# Patient Record
Sex: FEMALE | Race: WHITE | Employment: UNEMPLOYED | URBAN - METROPOLITAN AREA
[De-identification: names, ages, dates, MRNs, and addresses within clinical notes are randomized per-mention and may not be internally consistent; named-entity substitution may affect disease eponyms.]

---

## 2019-09-02 ENCOUNTER — HOSPITAL ENCOUNTER (EMERGENCY)
Age: 54
Discharge: HOME | End: 2019-09-02
Attending: STUDENT IN AN ORGANIZED HEALTH CARE EDUCATION/TRAINING PROGRAM
Payer: COMMERCIAL

## 2019-09-02 ENCOUNTER — APPOINTMENT (OUTPATIENT)
Dept: GENERAL RADIOLOGY | Age: 54
End: 2019-09-02
Payer: COMMERCIAL

## 2019-09-02 VITALS
OXYGEN SATURATION: 100 % | WEIGHT: 209 LBS | HEIGHT: 63 IN | TEMPERATURE: 97.7 F | DIASTOLIC BLOOD PRESSURE: 83 MMHG | RESPIRATION RATE: 16 BRPM | BODY MASS INDEX: 37.03 KG/M2 | SYSTOLIC BLOOD PRESSURE: 162 MMHG | HEART RATE: 90 BPM

## 2019-09-02 DIAGNOSIS — Z86.79 HISTORY OF CHF (CONGESTIVE HEART FAILURE): ICD-10-CM

## 2019-09-02 DIAGNOSIS — I10 CHRONIC HYPERTENSION: ICD-10-CM

## 2019-09-02 DIAGNOSIS — N18.5 CHRONIC RENAL FAILURE, STAGE 5 (HCC): Primary | ICD-10-CM

## 2019-09-02 DIAGNOSIS — E11.65 TYPE 2 DIABETES MELLITUS WITH HYPERGLYCEMIA, WITH LONG-TERM CURRENT USE OF INSULIN (HCC): ICD-10-CM

## 2019-09-02 DIAGNOSIS — Z79.4 TYPE 2 DIABETES MELLITUS WITH HYPERGLYCEMIA, WITH LONG-TERM CURRENT USE OF INSULIN (HCC): ICD-10-CM

## 2019-09-02 DIAGNOSIS — Z91.199 MEDICALLY NONCOMPLIANT: ICD-10-CM

## 2019-09-02 LAB
ALBUMIN SERPL-MCNC: 3.9 G/DL (ref 3.5–4.6)
ALP BLD-CCNC: 59 U/L (ref 40–130)
ALT SERPL-CCNC: 20 U/L (ref 0–33)
ANION GAP SERPL CALCULATED.3IONS-SCNC: 20 MEQ/L (ref 9–15)
APTT: 28.9 SEC (ref 24.4–36.8)
AST SERPL-CCNC: 19 U/L (ref 0–35)
BASOPHILS ABSOLUTE: 0 K/UL (ref 0–0.2)
BASOPHILS RELATIVE PERCENT: 0.6 %
BILIRUB SERPL-MCNC: 0.4 MG/DL (ref 0.2–0.7)
BUN BLDV-MCNC: 79 MG/DL (ref 6–20)
C-REACTIVE PROTEIN, HIGH SENSITIVITY: 3.4 MG/L (ref 0–5)
CALCIUM SERPL-MCNC: 8.9 MG/DL (ref 8.5–9.9)
CHLORIDE BLD-SCNC: 95 MEQ/L (ref 95–107)
CO2: 21 MEQ/L (ref 20–31)
CREAT SERPL-MCNC: 10.74 MG/DL (ref 0.5–0.9)
EKG ATRIAL RATE: 84 BPM
EKG P AXIS: 48 DEGREES
EKG P-R INTERVAL: 166 MS
EKG Q-T INTERVAL: 434 MS
EKG QRS DURATION: 88 MS
EKG QTC CALCULATION (BAZETT): 512 MS
EKG R AXIS: -10 DEGREES
EKG T AXIS: 84 DEGREES
EKG VENTRICULAR RATE: 84 BPM
EOSINOPHILS ABSOLUTE: 0.1 K/UL (ref 0–0.7)
EOSINOPHILS RELATIVE PERCENT: 1.6 %
GFR AFRICAN AMERICAN: 4.6
GFR NON-AFRICAN AMERICAN: 3.8
GLOBULIN: 4.6 G/DL (ref 2.3–3.5)
GLUCOSE BLD-MCNC: 226 MG/DL (ref 70–99)
HCT VFR BLD CALC: 35.2 % (ref 37–47)
HEMOGLOBIN: 11.6 G/DL (ref 12–16)
INR BLD: 1
LACTIC ACID: 1 MMOL/L (ref 0.5–2.2)
LYMPHOCYTES ABSOLUTE: 1.3 K/UL (ref 1–4.8)
LYMPHOCYTES RELATIVE PERCENT: 22.9 %
MCH RBC QN AUTO: 31.4 PG (ref 27–31.3)
MCHC RBC AUTO-ENTMCNC: 33 % (ref 33–37)
MCV RBC AUTO: 95.2 FL (ref 82–100)
MONOCYTES ABSOLUTE: 0.5 K/UL (ref 0.2–0.8)
MONOCYTES RELATIVE PERCENT: 8 %
NEUTROPHILS ABSOLUTE: 3.9 K/UL (ref 1.4–6.5)
NEUTROPHILS RELATIVE PERCENT: 66.9 %
PDW BLD-RTO: 14.8 % (ref 11.5–14.5)
PLATELET # BLD: 238 K/UL (ref 130–400)
POTASSIUM SERPL-SCNC: 4.5 MEQ/L (ref 3.4–4.9)
PROTHROMBIN TIME: 13.9 SEC (ref 12.3–14.9)
RBC # BLD: 3.69 M/UL (ref 4.2–5.4)
SODIUM BLD-SCNC: 136 MEQ/L (ref 135–144)
TOTAL CK: 58 U/L (ref 0–170)
TOTAL PROTEIN: 8.5 G/DL (ref 6.3–8)
TROPONIN: 0.13 NG/ML (ref 0–0.01)
WBC # BLD: 5.8 K/UL (ref 4.8–10.8)

## 2019-09-02 PROCEDURE — 93005 ELECTROCARDIOGRAM TRACING: CPT | Performed by: STUDENT IN AN ORGANIZED HEALTH CARE EDUCATION/TRAINING PROGRAM

## 2019-09-02 PROCEDURE — 6360000002 HC RX W HCPCS: Performed by: STUDENT IN AN ORGANIZED HEALTH CARE EDUCATION/TRAINING PROGRAM

## 2019-09-02 PROCEDURE — 71045 X-RAY EXAM CHEST 1 VIEW: CPT

## 2019-09-02 PROCEDURE — 99285 EMERGENCY DEPT VISIT HI MDM: CPT

## 2019-09-02 PROCEDURE — 36415 COLL VENOUS BLD VENIPUNCTURE: CPT

## 2019-09-02 PROCEDURE — 85025 COMPLETE CBC W/AUTO DIFF WBC: CPT

## 2019-09-02 PROCEDURE — 85610 PROTHROMBIN TIME: CPT

## 2019-09-02 PROCEDURE — 84484 ASSAY OF TROPONIN QUANT: CPT

## 2019-09-02 PROCEDURE — 86141 C-REACTIVE PROTEIN HS: CPT

## 2019-09-02 PROCEDURE — 85730 THROMBOPLASTIN TIME PARTIAL: CPT

## 2019-09-02 PROCEDURE — 83605 ASSAY OF LACTIC ACID: CPT

## 2019-09-02 PROCEDURE — 6370000000 HC RX 637 (ALT 250 FOR IP): Performed by: STUDENT IN AN ORGANIZED HEALTH CARE EDUCATION/TRAINING PROGRAM

## 2019-09-02 PROCEDURE — 82550 ASSAY OF CK (CPK): CPT

## 2019-09-02 PROCEDURE — 96374 THER/PROPH/DIAG INJ IV PUSH: CPT

## 2019-09-02 PROCEDURE — 80053 COMPREHEN METABOLIC PANEL: CPT

## 2019-09-02 RX ORDER — ONDANSETRON 4 MG/1
4 TABLET, ORALLY DISINTEGRATING ORAL ONCE
Status: COMPLETED | OUTPATIENT
Start: 2019-09-02 | End: 2019-09-02

## 2019-09-02 RX ORDER — HYDRALAZINE HYDROCHLORIDE 20 MG/ML
20 INJECTION INTRAMUSCULAR; INTRAVENOUS ONCE
Status: COMPLETED | OUTPATIENT
Start: 2019-09-02 | End: 2019-09-02

## 2019-09-02 RX ADMIN — ONDANSETRON 4 MG: 4 TABLET, ORALLY DISINTEGRATING ORAL at 15:50

## 2019-09-02 RX ADMIN — HYDRALAZINE HYDROCHLORIDE 20 MG: 20 INJECTION INTRAMUSCULAR; INTRAVENOUS at 14:58

## 2019-09-02 SDOH — HEALTH STABILITY: MENTAL HEALTH: HOW OFTEN DO YOU HAVE A DRINK CONTAINING ALCOHOL?: NEVER

## 2019-09-02 ASSESSMENT — ENCOUNTER SYMPTOMS
DIARRHEA: 0
TROUBLE SWALLOWING: 0
ABDOMINAL PAIN: 0
SINUS PRESSURE: 0
COUGH: 1
SHORTNESS OF BREATH: 1
VOMITING: 0
CHEST TIGHTNESS: 0
BACK PAIN: 0

## 2019-09-02 NOTE — ED PROVIDER NOTES
words are mis-transcribed.)    Bela Suazo,  (electronically signed)  Attending Emergency Physician          Bela Suazo,   09/02/19 Francheska Vasques [place] [address], DO  09/09/19 1673

## 2019-09-03 PROCEDURE — 93010 ELECTROCARDIOGRAM REPORT: CPT | Performed by: INTERNAL MEDICINE

## 2019-09-09 ASSESSMENT — ENCOUNTER SYMPTOMS
SHORTNESS OF BREATH: 1
VOMITING: 0
BACK PAIN: 0
SINUS PRESSURE: 0
COUGH: 1
TROUBLE SWALLOWING: 0
CHEST TIGHTNESS: 0
ABDOMINAL PAIN: 0
DIARRHEA: 0

## 2019-10-13 ENCOUNTER — HOSPITAL ENCOUNTER (INPATIENT)
Age: 54
LOS: 2 days | Discharge: ANOTHER HOSPITAL | DRG: 191 | End: 2019-10-16
Attending: STUDENT IN AN ORGANIZED HEALTH CARE EDUCATION/TRAINING PROGRAM | Admitting: INTERNAL MEDICINE
Payer: COMMERCIAL

## 2019-10-13 ENCOUNTER — APPOINTMENT (OUTPATIENT)
Dept: CT IMAGING | Age: 54
DRG: 191 | End: 2019-10-13
Payer: COMMERCIAL

## 2019-10-13 ENCOUNTER — APPOINTMENT (OUTPATIENT)
Dept: GENERAL RADIOLOGY | Age: 54
DRG: 191 | End: 2019-10-13
Payer: COMMERCIAL

## 2019-10-13 DIAGNOSIS — I20.8 ANGINAL CHEST PAIN AT REST (HCC): Primary | ICD-10-CM

## 2019-10-13 DIAGNOSIS — E11.65 TYPE 2 DIABETES MELLITUS WITH HYPERGLYCEMIA, WITH LONG-TERM CURRENT USE OF INSULIN (HCC): ICD-10-CM

## 2019-10-13 DIAGNOSIS — N18.6 END STAGE RENAL FAILURE ON DIALYSIS (HCC): ICD-10-CM

## 2019-10-13 DIAGNOSIS — Z99.2 END STAGE RENAL FAILURE ON DIALYSIS (HCC): ICD-10-CM

## 2019-10-13 DIAGNOSIS — E66.01 MORBID OBESITY DUE TO EXCESS CALORIES (HCC): ICD-10-CM

## 2019-10-13 DIAGNOSIS — Z79.4 TYPE 2 DIABETES MELLITUS WITH HYPERGLYCEMIA, WITH LONG-TERM CURRENT USE OF INSULIN (HCC): ICD-10-CM

## 2019-10-13 PROBLEM — R07.9 CHEST PAIN: Status: ACTIVE | Noted: 2019-10-13

## 2019-10-13 LAB
ALBUMIN SERPL-MCNC: 3.5 G/DL (ref 3.5–4.6)
ALP BLD-CCNC: 59 U/L (ref 40–130)
ALT SERPL-CCNC: 31 U/L (ref 0–33)
ANION GAP SERPL CALCULATED.3IONS-SCNC: 18 MEQ/L (ref 9–15)
APTT: 32.1 SEC (ref 24.4–36.8)
AST SERPL-CCNC: 25 U/L (ref 0–35)
BASOPHILS ABSOLUTE: 0 K/UL (ref 0–0.2)
BASOPHILS RELATIVE PERCENT: 0.5 %
BILIRUB SERPL-MCNC: <0.2 MG/DL (ref 0.2–0.7)
BUN BLDV-MCNC: 53 MG/DL (ref 6–20)
C-REACTIVE PROTEIN, HIGH SENSITIVITY: 3.7 MG/L (ref 0–5)
CALCIUM SERPL-MCNC: 8.8 MG/DL (ref 8.5–9.9)
CHLORIDE BLD-SCNC: 95 MEQ/L (ref 95–107)
CHOLESTEROL, TOTAL: 119 MG/DL (ref 0–199)
CO2: 26 MEQ/L (ref 20–31)
CREAT SERPL-MCNC: 8.11 MG/DL (ref 0.5–0.9)
D DIMER: 0.93 MG/L FEU (ref 0–0.5)
EOSINOPHILS ABSOLUTE: 0.3 K/UL (ref 0–0.7)
EOSINOPHILS RELATIVE PERCENT: 4.8 %
GFR AFRICAN AMERICAN: 6.3
GFR NON-AFRICAN AMERICAN: 5.2
GLOBULIN: 4.4 G/DL (ref 2.3–3.5)
GLUCOSE BLD-MCNC: 100 MG/DL (ref 60–115)
GLUCOSE BLD-MCNC: 183 MG/DL (ref 70–99)
GONADOTROPIN, CHORIONIC (HCG) QUANT: 4.2 MIU/ML
HCT VFR BLD CALC: 27.8 % (ref 37–47)
HDLC SERPL-MCNC: 30 MG/DL (ref 40–59)
HEMOGLOBIN: 9.7 G/DL (ref 12–16)
INR BLD: 1
LDL CHOLESTEROL CALCULATED: 60 MG/DL (ref 0–129)
LYMPHOCYTES ABSOLUTE: 1.5 K/UL (ref 1–4.8)
LYMPHOCYTES RELATIVE PERCENT: 27.9 %
MAGNESIUM: 2 MG/DL (ref 1.7–2.4)
MCH RBC QN AUTO: 33.3 PG (ref 27–31.3)
MCHC RBC AUTO-ENTMCNC: 35 % (ref 33–37)
MCV RBC AUTO: 95.1 FL (ref 82–100)
MONOCYTES ABSOLUTE: 0.6 K/UL (ref 0.2–0.8)
MONOCYTES RELATIVE PERCENT: 11.4 %
NEUTROPHILS ABSOLUTE: 2.9 K/UL (ref 1.4–6.5)
NEUTROPHILS RELATIVE PERCENT: 55.4 %
PDW BLD-RTO: 13.8 % (ref 11.5–14.5)
PERFORMED ON: NORMAL
PLATELET # BLD: 224 K/UL (ref 130–400)
POTASSIUM SERPL-SCNC: 4 MEQ/L (ref 3.4–4.9)
PRO-BNP: NORMAL PG/ML
PROTHROMBIN TIME: 13.6 SEC (ref 12.3–14.9)
RBC # BLD: 2.93 M/UL (ref 4.2–5.4)
SODIUM BLD-SCNC: 139 MEQ/L (ref 135–144)
TOTAL CK: 47 U/L (ref 0–170)
TOTAL PROTEIN: 7.9 G/DL (ref 6.3–8)
TRIGL SERPL-MCNC: 146 MG/DL (ref 0–150)
TROPONIN: 0.12 NG/ML (ref 0–0.01)
TROPONIN: 0.12 NG/ML (ref 0–0.01)
TSH SERPL DL<=0.05 MIU/L-ACNC: 0.85 UIU/ML (ref 0.44–3.86)
WBC # BLD: 5.2 K/UL (ref 4.8–10.8)

## 2019-10-13 PROCEDURE — 6370000000 HC RX 637 (ALT 250 FOR IP): Performed by: NURSE PRACTITIONER

## 2019-10-13 PROCEDURE — 2580000003 HC RX 258: Performed by: NURSE PRACTITIONER

## 2019-10-13 PROCEDURE — 80061 LIPID PANEL: CPT

## 2019-10-13 PROCEDURE — 85379 FIBRIN DEGRADATION QUANT: CPT

## 2019-10-13 PROCEDURE — 80053 COMPREHEN METABOLIC PANEL: CPT

## 2019-10-13 PROCEDURE — 36415 COLL VENOUS BLD VENIPUNCTURE: CPT

## 2019-10-13 PROCEDURE — 86141 C-REACTIVE PROTEIN HS: CPT

## 2019-10-13 PROCEDURE — 83880 ASSAY OF NATRIURETIC PEPTIDE: CPT

## 2019-10-13 PROCEDURE — 83735 ASSAY OF MAGNESIUM: CPT

## 2019-10-13 PROCEDURE — 84443 ASSAY THYROID STIM HORMONE: CPT

## 2019-10-13 PROCEDURE — 85025 COMPLETE CBC W/AUTO DIFF WBC: CPT

## 2019-10-13 PROCEDURE — 6370000000 HC RX 637 (ALT 250 FOR IP): Performed by: STUDENT IN AN ORGANIZED HEALTH CARE EDUCATION/TRAINING PROGRAM

## 2019-10-13 PROCEDURE — 71046 X-RAY EXAM CHEST 2 VIEWS: CPT

## 2019-10-13 PROCEDURE — 85730 THROMBOPLASTIN TIME PARTIAL: CPT

## 2019-10-13 PROCEDURE — 99285 EMERGENCY DEPT VISIT HI MDM: CPT

## 2019-10-13 PROCEDURE — G0378 HOSPITAL OBSERVATION PER HR: HCPCS

## 2019-10-13 PROCEDURE — 85610 PROTHROMBIN TIME: CPT

## 2019-10-13 PROCEDURE — 84484 ASSAY OF TROPONIN QUANT: CPT

## 2019-10-13 PROCEDURE — 84702 CHORIONIC GONADOTROPIN TEST: CPT

## 2019-10-13 PROCEDURE — 71275 CT ANGIOGRAPHY CHEST: CPT

## 2019-10-13 PROCEDURE — 93005 ELECTROCARDIOGRAM TRACING: CPT | Performed by: STUDENT IN AN ORGANIZED HEALTH CARE EDUCATION/TRAINING PROGRAM

## 2019-10-13 PROCEDURE — 6360000004 HC RX CONTRAST MEDICATION: Performed by: STUDENT IN AN ORGANIZED HEALTH CARE EDUCATION/TRAINING PROGRAM

## 2019-10-13 PROCEDURE — 82550 ASSAY OF CK (CPK): CPT

## 2019-10-13 RX ORDER — SODIUM CHLORIDE 0.9 % (FLUSH) 0.9 %
10 SYRINGE (ML) INJECTION PRN
Status: DISCONTINUED | OUTPATIENT
Start: 2019-10-13 | End: 2019-10-17 | Stop reason: HOSPADM

## 2019-10-13 RX ORDER — SODIUM CHLORIDE 0.9 % (FLUSH) 0.9 %
10 SYRINGE (ML) INJECTION EVERY 12 HOURS SCHEDULED
Status: DISCONTINUED | OUTPATIENT
Start: 2019-10-13 | End: 2019-10-14

## 2019-10-13 RX ORDER — FLUOXETINE 10 MG/1
40 CAPSULE ORAL DAILY
Status: ON HOLD | COMMUNITY
End: 2020-01-01 | Stop reason: HOSPADM

## 2019-10-13 RX ORDER — ONDANSETRON 2 MG/ML
4 INJECTION INTRAMUSCULAR; INTRAVENOUS EVERY 6 HOURS PRN
Status: DISCONTINUED | OUTPATIENT
Start: 2019-10-13 | End: 2019-10-17 | Stop reason: HOSPADM

## 2019-10-13 RX ORDER — ACETAMINOPHEN 500 MG
1000 TABLET ORAL ONCE
Status: COMPLETED | OUTPATIENT
Start: 2019-10-13 | End: 2019-10-13

## 2019-10-13 RX ORDER — ASPIRIN 81 MG/1
81 TABLET, CHEWABLE ORAL DAILY
Status: DISCONTINUED | OUTPATIENT
Start: 2019-10-14 | End: 2019-10-17 | Stop reason: HOSPADM

## 2019-10-13 RX ORDER — DEXTROSE MONOHYDRATE 25 G/50ML
12.5 INJECTION, SOLUTION INTRAVENOUS PRN
Status: DISCONTINUED | OUTPATIENT
Start: 2019-10-13 | End: 2019-10-17 | Stop reason: HOSPADM

## 2019-10-13 RX ORDER — ATORVASTATIN CALCIUM 40 MG/1
40 TABLET, FILM COATED ORAL NIGHTLY
Status: DISCONTINUED | OUTPATIENT
Start: 2019-10-13 | End: 2019-10-17 | Stop reason: HOSPADM

## 2019-10-13 RX ORDER — NICOTINE POLACRILEX 4 MG
15 LOZENGE BUCCAL PRN
Status: DISCONTINUED | OUTPATIENT
Start: 2019-10-13 | End: 2019-10-17 | Stop reason: HOSPADM

## 2019-10-13 RX ORDER — CLONIDINE HYDROCHLORIDE 0.1 MG/1
0.1 TABLET ORAL NIGHTLY
Status: ON HOLD | COMMUNITY
End: 2020-01-01 | Stop reason: HOSPADM

## 2019-10-13 RX ORDER — NITROGLYCERIN 0.4 MG/1
0.4 TABLET SUBLINGUAL EVERY 5 MIN PRN
Status: DISCONTINUED | OUTPATIENT
Start: 2019-10-13 | End: 2019-10-17 | Stop reason: HOSPADM

## 2019-10-13 RX ORDER — NICOTINE 21 MG/24HR
1 PATCH, TRANSDERMAL 24 HOURS TRANSDERMAL DAILY
Status: DISCONTINUED | OUTPATIENT
Start: 2019-10-13 | End: 2019-10-14

## 2019-10-13 RX ORDER — SENNA PLUS 8.6 MG/1
1 TABLET ORAL DAILY PRN
Status: DISCONTINUED | OUTPATIENT
Start: 2019-10-13 | End: 2019-10-17 | Stop reason: HOSPADM

## 2019-10-13 RX ORDER — DEXTROSE MONOHYDRATE 50 MG/ML
100 INJECTION, SOLUTION INTRAVENOUS PRN
Status: DISCONTINUED | OUTPATIENT
Start: 2019-10-13 | End: 2019-10-17 | Stop reason: HOSPADM

## 2019-10-13 RX ADMIN — Medication 10 ML: at 21:58

## 2019-10-13 RX ADMIN — ATORVASTATIN CALCIUM 40 MG: 40 TABLET, FILM COATED ORAL at 21:57

## 2019-10-13 RX ADMIN — NITROGLYCERIN 0.4 MG: 0.4 TABLET, ORALLY DISINTEGRATING SUBLINGUAL at 16:17

## 2019-10-13 RX ADMIN — ACETAMINOPHEN 1000 MG: 500 TABLET ORAL at 15:20

## 2019-10-13 RX ADMIN — IOPAMIDOL 100 ML: 612 INJECTION, SOLUTION INTRAVENOUS at 16:28

## 2019-10-13 ASSESSMENT — ENCOUNTER SYMPTOMS
CHEST TIGHTNESS: 0
ABDOMINAL PAIN: 0
EYES NEGATIVE: 1
CHEST TIGHTNESS: 1
COUGH: 0
SHORTNESS OF BREATH: 1
TROUBLE SWALLOWING: 0
VOMITING: 0
GASTROINTESTINAL NEGATIVE: 1
BACK PAIN: 0
ALLERGIC/IMMUNOLOGIC NEGATIVE: 1
SINUS PRESSURE: 0
DIARRHEA: 0

## 2019-10-13 ASSESSMENT — HEART SCORE: ECG: 1

## 2019-10-13 ASSESSMENT — PAIN SCALES - GENERAL
PAINLEVEL_OUTOF10: 9
PAINLEVEL_OUTOF10: 8
PAINLEVEL_OUTOF10: 2

## 2019-10-13 ASSESSMENT — PAIN DESCRIPTION - PAIN TYPE: TYPE: ACUTE PAIN

## 2019-10-13 ASSESSMENT — PAIN DESCRIPTION - LOCATION: LOCATION: CHEST

## 2019-10-14 ENCOUNTER — APPOINTMENT (OUTPATIENT)
Dept: CARDIAC CATH/INVASIVE PROCEDURES | Age: 54
DRG: 191 | End: 2019-10-14
Payer: COMMERCIAL

## 2019-10-14 LAB
ALBUMIN SERPL-MCNC: 3.4 G/DL (ref 3.5–4.6)
ALP BLD-CCNC: 55 U/L (ref 40–130)
ALT SERPL-CCNC: 29 U/L (ref 0–33)
ANION GAP SERPL CALCULATED.3IONS-SCNC: 18 MEQ/L (ref 9–15)
AST SERPL-CCNC: 21 U/L (ref 0–35)
BILIRUB SERPL-MCNC: <0.2 MG/DL (ref 0.2–0.7)
BUN BLDV-MCNC: 58 MG/DL (ref 6–20)
CALCIUM SERPL-MCNC: 9 MG/DL (ref 8.5–9.9)
CHLORIDE BLD-SCNC: 97 MEQ/L (ref 95–107)
CO2: 24 MEQ/L (ref 20–31)
CREAT SERPL-MCNC: 8.84 MG/DL (ref 0.5–0.9)
EKG ATRIAL RATE: 73 BPM
EKG ATRIAL RATE: 86 BPM
EKG P AXIS: 39 DEGREES
EKG P AXIS: 46 DEGREES
EKG P-R INTERVAL: 162 MS
EKG P-R INTERVAL: 176 MS
EKG Q-T INTERVAL: 426 MS
EKG Q-T INTERVAL: 472 MS
EKG QRS DURATION: 82 MS
EKG QRS DURATION: 98 MS
EKG QTC CALCULATION (BAZETT): 509 MS
EKG QTC CALCULATION (BAZETT): 519 MS
EKG R AXIS: -1 DEGREES
EKG R AXIS: 0 DEGREES
EKG T AXIS: 12 DEGREES
EKG T AXIS: 3 DEGREES
EKG VENTRICULAR RATE: 73 BPM
EKG VENTRICULAR RATE: 86 BPM
GFR AFRICAN AMERICAN: 5.7
GFR NON-AFRICAN AMERICAN: 4.7
GLOBULIN: 4.3 G/DL (ref 2.3–3.5)
GLUCOSE BLD-MCNC: 118 MG/DL (ref 70–99)
GLUCOSE BLD-MCNC: 129 MG/DL (ref 60–115)
GLUCOSE BLD-MCNC: 154 MG/DL (ref 60–115)
GLUCOSE BLD-MCNC: 222 MG/DL (ref 60–115)
GLUCOSE BLD-MCNC: 226 MG/DL (ref 60–115)
HCT VFR BLD CALC: 29.6 % (ref 37–47)
HEMOGLOBIN: 10.1 G/DL (ref 12–16)
MCH RBC QN AUTO: 32.6 PG (ref 27–31.3)
MCHC RBC AUTO-ENTMCNC: 34.2 % (ref 33–37)
MCV RBC AUTO: 95.4 FL (ref 82–100)
PDW BLD-RTO: 13.9 % (ref 11.5–14.5)
PERFORMED ON: ABNORMAL
PLATELET # BLD: 228 K/UL (ref 130–400)
POTASSIUM REFLEX MAGNESIUM: 4.3 MEQ/L (ref 3.4–4.9)
PRO-BNP: NORMAL PG/ML
RBC # BLD: 3.1 M/UL (ref 4.2–5.4)
SODIUM BLD-SCNC: 139 MEQ/L (ref 135–144)
TOTAL PROTEIN: 7.7 G/DL (ref 6.3–8)
TROPONIN: 0.11 NG/ML (ref 0–0.01)
WBC # BLD: 6 K/UL (ref 4.8–10.8)

## 2019-10-14 PROCEDURE — 6360000004 HC RX CONTRAST MEDICATION: Performed by: INTERNAL MEDICINE

## 2019-10-14 PROCEDURE — 2580000003 HC RX 258

## 2019-10-14 PROCEDURE — 85027 COMPLETE CBC AUTOMATED: CPT

## 2019-10-14 PROCEDURE — 4A023N8 MEASUREMENT OF CARDIAC SAMPLING AND PRESSURE, BILATERAL, PERCUTANEOUS APPROACH: ICD-10-PCS | Performed by: INTERNAL MEDICINE

## 2019-10-14 PROCEDURE — 6370000000 HC RX 637 (ALT 250 FOR IP): Performed by: NURSE PRACTITIONER

## 2019-10-14 PROCEDURE — 75716 ARTERY X-RAYS ARMS/LEGS: CPT | Performed by: INTERNAL MEDICINE

## 2019-10-14 PROCEDURE — 2500000003 HC RX 250 WO HCPCS

## 2019-10-14 PROCEDURE — 93458 L HRT ARTERY/VENTRICLE ANGIO: CPT | Performed by: INTERNAL MEDICINE

## 2019-10-14 PROCEDURE — B2151ZZ FLUOROSCOPY OF LEFT HEART USING LOW OSMOLAR CONTRAST: ICD-10-PCS | Performed by: INTERNAL MEDICINE

## 2019-10-14 PROCEDURE — C1894 INTRO/SHEATH, NON-LASER: HCPCS

## 2019-10-14 PROCEDURE — 93005 ELECTROCARDIOGRAM TRACING: CPT | Performed by: NURSE PRACTITIONER

## 2019-10-14 PROCEDURE — 75630 X-RAY AORTA LEG ARTERIES: CPT | Performed by: INTERNAL MEDICINE

## 2019-10-14 PROCEDURE — 2709999900 HC NON-CHARGEABLE SUPPLY

## 2019-10-14 PROCEDURE — 2500000003 HC RX 250 WO HCPCS: Performed by: INTERNAL MEDICINE

## 2019-10-14 PROCEDURE — B4101ZZ FLUOROSCOPY OF ABDOMINAL AORTA USING LOW OSMOLAR CONTRAST: ICD-10-PCS | Performed by: INTERNAL MEDICINE

## 2019-10-14 PROCEDURE — C1769 GUIDE WIRE: HCPCS

## 2019-10-14 PROCEDURE — 83880 ASSAY OF NATRIURETIC PEPTIDE: CPT

## 2019-10-14 PROCEDURE — 2060000000 HC ICU INTERMEDIATE R&B

## 2019-10-14 PROCEDURE — 6360000002 HC RX W HCPCS

## 2019-10-14 PROCEDURE — 99254 IP/OBS CNSLTJ NEW/EST MOD 60: CPT | Performed by: INTERNAL MEDICINE

## 2019-10-14 PROCEDURE — 36415 COLL VENOUS BLD VENIPUNCTURE: CPT

## 2019-10-14 PROCEDURE — 80053 COMPREHEN METABOLIC PANEL: CPT

## 2019-10-14 PROCEDURE — 6370000000 HC RX 637 (ALT 250 FOR IP): Performed by: INTERNAL MEDICINE

## 2019-10-14 RX ORDER — CARVEDILOL 12.5 MG/1
12.5 TABLET ORAL 2 TIMES DAILY
Status: ON HOLD | COMMUNITY
End: 2020-01-01 | Stop reason: HOSPADM

## 2019-10-14 RX ORDER — HYDRALAZINE HYDROCHLORIDE 20 MG/ML
10 INJECTION INTRAMUSCULAR; INTRAVENOUS EVERY 6 HOURS PRN
Status: DISCONTINUED | OUTPATIENT
Start: 2019-10-14 | End: 2019-10-17 | Stop reason: HOSPADM

## 2019-10-14 RX ORDER — CARVEDILOL 12.5 MG/1
12.5 TABLET ORAL 2 TIMES DAILY WITH MEALS
Status: DISCONTINUED | OUTPATIENT
Start: 2019-10-14 | End: 2019-10-17 | Stop reason: HOSPADM

## 2019-10-14 RX ORDER — TOPIRAMATE 25 MG/1
25 TABLET ORAL 2 TIMES DAILY
Status: ON HOLD | COMMUNITY
End: 2020-01-01 | Stop reason: HOSPADM

## 2019-10-14 RX ORDER — M-VIT,TX,IRON,MINS/CALC/FOLIC 27MG-0.4MG
1 TABLET ORAL DAILY
Status: ON HOLD | COMMUNITY
End: 2020-01-01 | Stop reason: HOSPADM

## 2019-10-14 RX ORDER — NYSTATIN 10B UNIT
POWDER (EA) MISCELLANEOUS 2 TIMES DAILY
COMMUNITY

## 2019-10-14 RX ORDER — ASPIRIN 81 MG/1
81 TABLET ORAL ONCE
Status: DISCONTINUED | OUTPATIENT
Start: 2019-10-14 | End: 2019-10-17 | Stop reason: HOSPADM

## 2019-10-14 RX ORDER — ATORVASTATIN CALCIUM 40 MG/1
40 TABLET, FILM COATED ORAL NIGHTLY
COMMUNITY

## 2019-10-14 RX ORDER — SODIUM CHLORIDE 9 MG/ML
INJECTION, SOLUTION INTRAVENOUS CONTINUOUS
Status: DISCONTINUED | OUTPATIENT
Start: 2019-10-14 | End: 2019-10-17 | Stop reason: HOSPADM

## 2019-10-14 RX ORDER — SODIUM CHLORIDE 0.9 % (FLUSH) 0.9 %
10 SYRINGE (ML) INJECTION PRN
Status: DISCONTINUED | OUTPATIENT
Start: 2019-10-14 | End: 2019-10-17 | Stop reason: HOSPADM

## 2019-10-14 RX ORDER — DIPHENHYDRAMINE HCL 25 MG
50 TABLET ORAL ONCE
Status: DISCONTINUED | OUTPATIENT
Start: 2019-10-14 | End: 2019-10-17 | Stop reason: HOSPADM

## 2019-10-14 RX ORDER — DILTIAZEM HYDROCHLORIDE 240 MG/1
240 CAPSULE, COATED, EXTENDED RELEASE ORAL DAILY
Status: DISCONTINUED | OUTPATIENT
Start: 2019-10-14 | End: 2019-10-17 | Stop reason: HOSPADM

## 2019-10-14 RX ORDER — ONDANSETRON 2 MG/ML
4 INJECTION INTRAMUSCULAR; INTRAVENOUS EVERY 6 HOURS PRN
Status: DISCONTINUED | OUTPATIENT
Start: 2019-10-14 | End: 2019-10-17 | Stop reason: HOSPADM

## 2019-10-14 RX ORDER — OXYCODONE HYDROCHLORIDE AND ACETAMINOPHEN 5; 325 MG/1; MG/1
1 TABLET ORAL EVERY 4 HOURS PRN
Status: DISCONTINUED | OUTPATIENT
Start: 2019-10-14 | End: 2019-10-17 | Stop reason: HOSPADM

## 2019-10-14 RX ORDER — SODIUM CHLORIDE 0.9 % (FLUSH) 0.9 %
10 SYRINGE (ML) INJECTION EVERY 12 HOURS SCHEDULED
Status: DISCONTINUED | OUTPATIENT
Start: 2019-10-14 | End: 2019-10-14

## 2019-10-14 RX ORDER — FLUOXETINE HYDROCHLORIDE 20 MG/1
40 CAPSULE ORAL DAILY
Status: DISCONTINUED | OUTPATIENT
Start: 2019-10-14 | End: 2019-10-17 | Stop reason: HOSPADM

## 2019-10-14 RX ORDER — ALPRAZOLAM 0.5 MG/1
0.5 TABLET ORAL
Status: ACTIVE | OUTPATIENT
Start: 2019-10-14 | End: 2019-10-14

## 2019-10-14 RX ORDER — INSULIN GLARGINE 100 [IU]/ML
10 INJECTION, SOLUTION SUBCUTANEOUS NIGHTLY
Status: DISCONTINUED | OUTPATIENT
Start: 2019-10-14 | End: 2019-10-16

## 2019-10-14 RX ORDER — CYCLOBENZAPRINE HCL 5 MG
5 TABLET ORAL 3 TIMES DAILY PRN
Status: DISCONTINUED | OUTPATIENT
Start: 2019-10-14 | End: 2019-10-17 | Stop reason: HOSPADM

## 2019-10-14 RX ORDER — CHOLECALCIFEROL (VITAMIN D3) 10 MCG
1 TABLET ORAL DAILY
COMMUNITY

## 2019-10-14 RX ORDER — CLONIDINE HYDROCHLORIDE 0.1 MG/1
0.1 TABLET ORAL NIGHTLY
Status: DISCONTINUED | OUTPATIENT
Start: 2019-10-14 | End: 2019-10-17 | Stop reason: HOSPADM

## 2019-10-14 RX ORDER — DILTIAZEM HYDROCHLORIDE 240 MG/1
240 CAPSULE, EXTENDED RELEASE ORAL DAILY
Status: ON HOLD | COMMUNITY
End: 2020-01-01

## 2019-10-14 RX ORDER — PREDNISONE 50 MG/1
50 TABLET ORAL ONCE
Status: DISCONTINUED | OUTPATIENT
Start: 2019-10-14 | End: 2019-10-17 | Stop reason: HOSPADM

## 2019-10-14 RX ORDER — NITROGLYCERIN 0.4 MG/1
0.4 TABLET SUBLINGUAL EVERY 5 MIN PRN
Status: DISCONTINUED | OUTPATIENT
Start: 2019-10-14 | End: 2019-10-17 | Stop reason: HOSPADM

## 2019-10-14 RX ORDER — ACETAMINOPHEN 325 MG/1
650 TABLET ORAL EVERY 4 HOURS PRN
Status: DISCONTINUED | OUTPATIENT
Start: 2019-10-14 | End: 2019-10-17 | Stop reason: HOSPADM

## 2019-10-14 RX ADMIN — IOPAMIDOL 85 ML: 612 INJECTION, SOLUTION INTRAVENOUS at 16:24

## 2019-10-14 RX ADMIN — INSULIN GLARGINE 10 UNITS: 100 INJECTION, SOLUTION SUBCUTANEOUS at 20:50

## 2019-10-14 RX ADMIN — ATORVASTATIN CALCIUM 40 MG: 40 TABLET, FILM COATED ORAL at 20:50

## 2019-10-14 RX ADMIN — OXYCODONE HYDROCHLORIDE AND ACETAMINOPHEN 1 TABLET: 5; 325 TABLET ORAL at 10:35

## 2019-10-14 RX ADMIN — CARVEDILOL 12.5 MG: 12.5 TABLET, FILM COATED ORAL at 20:49

## 2019-10-14 RX ADMIN — CARVEDILOL 12.5 MG: 12.5 TABLET, FILM COATED ORAL at 10:35

## 2019-10-14 RX ADMIN — MICONAZOLE NITRATE: 20 POWDER TOPICAL at 20:49

## 2019-10-14 RX ADMIN — DILTIAZEM HYDROCHLORIDE 240 MG: 240 CAPSULE, COATED, EXTENDED RELEASE ORAL at 10:37

## 2019-10-14 RX ADMIN — CLONIDINE HYDROCHLORIDE 0.1 MG: 0.1 TABLET ORAL at 01:51

## 2019-10-14 RX ADMIN — OXYCODONE HYDROCHLORIDE AND ACETAMINOPHEN 1 TABLET: 5; 325 TABLET ORAL at 20:49

## 2019-10-14 RX ADMIN — ASPIRIN 81 MG 81 MG: 81 TABLET ORAL at 10:34

## 2019-10-14 RX ADMIN — FLUOXETINE 40 MG: 20 CAPSULE ORAL at 10:34

## 2019-10-14 RX ADMIN — CLONIDINE HYDROCHLORIDE 0.1 MG: 0.1 TABLET ORAL at 20:50

## 2019-10-14 ASSESSMENT — ENCOUNTER SYMPTOMS
GASTROINTESTINAL NEGATIVE: 1
WHEEZING: 0
VOMITING: 0
ABDOMINAL PAIN: 0
NAUSEA: 0
EYES NEGATIVE: 1
SHORTNESS OF BREATH: 1
ALLERGIC/IMMUNOLOGIC NEGATIVE: 1

## 2019-10-14 ASSESSMENT — PAIN SCALES - GENERAL
PAINLEVEL_OUTOF10: 0
PAINLEVEL_OUTOF10: 7
PAINLEVEL_OUTOF10: 8

## 2019-10-15 PROBLEM — I20.8 ANGINA AT REST (HCC): Status: ACTIVE | Noted: 2019-10-13

## 2019-10-15 PROBLEM — I20.9 ANGINA PECTORIS, UNSPECIFIED (HCC): Status: ACTIVE | Noted: 2019-10-13

## 2019-10-15 PROBLEM — I20.89 ANGINA AT REST: Status: ACTIVE | Noted: 2019-10-13

## 2019-10-15 PROBLEM — I25.118 CORONARY ARTERY DISEASE OF NATIVE ARTERY OF NATIVE HEART WITH STABLE ANGINA PECTORIS (HCC): Status: ACTIVE | Noted: 2019-10-15

## 2019-10-15 LAB
ANION GAP SERPL CALCULATED.3IONS-SCNC: 18 MEQ/L (ref 9–15)
BASOPHILS ABSOLUTE: 0 K/UL (ref 0–0.2)
BASOPHILS RELATIVE PERCENT: 0.6 %
BUN BLDV-MCNC: 63 MG/DL (ref 6–20)
CALCIUM SERPL-MCNC: 8.9 MG/DL (ref 8.5–9.9)
CHLORIDE BLD-SCNC: 95 MEQ/L (ref 95–107)
CO2: 23 MEQ/L (ref 20–31)
CREAT SERPL-MCNC: 9.65 MG/DL (ref 0.5–0.9)
EOSINOPHILS ABSOLUTE: 0.3 K/UL (ref 0–0.7)
EOSINOPHILS RELATIVE PERCENT: 4.6 %
GFR AFRICAN AMERICAN: 5.2
GFR NON-AFRICAN AMERICAN: 4.3
GLUCOSE BLD-MCNC: 131 MG/DL (ref 60–115)
GLUCOSE BLD-MCNC: 134 MG/DL (ref 70–99)
GLUCOSE BLD-MCNC: 144 MG/DL (ref 60–115)
GLUCOSE BLD-MCNC: 167 MG/DL (ref 60–115)
HCT VFR BLD CALC: 28.3 % (ref 37–47)
HEMOGLOBIN: 9.6 G/DL (ref 12–16)
LYMPHOCYTES ABSOLUTE: 1.5 K/UL (ref 1–4.8)
LYMPHOCYTES RELATIVE PERCENT: 25.5 %
MCH RBC QN AUTO: 32.9 PG (ref 27–31.3)
MCHC RBC AUTO-ENTMCNC: 34 % (ref 33–37)
MCV RBC AUTO: 96.7 FL (ref 82–100)
MONOCYTES ABSOLUTE: 0.7 K/UL (ref 0.2–0.8)
MONOCYTES RELATIVE PERCENT: 11.1 %
NEUTROPHILS ABSOLUTE: 3.5 K/UL (ref 1.4–6.5)
NEUTROPHILS RELATIVE PERCENT: 58.2 %
PDW BLD-RTO: 14.1 % (ref 11.5–14.5)
PERFORMED ON: ABNORMAL
PLATELET # BLD: 226 K/UL (ref 130–400)
POTASSIUM SERPL-SCNC: 4.7 MEQ/L (ref 3.4–4.9)
RBC # BLD: 2.92 M/UL (ref 4.2–5.4)
SODIUM BLD-SCNC: 136 MEQ/L (ref 135–144)
WBC # BLD: 6 K/UL (ref 4.8–10.8)

## 2019-10-15 PROCEDURE — 2580000003 HC RX 258: Performed by: INTERNAL MEDICINE

## 2019-10-15 PROCEDURE — 2060000000 HC ICU INTERMEDIATE R&B

## 2019-10-15 PROCEDURE — 99233 SBSQ HOSP IP/OBS HIGH 50: CPT | Performed by: INTERNAL MEDICINE

## 2019-10-15 PROCEDURE — 6370000000 HC RX 637 (ALT 250 FOR IP): Performed by: INTERNAL MEDICINE

## 2019-10-15 PROCEDURE — 5A1D70Z PERFORMANCE OF URINARY FILTRATION, INTERMITTENT, LESS THAN 6 HOURS PER DAY: ICD-10-PCS | Performed by: STUDENT IN AN ORGANIZED HEALTH CARE EDUCATION/TRAINING PROGRAM

## 2019-10-15 PROCEDURE — 85025 COMPLETE CBC W/AUTO DIFF WBC: CPT

## 2019-10-15 PROCEDURE — 36415 COLL VENOUS BLD VENIPUNCTURE: CPT

## 2019-10-15 PROCEDURE — 6360000002 HC RX W HCPCS: Performed by: INTERNAL MEDICINE

## 2019-10-15 PROCEDURE — 6370000000 HC RX 637 (ALT 250 FOR IP): Performed by: NURSE PRACTITIONER

## 2019-10-15 PROCEDURE — 80048 BASIC METABOLIC PNL TOTAL CA: CPT

## 2019-10-15 RX ORDER — ASPIRIN 81 MG/1
81 TABLET, CHEWABLE ORAL DAILY
Qty: 30 TABLET | Refills: 3 | DISCHARGE
Start: 2019-10-15

## 2019-10-15 RX ORDER — HEPARIN SODIUM 5000 [USP'U]/ML
5000 INJECTION, SOLUTION INTRAVENOUS; SUBCUTANEOUS EVERY 8 HOURS SCHEDULED
Status: DISCONTINUED | OUTPATIENT
Start: 2019-10-15 | End: 2019-10-17 | Stop reason: HOSPADM

## 2019-10-15 RX ORDER — NITROGLYCERIN 0.4 MG/1
TABLET SUBLINGUAL
Qty: 25 TABLET | Refills: 3 | Status: ON HOLD | DISCHARGE
Start: 2019-10-15 | End: 2020-01-01

## 2019-10-15 RX ORDER — RANOLAZINE 500 MG/1
500 TABLET, EXTENDED RELEASE ORAL 2 TIMES DAILY
Status: DISCONTINUED | OUTPATIENT
Start: 2019-10-15 | End: 2019-10-17 | Stop reason: HOSPADM

## 2019-10-15 RX ADMIN — ASPIRIN 81 MG 81 MG: 81 TABLET ORAL at 08:41

## 2019-10-15 RX ADMIN — Medication 10 ML: at 20:48

## 2019-10-15 RX ADMIN — HEPARIN SODIUM 5000 UNITS: 5000 INJECTION INTRAVENOUS; SUBCUTANEOUS at 20:48

## 2019-10-15 RX ADMIN — RANOLAZINE 500 MG: 500 TABLET, FILM COATED, EXTENDED RELEASE ORAL at 20:48

## 2019-10-15 RX ADMIN — ATORVASTATIN CALCIUM 40 MG: 40 TABLET, FILM COATED ORAL at 20:48

## 2019-10-15 RX ADMIN — CLONIDINE HYDROCHLORIDE 0.1 MG: 0.1 TABLET ORAL at 20:48

## 2019-10-15 RX ADMIN — FLUOXETINE 40 MG: 20 CAPSULE ORAL at 08:41

## 2019-10-15 RX ADMIN — MICONAZOLE NITRATE: 20 POWDER TOPICAL at 20:48

## 2019-10-15 RX ADMIN — OXYCODONE HYDROCHLORIDE AND ACETAMINOPHEN 1 TABLET: 5; 325 TABLET ORAL at 08:42

## 2019-10-15 ASSESSMENT — ENCOUNTER SYMPTOMS
ABDOMINAL DISTENTION: 0
CHEST TIGHTNESS: 1
EYE DISCHARGE: 0
SHORTNESS OF BREATH: 1

## 2019-10-15 ASSESSMENT — PAIN SCALES - GENERAL
PAINLEVEL_OUTOF10: 0
PAINLEVEL_OUTOF10: 0
PAINLEVEL_OUTOF10: 9

## 2019-10-16 VITALS
HEART RATE: 72 BPM | TEMPERATURE: 97.8 F | WEIGHT: 218.48 LBS | HEIGHT: 63 IN | SYSTOLIC BLOOD PRESSURE: 149 MMHG | DIASTOLIC BLOOD PRESSURE: 67 MMHG | OXYGEN SATURATION: 100 % | BODY MASS INDEX: 38.71 KG/M2 | RESPIRATION RATE: 18 BRPM

## 2019-10-16 LAB
ANION GAP SERPL CALCULATED.3IONS-SCNC: 13 MEQ/L (ref 9–15)
BASOPHILS ABSOLUTE: 0 K/UL (ref 0–0.2)
BASOPHILS RELATIVE PERCENT: 0.6 %
BUN BLDV-MCNC: 20 MG/DL (ref 6–20)
CALCIUM SERPL-MCNC: 8.9 MG/DL (ref 8.5–9.9)
CHLORIDE BLD-SCNC: 93 MEQ/L (ref 95–107)
CO2: 28 MEQ/L (ref 20–31)
CREAT SERPL-MCNC: 4.9 MG/DL (ref 0.5–0.9)
EOSINOPHILS ABSOLUTE: 0.2 K/UL (ref 0–0.7)
EOSINOPHILS RELATIVE PERCENT: 4.8 %
GFR AFRICAN AMERICAN: 11.3
GFR NON-AFRICAN AMERICAN: 9.3
GLUCOSE BLD-MCNC: 120 MG/DL (ref 60–115)
GLUCOSE BLD-MCNC: 149 MG/DL (ref 60–115)
GLUCOSE BLD-MCNC: 263 MG/DL (ref 60–115)
GLUCOSE BLD-MCNC: 72 MG/DL (ref 60–115)
GLUCOSE BLD-MCNC: 76 MG/DL (ref 70–99)
HCT VFR BLD CALC: 32.7 % (ref 37–47)
HEMOGLOBIN: 10.7 G/DL (ref 12–16)
LYMPHOCYTES ABSOLUTE: 1.6 K/UL (ref 1–4.8)
LYMPHOCYTES RELATIVE PERCENT: 30.9 %
MCH RBC QN AUTO: 32.2 PG (ref 27–31.3)
MCHC RBC AUTO-ENTMCNC: 32.7 % (ref 33–37)
MCV RBC AUTO: 98.2 FL (ref 82–100)
MONOCYTES ABSOLUTE: 0.8 K/UL (ref 0.2–0.8)
MONOCYTES RELATIVE PERCENT: 15.7 %
NEUTROPHILS ABSOLUTE: 2.5 K/UL (ref 1.4–6.5)
NEUTROPHILS RELATIVE PERCENT: 48 %
PDW BLD-RTO: 14.2 % (ref 11.5–14.5)
PERFORMED ON: ABNORMAL
PERFORMED ON: NORMAL
PLATELET # BLD: 225 K/UL (ref 130–400)
POTASSIUM SERPL-SCNC: 3.8 MEQ/L (ref 3.4–4.9)
RBC # BLD: 3.33 M/UL (ref 4.2–5.4)
SODIUM BLD-SCNC: 134 MEQ/L (ref 135–144)
WBC # BLD: 5.1 K/UL (ref 4.8–10.8)

## 2019-10-16 PROCEDURE — 6360000002 HC RX W HCPCS: Performed by: INTERNAL MEDICINE

## 2019-10-16 PROCEDURE — 6370000000 HC RX 637 (ALT 250 FOR IP): Performed by: INTERNAL MEDICINE

## 2019-10-16 PROCEDURE — 80048 BASIC METABOLIC PNL TOTAL CA: CPT

## 2019-10-16 PROCEDURE — 36415 COLL VENOUS BLD VENIPUNCTURE: CPT

## 2019-10-16 PROCEDURE — 6370000000 HC RX 637 (ALT 250 FOR IP): Performed by: NURSE PRACTITIONER

## 2019-10-16 PROCEDURE — 99232 SBSQ HOSP IP/OBS MODERATE 35: CPT | Performed by: INTERNAL MEDICINE

## 2019-10-16 PROCEDURE — 85025 COMPLETE CBC W/AUTO DIFF WBC: CPT

## 2019-10-16 PROCEDURE — 2060000000 HC ICU INTERMEDIATE R&B

## 2019-10-16 RX ORDER — INSULIN GLARGINE 100 [IU]/ML
5 INJECTION, SOLUTION SUBCUTANEOUS NIGHTLY
Status: DISCONTINUED | OUTPATIENT
Start: 2019-10-16 | End: 2019-10-17 | Stop reason: HOSPADM

## 2019-10-16 RX ADMIN — RANOLAZINE 500 MG: 500 TABLET, FILM COATED, EXTENDED RELEASE ORAL at 08:11

## 2019-10-16 RX ADMIN — OXYCODONE HYDROCHLORIDE AND ACETAMINOPHEN 1 TABLET: 5; 325 TABLET ORAL at 00:32

## 2019-10-16 RX ADMIN — INSULIN GLARGINE 10 UNITS: 100 INJECTION, SOLUTION SUBCUTANEOUS at 01:13

## 2019-10-16 RX ADMIN — MICONAZOLE NITRATE: 20 POWDER TOPICAL at 08:12

## 2019-10-16 RX ADMIN — CARVEDILOL 12.5 MG: 12.5 TABLET, FILM COATED ORAL at 17:29

## 2019-10-16 RX ADMIN — ASPIRIN 81 MG 81 MG: 81 TABLET ORAL at 08:11

## 2019-10-16 RX ADMIN — OXYCODONE HYDROCHLORIDE AND ACETAMINOPHEN 1 TABLET: 5; 325 TABLET ORAL at 08:26

## 2019-10-16 RX ADMIN — CLONIDINE HYDROCHLORIDE 0.1 MG: 0.1 TABLET ORAL at 22:16

## 2019-10-16 RX ADMIN — CARVEDILOL 12.5 MG: 12.5 TABLET, FILM COATED ORAL at 08:11

## 2019-10-16 RX ADMIN — ATORVASTATIN CALCIUM 40 MG: 40 TABLET, FILM COATED ORAL at 22:16

## 2019-10-16 RX ADMIN — HEPARIN SODIUM 5000 UNITS: 5000 INJECTION INTRAVENOUS; SUBCUTANEOUS at 14:09

## 2019-10-16 RX ADMIN — DILTIAZEM HYDROCHLORIDE 240 MG: 240 CAPSULE, COATED, EXTENDED RELEASE ORAL at 08:11

## 2019-10-16 RX ADMIN — RANOLAZINE 500 MG: 500 TABLET, FILM COATED, EXTENDED RELEASE ORAL at 22:30

## 2019-10-16 RX ADMIN — HEPARIN SODIUM 5000 UNITS: 5000 INJECTION INTRAVENOUS; SUBCUTANEOUS at 05:33

## 2019-10-16 RX ADMIN — HEPARIN SODIUM 5000 UNITS: 5000 INJECTION INTRAVENOUS; SUBCUTANEOUS at 22:17

## 2019-10-16 RX ADMIN — FLUOXETINE 40 MG: 20 CAPSULE ORAL at 08:11

## 2019-10-16 ASSESSMENT — PAIN DESCRIPTION - LOCATION
LOCATION: BACK
LOCATION: BACK

## 2019-10-16 ASSESSMENT — PAIN DESCRIPTION - ORIENTATION
ORIENTATION: LOWER
ORIENTATION: LOWER

## 2019-10-16 ASSESSMENT — PAIN DESCRIPTION - DESCRIPTORS
DESCRIPTORS: ACHING
DESCRIPTORS: ACHING

## 2019-10-16 ASSESSMENT — PAIN SCALES - GENERAL
PAINLEVEL_OUTOF10: 9
PAINLEVEL_OUTOF10: 8
PAINLEVEL_OUTOF10: 0
PAINLEVEL_OUTOF10: 2

## 2019-10-16 ASSESSMENT — PAIN DESCRIPTION - PAIN TYPE
TYPE: ACUTE PAIN
TYPE: CHRONIC PAIN

## 2019-11-16 ENCOUNTER — HOSPITAL ENCOUNTER (EMERGENCY)
Age: 54
Discharge: HOME | End: 2019-11-16
Payer: COMMERCIAL

## 2019-11-16 ENCOUNTER — APPOINTMENT (OUTPATIENT)
Dept: GENERAL RADIOLOGY | Age: 54
End: 2019-11-16
Payer: COMMERCIAL

## 2019-11-16 VITALS
BODY MASS INDEX: 36.32 KG/M2 | RESPIRATION RATE: 16 BRPM | DIASTOLIC BLOOD PRESSURE: 69 MMHG | HEIGHT: 65 IN | WEIGHT: 218 LBS | TEMPERATURE: 98 F | OXYGEN SATURATION: 99 % | SYSTOLIC BLOOD PRESSURE: 146 MMHG | HEART RATE: 72 BPM

## 2019-11-16 DIAGNOSIS — Z91.199 H/O NONCOMPLIANCE WITH MEDICAL TREATMENT, PRESENTING HAZARDS TO HEALTH: ICD-10-CM

## 2019-11-16 DIAGNOSIS — Z99.2 STAGE 5 CHRONIC KIDNEY DISEASE ON CHRONIC DIALYSIS (HCC): ICD-10-CM

## 2019-11-16 DIAGNOSIS — R05.9 COUGH: Primary | ICD-10-CM

## 2019-11-16 DIAGNOSIS — J90 PLEURAL EFFUSION: ICD-10-CM

## 2019-11-16 DIAGNOSIS — N18.6 STAGE 5 CHRONIC KIDNEY DISEASE ON CHRONIC DIALYSIS (HCC): ICD-10-CM

## 2019-11-16 DIAGNOSIS — E87.79 OTHER HYPERVOLEMIA: ICD-10-CM

## 2019-11-16 LAB
ALBUMIN SERPL-MCNC: 2.9 G/DL (ref 3.5–4.6)
ALP BLD-CCNC: 66 U/L (ref 40–130)
ALT SERPL-CCNC: 24 U/L (ref 0–33)
ANION GAP SERPL CALCULATED.3IONS-SCNC: 19 MEQ/L (ref 9–15)
APTT: 28.2 SEC (ref 24.4–36.8)
AST SERPL-CCNC: 64 U/L (ref 0–35)
BASOPHILS ABSOLUTE: 0 K/UL (ref 0–0.2)
BASOPHILS RELATIVE PERCENT: 0.2 %
BILIRUB SERPL-MCNC: 0.3 MG/DL (ref 0.2–0.7)
BUN BLDV-MCNC: 61 MG/DL (ref 6–20)
CALCIUM SERPL-MCNC: 8.4 MG/DL (ref 8.5–9.9)
CHLORIDE BLD-SCNC: 94 MEQ/L (ref 95–107)
CO2: 18 MEQ/L (ref 20–31)
CREAT SERPL-MCNC: 11.06 MG/DL (ref 0.5–0.9)
EKG ATRIAL RATE: 71 BPM
EKG P AXIS: 55 DEGREES
EKG P-R INTERVAL: 170 MS
EKG Q-T INTERVAL: 442 MS
EKG QRS DURATION: 84 MS
EKG QTC CALCULATION (BAZETT): 480 MS
EKG R AXIS: -4 DEGREES
EKG T AXIS: -5 DEGREES
EKG VENTRICULAR RATE: 71 BPM
EOSINOPHILS ABSOLUTE: 0.8 K/UL (ref 0–0.7)
EOSINOPHILS RELATIVE PERCENT: 6.2 %
GFR AFRICAN AMERICAN: 4.4
GFR NON-AFRICAN AMERICAN: 3.6
GLOBULIN: 5 G/DL (ref 2.3–3.5)
GLUCOSE BLD-MCNC: 117 MG/DL (ref 70–99)
HCT VFR BLD CALC: 26.5 % (ref 37–47)
HEMOGLOBIN: 8.6 G/DL (ref 12–16)
INR BLD: 1.5
LYMPHOCYTES ABSOLUTE: 2 K/UL (ref 1–4.8)
LYMPHOCYTES RELATIVE PERCENT: 16.4 %
MCH RBC QN AUTO: 31 PG (ref 27–31.3)
MCHC RBC AUTO-ENTMCNC: 32.4 % (ref 33–37)
MCV RBC AUTO: 95.9 FL (ref 82–100)
MONOCYTES ABSOLUTE: 1.3 K/UL (ref 0.2–0.8)
MONOCYTES RELATIVE PERCENT: 10.3 %
NEUTROPHILS ABSOLUTE: 8.2 K/UL (ref 1.4–6.5)
NEUTROPHILS RELATIVE PERCENT: 66.9 %
PDW BLD-RTO: 15.8 % (ref 11.5–14.5)
PLATELET # BLD: 301 K/UL (ref 130–400)
POTASSIUM SERPL-SCNC: 4.2 MEQ/L (ref 3.4–4.9)
POTASSIUM SERPL-SCNC: 6.2 MEQ/L (ref 3.4–4.9)
PRO-BNP: NORMAL PG/ML
PROTHROMBIN TIME: 18.4 SEC (ref 12.3–14.9)
RBC # BLD: 2.76 M/UL (ref 4.2–5.4)
SODIUM BLD-SCNC: 131 MEQ/L (ref 135–144)
STREP GRP A PCR: NEGATIVE
TOTAL CK: 111 U/L (ref 0–170)
TOTAL PROTEIN: 7.9 G/DL (ref 6.3–8)
TROPONIN: 0.2 NG/ML (ref 0–0.01)
WBC # BLD: 12.2 K/UL (ref 4.8–10.8)

## 2019-11-16 PROCEDURE — 83880 ASSAY OF NATRIURETIC PEPTIDE: CPT

## 2019-11-16 PROCEDURE — 71046 X-RAY EXAM CHEST 2 VIEWS: CPT

## 2019-11-16 PROCEDURE — 93005 ELECTROCARDIOGRAM TRACING: CPT | Performed by: PHYSICIAN ASSISTANT

## 2019-11-16 PROCEDURE — 96374 THER/PROPH/DIAG INJ IV PUSH: CPT

## 2019-11-16 PROCEDURE — 85610 PROTHROMBIN TIME: CPT

## 2019-11-16 PROCEDURE — 85025 COMPLETE CBC W/AUTO DIFF WBC: CPT

## 2019-11-16 PROCEDURE — 36415 COLL VENOUS BLD VENIPUNCTURE: CPT

## 2019-11-16 PROCEDURE — 6360000002 HC RX W HCPCS: Performed by: PHYSICIAN ASSISTANT

## 2019-11-16 PROCEDURE — 84484 ASSAY OF TROPONIN QUANT: CPT

## 2019-11-16 PROCEDURE — 84132 ASSAY OF SERUM POTASSIUM: CPT

## 2019-11-16 PROCEDURE — 87651 STREP A DNA AMP PROBE: CPT

## 2019-11-16 PROCEDURE — 82550 ASSAY OF CK (CPK): CPT

## 2019-11-16 PROCEDURE — 99284 EMERGENCY DEPT VISIT MOD MDM: CPT

## 2019-11-16 PROCEDURE — 85730 THROMBOPLASTIN TIME PARTIAL: CPT

## 2019-11-16 PROCEDURE — 80053 COMPREHEN METABOLIC PANEL: CPT

## 2019-11-16 RX ORDER — ONDANSETRON 2 MG/ML
4 INJECTION INTRAMUSCULAR; INTRAVENOUS ONCE
Status: COMPLETED | OUTPATIENT
Start: 2019-11-16 | End: 2019-11-16

## 2019-11-16 RX ADMIN — ONDANSETRON 4 MG: 2 INJECTION INTRAMUSCULAR; INTRAVENOUS at 10:21

## 2019-11-16 ASSESSMENT — ENCOUNTER SYMPTOMS
RHINORRHEA: 0
EYE DISCHARGE: 0
SORE THROAT: 1
ABDOMINAL DISTENTION: 0
NAUSEA: 0
VOMITING: 0
SHORTNESS OF BREATH: 1
CHEST TIGHTNESS: 0
DIARRHEA: 0
BACK PAIN: 0
CONSTIPATION: 0
COUGH: 1
COLOR CHANGE: 0
ABDOMINAL PAIN: 0

## 2019-11-16 ASSESSMENT — PAIN DESCRIPTION - LOCATION: LOCATION: BACK;ABDOMEN

## 2019-11-16 ASSESSMENT — PAIN SCALES - GENERAL: PAINLEVEL_OUTOF10: 5

## 2019-11-16 ASSESSMENT — PAIN DESCRIPTION - DESCRIPTORS: DESCRIPTORS: CONSTANT

## 2019-11-17 ENCOUNTER — HOSPITAL ENCOUNTER (EMERGENCY)
Age: 54
Discharge: HOME | End: 2019-11-18
Attending: EMERGENCY MEDICINE
Payer: COMMERCIAL

## 2019-11-17 ENCOUNTER — APPOINTMENT (OUTPATIENT)
Dept: GENERAL RADIOLOGY | Age: 54
End: 2019-11-17
Payer: COMMERCIAL

## 2019-11-17 DIAGNOSIS — J90 PLEURAL EFFUSION ON RIGHT: Primary | ICD-10-CM

## 2019-11-17 DIAGNOSIS — J90 PLEURAL EFFUSION: ICD-10-CM

## 2019-11-17 DIAGNOSIS — I31.39 PERICARDIAL EFFUSION: ICD-10-CM

## 2019-11-17 LAB
ANION GAP SERPL CALCULATED.3IONS-SCNC: 17 MEQ/L (ref 9–15)
BASOPHILS ABSOLUTE: 0.1 K/UL (ref 0–0.2)
BASOPHILS RELATIVE PERCENT: 0.6 %
BUN BLDV-MCNC: 36 MG/DL (ref 6–20)
CALCIUM SERPL-MCNC: 8.7 MG/DL (ref 8.5–9.9)
CHLORIDE BLD-SCNC: 92 MEQ/L (ref 95–107)
CO2: 25 MEQ/L (ref 20–31)
CREAT SERPL-MCNC: 7.83 MG/DL (ref 0.5–0.9)
EKG ATRIAL RATE: 85 BPM
EKG P-R INTERVAL: 156 MS
EKG Q-T INTERVAL: 504 MS
EKG QRS DURATION: 86 MS
EKG QTC CALCULATION (BAZETT): 599 MS
EKG R AXIS: -23 DEGREES
EKG T AXIS: 130 DEGREES
EKG VENTRICULAR RATE: 85 BPM
EOSINOPHILS ABSOLUTE: 0.5 K/UL (ref 0–0.7)
EOSINOPHILS RELATIVE PERCENT: 5.8 %
GFR AFRICAN AMERICAN: 6.6
GFR NON-AFRICAN AMERICAN: 5.4
GLUCOSE BLD-MCNC: 125 MG/DL (ref 70–99)
HCT VFR BLD CALC: 26.3 % (ref 37–47)
HEMOGLOBIN: 9 G/DL (ref 12–16)
INR BLD: 1.4
LACTIC ACID: 1.8 MMOL/L (ref 0.5–2.2)
LYMPHOCYTES ABSOLUTE: 1.8 K/UL (ref 1–4.8)
LYMPHOCYTES RELATIVE PERCENT: 19.3 %
MCH RBC QN AUTO: 32.6 PG (ref 27–31.3)
MCHC RBC AUTO-ENTMCNC: 34.2 % (ref 33–37)
MCV RBC AUTO: 95.1 FL (ref 82–100)
MONOCYTES ABSOLUTE: 1.1 K/UL (ref 0.2–0.8)
MONOCYTES RELATIVE PERCENT: 11.5 %
NEUTROPHILS ABSOLUTE: 5.8 K/UL (ref 1.4–6.5)
NEUTROPHILS RELATIVE PERCENT: 62.8 %
PDW BLD-RTO: 15.6 % (ref 11.5–14.5)
PLATELET # BLD: 323 K/UL (ref 130–400)
POTASSIUM SERPL-SCNC: 3.4 MEQ/L (ref 3.4–4.9)
PROTHROMBIN TIME: 17.6 SEC (ref 12.3–14.9)
RBC # BLD: 2.77 M/UL (ref 4.2–5.4)
SODIUM BLD-SCNC: 134 MEQ/L (ref 135–144)
WBC # BLD: 9.2 K/UL (ref 4.8–10.8)

## 2019-11-17 PROCEDURE — 94760 N-INVAS EAR/PLS OXIMETRY 1: CPT

## 2019-11-17 PROCEDURE — 6370000000 HC RX 637 (ALT 250 FOR IP): Performed by: EMERGENCY MEDICINE

## 2019-11-17 PROCEDURE — 36415 COLL VENOUS BLD VENIPUNCTURE: CPT

## 2019-11-17 PROCEDURE — 99284 EMERGENCY DEPT VISIT MOD MDM: CPT

## 2019-11-17 PROCEDURE — 71046 X-RAY EXAM CHEST 2 VIEWS: CPT

## 2019-11-17 PROCEDURE — 83605 ASSAY OF LACTIC ACID: CPT

## 2019-11-17 PROCEDURE — 80048 BASIC METABOLIC PNL TOTAL CA: CPT

## 2019-11-17 PROCEDURE — 85025 COMPLETE CBC W/AUTO DIFF WBC: CPT

## 2019-11-17 PROCEDURE — 94640 AIRWAY INHALATION TREATMENT: CPT

## 2019-11-17 PROCEDURE — 87040 BLOOD CULTURE FOR BACTERIA: CPT

## 2019-11-17 PROCEDURE — 85610 PROTHROMBIN TIME: CPT

## 2019-11-17 RX ORDER — IPRATROPIUM BROMIDE AND ALBUTEROL SULFATE 2.5; .5 MG/3ML; MG/3ML
1 SOLUTION RESPIRATORY (INHALATION) ONCE
Status: COMPLETED | OUTPATIENT
Start: 2019-11-17 | End: 2019-11-17

## 2019-11-17 RX ORDER — IPRATROPIUM BROMIDE AND ALBUTEROL SULFATE 2.5; .5 MG/3ML; MG/3ML
1 SOLUTION RESPIRATORY (INHALATION)
Status: DISCONTINUED | OUTPATIENT
Start: 2019-11-18 | End: 2019-11-17

## 2019-11-17 RX ADMIN — IPRATROPIUM BROMIDE AND ALBUTEROL SULFATE 1 AMPULE: .5; 3 SOLUTION RESPIRATORY (INHALATION) at 22:58

## 2019-11-17 ASSESSMENT — ENCOUNTER SYMPTOMS
BACK PAIN: 0
VOMITING: 0
RHINORRHEA: 1
WHEEZING: 1
SORE THROAT: 0
ABDOMINAL PAIN: 0
SHORTNESS OF BREATH: 0
DIARRHEA: 0
NAUSEA: 0
COUGH: 1

## 2019-11-17 ASSESSMENT — PAIN DESCRIPTION - FREQUENCY
FREQUENCY: CONTINUOUS
FREQUENCY: CONTINUOUS

## 2019-11-17 ASSESSMENT — PAIN DESCRIPTION - LOCATION
LOCATION: GENERALIZED
LOCATION: GENERALIZED

## 2019-11-17 ASSESSMENT — PAIN SCALES - GENERAL
PAINLEVEL_OUTOF10: 5
PAINLEVEL_OUTOF10: 5

## 2019-11-17 ASSESSMENT — PAIN DESCRIPTION - PAIN TYPE
TYPE: ACUTE PAIN
TYPE: ACUTE PAIN

## 2019-11-17 ASSESSMENT — PAIN DESCRIPTION - DESCRIPTORS
DESCRIPTORS: ACHING
DESCRIPTORS: ACHING

## 2019-11-18 ENCOUNTER — APPOINTMENT (OUTPATIENT)
Dept: CT IMAGING | Age: 54
End: 2019-11-18
Payer: COMMERCIAL

## 2019-11-18 VITALS
HEART RATE: 83 BPM | TEMPERATURE: 98.9 F | OXYGEN SATURATION: 100 % | SYSTOLIC BLOOD PRESSURE: 165 MMHG | DIASTOLIC BLOOD PRESSURE: 85 MMHG | HEIGHT: 63 IN | WEIGHT: 210 LBS | BODY MASS INDEX: 37.21 KG/M2 | RESPIRATION RATE: 18 BRPM

## 2019-11-18 PROCEDURE — 6370000000 HC RX 637 (ALT 250 FOR IP): Performed by: EMERGENCY MEDICINE

## 2019-11-18 PROCEDURE — 71250 CT THORAX DX C-: CPT

## 2019-11-18 PROCEDURE — 93010 ELECTROCARDIOGRAM REPORT: CPT | Performed by: INTERNAL MEDICINE

## 2019-11-18 RX ORDER — OXYCODONE HYDROCHLORIDE AND ACETAMINOPHEN 5; 325 MG/1; MG/1
1 TABLET ORAL ONCE
Status: COMPLETED | OUTPATIENT
Start: 2019-11-18 | End: 2019-11-18

## 2019-11-18 RX ADMIN — OXYCODONE HYDROCHLORIDE AND ACETAMINOPHEN 1 TABLET: 5; 325 TABLET ORAL at 02:31

## 2019-11-18 ASSESSMENT — PAIN SCALES - GENERAL: PAINLEVEL_OUTOF10: 7

## 2019-11-23 LAB
BLOOD CULTURE, ROUTINE: NORMAL
CULTURE, BLOOD 2: NORMAL

## 2020-01-01 ENCOUNTER — APPOINTMENT (OUTPATIENT)
Dept: GENERAL RADIOLOGY | Age: 55
DRG: 640 | End: 2020-01-01
Payer: MEDICARE

## 2020-01-01 ENCOUNTER — APPOINTMENT (OUTPATIENT)
Dept: GENERAL RADIOLOGY | Age: 55
DRG: 291 | End: 2020-01-01
Payer: MEDICARE

## 2020-01-01 ENCOUNTER — HOSPITAL ENCOUNTER (INPATIENT)
Age: 55
LOS: 13 days | Discharge: HOME | DRG: 751 | End: 2020-03-16
Attending: PSYCHIATRY & NEUROLOGY | Admitting: PSYCHIATRY & NEUROLOGY
Payer: COMMERCIAL

## 2020-01-01 ENCOUNTER — TELEPHONE (OUTPATIENT)
Dept: PHARMACY | Age: 55
End: 2020-01-01

## 2020-01-01 ENCOUNTER — TELEPHONE (OUTPATIENT)
Dept: CARDIOLOGY CLINIC | Age: 55
End: 2020-01-01

## 2020-01-01 ENCOUNTER — APPOINTMENT (OUTPATIENT)
Dept: GENERAL RADIOLOGY | Age: 55
End: 2020-01-01
Payer: COMMERCIAL

## 2020-01-01 ENCOUNTER — HOSPITAL ENCOUNTER (INPATIENT)
Age: 55
LOS: 2 days | Discharge: HOME | DRG: 291 | End: 2020-04-06
Attending: FAMILY MEDICINE | Admitting: INTERNAL MEDICINE
Payer: MEDICARE

## 2020-01-01 ENCOUNTER — HOSPITAL ENCOUNTER (INPATIENT)
Age: 55
LOS: 4 days | Discharge: PSYCHIATRIC OR SUBSTANCE USE FACILITY | DRG: 194 | End: 2020-03-03
Attending: EMERGENCY MEDICINE | Admitting: INTERNAL MEDICINE
Payer: COMMERCIAL

## 2020-01-01 ENCOUNTER — CARE COORDINATION (OUTPATIENT)
Dept: CASE MANAGEMENT | Age: 55
End: 2020-01-01

## 2020-01-01 ENCOUNTER — APPOINTMENT (OUTPATIENT)
Dept: CT IMAGING | Age: 55
DRG: 640 | End: 2020-01-01
Payer: MEDICARE

## 2020-01-01 ENCOUNTER — HOSPITAL ENCOUNTER (EMERGENCY)
Age: 55
Discharge: HOME | End: 2020-07-26
Payer: MEDICARE

## 2020-01-01 ENCOUNTER — HOSPITAL ENCOUNTER (OUTPATIENT)
Dept: PHARMACY | Age: 55
Setting detail: THERAPIES SERIES
Discharge: HOME | End: 2020-08-18
Payer: MEDICARE

## 2020-01-01 ENCOUNTER — HOSPITAL ENCOUNTER (OUTPATIENT)
Dept: PHARMACY | Age: 55
Setting detail: THERAPIES SERIES
Discharge: HOME | End: 2020-08-04
Payer: MEDICARE

## 2020-01-01 ENCOUNTER — APPOINTMENT (OUTPATIENT)
Dept: POSTOP/PACU | Age: 55
DRG: 751 | End: 2020-01-01
Attending: PSYCHIATRY & NEUROLOGY
Payer: COMMERCIAL

## 2020-01-01 ENCOUNTER — HOSPITAL ENCOUNTER (INPATIENT)
Age: 55
LOS: 2 days | Discharge: HOME | DRG: 640 | End: 2020-06-23
Attending: STUDENT IN AN ORGANIZED HEALTH CARE EDUCATION/TRAINING PROGRAM | Admitting: INTERNAL MEDICINE
Payer: MEDICARE

## 2020-01-01 ENCOUNTER — APPOINTMENT (OUTPATIENT)
Dept: PHARMACY | Age: 55
End: 2020-01-01
Payer: MEDICARE

## 2020-01-01 ENCOUNTER — HOSPITAL ENCOUNTER (EMERGENCY)
Age: 55
Discharge: HOME | End: 2020-07-14
Attending: EMERGENCY MEDICINE
Payer: MEDICARE

## 2020-01-01 ENCOUNTER — HOSPITAL ENCOUNTER (EMERGENCY)
Age: 55
Discharge: HOME | End: 2020-09-07
Payer: MEDICARE

## 2020-01-01 ENCOUNTER — ANESTHESIA EVENT (OUTPATIENT)
Dept: POSTOP/PACU | Age: 55
End: 2020-01-01

## 2020-01-01 ENCOUNTER — HOSPITAL ENCOUNTER (OUTPATIENT)
Dept: WOUND CARE | Age: 55
Discharge: HOME | End: 2020-09-17

## 2020-01-01 ENCOUNTER — APPOINTMENT (OUTPATIENT)
Dept: CT IMAGING | Age: 55
End: 2020-01-01
Payer: MEDICARE

## 2020-01-01 ENCOUNTER — HOSPITAL ENCOUNTER (EMERGENCY)
Age: 55
Discharge: HOME | End: 2020-02-22
Attending: EMERGENCY MEDICINE
Payer: COMMERCIAL

## 2020-01-01 ENCOUNTER — HOSPITAL ENCOUNTER (OUTPATIENT)
Dept: PHARMACY | Age: 55
Setting detail: THERAPIES SERIES
Discharge: HOME | End: 2020-07-21
Payer: MEDICARE

## 2020-01-01 ENCOUNTER — APPOINTMENT (OUTPATIENT)
Dept: CT IMAGING | Age: 55
DRG: 291 | End: 2020-01-01
Payer: MEDICARE

## 2020-01-01 ENCOUNTER — APPOINTMENT (OUTPATIENT)
Dept: GENERAL RADIOLOGY | Age: 55
End: 2020-01-01
Payer: MEDICARE

## 2020-01-01 ENCOUNTER — HOSPITAL ENCOUNTER (EMERGENCY)
Age: 55
Discharge: HOME | End: 2020-08-16
Payer: MEDICARE

## 2020-01-01 ENCOUNTER — HOSPITAL ENCOUNTER (EMERGENCY)
Age: 55
Discharge: HOME | End: 2020-09-10
Payer: MEDICARE

## 2020-01-01 ENCOUNTER — ANESTHESIA (OUTPATIENT)
Dept: POSTOP/PACU | Age: 55
End: 2020-01-01

## 2020-01-01 ENCOUNTER — HOSPITAL ENCOUNTER (INPATIENT)
Age: 55
LOS: 1 days | Discharge: HOME | DRG: 308 | End: 2020-11-25
Attending: STUDENT IN AN ORGANIZED HEALTH CARE EDUCATION/TRAINING PROGRAM | Admitting: STUDENT IN AN ORGANIZED HEALTH CARE EDUCATION/TRAINING PROGRAM
Payer: MEDICARE

## 2020-01-01 ENCOUNTER — HOSPITAL ENCOUNTER (OUTPATIENT)
Dept: WOUND CARE | Age: 55
Discharge: HOME | End: 2020-09-30
Payer: MEDICARE

## 2020-01-01 ENCOUNTER — ANTI-COAG VISIT (OUTPATIENT)
Dept: PHARMACY | Age: 55
End: 2020-01-01

## 2020-01-01 ENCOUNTER — HOSPITAL ENCOUNTER (INPATIENT)
Age: 55
LOS: 1 days | Discharge: HOME HEALTH | DRG: 640 | End: 2020-05-15
Attending: INTERNAL MEDICINE | Admitting: STUDENT IN AN ORGANIZED HEALTH CARE EDUCATION/TRAINING PROGRAM
Payer: MEDICARE

## 2020-01-01 ENCOUNTER — APPOINTMENT (OUTPATIENT)
Dept: ULTRASOUND IMAGING | Age: 55
DRG: 291 | End: 2020-01-01
Payer: MEDICARE

## 2020-01-01 ENCOUNTER — APPOINTMENT (OUTPATIENT)
Dept: GENERAL RADIOLOGY | Age: 55
DRG: 308 | End: 2020-01-01
Payer: MEDICARE

## 2020-01-01 ENCOUNTER — HOSPITAL ENCOUNTER (EMERGENCY)
Age: 55
Discharge: HOME | End: 2020-08-19
Payer: MEDICARE

## 2020-01-01 ENCOUNTER — HOSPITAL ENCOUNTER (OUTPATIENT)
Dept: PHARMACY | Age: 55
Setting detail: THERAPIES SERIES
Discharge: HOME | End: 2020-09-03
Payer: MEDICARE

## 2020-01-01 ENCOUNTER — APPOINTMENT (OUTPATIENT)
Dept: INTERVENTIONAL RADIOLOGY/VASCULAR | Age: 55
DRG: 194 | End: 2020-01-01
Payer: COMMERCIAL

## 2020-01-01 ENCOUNTER — OFFICE VISIT (OUTPATIENT)
Dept: ORTHOPEDIC SURGERY | Age: 55
End: 2020-01-01
Payer: MEDICARE

## 2020-01-01 ENCOUNTER — CARE COORDINATION (OUTPATIENT)
Dept: CARE COORDINATION | Age: 55
End: 2020-01-01

## 2020-01-01 ENCOUNTER — TELEPHONE (OUTPATIENT)
Dept: WOUND CARE | Age: 55
End: 2020-01-01

## 2020-01-01 ENCOUNTER — HOSPITAL ENCOUNTER (INPATIENT)
Age: 55
LOS: 3 days | Discharge: HOME | DRG: 291 | End: 2020-04-15
Attending: FAMILY MEDICINE | Admitting: INTERNAL MEDICINE
Payer: MEDICARE

## 2020-01-01 ENCOUNTER — APPOINTMENT (OUTPATIENT)
Dept: GENERAL RADIOLOGY | Age: 55
DRG: 194 | End: 2020-01-01
Payer: COMMERCIAL

## 2020-01-01 VITALS
BODY MASS INDEX: 41.95 KG/M2 | DIASTOLIC BLOOD PRESSURE: 71 MMHG | OXYGEN SATURATION: 98 % | HEIGHT: 63 IN | WEIGHT: 236.77 LBS | HEART RATE: 60 BPM | SYSTOLIC BLOOD PRESSURE: 142 MMHG | RESPIRATION RATE: 16 BRPM | TEMPERATURE: 98.1 F

## 2020-01-01 VITALS
WEIGHT: 213.19 LBS | TEMPERATURE: 98.5 F | HEIGHT: 63 IN | HEART RATE: 76 BPM | BODY MASS INDEX: 37.77 KG/M2 | DIASTOLIC BLOOD PRESSURE: 96 MMHG | SYSTOLIC BLOOD PRESSURE: 157 MMHG | RESPIRATION RATE: 16 BRPM | OXYGEN SATURATION: 96 %

## 2020-01-01 VITALS
SYSTOLIC BLOOD PRESSURE: 168 MMHG | TEMPERATURE: 97.7 F | HEART RATE: 77 BPM | OXYGEN SATURATION: 97 % | DIASTOLIC BLOOD PRESSURE: 81 MMHG | HEIGHT: 63 IN | WEIGHT: 218.7 LBS | BODY MASS INDEX: 38.75 KG/M2 | RESPIRATION RATE: 18 BRPM

## 2020-01-01 VITALS
RESPIRATION RATE: 18 BRPM | SYSTOLIC BLOOD PRESSURE: 148 MMHG | OXYGEN SATURATION: 95 % | BODY MASS INDEX: 40.75 KG/M2 | WEIGHT: 230 LBS | TEMPERATURE: 97.7 F | HEIGHT: 63 IN | HEART RATE: 65 BPM | DIASTOLIC BLOOD PRESSURE: 80 MMHG

## 2020-01-01 VITALS
BODY MASS INDEX: 38.09 KG/M2 | WEIGHT: 215 LBS | RESPIRATION RATE: 20 BRPM | SYSTOLIC BLOOD PRESSURE: 162 MMHG | HEIGHT: 63 IN | DIASTOLIC BLOOD PRESSURE: 68 MMHG | HEART RATE: 65 BPM | TEMPERATURE: 97.8 F

## 2020-01-01 VITALS
BODY MASS INDEX: 37.21 KG/M2 | HEIGHT: 63 IN | RESPIRATION RATE: 18 BRPM | HEART RATE: 71 BPM | WEIGHT: 210 LBS | DIASTOLIC BLOOD PRESSURE: 71 MMHG | SYSTOLIC BLOOD PRESSURE: 110 MMHG | TEMPERATURE: 97.8 F | OXYGEN SATURATION: 97 %

## 2020-01-01 VITALS
OXYGEN SATURATION: 100 % | DIASTOLIC BLOOD PRESSURE: 76 MMHG | RESPIRATION RATE: 8 BRPM | SYSTOLIC BLOOD PRESSURE: 160 MMHG

## 2020-01-01 VITALS
RESPIRATION RATE: 20 BRPM | SYSTOLIC BLOOD PRESSURE: 133 MMHG | HEIGHT: 62 IN | TEMPERATURE: 97.8 F | HEART RATE: 69 BPM | DIASTOLIC BLOOD PRESSURE: 63 MMHG | WEIGHT: 210 LBS | OXYGEN SATURATION: 97 % | BODY MASS INDEX: 38.64 KG/M2

## 2020-01-01 VITALS
BODY MASS INDEX: 38.09 KG/M2 | RESPIRATION RATE: 18 BRPM | DIASTOLIC BLOOD PRESSURE: 52 MMHG | TEMPERATURE: 97.9 F | HEIGHT: 63 IN | OXYGEN SATURATION: 97 % | WEIGHT: 215 LBS | HEART RATE: 78 BPM | SYSTOLIC BLOOD PRESSURE: 136 MMHG

## 2020-01-01 VITALS
RESPIRATION RATE: 16 BRPM | WEIGHT: 215 LBS | DIASTOLIC BLOOD PRESSURE: 88 MMHG | HEIGHT: 63 IN | OXYGEN SATURATION: 96 % | TEMPERATURE: 97.4 F | BODY MASS INDEX: 38.09 KG/M2 | SYSTOLIC BLOOD PRESSURE: 191 MMHG | HEART RATE: 67 BPM

## 2020-01-01 VITALS
DIASTOLIC BLOOD PRESSURE: 95 MMHG | SYSTOLIC BLOOD PRESSURE: 146 MMHG | TEMPERATURE: 98.5 F | BODY MASS INDEX: 37.21 KG/M2 | WEIGHT: 210 LBS | HEART RATE: 76 BPM | HEIGHT: 63 IN | OXYGEN SATURATION: 100 % | RESPIRATION RATE: 20 BRPM

## 2020-01-01 VITALS
SYSTOLIC BLOOD PRESSURE: 126 MMHG | OXYGEN SATURATION: 100 % | DIASTOLIC BLOOD PRESSURE: 61 MMHG | RESPIRATION RATE: 8 BRPM

## 2020-01-01 VITALS
BODY MASS INDEX: 35.61 KG/M2 | OXYGEN SATURATION: 95 % | HEIGHT: 63 IN | WEIGHT: 201 LBS | RESPIRATION RATE: 16 BRPM | SYSTOLIC BLOOD PRESSURE: 139 MMHG | DIASTOLIC BLOOD PRESSURE: 69 MMHG | TEMPERATURE: 97.5 F | HEART RATE: 67 BPM

## 2020-01-01 VITALS
DIASTOLIC BLOOD PRESSURE: 77 MMHG | SYSTOLIC BLOOD PRESSURE: 176 MMHG | WEIGHT: 210 LBS | HEIGHT: 63 IN | RESPIRATION RATE: 18 BRPM | BODY MASS INDEX: 37.21 KG/M2 | TEMPERATURE: 98 F | HEART RATE: 81 BPM | OXYGEN SATURATION: 95 %

## 2020-01-01 VITALS
OXYGEN SATURATION: 100 % | DIASTOLIC BLOOD PRESSURE: 80 MMHG | SYSTOLIC BLOOD PRESSURE: 137 MMHG | RESPIRATION RATE: 9 BRPM

## 2020-01-01 VITALS
RESPIRATION RATE: 18 BRPM | WEIGHT: 214.51 LBS | SYSTOLIC BLOOD PRESSURE: 193 MMHG | BODY MASS INDEX: 38.01 KG/M2 | TEMPERATURE: 97.7 F | OXYGEN SATURATION: 100 % | HEART RATE: 66 BPM | HEIGHT: 63 IN | DIASTOLIC BLOOD PRESSURE: 84 MMHG

## 2020-01-01 VITALS
WEIGHT: 220 LBS | HEART RATE: 69 BPM | RESPIRATION RATE: 17 BRPM | SYSTOLIC BLOOD PRESSURE: 134 MMHG | TEMPERATURE: 97.6 F | OXYGEN SATURATION: 99 % | BODY MASS INDEX: 38.98 KG/M2 | DIASTOLIC BLOOD PRESSURE: 74 MMHG | HEIGHT: 63 IN

## 2020-01-01 VITALS
WEIGHT: 220 LBS | RESPIRATION RATE: 14 BRPM | TEMPERATURE: 97.2 F | HEART RATE: 64 BPM | HEIGHT: 63 IN | OXYGEN SATURATION: 96 % | BODY MASS INDEX: 38.98 KG/M2

## 2020-01-01 VITALS — TEMPERATURE: 98.1 F

## 2020-01-01 VITALS
RESPIRATION RATE: 16 BRPM | WEIGHT: 210 LBS | HEART RATE: 70 BPM | OXYGEN SATURATION: 100 % | HEIGHT: 63 IN | SYSTOLIC BLOOD PRESSURE: 141 MMHG | BODY MASS INDEX: 37.21 KG/M2 | TEMPERATURE: 97.9 F | DIASTOLIC BLOOD PRESSURE: 65 MMHG

## 2020-01-01 VITALS — TEMPERATURE: 97.6 F

## 2020-01-01 LAB
ABO/RH: NORMAL
ALBUMIN SERPL-MCNC: 3.1 G/DL (ref 3.5–4.6)
ALBUMIN SERPL-MCNC: 3.2 G/DL (ref 3.5–4.6)
ALBUMIN SERPL-MCNC: 3.3 G/DL (ref 3.5–4.6)
ALBUMIN SERPL-MCNC: 3.4 G/DL (ref 3.5–4.6)
ALBUMIN SERPL-MCNC: 3.5 G/DL (ref 3.5–4.6)
ALBUMIN SERPL-MCNC: 3.6 G/DL (ref 3.5–4.6)
ALBUMIN SERPL-MCNC: 3.9 G/DL (ref 3.5–4.6)
ALBUMIN SERPL-MCNC: 3.9 G/DL (ref 3.5–4.6)
ALP BLD-CCNC: 104 U/L (ref 40–130)
ALP BLD-CCNC: 187 U/L (ref 40–130)
ALP BLD-CCNC: 61 U/L (ref 40–130)
ALP BLD-CCNC: 62 U/L (ref 40–130)
ALP BLD-CCNC: 63 U/L (ref 40–130)
ALP BLD-CCNC: 64 U/L (ref 40–130)
ALP BLD-CCNC: 68 U/L (ref 40–130)
ALP BLD-CCNC: 68 U/L (ref 40–130)
ALP BLD-CCNC: 71 U/L (ref 40–130)
ALP BLD-CCNC: 73 U/L (ref 40–130)
ALP BLD-CCNC: 76 U/L (ref 40–130)
ALP BLD-CCNC: 77 U/L (ref 40–130)
ALP BLD-CCNC: 78 U/L (ref 40–130)
ALT SERPL-CCNC: 14 U/L (ref 0–33)
ALT SERPL-CCNC: 17 U/L (ref 0–33)
ALT SERPL-CCNC: 17 U/L (ref 0–33)
ALT SERPL-CCNC: 18 U/L (ref 0–33)
ALT SERPL-CCNC: 18 U/L (ref 0–33)
ALT SERPL-CCNC: 19 U/L (ref 0–33)
ALT SERPL-CCNC: 23 U/L (ref 0–33)
ALT SERPL-CCNC: 25 U/L (ref 0–33)
ALT SERPL-CCNC: 272 U/L (ref 0–33)
ALT SERPL-CCNC: 51 U/L (ref 0–33)
ALT SERPL-CCNC: 64 U/L (ref 0–33)
ANION GAP SERPL CALCULATED.3IONS-SCNC: 10 MEQ/L (ref 9–15)
ANION GAP SERPL CALCULATED.3IONS-SCNC: 11 MEQ/L (ref 9–15)
ANION GAP SERPL CALCULATED.3IONS-SCNC: 12 MEQ/L (ref 9–15)
ANION GAP SERPL CALCULATED.3IONS-SCNC: 12 MEQ/L (ref 9–15)
ANION GAP SERPL CALCULATED.3IONS-SCNC: 13 MEQ/L (ref 9–15)
ANION GAP SERPL CALCULATED.3IONS-SCNC: 15 MEQ/L (ref 9–15)
ANION GAP SERPL CALCULATED.3IONS-SCNC: 16 MEQ/L (ref 9–15)
ANION GAP SERPL CALCULATED.3IONS-SCNC: 16 MEQ/L (ref 9–15)
ANION GAP SERPL CALCULATED.3IONS-SCNC: 17 MEQ/L (ref 9–15)
ANION GAP SERPL CALCULATED.3IONS-SCNC: 17 MEQ/L (ref 9–15)
ANION GAP SERPL CALCULATED.3IONS-SCNC: 18 MEQ/L (ref 9–15)
ANION GAP SERPL CALCULATED.3IONS-SCNC: 20 MEQ/L (ref 9–15)
ANION GAP SERPL CALCULATED.3IONS-SCNC: 20 MEQ/L (ref 9–15)
ANION GAP SERPL CALCULATED.3IONS-SCNC: 21 MEQ/L (ref 9–15)
ANION GAP SERPL CALCULATED.3IONS-SCNC: 22 MEQ/L (ref 9–15)
ANION GAP SERPL CALCULATED.3IONS-SCNC: 24 MEQ/L (ref 9–15)
ANION GAP SERPL CALCULATED.3IONS-SCNC: 25 MEQ/L (ref 9–15)
ANION GAP SERPL CALCULATED.3IONS-SCNC: 28 MEQ/L (ref 9–15)
ANTI-XA UNFRAC HEPARIN: 0.46 IU/ML
ANTI-XA UNFRAC HEPARIN: 0.68 IU/ML
ANTIBODY SCREEN: NORMAL
APTT: 31.1 SEC (ref 24.4–36.8)
APTT: 37.9 SEC (ref 24.4–36.8)
AST SERPL-CCNC: 20 U/L (ref 0–35)
AST SERPL-CCNC: 22 U/L (ref 0–35)
AST SERPL-CCNC: 26 U/L (ref 0–35)
AST SERPL-CCNC: 29 U/L (ref 0–35)
AST SERPL-CCNC: 31 U/L (ref 0–35)
AST SERPL-CCNC: 32 U/L (ref 0–35)
AST SERPL-CCNC: 32 U/L (ref 0–35)
AST SERPL-CCNC: 44 U/L (ref 0–35)
AST SERPL-CCNC: 61 U/L (ref 0–35)
AST SERPL-CCNC: 65 U/L (ref 0–35)
AST SERPL-CCNC: 653 U/L (ref 0–35)
BACTERIA: ABNORMAL /HPF
BASOPHILS ABSOLUTE: 0 K/UL (ref 0–0.2)
BASOPHILS ABSOLUTE: 0.1 K/UL (ref 0–0.2)
BASOPHILS RELATIVE PERCENT: 0.5 %
BASOPHILS RELATIVE PERCENT: 0.6 %
BASOPHILS RELATIVE PERCENT: 0.7 %
BASOPHILS RELATIVE PERCENT: 0.7 %
BASOPHILS RELATIVE PERCENT: 0.8 %
BASOPHILS RELATIVE PERCENT: 0.9 %
BASOPHILS RELATIVE PERCENT: 1 %
BASOPHILS RELATIVE PERCENT: 1 %
BASOPHILS RELATIVE PERCENT: 1.1 %
BASOPHILS RELATIVE PERCENT: 1.2 %
BASOPHILS RELATIVE PERCENT: 1.3 %
BASOPHILS RELATIVE PERCENT: 1.3 %
BILIRUB SERPL-MCNC: 0.3 MG/DL (ref 0.2–0.7)
BILIRUB SERPL-MCNC: 0.4 MG/DL (ref 0.2–0.7)
BILIRUB SERPL-MCNC: 0.5 MG/DL (ref 0.2–0.7)
BILIRUB SERPL-MCNC: 0.5 MG/DL (ref 0.2–0.7)
BILIRUBIN URINE: NEGATIVE
BLOOD BANK DISPENSE STATUS: NORMAL
BLOOD BANK PRODUCT CODE: NORMAL
BLOOD, URINE: ABNORMAL
BPU ID: NORMAL
BUN BLDV-MCNC: 101 MG/DL (ref 6–20)
BUN BLDV-MCNC: 114 MG/DL (ref 6–20)
BUN BLDV-MCNC: 12 MG/DL (ref 6–20)
BUN BLDV-MCNC: 16 MG/DL (ref 6–20)
BUN BLDV-MCNC: 18 MG/DL (ref 6–20)
BUN BLDV-MCNC: 19 MG/DL (ref 6–20)
BUN BLDV-MCNC: 20 MG/DL (ref 6–20)
BUN BLDV-MCNC: 25 MG/DL (ref 6–20)
BUN BLDV-MCNC: 28 MG/DL (ref 6–20)
BUN BLDV-MCNC: 29 MG/DL (ref 6–20)
BUN BLDV-MCNC: 30 MG/DL (ref 6–20)
BUN BLDV-MCNC: 32 MG/DL (ref 6–20)
BUN BLDV-MCNC: 35 MG/DL (ref 6–20)
BUN BLDV-MCNC: 36 MG/DL (ref 6–20)
BUN BLDV-MCNC: 38 MG/DL (ref 6–20)
BUN BLDV-MCNC: 39 MG/DL (ref 6–20)
BUN BLDV-MCNC: 64 MG/DL (ref 6–20)
BUN BLDV-MCNC: 69 MG/DL (ref 6–20)
BUN BLDV-MCNC: 69 MG/DL (ref 6–20)
BUN BLDV-MCNC: 75 MG/DL (ref 6–20)
BUN BLDV-MCNC: 80 MG/DL (ref 6–20)
BUN BLDV-MCNC: 80 MG/DL (ref 6–20)
BUN BLDV-MCNC: 84 MG/DL (ref 6–20)
BUN BLDV-MCNC: 85 MG/DL (ref 6–20)
BUN BLDV-MCNC: 93 MG/DL (ref 6–20)
C-REACTIVE PROTEIN, HIGH SENSITIVITY: 15.4 MG/L (ref 0–5)
CALCIUM SERPL-MCNC: 7 MG/DL (ref 8.5–9.9)
CALCIUM SERPL-MCNC: 7.1 MG/DL (ref 8.5–9.9)
CALCIUM SERPL-MCNC: 7.3 MG/DL (ref 8.5–9.9)
CALCIUM SERPL-MCNC: 7.6 MG/DL (ref 8.5–9.9)
CALCIUM SERPL-MCNC: 7.9 MG/DL (ref 8.5–9.9)
CALCIUM SERPL-MCNC: 8 MG/DL (ref 8.5–9.9)
CALCIUM SERPL-MCNC: 8 MG/DL (ref 8.5–9.9)
CALCIUM SERPL-MCNC: 8.1 MG/DL (ref 8.5–9.9)
CALCIUM SERPL-MCNC: 8.1 MG/DL (ref 8.5–9.9)
CALCIUM SERPL-MCNC: 8.2 MG/DL (ref 8.5–9.9)
CALCIUM SERPL-MCNC: 8.2 MG/DL (ref 8.5–9.9)
CALCIUM SERPL-MCNC: 8.3 MG/DL (ref 8.5–9.9)
CALCIUM SERPL-MCNC: 8.3 MG/DL (ref 8.5–9.9)
CALCIUM SERPL-MCNC: 8.4 MG/DL (ref 8.5–9.9)
CALCIUM SERPL-MCNC: 8.5 MG/DL (ref 8.5–9.9)
CALCIUM SERPL-MCNC: 8.5 MG/DL (ref 8.5–9.9)
CALCIUM SERPL-MCNC: 8.6 MG/DL (ref 8.5–9.9)
CALCIUM SERPL-MCNC: 8.7 MG/DL (ref 8.5–9.9)
CALCIUM SERPL-MCNC: 8.7 MG/DL (ref 8.5–9.9)
CALCIUM SERPL-MCNC: 8.9 MG/DL (ref 8.5–9.9)
CALCIUM SERPL-MCNC: 9.2 MG/DL (ref 8.5–9.9)
CHLORIDE BLD-SCNC: 88 MEQ/L (ref 95–107)
CHLORIDE BLD-SCNC: 88 MEQ/L (ref 95–107)
CHLORIDE BLD-SCNC: 89 MEQ/L (ref 95–107)
CHLORIDE BLD-SCNC: 90 MEQ/L (ref 95–107)
CHLORIDE BLD-SCNC: 90 MEQ/L (ref 95–107)
CHLORIDE BLD-SCNC: 91 MEQ/L (ref 95–107)
CHLORIDE BLD-SCNC: 92 MEQ/L (ref 95–107)
CHLORIDE BLD-SCNC: 93 MEQ/L (ref 95–107)
CHLORIDE BLD-SCNC: 94 MEQ/L (ref 95–107)
CHLORIDE BLD-SCNC: 94 MEQ/L (ref 95–107)
CHLORIDE BLD-SCNC: 95 MEQ/L (ref 95–107)
CHLORIDE BLD-SCNC: 96 MEQ/L (ref 95–107)
CHLORIDE BLD-SCNC: 97 MEQ/L (ref 95–107)
CHLORIDE BLD-SCNC: 98 MEQ/L (ref 95–107)
CHLORIDE BLD-SCNC: 98 MEQ/L (ref 95–107)
CHLORIDE BLD-SCNC: 99 MEQ/L (ref 95–107)
CHOLESTEROL, TOTAL: 108 MG/DL (ref 0–199)
CHOLESTEROL, TOTAL: 68 MG/DL (ref 0–199)
CK MB: 5.1 NG/ML (ref 0–3.8)
CLARITY: CLEAR
CO2: 12 MEQ/L (ref 20–31)
CO2: 15 MEQ/L (ref 20–31)
CO2: 16 MEQ/L (ref 20–31)
CO2: 17 MEQ/L (ref 20–31)
CO2: 18 MEQ/L (ref 20–31)
CO2: 19 MEQ/L (ref 20–31)
CO2: 20 MEQ/L (ref 20–31)
CO2: 22 MEQ/L (ref 20–31)
CO2: 23 MEQ/L (ref 20–31)
CO2: 24 MEQ/L (ref 20–31)
CO2: 25 MEQ/L (ref 20–31)
CO2: 26 MEQ/L (ref 20–31)
CO2: 27 MEQ/L (ref 20–31)
CO2: 31 MEQ/L (ref 20–31)
COLOR: YELLOW
CREAT SERPL-MCNC: 10.42 MG/DL (ref 0.5–0.9)
CREAT SERPL-MCNC: 10.93 MG/DL (ref 0.5–0.9)
CREAT SERPL-MCNC: 11.13 MG/DL (ref 0.5–0.9)
CREAT SERPL-MCNC: 11.23 MG/DL (ref 0.5–0.9)
CREAT SERPL-MCNC: 11.31 MG/DL (ref 0.5–0.9)
CREAT SERPL-MCNC: 11.68 MG/DL (ref 0.5–0.9)
CREAT SERPL-MCNC: 12.53 MG/DL (ref 0.5–0.9)
CREAT SERPL-MCNC: 13.94 MG/DL (ref 0.5–0.9)
CREAT SERPL-MCNC: 2.65 MG/DL (ref 0.5–0.9)
CREAT SERPL-MCNC: 3.87 MG/DL (ref 0.5–0.9)
CREAT SERPL-MCNC: 4.28 MG/DL (ref 0.5–0.9)
CREAT SERPL-MCNC: 4.47 MG/DL (ref 0.5–0.9)
CREAT SERPL-MCNC: 4.54 MG/DL (ref 0.5–0.9)
CREAT SERPL-MCNC: 5.43 MG/DL (ref 0.5–0.9)
CREAT SERPL-MCNC: 5.68 MG/DL (ref 0.5–0.9)
CREAT SERPL-MCNC: 5.7 MG/DL (ref 0.5–0.9)
CREAT SERPL-MCNC: 5.85 MG/DL (ref 0.5–0.9)
CREAT SERPL-MCNC: 5.96 MG/DL (ref 0.5–0.9)
CREAT SERPL-MCNC: 6.05 MG/DL (ref 0.5–0.9)
CREAT SERPL-MCNC: 6.19 MG/DL (ref 0.5–0.9)
CREAT SERPL-MCNC: 6.22 MG/DL (ref 0.5–0.9)
CREAT SERPL-MCNC: 6.44 MG/DL (ref 0.5–0.9)
CREAT SERPL-MCNC: 8.7 MG/DL (ref 0.5–0.9)
CREAT SERPL-MCNC: 9.25 MG/DL (ref 0.5–0.9)
CREAT SERPL-MCNC: 9.38 MG/DL (ref 0.5–0.9)
CREATINE KINASE-MB INDEX: 6.8 % (ref 0–3.5)
DESCRIPTION BLOOD BANK: NORMAL
EKG ATRIAL RATE: 129 BPM
EKG ATRIAL RATE: 136 BPM
EKG ATRIAL RATE: 73 BPM
EKG ATRIAL RATE: 75 BPM
EKG ATRIAL RATE: 81 BPM
EKG ATRIAL RATE: 83 BPM
EKG ATRIAL RATE: 83 BPM
EKG ATRIAL RATE: 84 BPM
EKG ATRIAL RATE: 87 BPM
EKG P AXIS: 19 DEGREES
EKG P AXIS: 39 DEGREES
EKG P AXIS: 42 DEGREES
EKG P AXIS: 51 DEGREES
EKG P AXIS: 52 DEGREES
EKG P AXIS: 61 DEGREES
EKG P AXIS: 68 DEGREES
EKG P-R INTERVAL: 158 MS
EKG P-R INTERVAL: 164 MS
EKG P-R INTERVAL: 170 MS
EKG P-R INTERVAL: 172 MS
EKG P-R INTERVAL: 176 MS
EKG P-R INTERVAL: 178 MS
EKG P-R INTERVAL: 222 MS
EKG Q-T INTERVAL: 278 MS
EKG Q-T INTERVAL: 318 MS
EKG Q-T INTERVAL: 386 MS
EKG Q-T INTERVAL: 392 MS
EKG Q-T INTERVAL: 396 MS
EKG Q-T INTERVAL: 420 MS
EKG Q-T INTERVAL: 438 MS
EKG Q-T INTERVAL: 460 MS
EKG Q-T INTERVAL: 478 MS
EKG QRS DURATION: 140 MS
EKG QRS DURATION: 180 MS
EKG QRS DURATION: 76 MS
EKG QRS DURATION: 86 MS
EKG QRS DURATION: 88 MS
EKG QRS DURATION: 90 MS
EKG QRS DURATION: 98 MS
EKG QTC CALCULATION (BAZETT): 460 MS
EKG QTC CALCULATION (BAZETT): 460 MS
EKG QTC CALCULATION (BAZETT): 464 MS
EKG QTC CALCULATION (BAZETT): 467 MS
EKG QTC CALCULATION (BAZETT): 482 MS
EKG QTC CALCULATION (BAZETT): 496 MS
EKG QTC CALCULATION (BAZETT): 513 MS
EKG QTC CALCULATION (BAZETT): 526 MS
EKG QTC CALCULATION (BAZETT): 561 MS
EKG R AXIS: -2 DEGREES
EKG R AXIS: -23 DEGREES
EKG R AXIS: -6 DEGREES
EKG R AXIS: -7 DEGREES
EKG R AXIS: -9 DEGREES
EKG R AXIS: 166 DEGREES
EKG R AXIS: 35 DEGREES
EKG R AXIS: 4 DEGREES
EKG R AXIS: 7 DEGREES
EKG T AXIS: -14 DEGREES
EKG T AXIS: -23 DEGREES
EKG T AXIS: -43 DEGREES
EKG T AXIS: 129 DEGREES
EKG T AXIS: 26 DEGREES
EKG T AXIS: 33 DEGREES
EKG T AXIS: 4 DEGREES
EKG T AXIS: 40 DEGREES
EKG T AXIS: 82 DEGREES
EKG VENTRICULAR RATE: 165 BPM
EKG VENTRICULAR RATE: 170 BPM
EKG VENTRICULAR RATE: 73 BPM
EKG VENTRICULAR RATE: 75 BPM
EKG VENTRICULAR RATE: 81 BPM
EKG VENTRICULAR RATE: 83 BPM
EKG VENTRICULAR RATE: 83 BPM
EKG VENTRICULAR RATE: 84 BPM
EKG VENTRICULAR RATE: 87 BPM
EOSINOPHILS ABSOLUTE: 0.1 K/UL (ref 0–0.7)
EOSINOPHILS ABSOLUTE: 0.1 K/UL (ref 0–0.7)
EOSINOPHILS ABSOLUTE: 0.2 K/UL (ref 0–0.7)
EOSINOPHILS ABSOLUTE: 0.3 K/UL (ref 0–0.7)
EOSINOPHILS ABSOLUTE: 0.4 K/UL (ref 0–0.7)
EOSINOPHILS ABSOLUTE: 0.5 K/UL (ref 0–0.7)
EOSINOPHILS ABSOLUTE: 0.5 K/UL (ref 0–0.7)
EOSINOPHILS ABSOLUTE: 0.7 K/UL (ref 0–0.7)
EOSINOPHILS RELATIVE PERCENT: 1.6 %
EOSINOPHILS RELATIVE PERCENT: 10.2 %
EOSINOPHILS RELATIVE PERCENT: 12 %
EOSINOPHILS RELATIVE PERCENT: 12 %
EOSINOPHILS RELATIVE PERCENT: 2.3 %
EOSINOPHILS RELATIVE PERCENT: 2.9 %
EOSINOPHILS RELATIVE PERCENT: 3.2 %
EOSINOPHILS RELATIVE PERCENT: 4.4 %
EOSINOPHILS RELATIVE PERCENT: 4.4 %
EOSINOPHILS RELATIVE PERCENT: 5.2 %
EOSINOPHILS RELATIVE PERCENT: 5.8 %
EOSINOPHILS RELATIVE PERCENT: 5.8 %
EOSINOPHILS RELATIVE PERCENT: 6.4 %
EOSINOPHILS RELATIVE PERCENT: 6.4 %
EOSINOPHILS RELATIVE PERCENT: 6.9 %
EOSINOPHILS RELATIVE PERCENT: 7.1 %
EOSINOPHILS RELATIVE PERCENT: 7.4 %
EOSINOPHILS RELATIVE PERCENT: 7.8 %
EOSINOPHILS RELATIVE PERCENT: 8 %
EOSINOPHILS RELATIVE PERCENT: 8.4 %
EOSINOPHILS RELATIVE PERCENT: 9.2 %
EPITHELIAL CELLS, UA: ABNORMAL /HPF (ref 0–5)
ETHANOL PERCENT: NORMAL G/DL
ETHANOL: <10 MG/DL (ref 0–0.08)
FERRITIN: 703 NG/ML (ref 13–150)
GFR AFRICAN AMERICAN: 10
GFR AFRICAN AMERICAN: 12.3
GFR AFRICAN AMERICAN: 12.5
GFR AFRICAN AMERICAN: 13.1
GFR AFRICAN AMERICAN: 14.7
GFR AFRICAN AMERICAN: 22.9
GFR AFRICAN AMERICAN: 3.4
GFR AFRICAN AMERICAN: 3.8
GFR AFRICAN AMERICAN: 4.1
GFR AFRICAN AMERICAN: 4.3
GFR AFRICAN AMERICAN: 4.3
GFR AFRICAN AMERICAN: 4.4
GFR AFRICAN AMERICAN: 4.5
GFR AFRICAN AMERICAN: 4.7
GFR AFRICAN AMERICAN: 5.3
GFR AFRICAN AMERICAN: 5.4
GFR AFRICAN AMERICAN: 5.8
GFR AFRICAN AMERICAN: 8.2
GFR AFRICAN AMERICAN: 8.5
GFR AFRICAN AMERICAN: 8.6
GFR AFRICAN AMERICAN: 8.8
GFR AFRICAN AMERICAN: 9
GFR AFRICAN AMERICAN: 9.2
GFR AFRICAN AMERICAN: 9.4
GFR AFRICAN AMERICAN: 9.5
GFR NON-AFRICAN AMERICAN: 10.1
GFR NON-AFRICAN AMERICAN: 10.3
GFR NON-AFRICAN AMERICAN: 10.9
GFR NON-AFRICAN AMERICAN: 12.2
GFR NON-AFRICAN AMERICAN: 18.9
GFR NON-AFRICAN AMERICAN: 2.8
GFR NON-AFRICAN AMERICAN: 3.1
GFR NON-AFRICAN AMERICAN: 3.4
GFR NON-AFRICAN AMERICAN: 3.5
GFR NON-AFRICAN AMERICAN: 3.6
GFR NON-AFRICAN AMERICAN: 3.6
GFR NON-AFRICAN AMERICAN: 3.7
GFR NON-AFRICAN AMERICAN: 3.9
GFR NON-AFRICAN AMERICAN: 4.4
GFR NON-AFRICAN AMERICAN: 4.5
GFR NON-AFRICAN AMERICAN: 4.8
GFR NON-AFRICAN AMERICAN: 6.8
GFR NON-AFRICAN AMERICAN: 7
GFR NON-AFRICAN AMERICAN: 7.1
GFR NON-AFRICAN AMERICAN: 7.3
GFR NON-AFRICAN AMERICAN: 7.4
GFR NON-AFRICAN AMERICAN: 7.6
GFR NON-AFRICAN AMERICAN: 7.8
GFR NON-AFRICAN AMERICAN: 7.8
GFR NON-AFRICAN AMERICAN: 8.3
GLOBULIN: 3.6 G/DL (ref 2.3–3.5)
GLOBULIN: 3.9 G/DL (ref 2.3–3.5)
GLOBULIN: 4 G/DL (ref 2.3–3.5)
GLOBULIN: 4.1 G/DL (ref 2.3–3.5)
GLOBULIN: 4.2 G/DL (ref 2.3–3.5)
GLOBULIN: 4.3 G/DL (ref 2.3–3.5)
GLOBULIN: 4.4 G/DL (ref 2.3–3.5)
GLOBULIN: 4.5 G/DL (ref 2.3–3.5)
GLOBULIN: 4.7 G/DL (ref 2.3–3.5)
GLOBULIN: 4.8 G/DL (ref 2.3–3.5)
GLUCOSE BLD-MCNC: 100 MG/DL (ref 60–115)
GLUCOSE BLD-MCNC: 100 MG/DL (ref 70–99)
GLUCOSE BLD-MCNC: 100 MG/DL (ref 70–99)
GLUCOSE BLD-MCNC: 101 MG/DL (ref 60–115)
GLUCOSE BLD-MCNC: 101 MG/DL (ref 70–99)
GLUCOSE BLD-MCNC: 101 MG/DL (ref 70–99)
GLUCOSE BLD-MCNC: 102 MG/DL (ref 60–115)
GLUCOSE BLD-MCNC: 103 MG/DL (ref 60–115)
GLUCOSE BLD-MCNC: 104 MG/DL (ref 70–99)
GLUCOSE BLD-MCNC: 105 MG/DL (ref 60–115)
GLUCOSE BLD-MCNC: 106 MG/DL (ref 60–115)
GLUCOSE BLD-MCNC: 107 MG/DL (ref 60–115)
GLUCOSE BLD-MCNC: 107 MG/DL (ref 60–115)
GLUCOSE BLD-MCNC: 107 MG/DL (ref 70–99)
GLUCOSE BLD-MCNC: 108 MG/DL (ref 60–115)
GLUCOSE BLD-MCNC: 108 MG/DL (ref 70–99)
GLUCOSE BLD-MCNC: 109 MG/DL (ref 60–115)
GLUCOSE BLD-MCNC: 110 MG/DL (ref 60–115)
GLUCOSE BLD-MCNC: 111 MG/DL (ref 60–115)
GLUCOSE BLD-MCNC: 112 MG/DL (ref 60–115)
GLUCOSE BLD-MCNC: 112 MG/DL (ref 60–115)
GLUCOSE BLD-MCNC: 112 MG/DL (ref 70–99)
GLUCOSE BLD-MCNC: 113 MG/DL (ref 60–115)
GLUCOSE BLD-MCNC: 114 MG/DL (ref 70–99)
GLUCOSE BLD-MCNC: 115 MG/DL (ref 60–115)
GLUCOSE BLD-MCNC: 115 MG/DL (ref 70–99)
GLUCOSE BLD-MCNC: 116 MG/DL (ref 70–99)
GLUCOSE BLD-MCNC: 117 MG/DL (ref 60–115)
GLUCOSE BLD-MCNC: 117 MG/DL (ref 70–99)
GLUCOSE BLD-MCNC: 118 MG/DL (ref 60–115)
GLUCOSE BLD-MCNC: 119 MG/DL (ref 60–115)
GLUCOSE BLD-MCNC: 120 MG/DL (ref 70–99)
GLUCOSE BLD-MCNC: 121 MG/DL (ref 60–115)
GLUCOSE BLD-MCNC: 121 MG/DL (ref 60–115)
GLUCOSE BLD-MCNC: 123 MG/DL (ref 60–115)
GLUCOSE BLD-MCNC: 123 MG/DL (ref 60–115)
GLUCOSE BLD-MCNC: 124 MG/DL (ref 60–115)
GLUCOSE BLD-MCNC: 124 MG/DL (ref 60–115)
GLUCOSE BLD-MCNC: 124 MG/DL (ref 70–99)
GLUCOSE BLD-MCNC: 125 MG/DL (ref 60–115)
GLUCOSE BLD-MCNC: 125 MG/DL (ref 60–115)
GLUCOSE BLD-MCNC: 126 MG/DL (ref 60–115)
GLUCOSE BLD-MCNC: 127 MG/DL (ref 70–99)
GLUCOSE BLD-MCNC: 128 MG/DL (ref 70–99)
GLUCOSE BLD-MCNC: 129 MG/DL (ref 60–115)
GLUCOSE BLD-MCNC: 130 MG/DL (ref 60–115)
GLUCOSE BLD-MCNC: 131 MG/DL (ref 60–115)
GLUCOSE BLD-MCNC: 132 MG/DL (ref 60–115)
GLUCOSE BLD-MCNC: 134 MG/DL (ref 60–115)
GLUCOSE BLD-MCNC: 134 MG/DL (ref 60–115)
GLUCOSE BLD-MCNC: 137 MG/DL (ref 70–99)
GLUCOSE BLD-MCNC: 138 MG/DL (ref 60–115)
GLUCOSE BLD-MCNC: 139 MG/DL (ref 60–115)
GLUCOSE BLD-MCNC: 142 MG/DL (ref 60–115)
GLUCOSE BLD-MCNC: 143 MG/DL (ref 60–115)
GLUCOSE BLD-MCNC: 147 MG/DL (ref 60–115)
GLUCOSE BLD-MCNC: 147 MG/DL (ref 60–115)
GLUCOSE BLD-MCNC: 150 MG/DL (ref 60–115)
GLUCOSE BLD-MCNC: 152 MG/DL (ref 60–115)
GLUCOSE BLD-MCNC: 152 MG/DL (ref 60–115)
GLUCOSE BLD-MCNC: 157 MG/DL (ref 60–115)
GLUCOSE BLD-MCNC: 159 MG/DL (ref 70–99)
GLUCOSE BLD-MCNC: 163 MG/DL (ref 60–115)
GLUCOSE BLD-MCNC: 164 MG/DL (ref 60–115)
GLUCOSE BLD-MCNC: 165 MG/DL (ref 70–99)
GLUCOSE BLD-MCNC: 165 MG/DL (ref 70–99)
GLUCOSE BLD-MCNC: 169 MG/DL (ref 60–115)
GLUCOSE BLD-MCNC: 171 MG/DL (ref 60–115)
GLUCOSE BLD-MCNC: 173 MG/DL (ref 60–115)
GLUCOSE BLD-MCNC: 173 MG/DL (ref 60–115)
GLUCOSE BLD-MCNC: 176 MG/DL (ref 60–115)
GLUCOSE BLD-MCNC: 181 MG/DL (ref 60–115)
GLUCOSE BLD-MCNC: 183 MG/DL (ref 60–115)
GLUCOSE BLD-MCNC: 195 MG/DL (ref 60–115)
GLUCOSE BLD-MCNC: 197 MG/DL (ref 60–115)
GLUCOSE BLD-MCNC: 208 MG/DL (ref 60–115)
GLUCOSE BLD-MCNC: 215 MG/DL (ref 60–115)
GLUCOSE BLD-MCNC: 76 MG/DL (ref 60–115)
GLUCOSE BLD-MCNC: 77 MG/DL (ref 60–115)
GLUCOSE BLD-MCNC: 78 MG/DL (ref 60–115)
GLUCOSE BLD-MCNC: 80 MG/DL (ref 60–115)
GLUCOSE BLD-MCNC: 81 MG/DL (ref 60–115)
GLUCOSE BLD-MCNC: 83 MG/DL (ref 70–99)
GLUCOSE BLD-MCNC: 86 MG/DL (ref 60–115)
GLUCOSE BLD-MCNC: 86 MG/DL (ref 60–115)
GLUCOSE BLD-MCNC: 87 MG/DL (ref 60–115)
GLUCOSE BLD-MCNC: 87 MG/DL (ref 70–99)
GLUCOSE BLD-MCNC: 88 MG/DL (ref 60–115)
GLUCOSE BLD-MCNC: 88 MG/DL (ref 70–99)
GLUCOSE BLD-MCNC: 89 MG/DL (ref 60–115)
GLUCOSE BLD-MCNC: 89 MG/DL (ref 60–115)
GLUCOSE BLD-MCNC: 90 MG/DL (ref 70–99)
GLUCOSE BLD-MCNC: 92 MG/DL (ref 60–115)
GLUCOSE BLD-MCNC: 92 MG/DL (ref 60–115)
GLUCOSE BLD-MCNC: 93 MG/DL (ref 60–115)
GLUCOSE BLD-MCNC: 94 MG/DL (ref 60–115)
GLUCOSE BLD-MCNC: 96 MG/DL (ref 60–115)
GLUCOSE BLD-MCNC: 96 MG/DL (ref 60–115)
GLUCOSE BLD-MCNC: 96 MG/DL (ref 70–99)
GLUCOSE BLD-MCNC: 99 MG/DL (ref 60–115)
GLUCOSE URINE: 250 MG/DL
HAV IGM SER IA-ACNC: ABNORMAL
HAV IGM SER IA-ACNC: ABNORMAL
HBA1C MFR BLD: 5.1 % (ref 4.8–5.9)
HBV SURFACE AB TITR SER: NORMAL MIU/ML
HCT VFR BLD CALC: 20 % (ref 37–47)
HCT VFR BLD CALC: 22.3 % (ref 37–47)
HCT VFR BLD CALC: 22.5 % (ref 37–47)
HCT VFR BLD CALC: 23.1 % (ref 37–47)
HCT VFR BLD CALC: 23.4 % (ref 37–47)
HCT VFR BLD CALC: 23.8 % (ref 37–47)
HCT VFR BLD CALC: 23.9 % (ref 37–47)
HCT VFR BLD CALC: 24.1 % (ref 37–47)
HCT VFR BLD CALC: 24.2 % (ref 37–47)
HCT VFR BLD CALC: 24.5 % (ref 37–47)
HCT VFR BLD CALC: 24.7 % (ref 37–47)
HCT VFR BLD CALC: 25 % (ref 37–47)
HCT VFR BLD CALC: 25.2 % (ref 37–47)
HCT VFR BLD CALC: 25.2 % (ref 37–47)
HCT VFR BLD CALC: 25.4 % (ref 37–47)
HCT VFR BLD CALC: 26 % (ref 37–47)
HCT VFR BLD CALC: 26.6 % (ref 37–47)
HCT VFR BLD CALC: 26.6 % (ref 37–47)
HCT VFR BLD CALC: 26.7 % (ref 37–47)
HCT VFR BLD CALC: 26.9 % (ref 37–47)
HCT VFR BLD CALC: 29.4 % (ref 37–47)
HCT VFR BLD CALC: 29.8 % (ref 37–47)
HCT VFR BLD CALC: 33.3 % (ref 37–47)
HCT VFR BLD CALC: 34.5 % (ref 37–47)
HCT VFR BLD CALC: 35.2 % (ref 37–47)
HDLC SERPL-MCNC: 39 MG/DL (ref 40–59)
HDLC SERPL-MCNC: 51 MG/DL (ref 40–59)
HEMOGLOBIN: 11.1 G/DL (ref 12–16)
HEMOGLOBIN: 11.8 G/DL (ref 12–16)
HEMOGLOBIN: 12.1 G/DL (ref 12–16)
HEMOGLOBIN: 6.6 G/DL (ref 12–16)
HEMOGLOBIN: 7 G/DL (ref 12–16)
HEMOGLOBIN: 7.4 G/DL (ref 12–16)
HEMOGLOBIN: 7.6 G/DL (ref 12–16)
HEMOGLOBIN: 7.6 G/DL (ref 12–16)
HEMOGLOBIN: 7.7 G/DL (ref 12–16)
HEMOGLOBIN: 7.8 G/DL (ref 12–16)
HEMOGLOBIN: 7.9 G/DL (ref 12–16)
HEMOGLOBIN: 7.9 G/DL (ref 12–16)
HEMOGLOBIN: 8 G/DL (ref 12–16)
HEMOGLOBIN: 8.1 G/DL (ref 12–16)
HEMOGLOBIN: 8.1 G/DL (ref 12–16)
HEMOGLOBIN: 8.2 G/DL (ref 12–16)
HEMOGLOBIN: 8.3 G/DL (ref 12–16)
HEMOGLOBIN: 8.6 G/DL (ref 12–16)
HEMOGLOBIN: 8.7 G/DL (ref 12–16)
HEMOGLOBIN: 8.8 G/DL (ref 12–16)
HEMOGLOBIN: 8.8 G/DL (ref 12–16)
HEMOGLOBIN: 9.4 G/DL (ref 12–16)
HEMOGLOBIN: 9.8 G/DL (ref 12–16)
HEPATITIS B CORE IGM ANTIBODY: ABNORMAL
HEPATITIS B CORE IGM ANTIBODY: ABNORMAL
HEPATITIS B SURFACE ANTIGEN INTERPRETATION: ABNORMAL
HEPATITIS B SURFACE ANTIGEN INTERPRETATION: ABNORMAL
HEPATITIS C ANTIBODY INTERPRETATION: REACTIVE
HEPATITIS C ANTIBODY INTERPRETATION: REACTIVE
HEPATITIS INTERPRETATION:: ABNORMAL
HEPATITIS INTERPRETATION:: ABNORMAL
HYALINE CASTS: ABNORMAL /HPF (ref 0–5)
INR BLD: 1
INR BLD: 1.1
INR BLD: 1.2
INR BLD: 1.3
INR BLD: 1.8
INTERNATIONAL NORMALIZATION RATIO, POC: 1.7
INTERNATIONAL NORMALIZATION RATIO, POC: 1.9
INTERNATIONAL NORMALIZATION RATIO, POC: 2
INTERNATIONAL NORMALIZATION RATIO, POC: 2
INTERNATIONAL NORMALIZATION RATIO, POC: 2.2
IRON SATURATION: 35 % (ref 11–46)
IRON: 57 UG/DL (ref 37–145)
KETONES, URINE: NEGATIVE MG/DL
LACTIC ACID: 1 MMOL/L (ref 0.5–2.2)
LACTIC ACID: 1.1 MMOL/L (ref 0.5–2.2)
LACTIC ACID: 1.8 MMOL/L (ref 0.5–2.2)
LDL CHOLESTEROL CALCULATED: 15 MG/DL (ref 0–129)
LDL CHOLESTEROL CALCULATED: 48 MG/DL (ref 0–129)
LEUKOCYTE ESTERASE, URINE: NEGATIVE
LIPASE: 55 U/L (ref 12–95)
LIPASE: 69 U/L (ref 12–95)
LV EF: 60 %
LVEF MODALITY: NORMAL
LYMPHOCYTES ABSOLUTE: 0.5 K/UL (ref 1–4.8)
LYMPHOCYTES ABSOLUTE: 0.6 K/UL (ref 1–4.8)
LYMPHOCYTES ABSOLUTE: 0.7 K/UL (ref 1–4.8)
LYMPHOCYTES ABSOLUTE: 0.8 K/UL (ref 1–4.8)
LYMPHOCYTES ABSOLUTE: 0.9 K/UL (ref 1–4.8)
LYMPHOCYTES ABSOLUTE: 0.9 K/UL (ref 1–4.8)
LYMPHOCYTES ABSOLUTE: 1 K/UL (ref 1–4.8)
LYMPHOCYTES ABSOLUTE: 1.1 K/UL (ref 1–4.8)
LYMPHOCYTES ABSOLUTE: 1.2 K/UL (ref 1–4.8)
LYMPHOCYTES ABSOLUTE: 1.3 K/UL (ref 1–4.8)
LYMPHOCYTES ABSOLUTE: 1.4 K/UL (ref 1–4.8)
LYMPHOCYTES RELATIVE PERCENT: 13.1 %
LYMPHOCYTES RELATIVE PERCENT: 13.2 %
LYMPHOCYTES RELATIVE PERCENT: 15.6 %
LYMPHOCYTES RELATIVE PERCENT: 15.7 %
LYMPHOCYTES RELATIVE PERCENT: 15.7 %
LYMPHOCYTES RELATIVE PERCENT: 16.3 %
LYMPHOCYTES RELATIVE PERCENT: 17 %
LYMPHOCYTES RELATIVE PERCENT: 17 %
LYMPHOCYTES RELATIVE PERCENT: 17.3 %
LYMPHOCYTES RELATIVE PERCENT: 18.3 %
LYMPHOCYTES RELATIVE PERCENT: 18.5 %
LYMPHOCYTES RELATIVE PERCENT: 18.9 %
LYMPHOCYTES RELATIVE PERCENT: 20.3 %
LYMPHOCYTES RELATIVE PERCENT: 20.5 %
LYMPHOCYTES RELATIVE PERCENT: 20.6 %
LYMPHOCYTES RELATIVE PERCENT: 20.6 %
LYMPHOCYTES RELATIVE PERCENT: 20.9 %
LYMPHOCYTES RELATIVE PERCENT: 22.8 %
LYMPHOCYTES RELATIVE PERCENT: 24.1 %
LYMPHOCYTES RELATIVE PERCENT: 25.2 %
LYMPHOCYTES RELATIVE PERCENT: 26.1 %
LYMPHOCYTES RELATIVE PERCENT: 28 %
LYMPHOCYTES RELATIVE PERCENT: 8.3 %
MAGNESIUM: 1.9 MG/DL (ref 1.7–2.4)
MAGNESIUM: 2.2 MG/DL (ref 1.7–2.4)
MAGNESIUM: 2.2 MG/DL (ref 1.7–2.4)
MCH RBC QN AUTO: 29.6 PG (ref 27–31.3)
MCH RBC QN AUTO: 29.8 PG (ref 27–31.3)
MCH RBC QN AUTO: 29.8 PG (ref 27–31.3)
MCH RBC QN AUTO: 30 PG (ref 27–31.3)
MCH RBC QN AUTO: 30 PG (ref 27–31.3)
MCH RBC QN AUTO: 30.1 PG (ref 27–31.3)
MCH RBC QN AUTO: 30.2 PG (ref 27–31.3)
MCH RBC QN AUTO: 30.2 PG (ref 27–31.3)
MCH RBC QN AUTO: 30.3 PG (ref 27–31.3)
MCH RBC QN AUTO: 30.3 PG (ref 27–31.3)
MCH RBC QN AUTO: 30.5 PG (ref 27–31.3)
MCH RBC QN AUTO: 30.7 PG (ref 27–31.3)
MCH RBC QN AUTO: 31.2 PG (ref 27–31.3)
MCH RBC QN AUTO: 31.4 PG (ref 27–31.3)
MCH RBC QN AUTO: 31.5 PG (ref 27–31.3)
MCH RBC QN AUTO: 31.5 PG (ref 27–31.3)
MCH RBC QN AUTO: 31.7 PG (ref 27–31.3)
MCH RBC QN AUTO: 31.8 PG (ref 27–31.3)
MCH RBC QN AUTO: 31.8 PG (ref 27–31.3)
MCH RBC QN AUTO: 32.1 PG (ref 27–31.3)
MCHC RBC AUTO-ENTMCNC: 31.4 % (ref 33–37)
MCHC RBC AUTO-ENTMCNC: 31.4 % (ref 33–37)
MCHC RBC AUTO-ENTMCNC: 31.7 % (ref 33–37)
MCHC RBC AUTO-ENTMCNC: 32.1 % (ref 33–37)
MCHC RBC AUTO-ENTMCNC: 32.1 % (ref 33–37)
MCHC RBC AUTO-ENTMCNC: 32.3 % (ref 33–37)
MCHC RBC AUTO-ENTMCNC: 32.4 % (ref 33–37)
MCHC RBC AUTO-ENTMCNC: 32.5 % (ref 33–37)
MCHC RBC AUTO-ENTMCNC: 32.6 % (ref 33–37)
MCHC RBC AUTO-ENTMCNC: 32.6 % (ref 33–37)
MCHC RBC AUTO-ENTMCNC: 32.8 % (ref 33–37)
MCHC RBC AUTO-ENTMCNC: 32.9 % (ref 33–37)
MCHC RBC AUTO-ENTMCNC: 33 % (ref 33–37)
MCHC RBC AUTO-ENTMCNC: 33.1 % (ref 33–37)
MCHC RBC AUTO-ENTMCNC: 33.2 % (ref 33–37)
MCHC RBC AUTO-ENTMCNC: 33.2 % (ref 33–37)
MCHC RBC AUTO-ENTMCNC: 33.3 % (ref 33–37)
MCHC RBC AUTO-ENTMCNC: 34.3 % (ref 33–37)
MCHC RBC AUTO-ENTMCNC: 34.4 % (ref 33–37)
MCV RBC AUTO: 91.5 FL (ref 82–100)
MCV RBC AUTO: 91.6 FL (ref 82–100)
MCV RBC AUTO: 91.6 FL (ref 82–100)
MCV RBC AUTO: 91.7 FL (ref 82–100)
MCV RBC AUTO: 91.7 FL (ref 82–100)
MCV RBC AUTO: 91.8 FL (ref 82–100)
MCV RBC AUTO: 91.8 FL (ref 82–100)
MCV RBC AUTO: 92 FL (ref 82–100)
MCV RBC AUTO: 92.4 FL (ref 82–100)
MCV RBC AUTO: 92.7 FL (ref 82–100)
MCV RBC AUTO: 93.3 FL (ref 82–100)
MCV RBC AUTO: 93.4 FL (ref 82–100)
MCV RBC AUTO: 93.5 FL (ref 82–100)
MCV RBC AUTO: 93.9 FL (ref 82–100)
MCV RBC AUTO: 94.1 FL (ref 82–100)
MCV RBC AUTO: 94.1 FL (ref 82–100)
MCV RBC AUTO: 94.3 FL (ref 82–100)
MCV RBC AUTO: 95 FL (ref 82–100)
MCV RBC AUTO: 95.5 FL (ref 82–100)
MCV RBC AUTO: 96.4 FL (ref 82–100)
MCV RBC AUTO: 96.5 FL (ref 82–100)
MCV RBC AUTO: 96.7 FL (ref 82–100)
MCV RBC AUTO: 99.5 FL (ref 82–100)
MONOCYTES ABSOLUTE: 0.3 K/UL (ref 0.2–0.8)
MONOCYTES ABSOLUTE: 0.3 K/UL (ref 0.2–0.8)
MONOCYTES ABSOLUTE: 0.4 K/UL (ref 0.2–0.8)
MONOCYTES ABSOLUTE: 0.5 K/UL (ref 0.2–0.8)
MONOCYTES ABSOLUTE: 0.6 K/UL (ref 0.2–0.8)
MONOCYTES ABSOLUTE: 0.7 K/UL (ref 0.2–0.8)
MONOCYTES ABSOLUTE: 0.8 K/UL (ref 0.2–0.8)
MONOCYTES ABSOLUTE: 0.8 K/UL (ref 0.2–0.8)
MONOCYTES ABSOLUTE: 0.9 K/UL (ref 0.2–0.8)
MONOCYTES RELATIVE PERCENT: 10 %
MONOCYTES RELATIVE PERCENT: 10 %
MONOCYTES RELATIVE PERCENT: 10.4 %
MONOCYTES RELATIVE PERCENT: 10.7 %
MONOCYTES RELATIVE PERCENT: 10.8 %
MONOCYTES RELATIVE PERCENT: 11.3 %
MONOCYTES RELATIVE PERCENT: 11.9 %
MONOCYTES RELATIVE PERCENT: 12.3 %
MONOCYTES RELATIVE PERCENT: 12.8 %
MONOCYTES RELATIVE PERCENT: 12.9 %
MONOCYTES RELATIVE PERCENT: 15 %
MONOCYTES RELATIVE PERCENT: 15.4 %
MONOCYTES RELATIVE PERCENT: 15.6 %
MONOCYTES RELATIVE PERCENT: 6.3 %
MONOCYTES RELATIVE PERCENT: 7.3 %
MONOCYTES RELATIVE PERCENT: 7.4 %
MONOCYTES RELATIVE PERCENT: 8 %
MONOCYTES RELATIVE PERCENT: 8.1 %
MONOCYTES RELATIVE PERCENT: 8.2 %
MONOCYTES RELATIVE PERCENT: 9.1 %
MONOCYTES RELATIVE PERCENT: 9.4 %
MONOCYTES RELATIVE PERCENT: 9.6 %
MONOCYTES RELATIVE PERCENT: 9.7 %
NEUTROPHILS ABSOLUTE: 1.7 K/UL (ref 1.4–6.5)
NEUTROPHILS ABSOLUTE: 2 K/UL (ref 1.4–6.5)
NEUTROPHILS ABSOLUTE: 2.1 K/UL (ref 1.4–6.5)
NEUTROPHILS ABSOLUTE: 2.2 K/UL (ref 1.4–6.5)
NEUTROPHILS ABSOLUTE: 2.3 K/UL (ref 1.4–6.5)
NEUTROPHILS ABSOLUTE: 2.3 K/UL (ref 1.4–6.5)
NEUTROPHILS ABSOLUTE: 2.4 K/UL (ref 1.4–6.5)
NEUTROPHILS ABSOLUTE: 2.6 K/UL (ref 1.4–6.5)
NEUTROPHILS ABSOLUTE: 2.7 K/UL (ref 1.4–6.5)
NEUTROPHILS ABSOLUTE: 2.8 K/UL (ref 1.4–6.5)
NEUTROPHILS ABSOLUTE: 3.2 K/UL (ref 1.4–6.5)
NEUTROPHILS ABSOLUTE: 3.3 K/UL (ref 1.4–6.5)
NEUTROPHILS ABSOLUTE: 3.5 K/UL (ref 1.4–6.5)
NEUTROPHILS ABSOLUTE: 3.5 K/UL (ref 1.4–6.5)
NEUTROPHILS ABSOLUTE: 3.6 K/UL (ref 1.4–6.5)
NEUTROPHILS ABSOLUTE: 4.2 K/UL (ref 1.4–6.5)
NEUTROPHILS ABSOLUTE: 4.7 K/UL (ref 1.4–6.5)
NEUTROPHILS ABSOLUTE: 4.7 K/UL (ref 1.4–6.5)
NEUTROPHILS ABSOLUTE: 4.8 K/UL (ref 1.4–6.5)
NEUTROPHILS ABSOLUTE: 5.2 K/UL (ref 1.4–6.5)
NEUTROPHILS ABSOLUTE: 5.7 K/UL (ref 1.4–6.5)
NEUTROPHILS RELATIVE PERCENT: 51.7 %
NEUTROPHILS RELATIVE PERCENT: 52.3 %
NEUTROPHILS RELATIVE PERCENT: 53.5 %
NEUTROPHILS RELATIVE PERCENT: 54.1 %
NEUTROPHILS RELATIVE PERCENT: 55.9 %
NEUTROPHILS RELATIVE PERCENT: 57.4 %
NEUTROPHILS RELATIVE PERCENT: 58.1 %
NEUTROPHILS RELATIVE PERCENT: 58.8 %
NEUTROPHILS RELATIVE PERCENT: 60.9 %
NEUTROPHILS RELATIVE PERCENT: 61 %
NEUTROPHILS RELATIVE PERCENT: 61.2 %
NEUTROPHILS RELATIVE PERCENT: 63.4 %
NEUTROPHILS RELATIVE PERCENT: 64.4 %
NEUTROPHILS RELATIVE PERCENT: 64.9 %
NEUTROPHILS RELATIVE PERCENT: 65.7 %
NEUTROPHILS RELATIVE PERCENT: 67.4 %
NEUTROPHILS RELATIVE PERCENT: 67.8 %
NEUTROPHILS RELATIVE PERCENT: 68 %
NEUTROPHILS RELATIVE PERCENT: 68.1 %
NEUTROPHILS RELATIVE PERCENT: 69.8 %
NEUTROPHILS RELATIVE PERCENT: 72.7 %
NEUTROPHILS RELATIVE PERCENT: 73.1 %
NEUTROPHILS RELATIVE PERCENT: 81.4 %
NITRITE, URINE: NEGATIVE
PDW BLD-RTO: 14 % (ref 11.5–14.5)
PDW BLD-RTO: 14.1 % (ref 11.5–14.5)
PDW BLD-RTO: 14.3 % (ref 11.5–14.5)
PDW BLD-RTO: 14.4 % (ref 11.5–14.5)
PDW BLD-RTO: 14.6 % (ref 11.5–14.5)
PDW BLD-RTO: 14.8 % (ref 11.5–14.5)
PDW BLD-RTO: 15 % (ref 11.5–14.5)
PDW BLD-RTO: 15.3 % (ref 11.5–14.5)
PDW BLD-RTO: 15.6 % (ref 11.5–14.5)
PDW BLD-RTO: 15.8 % (ref 11.5–14.5)
PDW BLD-RTO: 15.9 % (ref 11.5–14.5)
PDW BLD-RTO: 16 % (ref 11.5–14.5)
PDW BLD-RTO: 16 % (ref 11.5–14.5)
PDW BLD-RTO: 16.3 % (ref 11.5–14.5)
PDW BLD-RTO: 16.5 % (ref 11.5–14.5)
PDW BLD-RTO: 16.6 % (ref 11.5–14.5)
PDW BLD-RTO: 16.7 % (ref 11.5–14.5)
PDW BLD-RTO: 17 % (ref 11.5–14.5)
PERFORMED ON: ABNORMAL
PERFORMED ON: NORMAL
PH UA: 7.5 (ref 5–9)
PHOSPHORUS: 2.4 MG/DL (ref 2.3–4.8)
PHOSPHORUS: 2.7 MG/DL (ref 2.3–4.8)
PHOSPHORUS: 4.3 MG/DL (ref 2.3–4.8)
PHOSPHORUS: 4.6 MG/DL (ref 2.3–4.8)
PHOSPHORUS: 5.7 MG/DL (ref 2.3–4.8)
PHOSPHORUS: 7.3 MG/DL (ref 2.3–4.8)
PHOSPHORUS: 8.6 MG/DL (ref 2.3–4.8)
PLATELET # BLD: 157 K/UL (ref 130–400)
PLATELET # BLD: 158 K/UL (ref 130–400)
PLATELET # BLD: 160 K/UL (ref 130–400)
PLATELET # BLD: 170 K/UL (ref 130–400)
PLATELET # BLD: 189 K/UL (ref 130–400)
PLATELET # BLD: 195 K/UL (ref 130–400)
PLATELET # BLD: 196 K/UL (ref 130–400)
PLATELET # BLD: 199 K/UL (ref 130–400)
PLATELET # BLD: 199 K/UL (ref 130–400)
PLATELET # BLD: 200 K/UL (ref 130–400)
PLATELET # BLD: 203 K/UL (ref 130–400)
PLATELET # BLD: 203 K/UL (ref 130–400)
PLATELET # BLD: 204 K/UL (ref 130–400)
PLATELET # BLD: 206 K/UL (ref 130–400)
PLATELET # BLD: 211 K/UL (ref 130–400)
PLATELET # BLD: 212 K/UL (ref 130–400)
PLATELET # BLD: 225 K/UL (ref 130–400)
PLATELET # BLD: 226 K/UL (ref 130–400)
PLATELET # BLD: 231 K/UL (ref 130–400)
PLATELET # BLD: 232 K/UL (ref 130–400)
PLATELET # BLD: 241 K/UL (ref 130–400)
PLATELET # BLD: 249 K/UL (ref 130–400)
PLATELET # BLD: 251 K/UL (ref 130–400)
PLATELET # BLD: 257 K/UL (ref 130–400)
PLATELET # BLD: 267 K/UL (ref 130–400)
PLATELET SLIDE REVIEW: ADEQUATE
POTASSIUM REFLEX MAGNESIUM: 4.1 MEQ/L (ref 3.4–4.9)
POTASSIUM REFLEX MAGNESIUM: 4.3 MEQ/L (ref 3.4–4.9)
POTASSIUM REFLEX MAGNESIUM: 4.6 MEQ/L (ref 3.4–4.9)
POTASSIUM REFLEX MAGNESIUM: 6.1 MEQ/L (ref 3.4–4.9)
POTASSIUM SERPL-SCNC: 3.6 MEQ/L (ref 3.4–4.9)
POTASSIUM SERPL-SCNC: 3.7 MEQ/L (ref 3.4–4.9)
POTASSIUM SERPL-SCNC: 3.8 MEQ/L (ref 3.4–4.9)
POTASSIUM SERPL-SCNC: 3.8 MEQ/L (ref 3.4–4.9)
POTASSIUM SERPL-SCNC: 3.9 MEQ/L (ref 3.4–4.9)
POTASSIUM SERPL-SCNC: 4.2 MEQ/L (ref 3.4–4.9)
POTASSIUM SERPL-SCNC: 4.4 MEQ/L (ref 3.4–4.9)
POTASSIUM SERPL-SCNC: 4.4 MEQ/L (ref 3.4–4.9)
POTASSIUM SERPL-SCNC: 4.5 MEQ/L (ref 3.4–4.9)
POTASSIUM SERPL-SCNC: 4.6 MEQ/L (ref 3.4–4.9)
POTASSIUM SERPL-SCNC: 4.8 MEQ/L (ref 3.4–4.9)
POTASSIUM SERPL-SCNC: 5.2 MEQ/L (ref 3.4–4.9)
POTASSIUM SERPL-SCNC: 5.3 MEQ/L (ref 3.4–4.9)
POTASSIUM SERPL-SCNC: 5.5 MEQ/L (ref 3.4–4.9)
POTASSIUM SERPL-SCNC: 5.6 MEQ/L (ref 3.4–4.9)
POTASSIUM SERPL-SCNC: 5.8 MEQ/L (ref 3.4–4.9)
POTASSIUM SERPL-SCNC: 5.8 MEQ/L (ref 3.4–4.9)
POTASSIUM SERPL-SCNC: 6 MEQ/L (ref 3.4–4.9)
POTASSIUM SERPL-SCNC: 6.1 MEQ/L (ref 3.4–4.9)
POTASSIUM SERPL-SCNC: 6.6 MEQ/L (ref 3.4–4.9)
PRO-BNP: NORMAL PG/ML
PROTEIN UA: >=300 MG/DL
PROTHROMBIN TIME: 13.6 SEC (ref 12.3–14.9)
PROTHROMBIN TIME: 14 SEC (ref 12.3–14.9)
PROTHROMBIN TIME: 14.1 SEC (ref 12.3–14.9)
PROTHROMBIN TIME: 14.3 SEC (ref 12.3–14.9)
PROTHROMBIN TIME: 15 SEC (ref 12.3–14.9)
PROTHROMBIN TIME: 15 SEC (ref 12.3–14.9)
PROTHROMBIN TIME: 15.1 SEC (ref 12.3–14.9)
PROTHROMBIN TIME: 15.4 SEC (ref 12.3–14.9)
PROTHROMBIN TIME: 15.9 SEC (ref 12.3–14.9)
PROTHROMBIN TIME: 16.5 SEC (ref 12.3–14.9)
PROTHROMBIN TIME: 20.8 SEC (ref 12.3–14.9)
RBC # BLD: 2.18 M/UL (ref 4.2–5.4)
RBC # BLD: 2.35 M/UL (ref 4.2–5.4)
RBC # BLD: 2.45 M/UL (ref 4.2–5.4)
RBC # BLD: 2.47 M/UL (ref 4.2–5.4)
RBC # BLD: 2.54 M/UL (ref 4.2–5.4)
RBC # BLD: 2.56 M/UL (ref 4.2–5.4)
RBC # BLD: 2.58 M/UL (ref 4.2–5.4)
RBC # BLD: 2.59 M/UL (ref 4.2–5.4)
RBC # BLD: 2.62 M/UL (ref 4.2–5.4)
RBC # BLD: 2.63 M/UL (ref 4.2–5.4)
RBC # BLD: 2.64 M/UL (ref 4.2–5.4)
RBC # BLD: 2.68 M/UL (ref 4.2–5.4)
RBC # BLD: 2.7 M/UL (ref 4.2–5.4)
RBC # BLD: 2.71 M/UL (ref 4.2–5.4)
RBC # BLD: 2.72 M/UL (ref 4.2–5.4)
RBC # BLD: 2.74 M/UL (ref 4.2–5.4)
RBC # BLD: 2.75 M/UL (ref 4.2–5.4)
RBC # BLD: 2.76 M/UL (ref 4.2–5.4)
RBC # BLD: 2.79 M/UL (ref 4.2–5.4)
RBC # BLD: 2.83 M/UL (ref 4.2–5.4)
RBC # BLD: 2.99 M/UL (ref 4.2–5.4)
RBC # BLD: 3.06 M/UL (ref 4.2–5.4)
RBC # BLD: 3.54 M/UL (ref 4.2–5.4)
RBC # BLD: 3.76 M/UL (ref 4.2–5.4)
RBC # BLD: 3.79 M/UL (ref 4.2–5.4)
RBC UA: ABNORMAL /HPF (ref 0–5)
REASON FOR REJECTION: NORMAL
REJECTED TEST: NORMAL
SARS-COV-2, NAAT: NOT DETECTED
SLIDE REVIEW: ABNORMAL
SODIUM BLD-SCNC: 126 MEQ/L (ref 135–144)
SODIUM BLD-SCNC: 127 MEQ/L (ref 135–144)
SODIUM BLD-SCNC: 132 MEQ/L (ref 135–144)
SODIUM BLD-SCNC: 132 MEQ/L (ref 135–144)
SODIUM BLD-SCNC: 133 MEQ/L (ref 135–144)
SODIUM BLD-SCNC: 134 MEQ/L (ref 135–144)
SODIUM BLD-SCNC: 135 MEQ/L (ref 135–144)
SODIUM BLD-SCNC: 136 MEQ/L (ref 135–144)
SODIUM BLD-SCNC: 137 MEQ/L (ref 135–144)
SODIUM BLD-SCNC: 137 MEQ/L (ref 135–144)
SODIUM BLD-SCNC: 138 MEQ/L (ref 135–144)
SODIUM BLD-SCNC: 141 MEQ/L (ref 135–144)
SPECIFIC GRAVITY UA: 1.02 (ref 1–1.03)
TOTAL CK: 109 U/L (ref 0–170)
TOTAL CK: 59 U/L (ref 0–170)
TOTAL CK: 75 U/L (ref 0–170)
TOTAL IRON BINDING CAPACITY: 161 UG/DL (ref 178–450)
TOTAL PROTEIN: 6.7 G/DL (ref 6.3–8)
TOTAL PROTEIN: 7.4 G/DL (ref 6.3–8)
TOTAL PROTEIN: 7.4 G/DL (ref 6.3–8)
TOTAL PROTEIN: 7.5 G/DL (ref 6.3–8)
TOTAL PROTEIN: 7.6 G/DL (ref 6.3–8)
TOTAL PROTEIN: 7.7 G/DL (ref 6.3–8)
TOTAL PROTEIN: 7.7 G/DL (ref 6.3–8)
TOTAL PROTEIN: 7.8 G/DL (ref 6.3–8)
TOTAL PROTEIN: 7.8 G/DL (ref 6.3–8)
TOTAL PROTEIN: 7.9 G/DL (ref 6.3–8)
TOTAL PROTEIN: 8.3 G/DL (ref 6.3–8)
TOTAL PROTEIN: 8.3 G/DL (ref 6.3–8)
TOTAL PROTEIN: 8.6 G/DL (ref 6.3–8)
TRIGL SERPL-MCNC: 46 MG/DL (ref 0–150)
TRIGL SERPL-MCNC: 70 MG/DL (ref 0–150)
TROPONIN: 0.12 NG/ML (ref 0–0.01)
TROPONIN: 0.12 NG/ML (ref 0–0.01)
TROPONIN: 0.13 NG/ML (ref 0–0.01)
TROPONIN: 0.13 NG/ML (ref 0–0.01)
TROPONIN: 0.16 NG/ML (ref 0–0.01)
TROPONIN: 0.16 NG/ML (ref 0–0.01)
TROPONIN: 0.18 NG/ML (ref 0–0.01)
TROPONIN: 0.19 NG/ML (ref 0–0.01)
TROPONIN: 0.19 NG/ML (ref 0–0.01)
TROPONIN: 0.21 NG/ML (ref 0–0.01)
TROPONIN: 0.33 NG/ML (ref 0–0.01)
TROPONIN: 0.7 NG/ML (ref 0–0.01)
TROPONIN: 0.75 NG/ML (ref 0–0.01)
TSH SERPL DL<=0.05 MIU/L-ACNC: 2.23 UIU/ML (ref 0.44–3.86)
URINE REFLEX TO CULTURE: ABNORMAL
UROBILINOGEN, URINE: 0.2 E.U./DL
WBC # BLD: 2.9 K/UL (ref 4.8–10.8)
WBC # BLD: 3.4 K/UL (ref 4.8–10.8)
WBC # BLD: 3.6 K/UL (ref 4.8–10.8)
WBC # BLD: 3.7 K/UL (ref 4.8–10.8)
WBC # BLD: 3.9 K/UL (ref 4.8–10.8)
WBC # BLD: 4.1 K/UL (ref 4.8–10.8)
WBC # BLD: 4.3 K/UL (ref 4.8–10.8)
WBC # BLD: 4.3 K/UL (ref 4.8–10.8)
WBC # BLD: 4.5 K/UL (ref 4.8–10.8)
WBC # BLD: 4.6 K/UL (ref 4.8–10.8)
WBC # BLD: 4.9 K/UL (ref 4.8–10.8)
WBC # BLD: 4.9 K/UL (ref 4.8–10.8)
WBC # BLD: 5 K/UL (ref 4.8–10.8)
WBC # BLD: 5.1 K/UL (ref 4.8–10.8)
WBC # BLD: 5.2 K/UL (ref 4.8–10.8)
WBC # BLD: 5.3 K/UL (ref 4.8–10.8)
WBC # BLD: 5.5 K/UL (ref 4.8–10.8)
WBC # BLD: 5.6 K/UL (ref 4.8–10.8)
WBC # BLD: 5.8 K/UL (ref 4.8–10.8)
WBC # BLD: 6.1 K/UL (ref 4.8–10.8)
WBC # BLD: 6.1 K/UL (ref 4.8–10.8)
WBC # BLD: 6.6 K/UL (ref 4.8–10.8)
WBC # BLD: 6.9 K/UL (ref 4.8–10.8)
WBC # BLD: 7.1 K/UL (ref 4.8–10.8)
WBC # BLD: 8.4 K/UL (ref 4.8–10.8)
WBC UA: ABNORMAL /HPF (ref 0–5)

## 2020-01-01 PROCEDURE — 90870 ELECTROCONVULSIVE THERAPY: CPT

## 2020-01-01 PROCEDURE — 6370000000 HC RX 637 (ALT 250 FOR IP): Performed by: PSYCHIATRY & NEUROLOGY

## 2020-01-01 PROCEDURE — 2500000003 HC RX 250 WO HCPCS: Performed by: PSYCHIATRY & NEUROLOGY

## 2020-01-01 PROCEDURE — 36415 COLL VENOUS BLD VENIPUNCTURE: CPT

## 2020-01-01 PROCEDURE — 6370000000 HC RX 637 (ALT 250 FOR IP): Performed by: INTERNAL MEDICINE

## 2020-01-01 PROCEDURE — 71046 X-RAY EXAM CHEST 2 VIEWS: CPT

## 2020-01-01 PROCEDURE — 96374 THER/PROPH/DIAG INJ IV PUSH: CPT

## 2020-01-01 PROCEDURE — 6360000002 HC RX W HCPCS: Performed by: PERSONAL EMERGENCY RESPONSE ATTENDANT

## 2020-01-01 PROCEDURE — 2580000003 HC RX 258

## 2020-01-01 PROCEDURE — 85025 COMPLETE CBC W/AUTO DIFF WBC: CPT

## 2020-01-01 PROCEDURE — 6360000002 HC RX W HCPCS: Performed by: NURSE ANESTHETIST, CERTIFIED REGISTERED

## 2020-01-01 PROCEDURE — U0002 COVID-19 LAB TEST NON-CDC: HCPCS

## 2020-01-01 PROCEDURE — 99285 EMERGENCY DEPT VISIT HI MDM: CPT

## 2020-01-01 PROCEDURE — 2580000003 HC RX 258: Performed by: INTERNAL MEDICINE

## 2020-01-01 PROCEDURE — 2580000003 HC RX 258: Performed by: PERSONAL EMERGENCY RESPONSE ATTENDANT

## 2020-01-01 PROCEDURE — 6360000002 HC RX W HCPCS: Performed by: INTERNAL MEDICINE

## 2020-01-01 PROCEDURE — 3700000001 HC ADD 15 MINUTES (ANESTHESIA)

## 2020-01-01 PROCEDURE — 73564 X-RAY EXAM KNEE 4 OR MORE: CPT

## 2020-01-01 PROCEDURE — 1240000000 HC EMOTIONAL WELLNESS R&B

## 2020-01-01 PROCEDURE — 2500000003 HC RX 250 WO HCPCS: Performed by: RADIOLOGY

## 2020-01-01 PROCEDURE — 85610 PROTHROMBIN TIME: CPT

## 2020-01-01 PROCEDURE — 71045 X-RAY EXAM CHEST 1 VIEW: CPT

## 2020-01-01 PROCEDURE — 6360000002 HC RX W HCPCS: Performed by: FAMILY MEDICINE

## 2020-01-01 PROCEDURE — 99203 OFFICE O/P NEW LOW 30 MIN: CPT | Performed by: ORTHOPAEDIC SURGERY

## 2020-01-01 PROCEDURE — 6370000000 HC RX 637 (ALT 250 FOR IP): Performed by: NURSE PRACTITIONER

## 2020-01-01 PROCEDURE — 2060000000 HC ICU INTERMEDIATE R&B

## 2020-01-01 PROCEDURE — 80053 COMPREHEN METABOLIC PANEL: CPT

## 2020-01-01 PROCEDURE — 6370000000 HC RX 637 (ALT 250 FOR IP): Performed by: PHYSICIAN ASSISTANT

## 2020-01-01 PROCEDURE — 86923 COMPATIBILITY TEST ELECTRIC: CPT

## 2020-01-01 PROCEDURE — GZB4ZZZ OTHER ELECTROCONVULSIVE THERAPY: ICD-10-PCS | Performed by: PSYCHIATRY & NEUROLOGY

## 2020-01-01 PROCEDURE — 93010 ELECTROCARDIOGRAM REPORT: CPT | Performed by: INTERNAL MEDICINE

## 2020-01-01 PROCEDURE — 2500000003 HC RX 250 WO HCPCS: Performed by: INTERNAL MEDICINE

## 2020-01-01 PROCEDURE — 6360000002 HC RX W HCPCS: Performed by: STUDENT IN AN ORGANIZED HEALTH CARE EDUCATION/TRAINING PROGRAM

## 2020-01-01 PROCEDURE — 2580000003 HC RX 258: Performed by: FAMILY MEDICINE

## 2020-01-01 PROCEDURE — 84100 ASSAY OF PHOSPHORUS: CPT

## 2020-01-01 PROCEDURE — 84484 ASSAY OF TROPONIN QUANT: CPT

## 2020-01-01 PROCEDURE — 80048 BASIC METABOLIC PNL TOTAL CA: CPT

## 2020-01-01 PROCEDURE — 2500000003 HC RX 250 WO HCPCS: Performed by: FAMILY MEDICINE

## 2020-01-01 PROCEDURE — 99232 SBSQ HOSP IP/OBS MODERATE 35: CPT | Performed by: INTERNAL MEDICINE

## 2020-01-01 PROCEDURE — 85027 COMPLETE CBC AUTOMATED: CPT

## 2020-01-01 PROCEDURE — 36592 COLLECT BLOOD FROM PICC: CPT

## 2020-01-01 PROCEDURE — 70450 CT HEAD/BRAIN W/O DYE: CPT

## 2020-01-01 PROCEDURE — 90870 ELECTROCONVULSIVE THERAPY: CPT | Performed by: PSYCHIATRY & NEUROLOGY

## 2020-01-01 PROCEDURE — 7100000000 HC PACU RECOVERY - FIRST 15 MIN

## 2020-01-01 PROCEDURE — 36589 REMOVAL TUNNELED CV CATH: CPT | Performed by: RADIOLOGY

## 2020-01-01 PROCEDURE — 97165 OT EVAL LOW COMPLEX 30 MIN: CPT

## 2020-01-01 PROCEDURE — 73630 X-RAY EXAM OF FOOT: CPT

## 2020-01-01 PROCEDURE — 85610 PROTHROMBIN TIME: CPT | Performed by: PHARMACIST

## 2020-01-01 PROCEDURE — 6360000002 HC RX W HCPCS: Performed by: ANESTHESIOLOGY

## 2020-01-01 PROCEDURE — 85730 THROMBOPLASTIN TIME PARTIAL: CPT

## 2020-01-01 PROCEDURE — 2700000000 HC OXYGEN THERAPY PER DAY

## 2020-01-01 PROCEDURE — 99223 1ST HOSP IP/OBS HIGH 75: CPT | Performed by: RADIOLOGY

## 2020-01-01 PROCEDURE — G8427 DOCREV CUR MEDS BY ELIG CLIN: HCPCS | Performed by: ORTHOPAEDIC SURGERY

## 2020-01-01 PROCEDURE — 1210000000 HC MED SURG R&B

## 2020-01-01 PROCEDURE — 99223 1ST HOSP IP/OBS HIGH 75: CPT | Performed by: INTERNAL MEDICINE

## 2020-01-01 PROCEDURE — 2500000003 HC RX 250 WO HCPCS: Performed by: NURSE ANESTHETIST, CERTIFIED REGISTERED

## 2020-01-01 PROCEDURE — 93005 ELECTROCARDIOGRAM TRACING: CPT | Performed by: STUDENT IN AN ORGANIZED HEALTH CARE EDUCATION/TRAINING PROGRAM

## 2020-01-01 PROCEDURE — 96375 TX/PRO/DX INJ NEW DRUG ADDON: CPT

## 2020-01-01 PROCEDURE — 99211 OFF/OP EST MAY X REQ PHY/QHP: CPT

## 2020-01-01 PROCEDURE — 99232 SBSQ HOSP IP/OBS MODERATE 35: CPT | Performed by: PSYCHIATRY & NEUROLOGY

## 2020-01-01 PROCEDURE — 99211 OFF/OP EST MAY X REQ PHY/QHP: CPT | Performed by: PHARMACIST

## 2020-01-01 PROCEDURE — 6360000002 HC RX W HCPCS: Performed by: PHYSICIAN ASSISTANT

## 2020-01-01 PROCEDURE — 83605 ASSAY OF LACTIC ACID: CPT

## 2020-01-01 PROCEDURE — G0378 HOSPITAL OBSERVATION PER HR: HCPCS

## 2020-01-01 PROCEDURE — 86850 RBC ANTIBODY SCREEN: CPT

## 2020-01-01 PROCEDURE — 3700000000 HC ANESTHESIA ATTENDED CARE

## 2020-01-01 PROCEDURE — 82550 ASSAY OF CK (CPK): CPT

## 2020-01-01 PROCEDURE — 2580000003 HC RX 258: Performed by: ANESTHESIOLOGY

## 2020-01-01 PROCEDURE — 83036 HEMOGLOBIN GLYCOSYLATED A1C: CPT

## 2020-01-01 PROCEDURE — 93005 ELECTROCARDIOGRAM TRACING: CPT | Performed by: EMERGENCY MEDICINE

## 2020-01-01 PROCEDURE — 2580000003 HC RX 258: Performed by: PHYSICIAN ASSISTANT

## 2020-01-01 PROCEDURE — 2580000003 HC RX 258: Performed by: STUDENT IN AN ORGANIZED HEALTH CARE EDUCATION/TRAINING PROGRAM

## 2020-01-01 PROCEDURE — 5A1D70Z PERFORMANCE OF URINARY FILTRATION, INTERMITTENT, LESS THAN 6 HOURS PER DAY: ICD-10-PCS | Performed by: INTERNAL MEDICINE

## 2020-01-01 PROCEDURE — 93005 ELECTROCARDIOGRAM TRACING: CPT | Performed by: PHYSICIAN ASSISTANT

## 2020-01-01 PROCEDURE — 6360000002 HC RX W HCPCS: Performed by: EMERGENCY MEDICINE

## 2020-01-01 PROCEDURE — 74018 RADEX ABDOMEN 1 VIEW: CPT

## 2020-01-01 PROCEDURE — 99223 1ST HOSP IP/OBS HIGH 75: CPT | Performed by: PSYCHIATRY & NEUROLOGY

## 2020-01-01 PROCEDURE — 99233 SBSQ HOSP IP/OBS HIGH 50: CPT | Performed by: INTERNAL MEDICINE

## 2020-01-01 PROCEDURE — 99284 EMERGENCY DEPT VISIT MOD MDM: CPT

## 2020-01-01 PROCEDURE — 99231 SBSQ HOSP IP/OBS SF/LOW 25: CPT | Performed by: PSYCHIATRY & NEUROLOGY

## 2020-01-01 PROCEDURE — 72170 X-RAY EXAM OF PELVIS: CPT

## 2020-01-01 PROCEDURE — 80061 LIPID PANEL: CPT

## 2020-01-01 PROCEDURE — 99222 1ST HOSP IP/OBS MODERATE 55: CPT | Performed by: INTERNAL MEDICINE

## 2020-01-01 PROCEDURE — 90792 PSYCH DIAG EVAL W/MED SRVCS: CPT | Performed by: PSYCHIATRY & NEUROLOGY

## 2020-01-01 PROCEDURE — 83880 ASSAY OF NATRIURETIC PEPTIDE: CPT

## 2020-01-01 PROCEDURE — 2580000003 HC RX 258: Performed by: EMERGENCY MEDICINE

## 2020-01-01 PROCEDURE — 6370000000 HC RX 637 (ALT 250 FOR IP): Performed by: EMERGENCY MEDICINE

## 2020-01-01 PROCEDURE — 6370000000 HC RX 637 (ALT 250 FOR IP): Performed by: STUDENT IN AN ORGANIZED HEALTH CARE EDUCATION/TRAINING PROGRAM

## 2020-01-01 PROCEDURE — 84132 ASSAY OF SERUM POTASSIUM: CPT

## 2020-01-01 PROCEDURE — 83735 ASSAY OF MAGNESIUM: CPT

## 2020-01-01 PROCEDURE — 86901 BLOOD TYPING SEROLOGIC RH(D): CPT

## 2020-01-01 PROCEDURE — 77001 FLUOROGUIDE FOR VEIN DEVICE: CPT | Performed by: RADIOLOGY

## 2020-01-01 PROCEDURE — 2580000003 HC RX 258: Performed by: PSYCHIATRY & NEUROLOGY

## 2020-01-01 PROCEDURE — 93005 ELECTROCARDIOGRAM TRACING: CPT | Performed by: NURSE PRACTITIONER

## 2020-01-01 PROCEDURE — 99203 OFFICE O/P NEW LOW 30 MIN: CPT | Performed by: SURGERY

## 2020-01-01 PROCEDURE — 6370000000 HC RX 637 (ALT 250 FOR IP): Performed by: FAMILY MEDICINE

## 2020-01-01 PROCEDURE — 36430 TRANSFUSION BLD/BLD COMPNT: CPT

## 2020-01-01 PROCEDURE — 96372 THER/PROPH/DIAG INJ SC/IM: CPT

## 2020-01-01 PROCEDURE — G0480 DRUG TEST DEF 1-7 CLASSES: HCPCS

## 2020-01-01 PROCEDURE — 96365 THER/PROPH/DIAG IV INF INIT: CPT

## 2020-01-01 PROCEDURE — 99282 EMERGENCY DEPT VISIT SF MDM: CPT

## 2020-01-01 PROCEDURE — 7100000001 HC PACU RECOVERY - ADDTL 15 MIN

## 2020-01-01 PROCEDURE — 86706 HEP B SURFACE ANTIBODY: CPT

## 2020-01-01 PROCEDURE — 82728 ASSAY OF FERRITIN: CPT

## 2020-01-01 PROCEDURE — 83540 ASSAY OF IRON: CPT

## 2020-01-01 PROCEDURE — 81001 URINALYSIS AUTO W/SCOPE: CPT

## 2020-01-01 PROCEDURE — APPNB30 APP NON BILLABLE TIME 0-30 MINS: Performed by: PHYSICIAN ASSISTANT

## 2020-01-01 PROCEDURE — 93306 TTE W/DOPPLER COMPLETE: CPT

## 2020-01-01 PROCEDURE — 83690 ASSAY OF LIPASE: CPT

## 2020-01-01 PROCEDURE — 86900 BLOOD TYPING SEROLOGIC ABO: CPT

## 2020-01-01 PROCEDURE — 99999 PR OFFICE/OUTPT VISIT,PROCEDURE ONLY: CPT | Performed by: RADIOLOGY

## 2020-01-01 PROCEDURE — P9016 RBC LEUKOCYTES REDUCED: HCPCS

## 2020-01-01 PROCEDURE — 6360000002 HC RX W HCPCS: Performed by: PSYCHIATRY & NEUROLOGY

## 2020-01-01 PROCEDURE — 99221 1ST HOSP IP/OBS SF/LOW 40: CPT | Performed by: PSYCHIATRY & NEUROLOGY

## 2020-01-01 PROCEDURE — 93005 ELECTROCARDIOGRAM TRACING: CPT | Performed by: PERSONAL EMERGENCY RESPONSE ATTENDANT

## 2020-01-01 PROCEDURE — 97162 PT EVAL MOD COMPLEX 30 MIN: CPT

## 2020-01-01 PROCEDURE — 36556 INSERT NON-TUNNEL CV CATH: CPT

## 2020-01-01 PROCEDURE — 99252 IP/OBS CONSLTJ NEW/EST SF 35: CPT | Performed by: NURSE PRACTITIONER

## 2020-01-01 PROCEDURE — 76705 ECHO EXAM OF ABDOMEN: CPT

## 2020-01-01 PROCEDURE — 93005 ELECTROCARDIOGRAM TRACING: CPT | Performed by: FAMILY MEDICINE

## 2020-01-01 PROCEDURE — 1036F TOBACCO NON-USER: CPT | Performed by: ORTHOPAEDIC SURGERY

## 2020-01-01 PROCEDURE — 99283 EMERGENCY DEPT VISIT LOW MDM: CPT

## 2020-01-01 PROCEDURE — 85520 HEPARIN ASSAY: CPT

## 2020-01-01 PROCEDURE — 71275 CT ANGIOGRAPHY CHEST: CPT

## 2020-01-01 PROCEDURE — 82553 CREATINE MB FRACTION: CPT

## 2020-01-01 PROCEDURE — 83550 IRON BINDING TEST: CPT

## 2020-01-01 PROCEDURE — 80074 ACUTE HEPATITIS PANEL: CPT

## 2020-01-01 PROCEDURE — 86141 C-REACTIVE PROTEIN HS: CPT

## 2020-01-01 PROCEDURE — 93005 ELECTROCARDIOGRAM TRACING: CPT | Performed by: INTERNAL MEDICINE

## 2020-01-01 PROCEDURE — 74176 CT ABD & PELVIS W/O CONTRAST: CPT

## 2020-01-01 PROCEDURE — 84443 ASSAY THYROID STIM HORMONE: CPT

## 2020-01-01 PROCEDURE — 99213 OFFICE O/P EST LOW 20 MIN: CPT

## 2020-01-01 PROCEDURE — 2500000003 HC RX 250 WO HCPCS: Performed by: STUDENT IN AN ORGANIZED HEALTH CARE EDUCATION/TRAINING PROGRAM

## 2020-01-01 PROCEDURE — 3017F COLORECTAL CA SCREEN DOC REV: CPT | Performed by: ORTHOPAEDIC SURGERY

## 2020-01-01 PROCEDURE — 99212 OFFICE O/P EST SF 10 MIN: CPT

## 2020-01-01 PROCEDURE — 2500000003 HC RX 250 WO HCPCS: Performed by: ANESTHESIOLOGY

## 2020-01-01 PROCEDURE — 2709999900 IR REMOVE TUNNELED CVAD WO SQ PORT/PUMP

## 2020-01-01 PROCEDURE — 96376 TX/PRO/DX INJ SAME DRUG ADON: CPT

## 2020-01-01 PROCEDURE — 6360000004 HC RX CONTRAST MEDICATION: Performed by: PERSONAL EMERGENCY RESPONSE ATTENDANT

## 2020-01-01 PROCEDURE — G8417 CALC BMI ABV UP PARAM F/U: HCPCS | Performed by: ORTHOPAEDIC SURGERY

## 2020-01-01 PROCEDURE — 5A1D70Z PERFORMANCE OF URINARY FILTRATION, INTERMITTENT, LESS THAN 6 HOURS PER DAY: ICD-10-PCS | Performed by: STUDENT IN AN ORGANIZED HEALTH CARE EDUCATION/TRAINING PROGRAM

## 2020-01-01 PROCEDURE — 6360000002 HC RX W HCPCS: Performed by: NURSE PRACTITIONER

## 2020-01-01 RX ORDER — SUCCINYLCHOLINE/SOD CL,ISO/PF 100 MG/5ML
SYRINGE (ML) INTRAVENOUS PRN
Status: DISCONTINUED | OUTPATIENT
Start: 2020-01-01 | End: 2020-01-01 | Stop reason: SDUPTHER

## 2020-01-01 RX ORDER — SODIUM CHLORIDE 0.9 % (FLUSH) 0.9 %
10 SYRINGE (ML) INJECTION PRN
Status: DISCONTINUED | OUTPATIENT
Start: 2020-01-01 | End: 2020-01-01 | Stop reason: HOSPADM

## 2020-01-01 RX ORDER — OXYCODONE HYDROCHLORIDE AND ACETAMINOPHEN 5; 325 MG/1; MG/1
1 TABLET ORAL EVERY 4 HOURS PRN
Status: DISCONTINUED | OUTPATIENT
Start: 2020-01-01 | End: 2020-01-01 | Stop reason: HOSPADM

## 2020-01-01 RX ORDER — ASPIRIN 81 MG/1
81 TABLET, CHEWABLE ORAL DAILY
Status: DISCONTINUED | OUTPATIENT
Start: 2020-01-01 | End: 2020-01-01 | Stop reason: HOSPADM

## 2020-01-01 RX ORDER — METOCLOPRAMIDE HYDROCHLORIDE 5 MG/ML
10 INJECTION INTRAMUSCULAR; INTRAVENOUS
Status: DISCONTINUED | OUTPATIENT
Start: 2020-01-01 | End: 2020-01-01

## 2020-01-01 RX ORDER — HYDRALAZINE HYDROCHLORIDE 25 MG/1
25 TABLET, FILM COATED ORAL EVERY 8 HOURS PRN
Status: DISCONTINUED | OUTPATIENT
Start: 2020-01-01 | End: 2020-01-01 | Stop reason: HOSPADM

## 2020-01-01 RX ORDER — QUETIAPINE FUMARATE 50 MG/1
50 TABLET, EXTENDED RELEASE ORAL NIGHTLY
Status: ON HOLD | COMMUNITY
End: 2020-01-01 | Stop reason: HOSPADM

## 2020-01-01 RX ORDER — BACITRACIN, NEOMYCIN, POLYMYXIN B 400; 3.5; 5 [USP'U]/G; MG/G; [USP'U]/G
OINTMENT TOPICAL ONCE
Status: COMPLETED | OUTPATIENT
Start: 2020-01-01 | End: 2020-01-01

## 2020-01-01 RX ORDER — ATORVASTATIN CALCIUM 40 MG/1
40 TABLET, FILM COATED ORAL NIGHTLY
Status: DISCONTINUED | OUTPATIENT
Start: 2020-01-01 | End: 2020-01-01 | Stop reason: HOSPADM

## 2020-01-01 RX ORDER — LEVOFLOXACIN 250 MG/1
250 TABLET ORAL
Qty: 5 TABLET | Refills: 0 | Status: SHIPPED | OUTPATIENT
Start: 2020-01-01 | End: 2020-01-01

## 2020-01-01 RX ORDER — VENLAFAXINE HYDROCHLORIDE 37.5 MG/1
37.5 CAPSULE, EXTENDED RELEASE ORAL
Status: DISCONTINUED | OUTPATIENT
Start: 2020-01-01 | End: 2020-01-01

## 2020-01-01 RX ORDER — LIDOCAINE AND PRILOCAINE 25; 25 MG/G; MG/G
CREAM TOPICAL ONCE
Status: CANCELLED | OUTPATIENT
Start: 2020-01-01

## 2020-01-01 RX ORDER — ACETAMINOPHEN 325 MG/1
650 TABLET ORAL EVERY 4 HOURS PRN
Status: DISCONTINUED | OUTPATIENT
Start: 2020-01-01 | End: 2020-01-01 | Stop reason: HOSPADM

## 2020-01-01 RX ORDER — OXYCODONE HYDROCHLORIDE AND ACETAMINOPHEN 5; 325 MG/1; MG/1
1 TABLET ORAL EVERY 4 HOURS PRN
Status: CANCELLED | OUTPATIENT
Start: 2020-01-01

## 2020-01-01 RX ORDER — GUAIFENESIN 600 MG/1
600 TABLET, EXTENDED RELEASE ORAL 2 TIMES DAILY PRN
Status: DISCONTINUED | OUTPATIENT
Start: 2020-01-01 | End: 2020-01-01 | Stop reason: HOSPADM

## 2020-01-01 RX ORDER — LIDOCAINE AND PRILOCAINE 25; 25 MG/G; MG/G
CREAM TOPICAL PRN
Status: DISCONTINUED | OUTPATIENT
Start: 2020-01-01 | End: 2020-01-01 | Stop reason: HOSPADM

## 2020-01-01 RX ORDER — WARFARIN SODIUM 5 MG/1
5 TABLET ORAL DAILY
Status: DISCONTINUED | OUTPATIENT
Start: 2020-01-01 | End: 2020-01-01 | Stop reason: HOSPADM

## 2020-01-01 RX ORDER — FAMOTIDINE 20 MG/1
20 TABLET, FILM COATED ORAL DAILY
Status: DISCONTINUED | OUTPATIENT
Start: 2020-01-01 | End: 2020-01-01

## 2020-01-01 RX ORDER — BENZTROPINE MESYLATE 1 MG/ML
2 INJECTION INTRAMUSCULAR; INTRAVENOUS 2 TIMES DAILY PRN
Status: DISCONTINUED | OUTPATIENT
Start: 2020-01-01 | End: 2020-01-01 | Stop reason: HOSPADM

## 2020-01-01 RX ORDER — SODIUM CHLORIDE 0.9 % (FLUSH) 0.9 %
10 SYRINGE (ML) INJECTION EVERY 12 HOURS SCHEDULED
Status: DISCONTINUED | OUTPATIENT
Start: 2020-01-01 | End: 2020-01-01 | Stop reason: HOSPADM

## 2020-01-01 RX ORDER — MORPHINE SULFATE 2 MG/ML
4 INJECTION, SOLUTION INTRAMUSCULAR; INTRAVENOUS ONCE
Status: COMPLETED | OUTPATIENT
Start: 2020-01-01 | End: 2020-01-01

## 2020-01-01 RX ORDER — ASPIRIN 81 MG/1
324 TABLET, CHEWABLE ORAL ONCE
Status: COMPLETED | OUTPATIENT
Start: 2020-01-01 | End: 2020-01-01

## 2020-01-01 RX ORDER — FLUOXETINE HYDROCHLORIDE 20 MG/1
20 CAPSULE ORAL DAILY
Qty: 30 CAPSULE | Refills: 1 | Status: SHIPPED | OUTPATIENT
Start: 2020-01-01

## 2020-01-01 RX ORDER — DIPHENHYDRAMINE HYDROCHLORIDE 50 MG/ML
12.5 INJECTION INTRAMUSCULAR; INTRAVENOUS
Status: DISCONTINUED | OUTPATIENT
Start: 2020-01-01 | End: 2020-01-01

## 2020-01-01 RX ORDER — MAGNESIUM HYDROXIDE/ALUMINUM HYDROXICE/SIMETHICONE 120; 1200; 1200 MG/30ML; MG/30ML; MG/30ML
30 SUSPENSION ORAL PRN
Status: DISCONTINUED | OUTPATIENT
Start: 2020-01-01 | End: 2020-01-01 | Stop reason: HOSPADM

## 2020-01-01 RX ORDER — WARFARIN SODIUM 5 MG/1
7.5 TABLET ORAL ONCE
Status: DISCONTINUED | OUTPATIENT
Start: 2020-01-01 | End: 2020-01-01 | Stop reason: HOSPADM

## 2020-01-01 RX ORDER — LEVOFLOXACIN 500 MG/1
250 TABLET, FILM COATED ORAL
Status: DISCONTINUED | OUTPATIENT
Start: 2020-01-01 | End: 2020-01-01 | Stop reason: HOSPADM

## 2020-01-01 RX ORDER — DEXTROSE MONOHYDRATE 50 MG/ML
100 INJECTION, SOLUTION INTRAVENOUS PRN
Status: DISCONTINUED | OUTPATIENT
Start: 2020-01-01 | End: 2020-01-01 | Stop reason: HOSPADM

## 2020-01-01 RX ORDER — HYDRALAZINE HYDROCHLORIDE 20 MG/ML
10 INJECTION INTRAMUSCULAR; INTRAVENOUS EVERY 6 HOURS PRN
Status: DISCONTINUED | OUTPATIENT
Start: 2020-01-01 | End: 2020-01-01 | Stop reason: HOSPADM

## 2020-01-01 RX ORDER — ONDANSETRON 2 MG/ML
4 INJECTION INTRAMUSCULAR; INTRAVENOUS ONCE
Status: COMPLETED | OUTPATIENT
Start: 2020-01-01 | End: 2020-01-01

## 2020-01-01 RX ORDER — CLONIDINE HYDROCHLORIDE 0.1 MG/1
0.1 TABLET ORAL DAILY
COMMUNITY

## 2020-01-01 RX ORDER — METOPROLOL TARTRATE 5 MG/5ML
10 INJECTION INTRAVENOUS EVERY 6 HOURS PRN
Status: DISCONTINUED | OUTPATIENT
Start: 2020-01-01 | End: 2020-01-01 | Stop reason: HOSPADM

## 2020-01-01 RX ORDER — GLYCOPYRROLATE 1 MG/5 ML
SYRINGE (ML) INTRAVENOUS PRN
Status: DISCONTINUED | OUTPATIENT
Start: 2020-01-01 | End: 2020-01-01 | Stop reason: SDUPTHER

## 2020-01-01 RX ORDER — CLINDAMYCIN HYDROCHLORIDE 150 MG/1
300 CAPSULE ORAL 3 TIMES DAILY
Qty: 42 CAPSULE | Refills: 0 | Status: SHIPPED | OUTPATIENT
Start: 2020-01-01 | End: 2020-01-01

## 2020-01-01 RX ORDER — TRAZODONE HYDROCHLORIDE 50 MG/1
50 TABLET ORAL NIGHTLY PRN
Status: CANCELLED | OUTPATIENT
Start: 2020-01-01

## 2020-01-01 RX ORDER — LIDOCAINE HYDROCHLORIDE 20 MG/ML
INJECTION, SOLUTION INFILTRATION; PERINEURAL
Status: COMPLETED | OUTPATIENT
Start: 2020-01-01 | End: 2020-01-01

## 2020-01-01 RX ORDER — DEXTROSE MONOHYDRATE 25 G/50ML
12.5 INJECTION, SOLUTION INTRAVENOUS PRN
Status: DISCONTINUED | OUTPATIENT
Start: 2020-01-01 | End: 2020-01-01 | Stop reason: HOSPADM

## 2020-01-01 RX ORDER — PROMETHAZINE HYDROCHLORIDE 12.5 MG/1
12.5 TABLET ORAL EVERY 6 HOURS PRN
Status: DISCONTINUED | OUTPATIENT
Start: 2020-01-01 | End: 2020-01-01 | Stop reason: HOSPADM

## 2020-01-01 RX ORDER — ONDANSETRON 2 MG/ML
INJECTION INTRAMUSCULAR; INTRAVENOUS PRN
Status: DISCONTINUED | OUTPATIENT
Start: 2020-01-01 | End: 2020-01-01 | Stop reason: SDUPTHER

## 2020-01-01 RX ORDER — TOPIRAMATE 25 MG/1
25 TABLET ORAL 2 TIMES DAILY
Status: DISCONTINUED | OUTPATIENT
Start: 2020-01-01 | End: 2020-01-01 | Stop reason: HOSPADM

## 2020-01-01 RX ORDER — FLUOXETINE HYDROCHLORIDE 20 MG/1
20 CAPSULE ORAL DAILY
Status: DISCONTINUED | OUTPATIENT
Start: 2020-01-01 | End: 2020-01-01 | Stop reason: HOSPADM

## 2020-01-01 RX ORDER — FAMOTIDINE 20 MG/1
20 TABLET, FILM COATED ORAL 2 TIMES DAILY
Status: DISCONTINUED | OUTPATIENT
Start: 2020-01-01 | End: 2020-01-01 | Stop reason: DRUGHIGH

## 2020-01-01 RX ORDER — TOPIRAMATE 25 MG/1
25 CAPSULE, COATED PELLETS ORAL 2 TIMES DAILY
COMMUNITY

## 2020-01-01 RX ORDER — FLUOXETINE HYDROCHLORIDE 20 MG/1
40 CAPSULE ORAL DAILY
Status: DISCONTINUED | OUTPATIENT
Start: 2020-01-01 | End: 2020-01-01

## 2020-01-01 RX ORDER — TRAZODONE HYDROCHLORIDE 100 MG/1
100 TABLET ORAL NIGHTLY
Status: DISCONTINUED | OUTPATIENT
Start: 2020-01-01 | End: 2020-01-01 | Stop reason: HOSPADM

## 2020-01-01 RX ORDER — SODIUM CHLORIDE 9 MG/ML
INJECTION, SOLUTION INTRAVENOUS
Status: COMPLETED
Start: 2020-01-01 | End: 2020-01-01

## 2020-01-01 RX ORDER — HEPARIN SODIUM 1000 [USP'U]/ML
2000 INJECTION, SOLUTION INTRAVENOUS; SUBCUTANEOUS PRN
Status: DISCONTINUED | OUTPATIENT
Start: 2020-01-01 | End: 2020-01-01 | Stop reason: HOSPADM

## 2020-01-01 RX ORDER — CLONIDINE HYDROCHLORIDE 0.1 MG/1
0.1 TABLET ORAL DAILY
Status: DISCONTINUED | OUTPATIENT
Start: 2020-01-01 | End: 2020-01-01

## 2020-01-01 RX ORDER — CARVEDILOL 12.5 MG/1
12.5 TABLET ORAL 2 TIMES DAILY
Status: CANCELLED | OUTPATIENT
Start: 2020-01-01

## 2020-01-01 RX ORDER — VENLAFAXINE HYDROCHLORIDE 75 MG/1
75 CAPSULE, EXTENDED RELEASE ORAL
Qty: 15 CAPSULE | Refills: 3 | Status: ON HOLD | OUTPATIENT
Start: 2020-01-01 | End: 2020-01-01

## 2020-01-01 RX ORDER — HEPARIN SODIUM 1000 [USP'U]/ML
40 INJECTION, SOLUTION INTRAVENOUS; SUBCUTANEOUS PRN
Status: DISCONTINUED | OUTPATIENT
Start: 2020-01-01 | End: 2020-01-01 | Stop reason: HOSPADM

## 2020-01-01 RX ORDER — METOPROLOL TARTRATE 5 MG/5ML
5 INJECTION INTRAVENOUS ONCE
Status: DISCONTINUED | OUTPATIENT
Start: 2020-01-01 | End: 2020-01-01

## 2020-01-01 RX ORDER — SODIUM CHLORIDE 9 MG/ML
INJECTION, SOLUTION INTRAVENOUS
Status: DISCONTINUED
Start: 2020-01-01 | End: 2020-01-01 | Stop reason: HOSPADM

## 2020-01-01 RX ORDER — WARFARIN SODIUM 5 MG/1
5 TABLET ORAL DAILY
Qty: 60 TABLET | Refills: 2 | Status: CANCELLED | OUTPATIENT
Start: 2020-01-01

## 2020-01-01 RX ORDER — AMOXICILLIN 250 MG
1 CAPSULE ORAL 2 TIMES DAILY
Status: DISCONTINUED | OUTPATIENT
Start: 2020-01-01 | End: 2020-01-01 | Stop reason: HOSPADM

## 2020-01-01 RX ORDER — ACETAMINOPHEN 325 MG/1
650 TABLET ORAL EVERY 6 HOURS PRN
Status: DISCONTINUED | OUTPATIENT
Start: 2020-01-01 | End: 2020-01-01 | Stop reason: HOSPADM

## 2020-01-01 RX ORDER — MORPHINE SULFATE 2 MG/ML
4 INJECTION, SOLUTION INTRAMUSCULAR; INTRAVENOUS
Status: DISCONTINUED | OUTPATIENT
Start: 2020-01-01 | End: 2020-01-01 | Stop reason: HOSPADM

## 2020-01-01 RX ORDER — METOPROLOL TARTRATE 50 MG/1
50 TABLET, FILM COATED ORAL 2 TIMES DAILY
Status: DISCONTINUED | OUTPATIENT
Start: 2020-01-01 | End: 2020-01-01 | Stop reason: HOSPADM

## 2020-01-01 RX ORDER — DEXTROSE MONOHYDRATE 50 MG/ML
INJECTION, SOLUTION INTRAVENOUS
Status: DISPENSED
Start: 2020-01-01 | End: 2020-01-01

## 2020-01-01 RX ORDER — LIDOCAINE HYDROCHLORIDE 10 MG/ML
1 INJECTION, SOLUTION EPIDURAL; INFILTRATION; INTRACAUDAL; PERINEURAL
Status: DISCONTINUED | OUTPATIENT
Start: 2020-01-01 | End: 2020-01-01 | Stop reason: HOSPADM

## 2020-01-01 RX ORDER — TOPIRAMATE 25 MG/1
25 TABLET ORAL 2 TIMES DAILY
Status: CANCELLED | OUTPATIENT
Start: 2020-01-01

## 2020-01-01 RX ORDER — TRAZODONE HYDROCHLORIDE 100 MG/1
100 TABLET ORAL NIGHTLY
Qty: 15 TABLET | Refills: 2 | Status: ON HOLD | OUTPATIENT
Start: 2020-01-01 | End: 2020-01-01

## 2020-01-01 RX ORDER — LIDOCAINE AND PRILOCAINE 25; 25 MG/G; MG/G
CREAM TOPICAL ONCE
Status: COMPLETED | OUTPATIENT
Start: 2020-01-01 | End: 2020-01-01

## 2020-01-01 RX ORDER — LEVOFLOXACIN 500 MG/1
250 TABLET, FILM COATED ORAL DAILY
Status: DISCONTINUED | OUTPATIENT
Start: 2020-01-01 | End: 2020-01-01

## 2020-01-01 RX ORDER — CYCLOBENZAPRINE HCL 5 MG
5 TABLET ORAL 3 TIMES DAILY PRN
Status: DISCONTINUED | OUTPATIENT
Start: 2020-01-01 | End: 2020-01-01 | Stop reason: HOSPADM

## 2020-01-01 RX ORDER — DEXTROSE MONOHYDRATE 50 MG/ML
100 INJECTION, SOLUTION INTRAVENOUS PRN
Status: CANCELLED | OUTPATIENT
Start: 2020-01-01

## 2020-01-01 RX ORDER — LEVOFLOXACIN 5 MG/ML
250 INJECTION, SOLUTION INTRAVENOUS
Status: DISCONTINUED | OUTPATIENT
Start: 2020-01-01 | End: 2020-01-01

## 2020-01-01 RX ORDER — CHOLECALCIFEROL (VITAMIN D3) 10 MCG
1 TABLET ORAL DAILY
Status: DISCONTINUED | OUTPATIENT
Start: 2020-01-01 | End: 2020-01-01 | Stop reason: HOSPADM

## 2020-01-01 RX ORDER — NITROGLYCERIN 0.4 MG/1
0.4 TABLET SUBLINGUAL EVERY 5 MIN PRN
Status: DISCONTINUED | OUTPATIENT
Start: 2020-01-01 | End: 2020-01-01 | Stop reason: HOSPADM

## 2020-01-01 RX ORDER — NICOTINE POLACRILEX 4 MG
15 LOZENGE BUCCAL PRN
Status: DISCONTINUED | OUTPATIENT
Start: 2020-01-01 | End: 2020-01-01 | Stop reason: HOSPADM

## 2020-01-01 RX ORDER — DIPHENHYDRAMINE HCL 25 MG
25 CAPSULE ORAL EVERY 6 HOURS PRN
Status: DISCONTINUED | OUTPATIENT
Start: 2020-01-01 | End: 2020-01-01 | Stop reason: CLARIF

## 2020-01-01 RX ORDER — SODIUM CHLORIDE 9 MG/ML
INJECTION, SOLUTION INTRAVENOUS CONTINUOUS
Status: DISCONTINUED | OUTPATIENT
Start: 2020-01-01 | End: 2020-01-01

## 2020-01-01 RX ORDER — CARVEDILOL 12.5 MG/1
25 TABLET ORAL 2 TIMES DAILY
Status: DISCONTINUED | OUTPATIENT
Start: 2020-01-01 | End: 2020-01-01 | Stop reason: HOSPADM

## 2020-01-01 RX ORDER — ASPIRIN 81 MG/1
81 TABLET, CHEWABLE ORAL DAILY
Status: CANCELLED | OUTPATIENT
Start: 2020-01-01

## 2020-01-01 RX ORDER — WARFARIN SODIUM 5 MG/1
5 TABLET ORAL ONCE
Status: COMPLETED | OUTPATIENT
Start: 2020-01-01 | End: 2020-01-01

## 2020-01-01 RX ORDER — CLONIDINE HYDROCHLORIDE 0.1 MG/1
0.1 TABLET ORAL DAILY
Status: DISCONTINUED | OUTPATIENT
Start: 2020-01-01 | End: 2020-01-01 | Stop reason: HOSPADM

## 2020-01-01 RX ORDER — SODIUM CHLORIDE 9 MG/ML
INJECTION, SOLUTION INTRAVENOUS CONTINUOUS
Status: DISCONTINUED | OUTPATIENT
Start: 2020-01-01 | End: 2020-01-01 | Stop reason: HOSPADM

## 2020-01-01 RX ORDER — HEPARIN SODIUM 1000 [USP'U]/ML
80 INJECTION, SOLUTION INTRAVENOUS; SUBCUTANEOUS ONCE
Status: COMPLETED | OUTPATIENT
Start: 2020-01-01 | End: 2020-01-01

## 2020-01-01 RX ORDER — DOCUSATE SODIUM 100 MG/1
100 CAPSULE, LIQUID FILLED ORAL 2 TIMES DAILY
Status: DISCONTINUED | OUTPATIENT
Start: 2020-01-01 | End: 2020-01-01 | Stop reason: HOSPADM

## 2020-01-01 RX ORDER — HYDROCODONE BITARTRATE AND ACETAMINOPHEN 5; 325 MG/1; MG/1
2 TABLET ORAL PRN
Status: DISCONTINUED | OUTPATIENT
Start: 2020-01-01 | End: 2020-01-01

## 2020-01-01 RX ORDER — SODIUM CHLORIDE 9 MG/ML
INJECTION, SOLUTION INTRAVENOUS CONTINUOUS PRN
Status: DISCONTINUED | OUTPATIENT
Start: 2020-01-01 | End: 2020-01-01 | Stop reason: SDUPTHER

## 2020-01-01 RX ORDER — ONDANSETRON 2 MG/ML
4 INJECTION INTRAMUSCULAR; INTRAVENOUS
Status: DISCONTINUED | OUTPATIENT
Start: 2020-01-01 | End: 2020-01-01

## 2020-01-01 RX ORDER — METOPROLOL TARTRATE 50 MG/1
50 TABLET, FILM COATED ORAL 2 TIMES DAILY
Qty: 60 TABLET | Refills: 3 | Status: SHIPPED | OUTPATIENT
Start: 2020-01-01

## 2020-01-01 RX ORDER — ASCORBIC ACID, THIAMINE MONONITRATE,RIBOFLAVIN, NIACINAMIDE, PYRIDOXINE HYDROCHLORIDE, FOLIC ACID, CYANOCOBALAMIN, BIOTIN, CALCIUM PANTOTHENATE, 100; 1.5; 1.7; 20; 10; 1; 6000; 150000; 5 MG/1; MG/1; MG/1; MG/1; MG/1; MG/1; UG/1; UG/1; MG/1
1 CAPSULE, LIQUID FILLED ORAL DAILY
Status: ON HOLD | COMMUNITY
Start: 2020-01-01 | End: 2020-01-01 | Stop reason: HOSPADM

## 2020-01-01 RX ORDER — ALBUMIN (HUMAN) 12.5 G/50ML
25 SOLUTION INTRAVENOUS DAILY PRN
Status: DISCONTINUED | OUTPATIENT
Start: 2020-01-01 | End: 2020-01-01 | Stop reason: HOSPADM

## 2020-01-01 RX ORDER — SODIUM CHLORIDE 9 MG/ML
INJECTION, SOLUTION INTRAVENOUS
Status: DISPENSED
Start: 2020-01-01 | End: 2020-01-01

## 2020-01-01 RX ORDER — ONDANSETRON HYDROCHLORIDE 4 MG/5ML
4 SOLUTION ORAL ONCE
Status: DISCONTINUED | OUTPATIENT
Start: 2020-01-01 | End: 2020-01-01 | Stop reason: HOSPADM

## 2020-01-01 RX ORDER — MAGNESIUM SULFATE IN WATER 40 MG/ML
2 INJECTION, SOLUTION INTRAVENOUS PRN
Status: DISCONTINUED | OUTPATIENT
Start: 2020-01-01 | End: 2020-01-01 | Stop reason: HOSPADM

## 2020-01-01 RX ORDER — ONDANSETRON 2 MG/ML
4 INJECTION INTRAMUSCULAR; INTRAVENOUS EVERY 6 HOURS PRN
Status: DISCONTINUED | OUTPATIENT
Start: 2020-01-01 | End: 2020-01-01 | Stop reason: HOSPADM

## 2020-01-01 RX ORDER — HEPARIN SODIUM 5000 [USP'U]/ML
5000 INJECTION, SOLUTION INTRAVENOUS; SUBCUTANEOUS EVERY 8 HOURS SCHEDULED
Status: DISCONTINUED | OUTPATIENT
Start: 2020-01-01 | End: 2020-01-01 | Stop reason: HOSPADM

## 2020-01-01 RX ORDER — ONDANSETRON 2 MG/ML
4 INJECTION INTRAMUSCULAR; INTRAVENOUS
Status: DISCONTINUED | OUTPATIENT
Start: 2020-01-01 | End: 2020-01-01 | Stop reason: HOSPADM

## 2020-01-01 RX ORDER — CETIRIZINE HYDROCHLORIDE 10 MG/1
10 TABLET ORAL 2 TIMES DAILY PRN
Status: DISCONTINUED | OUTPATIENT
Start: 2020-01-01 | End: 2020-01-01 | Stop reason: HOSPADM

## 2020-01-01 RX ORDER — SENNA AND DOCUSATE SODIUM 50; 8.6 MG/1; MG/1
1 TABLET, FILM COATED ORAL 2 TIMES DAILY
COMMUNITY
End: 2020-01-01

## 2020-01-01 RX ORDER — POLYETHYLENE GLYCOL 3350 17 G/17G
17 POWDER, FOR SOLUTION ORAL DAILY PRN
Status: DISCONTINUED | OUTPATIENT
Start: 2020-01-01 | End: 2020-01-01 | Stop reason: HOSPADM

## 2020-01-01 RX ORDER — DIPHENHYDRAMINE HCL 25 MG
25 TABLET ORAL EVERY 6 HOURS PRN
Status: DISCONTINUED | OUTPATIENT
Start: 2020-01-01 | End: 2020-01-01 | Stop reason: HOSPADM

## 2020-01-01 RX ORDER — METOPROLOL SUCCINATE 50 MG/1
50 TABLET, EXTENDED RELEASE ORAL 2 TIMES DAILY
Status: ON HOLD | COMMUNITY
End: 2020-01-01 | Stop reason: HOSPADM

## 2020-01-01 RX ORDER — FLUOXETINE 10 MG/1
20 CAPSULE ORAL DAILY
Status: ON HOLD | COMMUNITY
End: 2020-01-01 | Stop reason: HOSPADM

## 2020-01-01 RX ORDER — LIDOCAINE HYDROCHLORIDE 20 MG/ML
10 INJECTION, SOLUTION INFILTRATION; PERINEURAL
Status: DISCONTINUED | OUTPATIENT
Start: 2020-01-01 | End: 2020-01-01 | Stop reason: HOSPADM

## 2020-01-01 RX ORDER — DIAPER,BRIEF,INFANT-TODD,DISP
EACH MISCELLANEOUS
Qty: 1 TUBE | Refills: 1 | Status: SHIPPED | OUTPATIENT
Start: 2020-01-01 | End: 2020-01-01

## 2020-01-01 RX ORDER — SODIUM CHLORIDE 0.9 % (FLUSH) 0.9 %
3 SYRINGE (ML) INJECTION EVERY 8 HOURS
Status: DISCONTINUED | OUTPATIENT
Start: 2020-01-01 | End: 2020-01-01 | Stop reason: HOSPADM

## 2020-01-01 RX ORDER — WARFARIN SODIUM 10 MG/1
10 TABLET ORAL ONCE
Status: DISCONTINUED | OUTPATIENT
Start: 2020-01-01 | End: 2020-01-01 | Stop reason: HOSPADM

## 2020-01-01 RX ORDER — QUETIAPINE FUMARATE 50 MG/1
TABLET, FILM COATED ORAL
Status: ON HOLD | COMMUNITY
Start: 2020-01-01 | End: 2020-01-01

## 2020-01-01 RX ORDER — OXYCODONE HYDROCHLORIDE AND ACETAMINOPHEN 5; 325 MG/1; MG/1
1 TABLET ORAL EVERY 6 HOURS PRN
Status: DISCONTINUED | OUTPATIENT
Start: 2020-01-01 | End: 2020-01-01 | Stop reason: HOSPADM

## 2020-01-01 RX ORDER — MEPERIDINE HYDROCHLORIDE 25 MG/ML
12.5 INJECTION INTRAMUSCULAR; INTRAVENOUS; SUBCUTANEOUS EVERY 5 MIN PRN
Status: DISCONTINUED | OUTPATIENT
Start: 2020-01-01 | End: 2020-01-01

## 2020-01-01 RX ORDER — HEPARIN SODIUM 5000 [USP'U]/ML
5000 INJECTION, SOLUTION INTRAVENOUS; SUBCUTANEOUS EVERY 8 HOURS SCHEDULED
Status: DISCONTINUED | OUTPATIENT
Start: 2020-01-01 | End: 2020-01-01

## 2020-01-01 RX ORDER — CETIRIZINE HYDROCHLORIDE 10 MG/1
10 TABLET ORAL 2 TIMES DAILY PRN
COMMUNITY
End: 2020-01-01

## 2020-01-01 RX ORDER — 0.9 % SODIUM CHLORIDE 0.9 %
20 INTRAVENOUS SOLUTION INTRAVENOUS ONCE
Status: DISCONTINUED | OUTPATIENT
Start: 2020-01-01 | End: 2020-01-01 | Stop reason: HOSPADM

## 2020-01-01 RX ORDER — TOPIRAMATE 25 MG/1
25 CAPSULE, COATED PELLETS ORAL 2 TIMES DAILY
Status: DISCONTINUED | OUTPATIENT
Start: 2020-01-01 | End: 2020-01-01 | Stop reason: RX

## 2020-01-01 RX ORDER — WARFARIN SODIUM 5 MG/1
7.5 TABLET ORAL ONCE
Status: COMPLETED | OUTPATIENT
Start: 2020-01-01 | End: 2020-01-01

## 2020-01-01 RX ORDER — DIAPER,BRIEF,INFANT-TODD,DISP
EACH MISCELLANEOUS ONCE
Status: COMPLETED | OUTPATIENT
Start: 2020-01-01 | End: 2020-01-01

## 2020-01-01 RX ORDER — ONDANSETRON HYDROCHLORIDE 4 MG/5ML
4 SOLUTION ORAL ONCE
Status: COMPLETED | OUTPATIENT
Start: 2020-01-01 | End: 2020-01-01

## 2020-01-01 RX ORDER — HEPARIN SODIUM 1000 [USP'U]/ML
80 INJECTION, SOLUTION INTRAVENOUS; SUBCUTANEOUS PRN
Status: DISCONTINUED | OUTPATIENT
Start: 2020-01-01 | End: 2020-01-01 | Stop reason: HOSPADM

## 2020-01-01 RX ORDER — TRAZODONE HYDROCHLORIDE 50 MG/1
50 TABLET ORAL NIGHTLY PRN
Status: DISCONTINUED | OUTPATIENT
Start: 2020-01-01 | End: 2020-01-01

## 2020-01-01 RX ORDER — ONDANSETRON 2 MG/ML
4 INJECTION INTRAMUSCULAR; INTRAVENOUS EVERY 10 MIN PRN
Status: DISCONTINUED | OUTPATIENT
Start: 2020-01-01 | End: 2020-01-01 | Stop reason: HOSPADM

## 2020-01-01 RX ORDER — LISINOPRIL 20 MG/1
20 TABLET ORAL DAILY
COMMUNITY
Start: 2020-01-01 | End: 2020-01-01

## 2020-01-01 RX ORDER — VENLAFAXINE HYDROCHLORIDE 75 MG/1
75 CAPSULE, EXTENDED RELEASE ORAL
Status: DISCONTINUED | OUTPATIENT
Start: 2020-01-01 | End: 2020-01-01 | Stop reason: HOSPADM

## 2020-01-01 RX ORDER — FLUOXETINE HYDROCHLORIDE 20 MG/1
40 CAPSULE ORAL DAILY
Status: DISCONTINUED | OUTPATIENT
Start: 2020-01-01 | End: 2020-01-01 | Stop reason: HOSPADM

## 2020-01-01 RX ORDER — CLONIDINE HYDROCHLORIDE 0.1 MG/1
0.1 TABLET ORAL 2 TIMES DAILY
Status: DISCONTINUED | OUTPATIENT
Start: 2020-01-01 | End: 2020-01-01 | Stop reason: HOSPADM

## 2020-01-01 RX ORDER — HYDRALAZINE HYDROCHLORIDE 20 MG/ML
10 INJECTION INTRAMUSCULAR; INTRAVENOUS ONCE
Status: DISCONTINUED | OUTPATIENT
Start: 2020-01-01 | End: 2020-01-01

## 2020-01-01 RX ORDER — METOPROLOL TARTRATE 5 MG/5ML
5 INJECTION INTRAVENOUS ONCE
Status: COMPLETED | OUTPATIENT
Start: 2020-01-01 | End: 2020-01-01

## 2020-01-01 RX ORDER — DILTIAZEM HYDROCHLORIDE 5 MG/ML
20 INJECTION INTRAVENOUS ONCE
Status: COMPLETED | OUTPATIENT
Start: 2020-01-01 | End: 2020-01-01

## 2020-01-01 RX ORDER — DIPHENHYDRAMINE HYDROCHLORIDE 50 MG/ML
25 INJECTION INTRAMUSCULAR; INTRAVENOUS ONCE
Status: COMPLETED | OUTPATIENT
Start: 2020-01-01 | End: 2020-01-01

## 2020-01-01 RX ORDER — ONDANSETRON 2 MG/ML
INJECTION INTRAMUSCULAR; INTRAVENOUS
Status: DISCONTINUED
Start: 2020-01-01 | End: 2020-01-01 | Stop reason: WASHOUT

## 2020-01-01 RX ORDER — BENZTROPINE MESYLATE 1 MG/ML
2 INJECTION INTRAMUSCULAR; INTRAVENOUS 2 TIMES DAILY PRN
Status: CANCELLED | OUTPATIENT
Start: 2020-01-01

## 2020-01-01 RX ORDER — DIAPER,BRIEF,INFANT-TODD,DISP
EACH MISCELLANEOUS 2 TIMES DAILY
Status: DISCONTINUED | OUTPATIENT
Start: 2020-01-01 | End: 2020-01-01

## 2020-01-01 RX ORDER — HEPARIN SODIUM 1000 [USP'U]/ML
4000 INJECTION, SOLUTION INTRAVENOUS; SUBCUTANEOUS ONCE
Status: DISCONTINUED | OUTPATIENT
Start: 2020-01-01 | End: 2020-01-01 | Stop reason: HOSPADM

## 2020-01-01 RX ORDER — POTASSIUM CHLORIDE 7.45 MG/ML
10 INJECTION INTRAVENOUS PRN
Status: DISCONTINUED | OUTPATIENT
Start: 2020-01-01 | End: 2020-01-01 | Stop reason: HOSPADM

## 2020-01-01 RX ORDER — ASPIRIN 81 MG/1
324 TABLET, CHEWABLE ORAL ONCE
Status: DISCONTINUED | OUTPATIENT
Start: 2020-01-01 | End: 2020-01-01 | Stop reason: HOSPADM

## 2020-01-01 RX ORDER — SODIUM POLYSTYRENE SULFONATE 15 G/60ML
15 SUSPENSION ORAL; RECTAL ONCE
Status: COMPLETED | OUTPATIENT
Start: 2020-01-01 | End: 2020-01-01

## 2020-01-01 RX ORDER — SENNA AND DOCUSATE SODIUM 50; 8.6 MG/1; MG/1
1 TABLET, FILM COATED ORAL 2 TIMES DAILY
Status: DISCONTINUED | OUTPATIENT
Start: 2020-01-01 | End: 2020-01-01 | Stop reason: HOSPADM

## 2020-01-01 RX ORDER — HYDRALAZINE HYDROCHLORIDE 25 MG/1
25 TABLET, FILM COATED ORAL 2 TIMES DAILY
Status: DISCONTINUED | OUTPATIENT
Start: 2020-01-01 | End: 2020-01-01 | Stop reason: HOSPADM

## 2020-01-01 RX ORDER — FENTANYL CITRATE 50 UG/ML
50 INJECTION, SOLUTION INTRAMUSCULAR; INTRAVENOUS EVERY 10 MIN PRN
Status: DISCONTINUED | OUTPATIENT
Start: 2020-01-01 | End: 2020-01-01

## 2020-01-01 RX ORDER — AMLODIPINE BESYLATE 10 MG/1
10 TABLET ORAL NIGHTLY
COMMUNITY
Start: 2020-01-01 | End: 2020-01-01

## 2020-01-01 RX ORDER — TOPIRAMATE 25 MG/1
25 TABLET ORAL 2 TIMES DAILY
Status: DISCONTINUED | OUTPATIENT
Start: 2020-01-01 | End: 2020-01-01

## 2020-01-01 RX ORDER — DIPHENHYDRAMINE HCL 25 MG
25 CAPSULE ORAL EVERY 6 HOURS PRN
COMMUNITY

## 2020-01-01 RX ORDER — METOPROLOL TARTRATE 5 MG/5ML
10 INJECTION INTRAVENOUS EVERY 6 HOURS PRN
Status: DISCONTINUED | OUTPATIENT
Start: 2020-01-01 | End: 2020-01-01

## 2020-01-01 RX ORDER — ACETAMINOPHEN 650 MG/1
650 SUPPOSITORY RECTAL EVERY 6 HOURS PRN
Status: DISCONTINUED | OUTPATIENT
Start: 2020-01-01 | End: 2020-01-01 | Stop reason: HOSPADM

## 2020-01-01 RX ORDER — HEPARIN SODIUM 1000 [USP'U]/ML
2000 INJECTION, SOLUTION INTRAVENOUS; SUBCUTANEOUS ONCE
Status: COMPLETED | OUTPATIENT
Start: 2020-01-01 | End: 2020-01-01

## 2020-01-01 RX ORDER — ATORVASTATIN CALCIUM 40 MG/1
40 TABLET, FILM COATED ORAL NIGHTLY
Status: CANCELLED | OUTPATIENT
Start: 2020-01-01

## 2020-01-01 RX ORDER — LIDOCAINE AND PRILOCAINE 25; 25 MG/G; MG/G
CREAM TOPICAL ONCE
Status: DISCONTINUED | OUTPATIENT
Start: 2020-01-01 | End: 2020-01-01 | Stop reason: HOSPADM

## 2020-01-01 RX ORDER — ONDANSETRON 2 MG/ML
4 INJECTION INTRAMUSCULAR; INTRAVENOUS EVERY 6 HOURS PRN
Status: DISCONTINUED | OUTPATIENT
Start: 2020-01-01 | End: 2020-01-01

## 2020-01-01 RX ORDER — CARVEDILOL 12.5 MG/1
12.5 TABLET ORAL 2 TIMES DAILY
Status: DISCONTINUED | OUTPATIENT
Start: 2020-01-01 | End: 2020-01-01

## 2020-01-01 RX ORDER — HYDRALAZINE HYDROCHLORIDE 25 MG/1
25 TABLET, FILM COATED ORAL EVERY 8 HOURS SCHEDULED
Status: DISCONTINUED | OUTPATIENT
Start: 2020-01-01 | End: 2020-01-01 | Stop reason: HOSPADM

## 2020-01-01 RX ORDER — HYDRALAZINE HYDROCHLORIDE 20 MG/ML
20 INJECTION INTRAMUSCULAR; INTRAVENOUS ONCE
Status: COMPLETED | OUTPATIENT
Start: 2020-01-01 | End: 2020-01-01

## 2020-01-01 RX ORDER — INSULIN GLARGINE 100 [IU]/ML
5 INJECTION, SOLUTION SUBCUTANEOUS NIGHTLY
Status: DISCONTINUED | OUTPATIENT
Start: 2020-01-01 | End: 2020-01-01 | Stop reason: HOSPADM

## 2020-01-01 RX ORDER — WARFARIN SODIUM 5 MG/1
5 TABLET ORAL
Status: COMPLETED | OUTPATIENT
Start: 2020-01-01 | End: 2020-01-01

## 2020-01-01 RX ORDER — HEPARIN SODIUM 10000 [USP'U]/100ML
18 INJECTION, SOLUTION INTRAVENOUS CONTINUOUS
Status: DISCONTINUED | OUTPATIENT
Start: 2020-01-01 | End: 2020-01-01 | Stop reason: HOSPADM

## 2020-01-01 RX ORDER — SODIUM POLYSTYRENE SULFONATE 15 G/60ML
30 SUSPENSION ORAL; RECTAL ONCE
Status: COMPLETED | OUTPATIENT
Start: 2020-01-01 | End: 2020-01-01

## 2020-01-01 RX ORDER — METOPROLOL TARTRATE 50 MG/1
50 TABLET, FILM COATED ORAL 2 TIMES DAILY
Status: ON HOLD | COMMUNITY
End: 2020-01-01 | Stop reason: SDUPTHER

## 2020-01-01 RX ORDER — INSULIN GLARGINE 100 [IU]/ML
10 INJECTION, SOLUTION SUBCUTANEOUS NIGHTLY
Status: DISCONTINUED | OUTPATIENT
Start: 2020-01-01 | End: 2020-01-01 | Stop reason: HOSPADM

## 2020-01-01 RX ORDER — CARVEDILOL 12.5 MG/1
12.5 TABLET ORAL 2 TIMES DAILY
Status: DISCONTINUED | OUTPATIENT
Start: 2020-01-01 | End: 2020-01-01 | Stop reason: HOSPADM

## 2020-01-01 RX ORDER — WARFARIN SODIUM 5 MG/1
5 TABLET ORAL DAILY
Qty: 90 TABLET | Refills: 1 | Status: SHIPPED | OUTPATIENT
Start: 2020-01-01

## 2020-01-01 RX ORDER — NICOTINE POLACRILEX 4 MG
15 LOZENGE BUCCAL PRN
Status: CANCELLED | OUTPATIENT
Start: 2020-01-01

## 2020-01-01 RX ORDER — HEPARIN SODIUM 1000 [USP'U]/ML
2000 INJECTION, SOLUTION INTRAVENOUS; SUBCUTANEOUS ONCE
Status: DISCONTINUED | OUTPATIENT
Start: 2020-01-01 | End: 2020-01-01 | Stop reason: HOSPADM

## 2020-01-01 RX ORDER — DEXTROSE MONOHYDRATE 25 G/50ML
12.5 INJECTION, SOLUTION INTRAVENOUS PRN
Status: CANCELLED | OUTPATIENT
Start: 2020-01-01

## 2020-01-01 RX ORDER — FLUOXETINE HYDROCHLORIDE 20 MG/1
40 CAPSULE ORAL DAILY
Status: CANCELLED | OUTPATIENT
Start: 2020-01-01

## 2020-01-01 RX ORDER — AMOXICILLIN 875 MG/1
875 TABLET, COATED ORAL 2 TIMES DAILY
Qty: 20 TABLET | Refills: 0 | Status: SHIPPED | OUTPATIENT
Start: 2020-01-01 | End: 2020-01-01

## 2020-01-01 RX ORDER — ONDANSETRON 4 MG/1
4 TABLET, ORALLY DISINTEGRATING ORAL EVERY 8 HOURS PRN
Qty: 20 TABLET | Refills: 0 | Status: SHIPPED | OUTPATIENT
Start: 2020-01-01

## 2020-01-01 RX ORDER — MAGNESIUM HYDROXIDE/ALUMINUM HYDROXICE/SIMETHICONE 120; 1200; 1200 MG/30ML; MG/30ML; MG/30ML
30 SUSPENSION ORAL PRN
Status: CANCELLED | OUTPATIENT
Start: 2020-01-01

## 2020-01-01 RX ORDER — HYDRALAZINE HYDROCHLORIDE 25 MG/1
25 TABLET, FILM COATED ORAL EVERY 8 HOURS SCHEDULED
Qty: 90 TABLET | Refills: 3 | Status: SHIPPED | OUTPATIENT
Start: 2020-01-01

## 2020-01-01 RX ORDER — FLUOXETINE HYDROCHLORIDE 20 MG/1
20 CAPSULE ORAL DAILY
Qty: 30 CAPSULE | Refills: 1 | OUTPATIENT
Start: 2020-01-01

## 2020-01-01 RX ORDER — ATORVASTATIN CALCIUM 80 MG/1
80 TABLET, FILM COATED ORAL NIGHTLY
Status: DISCONTINUED | OUTPATIENT
Start: 2020-01-01 | End: 2020-01-01 | Stop reason: HOSPADM

## 2020-01-01 RX ORDER — DILTIAZEM HYDROCHLORIDE 5 MG/ML
15 INJECTION INTRAVENOUS ONCE
Status: COMPLETED | OUTPATIENT
Start: 2020-01-01 | End: 2020-01-01

## 2020-01-01 RX ORDER — HYDROCODONE BITARTRATE AND ACETAMINOPHEN 5; 325 MG/1; MG/1
1 TABLET ORAL PRN
Status: DISCONTINUED | OUTPATIENT
Start: 2020-01-01 | End: 2020-01-01

## 2020-01-01 RX ADMIN — Medication 10 ML: at 21:55

## 2020-01-01 RX ADMIN — OXYCODONE HYDROCHLORIDE AND ACETAMINOPHEN 1 TABLET: 5; 325 TABLET ORAL at 14:46

## 2020-01-01 RX ADMIN — ATORVASTATIN CALCIUM 40 MG: 40 TABLET, FILM COATED ORAL at 21:01

## 2020-01-01 RX ADMIN — CARVEDILOL 25 MG: 12.5 TABLET, FILM COATED ORAL at 20:17

## 2020-01-01 RX ADMIN — CALCIUM ACETATE 2001 MG: 667 CAPSULE ORAL at 17:20

## 2020-01-01 RX ADMIN — DOCUSATE SODIUM 100 MG: 100 CAPSULE, LIQUID FILLED ORAL at 20:59

## 2020-01-01 RX ADMIN — CALCIUM ACETATE 2001 MG: 667 CAPSULE ORAL at 13:41

## 2020-01-01 RX ADMIN — CARVEDILOL 12.5 MG: 12.5 TABLET, FILM COATED ORAL at 08:23

## 2020-01-01 RX ADMIN — TRAZODONE HYDROCHLORIDE 100 MG: 100 TABLET ORAL at 21:03

## 2020-01-01 RX ADMIN — OXYCODONE HYDROCHLORIDE AND ACETAMINOPHEN 1 TABLET: 5; 325 TABLET ORAL at 21:15

## 2020-01-01 RX ADMIN — MICONAZOLE NITRATE: 20 POWDER TOPICAL at 20:19

## 2020-01-01 RX ADMIN — OXYCODONE HYDROCHLORIDE AND ACETAMINOPHEN 1 TABLET: 5; 325 TABLET ORAL at 09:21

## 2020-01-01 RX ADMIN — LIDOCAINE AND PRILOCAINE: 25; 25 CREAM TOPICAL at 12:32

## 2020-01-01 RX ADMIN — ENOXAPARIN SODIUM 80 MG: 40 INJECTION SUBCUTANEOUS at 21:12

## 2020-01-01 RX ADMIN — ASPIRIN 81 MG 81 MG: 81 TABLET ORAL at 09:13

## 2020-01-01 RX ADMIN — ATORVASTATIN CALCIUM 40 MG: 40 TABLET, FILM COATED ORAL at 22:08

## 2020-01-01 RX ADMIN — ATORVASTATIN CALCIUM 40 MG: 40 TABLET, FILM COATED ORAL at 21:44

## 2020-01-01 RX ADMIN — ASPIRIN 81 MG 81 MG: 81 TABLET ORAL at 13:31

## 2020-01-01 RX ADMIN — MUPIROCIN: 20 OINTMENT TOPICAL at 09:22

## 2020-01-01 RX ADMIN — TOPIRAMATE 25 MG: 25 TABLET, FILM COATED ORAL at 20:53

## 2020-01-01 RX ADMIN — Medication 0.2 MG: at 12:51

## 2020-01-01 RX ADMIN — ASPIRIN 81 MG: 81 TABLET, CHEWABLE ORAL at 11:06

## 2020-01-01 RX ADMIN — HEPARIN SODIUM 5000 UNITS: 5000 INJECTION INTRAVENOUS; SUBCUTANEOUS at 08:27

## 2020-01-01 RX ADMIN — ATORVASTATIN CALCIUM 40 MG: 40 TABLET, FILM COATED ORAL at 21:30

## 2020-01-01 RX ADMIN — TOPIRAMATE 25 MG: 25 TABLET, FILM COATED ORAL at 20:49

## 2020-01-01 RX ADMIN — ASPIRIN 81 MG 81 MG: 81 TABLET ORAL at 09:06

## 2020-01-01 RX ADMIN — CARVEDILOL 12.5 MG: 12.5 TABLET, FILM COATED ORAL at 21:24

## 2020-01-01 RX ADMIN — ASPIRIN 81 MG 81 MG: 81 TABLET ORAL at 09:03

## 2020-01-01 RX ADMIN — HEPARIN SODIUM 5000 UNITS: 5000 INJECTION INTRAVENOUS; SUBCUTANEOUS at 07:49

## 2020-01-01 RX ADMIN — Medication 10 ML: at 21:02

## 2020-01-01 RX ADMIN — MUPIROCIN: 20 OINTMENT TOPICAL at 09:39

## 2020-01-01 RX ADMIN — INSULIN LISPRO 1 UNITS: 100 INJECTION, SOLUTION INTRAVENOUS; SUBCUTANEOUS at 20:58

## 2020-01-01 RX ADMIN — DOCUSATE SODIUM 100 MG: 100 CAPSULE, LIQUID FILLED ORAL at 09:06

## 2020-01-01 RX ADMIN — INSULIN LISPRO 1 UNITS: 100 INJECTION, SOLUTION INTRAVENOUS; SUBCUTANEOUS at 20:18

## 2020-01-01 RX ADMIN — TOPIRAMATE 25 MG: 25 TABLET, FILM COATED ORAL at 21:11

## 2020-01-01 RX ADMIN — ASPIRIN 81 MG 81 MG: 81 TABLET ORAL at 13:29

## 2020-01-01 RX ADMIN — ASPIRIN 81 MG CHEWABLE TABLET 81 MG: 81 TABLET CHEWABLE at 21:44

## 2020-01-01 RX ADMIN — CALCIUM ACETATE 2001 MG: 667 CAPSULE ORAL at 12:40

## 2020-01-01 RX ADMIN — INSULIN LISPRO 1 UNITS: 100 INJECTION, SOLUTION INTRAVENOUS; SUBCUTANEOUS at 12:11

## 2020-01-01 RX ADMIN — WARFARIN SODIUM 7.5 MG: 5 TABLET ORAL at 18:24

## 2020-01-01 RX ADMIN — LIDOCAINE HYDROCHLORIDE 10 ML: 20 INJECTION, SOLUTION INFILTRATION; PERINEURAL at 12:31

## 2020-01-01 RX ADMIN — TOPIRAMATE 25 MG: 25 TABLET, FILM COATED ORAL at 21:24

## 2020-01-01 RX ADMIN — INSULIN LISPRO 1 UNITS: 100 INJECTION, SOLUTION INTRAVENOUS; SUBCUTANEOUS at 20:27

## 2020-01-01 RX ADMIN — CARVEDILOL 25 MG: 12.5 TABLET, FILM COATED ORAL at 21:30

## 2020-01-01 RX ADMIN — CALCIUM ACETATE 2001 MG: 667 CAPSULE ORAL at 12:11

## 2020-01-01 RX ADMIN — GUAIFENESIN 600 MG: 600 TABLET, EXTENDED RELEASE ORAL at 13:45

## 2020-01-01 RX ADMIN — OXYCODONE HYDROCHLORIDE AND ACETAMINOPHEN 1 TABLET: 5; 325 TABLET ORAL at 18:40

## 2020-01-01 RX ADMIN — CARVEDILOL 12.5 MG: 12.5 TABLET, FILM COATED ORAL at 21:01

## 2020-01-01 RX ADMIN — ONDANSETRON 4 MG: 2 INJECTION INTRAMUSCULAR; INTRAVENOUS at 01:54

## 2020-01-01 RX ADMIN — Medication 10 ML: at 20:50

## 2020-01-01 RX ADMIN — FLUOXETINE 40 MG: 20 CAPSULE ORAL at 11:05

## 2020-01-01 RX ADMIN — ASPIRIN 81 MG 81 MG: 81 TABLET ORAL at 08:48

## 2020-01-01 RX ADMIN — MICONAZOLE NITRATE: 20 POWDER TOPICAL at 20:25

## 2020-01-01 RX ADMIN — ASPIRIN 81 MG CHEWABLE TABLET 81 MG: 81 TABLET CHEWABLE at 16:31

## 2020-01-01 RX ADMIN — OXYCODONE HYDROCHLORIDE AND ACETAMINOPHEN 1 TABLET: 5; 325 TABLET ORAL at 09:33

## 2020-01-01 RX ADMIN — OXYCODONE HYDROCHLORIDE AND ACETAMINOPHEN 1 TABLET: 5; 325 TABLET ORAL at 20:17

## 2020-01-01 RX ADMIN — ONDANSETRON 4 MG: 2 INJECTION INTRAMUSCULAR; INTRAVENOUS at 12:51

## 2020-01-01 RX ADMIN — CALCIUM ACETATE 2001 MG: 667 CAPSULE ORAL at 15:22

## 2020-01-01 RX ADMIN — ASPIRIN 81 MG 81 MG: 81 TABLET ORAL at 08:28

## 2020-01-01 RX ADMIN — OXYCODONE HYDROCHLORIDE AND ACETAMINOPHEN 1 TABLET: 5; 325 TABLET ORAL at 23:18

## 2020-01-01 RX ADMIN — Medication 10 ML: at 21:11

## 2020-01-01 RX ADMIN — TRAZODONE HYDROCHLORIDE 100 MG: 100 TABLET ORAL at 21:15

## 2020-01-01 RX ADMIN — TOPIRAMATE 25 MG: 25 TABLET, FILM COATED ORAL at 21:02

## 2020-01-01 RX ADMIN — Medication 10 ML: at 20:52

## 2020-01-01 RX ADMIN — OXYCODONE HYDROCHLORIDE AND ACETAMINOPHEN 1 TABLET: 5; 325 TABLET ORAL at 02:53

## 2020-01-01 RX ADMIN — MICONAZOLE NITRATE: 20 POWDER TOPICAL at 10:00

## 2020-01-01 RX ADMIN — OXYCODONE HYDROCHLORIDE AND ACETAMINOPHEN 1 TABLET: 5; 325 TABLET ORAL at 21:03

## 2020-01-01 RX ADMIN — GUAIFENESIN 600 MG: 600 TABLET, EXTENDED RELEASE ORAL at 21:01

## 2020-01-01 RX ADMIN — FAMOTIDINE 20 MG: 10 INJECTION INTRAVENOUS at 08:33

## 2020-01-01 RX ADMIN — ATORVASTATIN CALCIUM 40 MG: 40 TABLET, FILM COATED ORAL at 20:26

## 2020-01-01 RX ADMIN — CALCIUM ACETATE 2001 MG: 667 CAPSULE ORAL at 12:59

## 2020-01-01 RX ADMIN — Medication 10 ML: at 22:28

## 2020-01-01 RX ADMIN — CALCIUM ACETATE 2001 MG: 667 CAPSULE ORAL at 17:10

## 2020-01-01 RX ADMIN — DOCUSATE SODIUM 100 MG: 100 CAPSULE, LIQUID FILLED ORAL at 13:54

## 2020-01-01 RX ADMIN — CARVEDILOL 25 MG: 12.5 TABLET, FILM COATED ORAL at 13:54

## 2020-01-01 RX ADMIN — ASPIRIN 81 MG 81 MG: 81 TABLET ORAL at 07:49

## 2020-01-01 RX ADMIN — MUPIROCIN: 20 OINTMENT TOPICAL at 21:04

## 2020-01-01 RX ADMIN — OXYCODONE HYDROCHLORIDE AND ACETAMINOPHEN 1 TABLET: 5; 325 TABLET ORAL at 09:00

## 2020-01-01 RX ADMIN — MICONAZOLE NITRATE: 20 POWDER TOPICAL at 09:22

## 2020-01-01 RX ADMIN — CARVEDILOL 25 MG: 12.5 TABLET, FILM COATED ORAL at 09:01

## 2020-01-01 RX ADMIN — MUPIROCIN: 20 OINTMENT TOPICAL at 20:25

## 2020-01-01 RX ADMIN — DIPHENHYDRAMINE HCL 25 MG: 25 TABLET ORAL at 20:59

## 2020-01-01 RX ADMIN — CALCIUM ACETATE 2001 MG: 667 CAPSULE ORAL at 13:54

## 2020-01-01 RX ADMIN — TOPIRAMATE 25 MG: 25 TABLET, FILM COATED ORAL at 20:26

## 2020-01-01 RX ADMIN — MICONAZOLE NITRATE: 20 POWDER TOPICAL at 20:16

## 2020-01-01 RX ADMIN — CLONIDINE HYDROCHLORIDE 0.1 MG: 0.1 TABLET ORAL at 08:48

## 2020-01-01 RX ADMIN — HEPARIN SODIUM 2000 UNITS: 1000 INJECTION INTRAVENOUS; SUBCUTANEOUS at 12:05

## 2020-01-01 RX ADMIN — ATORVASTATIN CALCIUM 40 MG: 40 TABLET, FILM COATED ORAL at 20:09

## 2020-01-01 RX ADMIN — TOPIRAMATE 25 MG: 25 TABLET, FILM COATED ORAL at 20:30

## 2020-01-01 RX ADMIN — OXYCODONE HYDROCHLORIDE AND ACETAMINOPHEN 1 TABLET: 5; 325 TABLET ORAL at 15:22

## 2020-01-01 RX ADMIN — TOPIRAMATE 25 MG: 25 TABLET, FILM COATED ORAL at 20:10

## 2020-01-01 RX ADMIN — CALCIUM ACETATE 2001 MG: 667 CAPSULE ORAL at 09:00

## 2020-01-01 RX ADMIN — MUPIROCIN: 20 OINTMENT TOPICAL at 21:44

## 2020-01-01 RX ADMIN — ATORVASTATIN CALCIUM 40 MG: 40 TABLET, FILM COATED ORAL at 20:59

## 2020-01-01 RX ADMIN — ONDANSETRON 4 MG: 2 INJECTION INTRAMUSCULAR; INTRAVENOUS at 21:32

## 2020-01-01 RX ADMIN — TRAZODONE HYDROCHLORIDE 100 MG: 100 TABLET ORAL at 22:26

## 2020-01-01 RX ADMIN — MICONAZOLE NITRATE: 20 POWDER TOPICAL at 09:27

## 2020-01-01 RX ADMIN — VENLAFAXINE HYDROCHLORIDE 37.5 MG: 37.5 CAPSULE, EXTENDED RELEASE ORAL at 09:21

## 2020-01-01 RX ADMIN — ENOXAPARIN SODIUM 110 MG: 150 INJECTION SUBCUTANEOUS at 08:32

## 2020-01-01 RX ADMIN — OXYCODONE HYDROCHLORIDE AND ACETAMINOPHEN 1 TABLET: 5; 325 TABLET ORAL at 09:05

## 2020-01-01 RX ADMIN — SODIUM CHLORIDE: 9 INJECTION, SOLUTION INTRAVENOUS at 11:13

## 2020-01-01 RX ADMIN — LEVOFLOXACIN 250 MG: 5 INJECTION, SOLUTION INTRAVENOUS at 21:12

## 2020-01-01 RX ADMIN — MICONAZOLE NITRATE: 2 POWDER TOPICAL at 00:56

## 2020-01-01 RX ADMIN — DILTIAZEM HYDROCHLORIDE 20 MG: 5 INJECTION INTRAVENOUS at 15:35

## 2020-01-01 RX ADMIN — WARFARIN SODIUM 5 MG: 5 TABLET ORAL at 21:48

## 2020-01-01 RX ADMIN — METHOHEXITAL SODIUM 100 MG: 500 INJECTION, POWDER, LYOPHILIZED, FOR SOLUTION INTRAMUSCULAR; INTRAVENOUS; RECTAL at 12:02

## 2020-01-01 RX ADMIN — MORPHINE SULFATE 4 MG: 2 INJECTION, SOLUTION INTRAMUSCULAR; INTRAVENOUS at 21:32

## 2020-01-01 RX ADMIN — FAMOTIDINE 20 MG: 10 INJECTION INTRAVENOUS at 08:23

## 2020-01-01 RX ADMIN — TOPIRAMATE 25 MG: 25 TABLET, FILM COATED ORAL at 09:21

## 2020-01-01 RX ADMIN — METHOHEXITAL SODIUM 100 MG: 500 INJECTION, POWDER, LYOPHILIZED, FOR SOLUTION INTRAMUSCULAR; INTRAVENOUS; RECTAL at 12:51

## 2020-01-01 RX ADMIN — SODIUM CHLORIDE: 9 INJECTION, SOLUTION INTRAVENOUS at 11:23

## 2020-01-01 RX ADMIN — OXYCODONE HYDROCHLORIDE AND ACETAMINOPHEN 1 TABLET: 5; 325 TABLET ORAL at 21:30

## 2020-01-01 RX ADMIN — MUPIROCIN: 20 OINTMENT TOPICAL at 09:25

## 2020-01-01 RX ADMIN — CALCIUM ACETATE 2001 MG: 667 CAPSULE ORAL at 12:43

## 2020-01-01 RX ADMIN — LIDOCAINE AND PRILOCAINE: 25; 25 CREAM TOPICAL at 07:33

## 2020-01-01 RX ADMIN — CALCIUM ACETATE 2001 MG: 667 CAPSULE ORAL at 13:31

## 2020-01-01 RX ADMIN — TOPIRAMATE 25 MG: 25 TABLET, FILM COATED ORAL at 09:07

## 2020-01-01 RX ADMIN — TRAZODONE HYDROCHLORIDE 100 MG: 100 TABLET ORAL at 21:01

## 2020-01-01 RX ADMIN — GUAIFENESIN 600 MG: 600 TABLET, EXTENDED RELEASE ORAL at 21:15

## 2020-01-01 RX ADMIN — ASPIRIN 81 MG 81 MG: 81 TABLET ORAL at 09:07

## 2020-01-01 RX ADMIN — ASPIRIN 81 MG 81 MG: 81 TABLET ORAL at 09:21

## 2020-01-01 RX ADMIN — SODIUM POLYSTYRENE SULFONATE 30 G: 15 SUSPENSION ORAL; RECTAL at 16:34

## 2020-01-01 RX ADMIN — CALCIUM ACETATE 2001 MG: 667 CAPSULE ORAL at 17:28

## 2020-01-01 RX ADMIN — ENOXAPARIN SODIUM 30 MG: 30 INJECTION SUBCUTANEOUS at 21:52

## 2020-01-01 RX ADMIN — OXYCODONE HYDROCHLORIDE AND ACETAMINOPHEN 1 TABLET: 5; 325 TABLET ORAL at 22:12

## 2020-01-01 RX ADMIN — LIDOCAINE AND PRILOCAINE 5 G: 25; 25 CREAM TOPICAL at 11:31

## 2020-01-01 RX ADMIN — Medication 40 MG: at 12:51

## 2020-01-01 RX ADMIN — ONDANSETRON 4 MG: 2 INJECTION INTRAMUSCULAR; INTRAVENOUS at 15:46

## 2020-01-01 RX ADMIN — CLONIDINE HYDROCHLORIDE 0.1 MG: 0.1 TABLET ORAL at 21:13

## 2020-01-01 RX ADMIN — CALCIUM ACETATE 2001 MG: 667 CAPSULE ORAL at 16:44

## 2020-01-01 RX ADMIN — NEPHROCAP 1 MG: 1 CAP ORAL at 08:24

## 2020-01-01 RX ADMIN — TOPIRAMATE 25 MG: 25 TABLET, FILM COATED ORAL at 08:48

## 2020-01-01 RX ADMIN — FAMOTIDINE 20 MG: 10 INJECTION INTRAVENOUS at 21:45

## 2020-01-01 RX ADMIN — CALCIUM ACETATE 667 MG: 667 CAPSULE ORAL at 13:19

## 2020-01-01 RX ADMIN — CARVEDILOL 12.5 MG: 12.5 TABLET, FILM COATED ORAL at 22:25

## 2020-01-01 RX ADMIN — Medication 60 MG: at 12:02

## 2020-01-01 RX ADMIN — OXYCODONE HYDROCHLORIDE AND ACETAMINOPHEN 1 TABLET: 5; 325 TABLET ORAL at 20:25

## 2020-01-01 RX ADMIN — ATORVASTATIN CALCIUM 40 MG: 40 TABLET, FILM COATED ORAL at 20:17

## 2020-01-01 RX ADMIN — METOPROLOL TARTRATE 50 MG: 50 TABLET, FILM COATED ORAL at 20:49

## 2020-01-01 RX ADMIN — Medication 10 ML: at 08:29

## 2020-01-01 RX ADMIN — TOPIRAMATE 25 MG: 25 TABLET, FILM COATED ORAL at 14:46

## 2020-01-01 RX ADMIN — CARVEDILOL 12.5 MG: 12.5 TABLET, FILM COATED ORAL at 22:08

## 2020-01-01 RX ADMIN — RENO CAPS 1 MG: 100; 1.5; 1.7; 20; 10; 1; 150; 5; 6 CAPSULE ORAL at 18:43

## 2020-01-01 RX ADMIN — Medication 10 ML: at 08:36

## 2020-01-01 RX ADMIN — OXYCODONE HYDROCHLORIDE AND ACETAMINOPHEN 1 TABLET: 5; 325 TABLET ORAL at 22:49

## 2020-01-01 RX ADMIN — MICONAZOLE NITRATE: 20 POWDER TOPICAL at 20:37

## 2020-01-01 RX ADMIN — OXYCODONE HYDROCHLORIDE AND ACETAMINOPHEN 1 TABLET: 5; 325 TABLET ORAL at 08:07

## 2020-01-01 RX ADMIN — ATORVASTATIN CALCIUM 40 MG: 40 TABLET, FILM COATED ORAL at 21:11

## 2020-01-01 RX ADMIN — ATORVASTATIN CALCIUM 40 MG: 40 TABLET, FILM COATED ORAL at 20:11

## 2020-01-01 RX ADMIN — Medication 10 ML: at 22:00

## 2020-01-01 RX ADMIN — CALCIUM ACETATE 667 MG: 667 CAPSULE ORAL at 16:51

## 2020-01-01 RX ADMIN — HYDRALAZINE HYDROCHLORIDE 25 MG: 25 TABLET, FILM COATED ORAL at 20:08

## 2020-01-01 RX ADMIN — CARVEDILOL 12.5 MG: 12.5 TABLET, FILM COATED ORAL at 10:14

## 2020-01-01 RX ADMIN — OXYCODONE HYDROCHLORIDE AND ACETAMINOPHEN 1 TABLET: 5; 325 TABLET ORAL at 20:23

## 2020-01-01 RX ADMIN — CARVEDILOL 12.5 MG: 12.5 TABLET, FILM COATED ORAL at 08:28

## 2020-01-01 RX ADMIN — MICONAZOLE NITRATE: 20 POWDER TOPICAL at 20:22

## 2020-01-01 RX ADMIN — CALCIUM ACETATE 2001 MG: 667 CAPSULE ORAL at 17:23

## 2020-01-01 RX ADMIN — CALCIUM ACETATE 2001 MG: 667 CAPSULE ORAL at 18:35

## 2020-01-01 RX ADMIN — MUPIROCIN: 20 OINTMENT TOPICAL at 21:01

## 2020-01-01 RX ADMIN — ATORVASTATIN CALCIUM 40 MG: 40 TABLET, FILM COATED ORAL at 20:23

## 2020-01-01 RX ADMIN — Medication 0.2 MG: at 12:02

## 2020-01-01 RX ADMIN — CALCIUM ACETATE 2001 MG: 667 CAPSULE ORAL at 08:46

## 2020-01-01 RX ADMIN — CARVEDILOL 25 MG: 12.5 TABLET, FILM COATED ORAL at 14:46

## 2020-01-01 RX ADMIN — CARVEDILOL 12.5 MG: 12.5 TABLET, FILM COATED ORAL at 08:46

## 2020-01-01 RX ADMIN — ASPIRIN 81 MG 81 MG: 81 TABLET ORAL at 13:41

## 2020-01-01 RX ADMIN — MUPIROCIN: 20 OINTMENT TOPICAL at 20:15

## 2020-01-01 RX ADMIN — CALCIUM ACETATE 2001 MG: 667 CAPSULE ORAL at 09:04

## 2020-01-01 RX ADMIN — TOPIRAMATE 25 MG: 25 TABLET, FILM COATED ORAL at 08:28

## 2020-01-01 RX ADMIN — TOPIRAMATE 25 MG: 25 TABLET, FILM COATED ORAL at 21:13

## 2020-01-01 RX ADMIN — SODIUM CHLORIDE 1000 ML: 9 INJECTION, SOLUTION INTRAVENOUS at 11:50

## 2020-01-01 RX ADMIN — VENLAFAXINE HYDROCHLORIDE 75 MG: 75 CAPSULE, EXTENDED RELEASE ORAL at 13:28

## 2020-01-01 RX ADMIN — MUPIROCIN: 20 OINTMENT TOPICAL at 10:01

## 2020-01-01 RX ADMIN — TOPIRAMATE 25 MG: 25 TABLET, FILM COATED ORAL at 07:49

## 2020-01-01 RX ADMIN — FLUOXETINE 40 MG: 20 CAPSULE ORAL at 08:28

## 2020-01-01 RX ADMIN — LIDOCAINE AND PRILOCAINE: 25; 25 CREAM TOPICAL at 08:08

## 2020-01-01 RX ADMIN — CALCIUM ACETATE 2001 MG: 667 CAPSULE ORAL at 09:05

## 2020-01-01 RX ADMIN — BACITRACIN, NEOMYCIN, POLYMYXIN B 1 G: 400; 3.5; 5 OINTMENT TOPICAL at 03:03

## 2020-01-01 RX ADMIN — TOPIRAMATE 25 MG: 25 TABLET, FILM COATED ORAL at 11:06

## 2020-01-01 RX ADMIN — TRAZODONE HYDROCHLORIDE 100 MG: 100 TABLET ORAL at 20:12

## 2020-01-01 RX ADMIN — MUPIROCIN: 20 OINTMENT TOPICAL at 20:22

## 2020-01-01 RX ADMIN — METOPROLOL TARTRATE 50 MG: 50 TABLET, FILM COATED ORAL at 08:48

## 2020-01-01 RX ADMIN — FLUOXETINE 20 MG: 20 CAPSULE ORAL at 08:48

## 2020-01-01 RX ADMIN — TOPIRAMATE 25 MG: 25 TABLET, FILM COATED ORAL at 21:01

## 2020-01-01 RX ADMIN — PERMETHRIN: 1 LOTION TOPICAL at 18:07

## 2020-01-01 RX ADMIN — DARBEPOETIN ALFA 60 MCG: 60 SOLUTION INTRAVENOUS; SUBCUTANEOUS at 14:43

## 2020-01-01 RX ADMIN — DARBEPOETIN ALFA 60 MCG: 60 SOLUTION INTRAVENOUS; SUBCUTANEOUS at 10:15

## 2020-01-01 RX ADMIN — CALCIUM ACETATE 2001 MG: 667 CAPSULE ORAL at 08:36

## 2020-01-01 RX ADMIN — CALCIUM ACETATE 2001 MG: 667 CAPSULE ORAL at 19:53

## 2020-01-01 RX ADMIN — ATORVASTATIN CALCIUM 40 MG: 40 TABLET, FILM COATED ORAL at 21:13

## 2020-01-01 RX ADMIN — WARFARIN SODIUM 5 MG: 5 TABLET ORAL at 20:52

## 2020-01-01 RX ADMIN — FLUOXETINE 40 MG: 20 CAPSULE ORAL at 07:49

## 2020-01-01 RX ADMIN — GUAIFENESIN 600 MG: 600 TABLET, EXTENDED RELEASE ORAL at 20:15

## 2020-01-01 RX ADMIN — CARVEDILOL 25 MG: 12.5 TABLET, FILM COATED ORAL at 20:25

## 2020-01-01 RX ADMIN — OXYCODONE HYDROCHLORIDE AND ACETAMINOPHEN 1 TABLET: 5; 325 TABLET ORAL at 20:15

## 2020-01-01 RX ADMIN — CALCIUM ACETATE 2001 MG: 667 CAPSULE ORAL at 14:10

## 2020-01-01 RX ADMIN — MUPIROCIN: 20 OINTMENT TOPICAL at 09:08

## 2020-01-01 RX ADMIN — OXYCODONE HYDROCHLORIDE AND ACETAMINOPHEN 1 TABLET: 5; 325 TABLET ORAL at 13:45

## 2020-01-01 RX ADMIN — METOPROLOL TARTRATE 50 MG: 50 TABLET, FILM COATED ORAL at 08:33

## 2020-01-01 RX ADMIN — SENNOSIDES AND DOCUSATE SODIUM 1 TABLET: 8.6; 5 TABLET ORAL at 20:49

## 2020-01-01 RX ADMIN — MICONAZOLE NITRATE: 20 POWDER TOPICAL at 09:38

## 2020-01-01 RX ADMIN — ASPIRIN 324 MG: 81 TABLET, CHEWABLE ORAL at 13:45

## 2020-01-01 RX ADMIN — HEPARIN SODIUM 5000 UNITS: 5000 INJECTION INTRAVENOUS; SUBCUTANEOUS at 16:42

## 2020-01-01 RX ADMIN — HYDRALAZINE HYDROCHLORIDE 25 MG: 25 TABLET, FILM COATED ORAL at 11:06

## 2020-01-01 RX ADMIN — CARVEDILOL 12.5 MG: 12.5 TABLET, FILM COATED ORAL at 07:49

## 2020-01-01 RX ADMIN — FLUOXETINE 40 MG: 20 CAPSULE ORAL at 08:46

## 2020-01-01 RX ADMIN — TOPIRAMATE 25 MG: 25 TABLET, FILM COATED ORAL at 08:36

## 2020-01-01 RX ADMIN — MUPIROCIN: 20 OINTMENT TOPICAL at 22:03

## 2020-01-01 RX ADMIN — ASPIRIN 81 MG 81 MG: 81 TABLET ORAL at 14:45

## 2020-01-01 RX ADMIN — INSULIN LISPRO 1 UNITS: 100 INJECTION, SOLUTION INTRAVENOUS; SUBCUTANEOUS at 17:08

## 2020-01-01 RX ADMIN — DILTIAZEM HYDROCHLORIDE 15 MG: 5 INJECTION INTRAVENOUS at 16:28

## 2020-01-01 RX ADMIN — CARVEDILOL 25 MG: 12.5 TABLET, FILM COATED ORAL at 09:06

## 2020-01-01 RX ADMIN — ATORVASTATIN CALCIUM 40 MG: 40 TABLET, FILM COATED ORAL at 20:31

## 2020-01-01 RX ADMIN — TOPIRAMATE 25 MG: 25 TABLET, FILM COATED ORAL at 08:33

## 2020-01-01 RX ADMIN — ASPIRIN 81 MG CHEWABLE TABLET 81 MG: 81 TABLET CHEWABLE at 08:24

## 2020-01-01 RX ADMIN — HEPARIN SODIUM 5000 UNITS: 5000 INJECTION INTRAVENOUS; SUBCUTANEOUS at 05:47

## 2020-01-01 RX ADMIN — ATORVASTATIN CALCIUM 40 MG: 40 TABLET, FILM COATED ORAL at 20:53

## 2020-01-01 RX ADMIN — TOPIRAMATE 25 MG: 25 TABLET, FILM COATED ORAL at 13:41

## 2020-01-01 RX ADMIN — ONDANSETRON 4 MG: 2 INJECTION INTRAMUSCULAR; INTRAVENOUS at 16:06

## 2020-01-01 RX ADMIN — NITROGLYCERIN 0.4 MG: 0.4 TABLET, ORALLY DISINTEGRATING SUBLINGUAL at 13:37

## 2020-01-01 RX ADMIN — OXYCODONE HYDROCHLORIDE AND ACETAMINOPHEN 1 TABLET: 5; 325 TABLET ORAL at 15:00

## 2020-01-01 RX ADMIN — TOPIRAMATE 25 MG: 25 TABLET, FILM COATED ORAL at 22:49

## 2020-01-01 RX ADMIN — OXYCODONE HYDROCHLORIDE AND ACETAMINOPHEN 1 TABLET: 5; 325 TABLET ORAL at 20:59

## 2020-01-01 RX ADMIN — CALCIUM ACETATE 2001 MG: 667 CAPSULE ORAL at 17:54

## 2020-01-01 RX ADMIN — WARFARIN SODIUM 5 MG: 5 TABLET ORAL at 22:20

## 2020-01-01 RX ADMIN — TOPIRAMATE 25 MG: 25 TABLET, FILM COATED ORAL at 09:13

## 2020-01-01 RX ADMIN — VENLAFAXINE HYDROCHLORIDE 75 MG: 75 CAPSULE, EXTENDED RELEASE ORAL at 13:54

## 2020-01-01 RX ADMIN — ASPIRIN 81 MG 81 MG: 81 TABLET ORAL at 08:46

## 2020-01-01 RX ADMIN — MICONAZOLE NITRATE: 20 POWDER TOPICAL at 21:07

## 2020-01-01 RX ADMIN — CARVEDILOL 12.5 MG: 12.5 TABLET, FILM COATED ORAL at 08:36

## 2020-01-01 RX ADMIN — OXYCODONE HYDROCHLORIDE AND ACETAMINOPHEN 1 TABLET: 5; 325 TABLET ORAL at 09:19

## 2020-01-01 RX ADMIN — FLUOXETINE 40 MG: 20 CAPSULE ORAL at 09:08

## 2020-01-01 RX ADMIN — CALCIUM ACETATE 2001 MG: 667 CAPSULE ORAL at 16:36

## 2020-01-01 RX ADMIN — ATORVASTATIN CALCIUM 40 MG: 40 TABLET, FILM COATED ORAL at 21:24

## 2020-01-01 RX ADMIN — WARFARIN SODIUM 5 MG: 5 TABLET ORAL at 21:13

## 2020-01-01 RX ADMIN — SENNOSIDES AND DOCUSATE SODIUM 1 TABLET: 8.6; 5 TABLET ORAL at 09:33

## 2020-01-01 RX ADMIN — CALCIUM ACETATE 667 MG: 667 CAPSULE ORAL at 14:04

## 2020-01-01 RX ADMIN — CARVEDILOL 25 MG: 12.5 TABLET, FILM COATED ORAL at 20:08

## 2020-01-01 RX ADMIN — OXYCODONE HYDROCHLORIDE AND ACETAMINOPHEN 1 TABLET: 5; 325 TABLET ORAL at 10:56

## 2020-01-01 RX ADMIN — CALCIUM ACETATE 2001 MG: 667 CAPSULE ORAL at 17:14

## 2020-01-01 RX ADMIN — Medication 10 ML: at 21:39

## 2020-01-01 RX ADMIN — TOPIRAMATE 25 MG: 25 TABLET, FILM COATED ORAL at 22:08

## 2020-01-01 RX ADMIN — TRAZODONE HYDROCHLORIDE 100 MG: 100 TABLET ORAL at 21:00

## 2020-01-01 RX ADMIN — INSULIN GLARGINE 5 UNITS: 100 INJECTION, SOLUTION SUBCUTANEOUS at 20:40

## 2020-01-01 RX ADMIN — ONDANSETRON 4 MG: 2 INJECTION INTRAMUSCULAR; INTRAVENOUS at 16:28

## 2020-01-01 RX ADMIN — TRAZODONE HYDROCHLORIDE 100 MG: 100 TABLET ORAL at 21:02

## 2020-01-01 RX ADMIN — OXYCODONE HYDROCHLORIDE AND ACETAMINOPHEN 1 TABLET: 5; 325 TABLET ORAL at 00:56

## 2020-01-01 RX ADMIN — Medication 10 ML: at 14:05

## 2020-01-01 RX ADMIN — MICONAZOLE NITRATE: 20 POWDER TOPICAL at 21:01

## 2020-01-01 RX ADMIN — HEPARIN SODIUM 5000 UNITS: 5000 INJECTION INTRAVENOUS; SUBCUTANEOUS at 09:08

## 2020-01-01 RX ADMIN — CARVEDILOL 12.5 MG: 12.5 TABLET, FILM COATED ORAL at 20:53

## 2020-01-01 RX ADMIN — SODIUM POLYSTYRENE SULFONATE 15 G: 15 SUSPENSION ORAL; RECTAL at 16:31

## 2020-01-01 RX ADMIN — TRAZODONE HYDROCHLORIDE 100 MG: 100 TABLET ORAL at 20:23

## 2020-01-01 RX ADMIN — HYDROMORPHONE HYDROCHLORIDE 1 MG: 1 INJECTION, SOLUTION INTRAMUSCULAR; INTRAVENOUS; SUBCUTANEOUS at 01:54

## 2020-01-01 RX ADMIN — ATORVASTATIN CALCIUM 40 MG: 40 TABLET, FILM COATED ORAL at 21:02

## 2020-01-01 RX ADMIN — TRAZODONE HYDROCHLORIDE 100 MG: 100 TABLET ORAL at 21:30

## 2020-01-01 RX ADMIN — OXYCODONE HYDROCHLORIDE AND ACETAMINOPHEN 1 TABLET: 5; 325 TABLET ORAL at 14:03

## 2020-01-01 RX ADMIN — CALCIUM ACETATE 2001 MG: 667 CAPSULE ORAL at 13:28

## 2020-01-01 RX ADMIN — METOPROLOL TARTRATE 50 MG: 50 TABLET, FILM COATED ORAL at 22:49

## 2020-01-01 RX ADMIN — VENLAFAXINE HYDROCHLORIDE 75 MG: 75 CAPSULE, EXTENDED RELEASE ORAL at 14:11

## 2020-01-01 RX ADMIN — DIPHENHYDRAMINE HCL 25 MG: 25 TABLET ORAL at 03:00

## 2020-01-01 RX ADMIN — OXYCODONE HYDROCHLORIDE AND ACETAMINOPHEN 1 TABLET: 5; 325 TABLET ORAL at 18:24

## 2020-01-01 RX ADMIN — Medication 10 ML: at 20:54

## 2020-01-01 RX ADMIN — CARVEDILOL 25 MG: 12.5 TABLET, FILM COATED ORAL at 09:03

## 2020-01-01 RX ADMIN — CARVEDILOL 25 MG: 12.5 TABLET, FILM COATED ORAL at 09:21

## 2020-01-01 RX ADMIN — HYDRALAZINE HYDROCHLORIDE 25 MG: 25 TABLET, FILM COATED ORAL at 08:48

## 2020-01-01 RX ADMIN — SODIUM CHLORIDE: 9 INJECTION, SOLUTION INTRAVENOUS at 12:23

## 2020-01-01 RX ADMIN — SODIUM CHLORIDE: 9 INJECTION, SOLUTION INTRAVENOUS at 11:38

## 2020-01-01 RX ADMIN — OXYCODONE HYDROCHLORIDE AND ACETAMINOPHEN 1 TABLET: 5; 325 TABLET ORAL at 13:32

## 2020-01-01 RX ADMIN — TOPIRAMATE 25 MG: 25 TABLET, FILM COATED ORAL at 20:12

## 2020-01-01 RX ADMIN — ASPIRIN 81 MG 81 MG: 81 TABLET ORAL at 13:54

## 2020-01-01 RX ADMIN — ATORVASTATIN CALCIUM 40 MG: 40 TABLET, FILM COATED ORAL at 00:56

## 2020-01-01 RX ADMIN — CARVEDILOL 25 MG: 12.5 TABLET, FILM COATED ORAL at 20:12

## 2020-01-01 RX ADMIN — ATORVASTATIN CALCIUM 80 MG: 80 TABLET, FILM COATED ORAL at 22:20

## 2020-01-01 RX ADMIN — MICONAZOLE NITRATE: 20 POWDER TOPICAL at 13:40

## 2020-01-01 RX ADMIN — MICONAZOLE NITRATE: 20 POWDER TOPICAL at 21:44

## 2020-01-01 RX ADMIN — TOPIRAMATE 25 MG: 25 TABLET, FILM COATED ORAL at 21:38

## 2020-01-01 RX ADMIN — ATORVASTATIN CALCIUM 40 MG: 40 TABLET, FILM COATED ORAL at 20:52

## 2020-01-01 RX ADMIN — CLONIDINE HYDROCHLORIDE 0.1 MG: 0.1 TABLET ORAL at 08:33

## 2020-01-01 RX ADMIN — HEPARIN SODIUM 5000 UNITS: 5000 INJECTION INTRAVENOUS; SUBCUTANEOUS at 14:55

## 2020-01-01 RX ADMIN — Medication 10 ML: at 09:17

## 2020-01-01 RX ADMIN — FLUOXETINE 20 MG: 20 CAPSULE ORAL at 09:13

## 2020-01-01 RX ADMIN — CLONIDINE HYDROCHLORIDE 0.1 MG: 0.1 TABLET ORAL at 18:43

## 2020-01-01 RX ADMIN — HEPARIN SODIUM 5000 UNITS: 5000 INJECTION INTRAVENOUS; SUBCUTANEOUS at 00:44

## 2020-01-01 RX ADMIN — TOPIRAMATE 25 MG: 25 TABLET, FILM COATED ORAL at 09:01

## 2020-01-01 RX ADMIN — ATORVASTATIN CALCIUM 40 MG: 40 TABLET, FILM COATED ORAL at 20:49

## 2020-01-01 RX ADMIN — VENLAFAXINE HYDROCHLORIDE 75 MG: 75 CAPSULE, EXTENDED RELEASE ORAL at 14:45

## 2020-01-01 RX ADMIN — HEPARIN SODIUM 4000 UNITS: 1000 INJECTION, SOLUTION INTRAVENOUS; SUBCUTANEOUS at 16:07

## 2020-01-01 RX ADMIN — LEVOFLOXACIN 250 MG: 500 TABLET, FILM COATED ORAL at 10:59

## 2020-01-01 RX ADMIN — CALCIUM ACETATE 2001 MG: 667 CAPSULE ORAL at 16:42

## 2020-01-01 RX ADMIN — LIDOCAINE AND PRILOCAINE 2.5 %: 25; 25 CREAM TOPICAL at 10:15

## 2020-01-01 RX ADMIN — HEPARIN SODIUM 5000 UNITS: 5000 INJECTION INTRAVENOUS; SUBCUTANEOUS at 22:00

## 2020-01-01 RX ADMIN — MICONAZOLE NITRATE: 20 POWDER TOPICAL at 09:08

## 2020-01-01 RX ADMIN — METOPROLOL TARTRATE 50 MG: 50 TABLET, FILM COATED ORAL at 21:01

## 2020-01-01 RX ADMIN — SENNOSIDES AND DOCUSATE SODIUM 1 TABLET: 8.6; 5 TABLET ORAL at 22:50

## 2020-01-01 RX ADMIN — ONDANSETRON 4 MG: 2 INJECTION INTRAMUSCULAR; INTRAVENOUS at 22:27

## 2020-01-01 RX ADMIN — CALCIUM ACETATE 2001 MG: 667 CAPSULE ORAL at 17:29

## 2020-01-01 RX ADMIN — RENO CAPS 1 MG: 100; 1.5; 1.7; 20; 10; 1; 150; 5; 6 CAPSULE ORAL at 09:14

## 2020-01-01 RX ADMIN — CARVEDILOL 25 MG: 12.5 TABLET, FILM COATED ORAL at 20:31

## 2020-01-01 RX ADMIN — ONDANSETRON 4 MG: 2 INJECTION INTRAMUSCULAR; INTRAVENOUS at 22:24

## 2020-01-01 RX ADMIN — CALCIUM ACETATE 2001 MG: 667 CAPSULE ORAL at 17:02

## 2020-01-01 RX ADMIN — DOCUSATE SODIUM 50MG AND SENNOSIDES 8.6MG 1 TABLET: 8.6; 5 TABLET, FILM COATED ORAL at 00:56

## 2020-01-01 RX ADMIN — CARVEDILOL 25 MG: 12.5 TABLET, FILM COATED ORAL at 13:42

## 2020-01-01 RX ADMIN — MICONAZOLE NITRATE: 20 POWDER TOPICAL at 10:01

## 2020-01-01 RX ADMIN — MUPIROCIN: 20 OINTMENT TOPICAL at 09:27

## 2020-01-01 RX ADMIN — HEPARIN SODIUM AND DEXTROSE 18 UNITS/KG/HR: 10000; 5 INJECTION INTRAVENOUS at 22:21

## 2020-01-01 RX ADMIN — NITROGLYCERIN 0.4 MG: 0.4 TABLET, ORALLY DISINTEGRATING SUBLINGUAL at 13:43

## 2020-01-01 RX ADMIN — OXYCODONE HYDROCHLORIDE AND ACETAMINOPHEN 1 TABLET: 5; 325 TABLET ORAL at 08:48

## 2020-01-01 RX ADMIN — IRON SUCROSE 100 MG: 20 INJECTION, SOLUTION INTRAVENOUS at 08:33

## 2020-01-01 RX ADMIN — SENNOSIDES AND DOCUSATE SODIUM 1 TABLET: 8.6; 5 TABLET ORAL at 09:14

## 2020-01-01 RX ADMIN — HEPARIN SODIUM 5000 UNITS: 5000 INJECTION INTRAVENOUS; SUBCUTANEOUS at 23:23

## 2020-01-01 RX ADMIN — ATORVASTATIN CALCIUM 40 MG: 40 TABLET, FILM COATED ORAL at 22:49

## 2020-01-01 RX ADMIN — Medication 10 ML: at 08:24

## 2020-01-01 RX ADMIN — INSULIN LISPRO 2 UNITS: 100 INJECTION, SOLUTION INTRAVENOUS; SUBCUTANEOUS at 11:29

## 2020-01-01 RX ADMIN — Medication 10 ML: at 00:58

## 2020-01-01 RX ADMIN — DIPHENHYDRAMINE HCL 25 MG: 25 TABLET ORAL at 09:42

## 2020-01-01 RX ADMIN — NEPHROCAP 1 MG: 1 CAP ORAL at 16:31

## 2020-01-01 RX ADMIN — CARVEDILOL 25 MG: 12.5 TABLET, FILM COATED ORAL at 13:32

## 2020-01-01 RX ADMIN — OXYCODONE HYDROCHLORIDE AND ACETAMINOPHEN 1 TABLET: 5; 325 TABLET ORAL at 08:33

## 2020-01-01 RX ADMIN — HYDRALAZINE HYDROCHLORIDE 10 MG: 20 INJECTION INTRAMUSCULAR; INTRAVENOUS at 13:58

## 2020-01-01 RX ADMIN — METOPROLOL TARTRATE 50 MG: 50 TABLET, FILM COATED ORAL at 21:13

## 2020-01-01 RX ADMIN — CALCIUM ACETATE 2001 MG: 667 CAPSULE ORAL at 09:19

## 2020-01-01 RX ADMIN — RENO CAPS 1 MG: 100; 1.5; 1.7; 20; 10; 1; 150; 5; 6 CAPSULE ORAL at 09:33

## 2020-01-01 RX ADMIN — ATORVASTATIN CALCIUM 40 MG: 40 TABLET, FILM COATED ORAL at 21:15

## 2020-01-01 RX ADMIN — MUPIROCIN: 20 OINTMENT TOPICAL at 10:00

## 2020-01-01 RX ADMIN — CARVEDILOL 25 MG: 12.5 TABLET, FILM COATED ORAL at 21:15

## 2020-01-01 RX ADMIN — CALCIUM ACETATE 2001 MG: 667 CAPSULE ORAL at 08:28

## 2020-01-01 RX ADMIN — VENLAFAXINE HYDROCHLORIDE 75 MG: 75 CAPSULE, EXTENDED RELEASE ORAL at 09:06

## 2020-01-01 RX ADMIN — CALCIUM ACETATE 667 MG: 667 CAPSULE ORAL at 08:24

## 2020-01-01 RX ADMIN — OXYCODONE HYDROCHLORIDE AND ACETAMINOPHEN 1 TABLET: 5; 325 TABLET ORAL at 21:42

## 2020-01-01 RX ADMIN — GUAIFENESIN 600 MG: 600 TABLET, EXTENDED RELEASE ORAL at 14:45

## 2020-01-01 RX ADMIN — CALCIUM ACETATE 667 MG: 667 CAPSULE ORAL at 09:13

## 2020-01-01 RX ADMIN — ONDANSETRON 4 MG: 2 INJECTION INTRAMUSCULAR; INTRAVENOUS at 12:02

## 2020-01-01 RX ADMIN — CARVEDILOL 25 MG: 12.5 TABLET, FILM COATED ORAL at 21:03

## 2020-01-01 RX ADMIN — OXYCODONE HYDROCHLORIDE AND ACETAMINOPHEN 1 TABLET: 5; 325 TABLET ORAL at 20:09

## 2020-01-01 RX ADMIN — VENLAFAXINE HYDROCHLORIDE 75 MG: 75 CAPSULE, EXTENDED RELEASE ORAL at 09:03

## 2020-01-01 RX ADMIN — IOPAMIDOL 100 ML: 612 INJECTION, SOLUTION INTRAVENOUS at 01:42

## 2020-01-01 RX ADMIN — TRAZODONE HYDROCHLORIDE 100 MG: 100 TABLET ORAL at 20:30

## 2020-01-01 RX ADMIN — OXYCODONE HYDROCHLORIDE AND ACETAMINOPHEN 1 TABLET: 5; 325 TABLET ORAL at 20:55

## 2020-01-01 RX ADMIN — MICONAZOLE NITRATE: 20 POWDER TOPICAL at 11:14

## 2020-01-01 RX ADMIN — ASPIRIN 81 MG 81 MG: 81 TABLET ORAL at 09:05

## 2020-01-01 RX ADMIN — VENLAFAXINE HYDROCHLORIDE 37.5 MG: 37.5 CAPSULE, EXTENDED RELEASE ORAL at 09:19

## 2020-01-01 RX ADMIN — ONDANSETRON 4 MG: 2 INJECTION INTRAMUSCULAR; INTRAVENOUS at 22:40

## 2020-01-01 RX ADMIN — CARVEDILOL 25 MG: 12.5 TABLET, FILM COATED ORAL at 21:00

## 2020-01-01 RX ADMIN — DARBEPOETIN ALFA 40 MCG: 40 SOLUTION INTRAVENOUS; SUBCUTANEOUS at 14:55

## 2020-01-01 RX ADMIN — ONDANSETRON 4 MG: 2 INJECTION INTRAMUSCULAR; INTRAVENOUS at 13:39

## 2020-01-01 RX ADMIN — CARVEDILOL 12.5 MG: 12.5 TABLET, FILM COATED ORAL at 21:11

## 2020-01-01 RX ADMIN — OXYCODONE HYDROCHLORIDE AND ACETAMINOPHEN 1 TABLET: 5; 325 TABLET ORAL at 21:23

## 2020-01-01 RX ADMIN — VENLAFAXINE HYDROCHLORIDE 75 MG: 75 CAPSULE, EXTENDED RELEASE ORAL at 13:41

## 2020-01-01 RX ADMIN — ONDANSETRON HYDROCHLORIDE 4 MG: 4 SOLUTION ORAL at 20:11

## 2020-01-01 RX ADMIN — METOPROLOL TARTRATE 50 MG: 50 TABLET, FILM COATED ORAL at 09:14

## 2020-01-01 RX ADMIN — INSULIN LISPRO 1 UNITS: 100 INJECTION, SOLUTION INTRAVENOUS; SUBCUTANEOUS at 20:31

## 2020-01-01 RX ADMIN — Medication 10 ML: at 21:12

## 2020-01-01 RX ADMIN — MUPIROCIN: 20 OINTMENT TOPICAL at 20:19

## 2020-01-01 RX ADMIN — METHOHEXITAL SODIUM 65 MG: 500 INJECTION, POWDER, LYOPHILIZED, FOR SOLUTION INTRAMUSCULAR; INTRAVENOUS; RECTAL at 12:51

## 2020-01-01 RX ADMIN — DARBEPOETIN ALFA 60 MCG: 60 SOLUTION INTRAVENOUS; SUBCUTANEOUS at 21:25

## 2020-01-01 RX ADMIN — ONDANSETRON 4 MG: 2 INJECTION INTRAMUSCULAR; INTRAVENOUS at 16:26

## 2020-01-01 RX ADMIN — SENNOSIDES AND DOCUSATE SODIUM 1 TABLET: 8.6; 5 TABLET ORAL at 20:52

## 2020-01-01 RX ADMIN — CYCLOBENZAPRINE HYDROCHLORIDE 5 MG: 5 TABLET, FILM COATED ORAL at 21:01

## 2020-01-01 RX ADMIN — TOPIRAMATE 25 MG: 25 TABLET, FILM COATED ORAL at 09:19

## 2020-01-01 RX ADMIN — METOPROLOL TARTRATE 50 MG: 50 TABLET, FILM COATED ORAL at 20:52

## 2020-01-01 RX ADMIN — ASPIRIN 81 MG 81 MG: 81 TABLET ORAL at 10:15

## 2020-01-01 RX ADMIN — SODIUM CHLORIDE 1000 ML: 9 INJECTION, SOLUTION INTRAVENOUS at 14:21

## 2020-01-01 RX ADMIN — CLONIDINE HYDROCHLORIDE 0.1 MG: 0.1 TABLET ORAL at 09:13

## 2020-01-01 RX ADMIN — FLUOXETINE 20 MG: 20 CAPSULE ORAL at 20:49

## 2020-01-01 RX ADMIN — DIPHENHYDRAMINE HCL 25 MG: 25 TABLET ORAL at 21:05

## 2020-01-01 RX ADMIN — OXYCODONE HYDROCHLORIDE AND ACETAMINOPHEN 1 TABLET: 5; 325 TABLET ORAL at 02:59

## 2020-01-01 RX ADMIN — TOPIRAMATE 25 MG: 25 TABLET, FILM COATED ORAL at 20:17

## 2020-01-01 RX ADMIN — LIDOCAINE AND PRILOCAINE: 25; 25 CREAM TOPICAL at 13:20

## 2020-01-01 RX ADMIN — CALCIUM ACETATE 667 MG: 667 CAPSULE ORAL at 09:08

## 2020-01-01 RX ADMIN — TOPIRAMATE 25 MG: 25 TABLET, FILM COATED ORAL at 09:33

## 2020-01-01 RX ADMIN — HYDRALAZINE HYDROCHLORIDE 25 MG: 25 TABLET, FILM COATED ORAL at 13:46

## 2020-01-01 RX ADMIN — ASPIRIN 81 MG 81 MG: 81 TABLET ORAL at 18:43

## 2020-01-01 RX ADMIN — HYDROCORTISONE: 1 CREAM TOPICAL at 18:52

## 2020-01-01 RX ADMIN — DARBEPOETIN ALFA 40 MCG: 40 SOLUTION INTRAVENOUS; SUBCUTANEOUS at 18:48

## 2020-01-01 RX ADMIN — ONDANSETRON 4 MG: 2 INJECTION INTRAMUSCULAR; INTRAVENOUS at 12:43

## 2020-01-01 RX ADMIN — CLONIDINE HYDROCHLORIDE 0.1 MG: 0.1 TABLET ORAL at 21:01

## 2020-01-01 RX ADMIN — Medication 10 ML: at 21:03

## 2020-01-01 RX ADMIN — TRAZODONE HYDROCHLORIDE 100 MG: 100 TABLET ORAL at 20:09

## 2020-01-01 RX ADMIN — CALCIUM ACETATE 2001 MG: 667 CAPSULE ORAL at 08:00

## 2020-01-01 RX ADMIN — WARFARIN SODIUM 5 MG: 5 TABLET ORAL at 00:56

## 2020-01-01 RX ADMIN — DIPHENHYDRAMINE HCL 25 MG: 25 TABLET ORAL at 14:03

## 2020-01-01 RX ADMIN — DARBEPOETIN ALFA 40 MCG: 40 SOLUTION INTRAVENOUS; SUBCUTANEOUS at 12:15

## 2020-01-01 RX ADMIN — DIPHENHYDRAMINE HYDROCHLORIDE 25 MG: 50 INJECTION, SOLUTION INTRAMUSCULAR; INTRAVENOUS at 18:21

## 2020-01-01 RX ADMIN — INSULIN LISPRO 1 UNITS: 100 INJECTION, SOLUTION INTRAVENOUS; SUBCUTANEOUS at 17:20

## 2020-01-01 RX ADMIN — CARVEDILOL 12.5 MG: 12.5 TABLET, FILM COATED ORAL at 21:38

## 2020-01-01 RX ADMIN — Medication 10 ML: at 10:23

## 2020-01-01 RX ADMIN — CALCIUM ACETATE 2001 MG: 667 CAPSULE ORAL at 12:08

## 2020-01-01 RX ADMIN — CLONIDINE HYDROCHLORIDE 0.1 MG: 0.1 TABLET ORAL at 09:15

## 2020-01-01 RX ADMIN — MUPIROCIN: 20 OINTMENT TOPICAL at 21:07

## 2020-01-01 RX ADMIN — FLUOXETINE 20 MG: 20 CAPSULE ORAL at 08:32

## 2020-01-01 RX ADMIN — ASPIRIN 81 MG 81 MG: 81 TABLET ORAL at 09:33

## 2020-01-01 RX ADMIN — NITROGLYCERIN 1 INCH: 20 OINTMENT TOPICAL at 13:37

## 2020-01-01 RX ADMIN — MORPHINE SULFATE 4 MG: 2 INJECTION, SOLUTION INTRAMUSCULAR; INTRAVENOUS at 22:40

## 2020-01-01 RX ADMIN — Medication 3 ML: at 21:20

## 2020-01-01 RX ADMIN — HYDRALAZINE HYDROCHLORIDE 10 MG: 20 INJECTION INTRAMUSCULAR; INTRAVENOUS at 16:30

## 2020-01-01 RX ADMIN — CALCIUM ACETATE 2001 MG: 667 CAPSULE ORAL at 09:03

## 2020-01-01 RX ADMIN — MORPHINE SULFATE 4 MG: 2 INJECTION, SOLUTION INTRAMUSCULAR; INTRAVENOUS at 22:24

## 2020-01-01 RX ADMIN — DOXYCYCLINE 100 MG: 100 INJECTION, POWDER, LYOPHILIZED, FOR SOLUTION INTRAVENOUS at 19:41

## 2020-01-01 RX ADMIN — MUPIROCIN: 20 OINTMENT TOPICAL at 11:14

## 2020-01-01 RX ADMIN — ENOXAPARIN SODIUM 110 MG: 150 INJECTION SUBCUTANEOUS at 08:48

## 2020-01-01 RX ADMIN — CALCIUM ACETATE 667 MG: 667 CAPSULE ORAL at 18:45

## 2020-01-01 RX ADMIN — MICONAZOLE NITRATE: 20 POWDER TOPICAL at 08:33

## 2020-01-01 RX ADMIN — OXYCODONE HYDROCHLORIDE AND ACETAMINOPHEN 1 TABLET: 5; 325 TABLET ORAL at 08:50

## 2020-01-01 RX ADMIN — CALCIUM ACETATE 667 MG: 667 CAPSULE ORAL at 16:31

## 2020-01-01 RX ADMIN — ASPIRIN 81 MG 81 MG: 81 TABLET ORAL at 09:00

## 2020-01-01 RX ADMIN — OXYCODONE HYDROCHLORIDE AND ACETAMINOPHEN 1 TABLET: 5; 325 TABLET ORAL at 21:00

## 2020-01-01 RX ADMIN — GUAIFENESIN 600 MG: 600 TABLET, EXTENDED RELEASE ORAL at 09:06

## 2020-01-01 RX ADMIN — OXYCODONE HYDROCHLORIDE AND ACETAMINOPHEN 1 TABLET: 5; 325 TABLET ORAL at 14:12

## 2020-01-01 RX ADMIN — TRAZODONE HYDROCHLORIDE 100 MG: 100 TABLET ORAL at 20:17

## 2020-01-01 RX ADMIN — OXYCODONE HYDROCHLORIDE AND ACETAMINOPHEN 1 TABLET: 5; 325 TABLET ORAL at 12:39

## 2020-01-01 RX ADMIN — MICONAZOLE NITRATE: 20 POWDER TOPICAL at 22:03

## 2020-01-01 RX ADMIN — ASPIRIN 81 MG 81 MG: 81 TABLET ORAL at 14:12

## 2020-01-01 RX ADMIN — WARFARIN SODIUM 5 MG: 5 TABLET ORAL at 16:51

## 2020-01-01 RX ADMIN — HYDRALAZINE HYDROCHLORIDE 10 MG: 20 INJECTION INTRAMUSCULAR; INTRAVENOUS at 04:47

## 2020-01-01 RX ADMIN — TRAZODONE HYDROCHLORIDE 100 MG: 100 TABLET ORAL at 20:26

## 2020-01-01 RX ADMIN — CALCIUM ACETATE 2001 MG: 667 CAPSULE ORAL at 16:34

## 2020-01-01 RX ADMIN — DILTIAZEM HYDROCHLORIDE 5 MG/HR: 5 INJECTION INTRAVENOUS at 02:55

## 2020-01-01 RX ADMIN — CALCIUM ACETATE 2001 MG: 667 CAPSULE ORAL at 14:46

## 2020-01-01 RX ADMIN — SODIUM CHLORIDE 2000 ML: 9 INJECTION, SOLUTION INTRAVENOUS at 16:08

## 2020-01-01 RX ADMIN — CARVEDILOL 25 MG: 12.5 TABLET, FILM COATED ORAL at 09:19

## 2020-01-01 RX ADMIN — CARVEDILOL 25 MG: 12.5 TABLET, FILM COATED ORAL at 13:29

## 2020-01-01 RX ADMIN — ATORVASTATIN CALCIUM 40 MG: 40 TABLET, FILM COATED ORAL at 21:03

## 2020-01-01 RX ADMIN — CEFTRIAXONE SODIUM 1 G: 1 INJECTION, POWDER, FOR SOLUTION INTRAMUSCULAR; INTRAVENOUS at 22:43

## 2020-01-01 RX ADMIN — CARVEDILOL 12.5 MG: 12.5 TABLET, FILM COATED ORAL at 09:08

## 2020-01-01 RX ADMIN — MUPIROCIN: 20 OINTMENT TOPICAL at 13:41

## 2020-01-01 RX ADMIN — TOPIRAMATE 25 MG: 25 TABLET, FILM COATED ORAL at 08:46

## 2020-01-01 RX ADMIN — ENOXAPARIN SODIUM 30 MG: 100 INJECTION SUBCUTANEOUS at 09:17

## 2020-01-01 RX ADMIN — ATORVASTATIN CALCIUM 40 MG: 40 TABLET, FILM COATED ORAL at 21:38

## 2020-01-01 RX ADMIN — DOCUSATE SODIUM 100 MG: 100 CAPSULE, LIQUID FILLED ORAL at 21:06

## 2020-01-01 RX ADMIN — CALCIUM ACETATE 2001 MG: 667 CAPSULE ORAL at 12:10

## 2020-01-01 RX ADMIN — OXYCODONE HYDROCHLORIDE AND ACETAMINOPHEN 1 TABLET: 5; 325 TABLET ORAL at 21:14

## 2020-01-01 RX ADMIN — NEPHROCAP 1 MG: 1 CAP ORAL at 22:25

## 2020-01-01 RX ADMIN — TOPIRAMATE 25 MG: 25 TABLET, FILM COATED ORAL at 09:05

## 2020-01-01 RX ADMIN — PERMETHRIN: 1 LOTION TOPICAL at 17:44

## 2020-01-01 RX ADMIN — FLUOXETINE 40 MG: 20 CAPSULE ORAL at 08:36

## 2020-01-01 RX ADMIN — DILTIAZEM HYDROCHLORIDE 5 MG/ML: 5 INJECTION, SOLUTION INTRAVENOUS at 17:40

## 2020-01-01 RX ADMIN — METOPROLOL TARTRATE 5 MG: 5 INJECTION INTRAVENOUS at 16:31

## 2020-01-01 RX ADMIN — GUAIFENESIN 600 MG: 600 TABLET, EXTENDED RELEASE ORAL at 13:53

## 2020-01-01 RX ADMIN — TOPIRAMATE 25 MG: 25 TABLET, FILM COATED ORAL at 00:55

## 2020-01-01 RX ADMIN — MICONAZOLE NITRATE: 20 POWDER TOPICAL at 09:25

## 2020-01-01 RX ADMIN — MICONAZOLE NITRATE: 20 POWDER TOPICAL at 21:04

## 2020-01-01 RX ADMIN — CARVEDILOL 25 MG: 12.5 TABLET, FILM COATED ORAL at 09:04

## 2020-01-01 RX ADMIN — HEPARIN SODIUM 5000 UNITS: 5000 INJECTION INTRAVENOUS; SUBCUTANEOUS at 02:11

## 2020-01-01 RX ADMIN — FLUOXETINE 40 MG: 20 CAPSULE ORAL at 10:14

## 2020-01-01 RX ADMIN — TOPIRAMATE 25 MG: 25 TABLET, FILM COATED ORAL at 10:15

## 2020-01-01 RX ADMIN — VENLAFAXINE HYDROCHLORIDE 75 MG: 75 CAPSULE, EXTENDED RELEASE ORAL at 13:32

## 2020-01-01 RX ADMIN — MICONAZOLE NITRATE: 20 POWDER TOPICAL at 09:17

## 2020-01-01 RX ADMIN — HEPARIN SODIUM 5000 UNITS: 5000 INJECTION INTRAVENOUS; SUBCUTANEOUS at 04:47

## 2020-01-01 RX ADMIN — VENLAFAXINE HYDROCHLORIDE 37.5 MG: 37.5 CAPSULE, EXTENDED RELEASE ORAL at 09:05

## 2020-01-01 RX ADMIN — TOPIRAMATE 25 MG: 25 TABLET, FILM COATED ORAL at 20:52

## 2020-01-01 RX ADMIN — HEPARIN SODIUM 7800 UNITS: 1000 INJECTION INTRAVENOUS; SUBCUTANEOUS at 22:20

## 2020-01-01 RX ADMIN — ASPIRIN 81 MG 81 MG: 81 TABLET ORAL at 08:32

## 2020-01-01 RX ADMIN — HYDRALAZINE HYDROCHLORIDE 20 MG: 20 INJECTION INTRAMUSCULAR; INTRAVENOUS at 19:50

## 2020-01-01 RX ADMIN — MICONAZOLE NITRATE: 20 POWDER TOPICAL at 21:12

## 2020-01-01 RX ADMIN — Medication 10 ML: at 21:24

## 2020-01-01 RX ADMIN — Medication 10 ML: at 22:20

## 2020-01-01 RX ADMIN — CALCIUM ACETATE 667 MG: 667 CAPSULE ORAL at 09:33

## 2020-01-01 RX ADMIN — CARVEDILOL 25 MG: 12.5 TABLET, FILM COATED ORAL at 14:12

## 2020-01-01 RX ADMIN — VENLAFAXINE HYDROCHLORIDE 75 MG: 75 CAPSULE, EXTENDED RELEASE ORAL at 08:23

## 2020-01-01 RX ADMIN — MUPIROCIN: 20 OINTMENT TOPICAL at 21:12

## 2020-01-01 RX ADMIN — CARVEDILOL 25 MG: 12.5 TABLET, FILM COATED ORAL at 20:23

## 2020-01-01 RX ADMIN — WARFARIN SODIUM 5 MG: 5 TABLET ORAL at 18:38

## 2020-01-01 RX ADMIN — CARVEDILOL 25 MG: 12.5 TABLET, FILM COATED ORAL at 20:59

## 2020-01-01 RX ADMIN — CALCIUM ACETATE 2001 MG: 667 CAPSULE ORAL at 17:32

## 2020-01-01 RX ADMIN — SODIUM CHLORIDE 2000 ML: 9 INJECTION, SOLUTION INTRAVENOUS at 09:09

## 2020-01-01 RX ADMIN — GUAIFENESIN 600 MG: 600 TABLET, EXTENDED RELEASE ORAL at 10:39

## 2020-01-01 RX ADMIN — CALCIUM ACETATE 2001 MG: 667 CAPSULE ORAL at 07:49

## 2020-01-01 RX ADMIN — OXYCODONE HYDROCHLORIDE AND ACETAMINOPHEN 1 TABLET: 5; 325 TABLET ORAL at 12:36

## 2020-01-01 RX ADMIN — VENLAFAXINE HYDROCHLORIDE 75 MG: 75 CAPSULE, EXTENDED RELEASE ORAL at 16:31

## 2020-01-01 RX ADMIN — HYDRALAZINE HYDROCHLORIDE 25 MG: 25 TABLET, FILM COATED ORAL at 21:02

## 2020-01-01 RX ADMIN — HEPARIN SODIUM 5000 UNITS: 5000 INJECTION INTRAVENOUS; SUBCUTANEOUS at 16:36

## 2020-01-01 RX ADMIN — Medication 40 MG: at 12:52

## 2020-01-01 RX ADMIN — ASPIRIN 81 MG 81 MG: 81 TABLET ORAL at 08:36

## 2020-01-01 RX ADMIN — VENLAFAXINE HYDROCHLORIDE 37.5 MG: 37.5 CAPSULE, EXTENDED RELEASE ORAL at 09:01

## 2020-01-01 RX ADMIN — ASPIRIN 81 MG 81 MG: 81 TABLET ORAL at 09:19

## 2020-01-01 RX ADMIN — ONDANSETRON 4 MG: 2 INJECTION INTRAMUSCULAR; INTRAVENOUS at 15:00

## 2020-01-01 RX ADMIN — Medication 10 ML: at 09:08

## 2020-01-01 RX ADMIN — METOPROLOL TARTRATE 50 MG: 50 TABLET, FILM COATED ORAL at 00:56

## 2020-01-01 RX ADMIN — Medication 10 ML: at 07:49

## 2020-01-01 RX ADMIN — INSULIN LISPRO 1 UNITS: 100 INJECTION, SOLUTION INTRAVENOUS; SUBCUTANEOUS at 21:30

## 2020-01-01 ASSESSMENT — PAIN SCALES - GENERAL
PAINLEVEL_OUTOF10: 8
PAINLEVEL_OUTOF10: 0
PAINLEVEL_OUTOF10: 9
PAINLEVEL_OUTOF10: 0
PAINLEVEL_OUTOF10: 10
PAINLEVEL_OUTOF10: 6
PAINLEVEL_OUTOF10: 9
PAINLEVEL_OUTOF10: 9
PAINLEVEL_OUTOF10: 0
PAINLEVEL_OUTOF10: 9
PAINLEVEL_OUTOF10: 10
PAINLEVEL_OUTOF10: 8
PAINLEVEL_OUTOF10: 0
PAINLEVEL_OUTOF10: 2
PAINLEVEL_OUTOF10: 9
PAINLEVEL_OUTOF10: 0
PAINLEVEL_OUTOF10: 0
PAINLEVEL_OUTOF10: 2
PAINLEVEL_OUTOF10: 9
PAINLEVEL_OUTOF10: 9
PAINLEVEL_OUTOF10: 6
PAINLEVEL_OUTOF10: 9
PAINLEVEL_OUTOF10: 8
PAINLEVEL_OUTOF10: 0
PAINLEVEL_OUTOF10: 6
PAINLEVEL_OUTOF10: 8
PAINLEVEL_OUTOF10: 0
PAINLEVEL_OUTOF10: 0
PAINLEVEL_OUTOF10: 6
PAINLEVEL_OUTOF10: 9
PAINLEVEL_OUTOF10: 0
PAINLEVEL_OUTOF10: 9
PAINLEVEL_OUTOF10: 9
PAINLEVEL_OUTOF10: 0
PAINLEVEL_OUTOF10: 0
PAINLEVEL_OUTOF10: 7
PAINLEVEL_OUTOF10: 8
PAINLEVEL_OUTOF10: 0
PAINLEVEL_OUTOF10: 9
PAINLEVEL_OUTOF10: 6
PAINLEVEL_OUTOF10: 9
PAINLEVEL_OUTOF10: 5
PAINLEVEL_OUTOF10: 6
PAINLEVEL_OUTOF10: 10
PAINLEVEL_OUTOF10: 2
PAINLEVEL_OUTOF10: 9
PAINLEVEL_OUTOF10: 9
PAINLEVEL_OUTOF10: 0
PAINLEVEL_OUTOF10: 9
PAINLEVEL_OUTOF10: 0
PAINLEVEL_OUTOF10: 10
PAINLEVEL_OUTOF10: 6
PAINLEVEL_OUTOF10: 1
PAINLEVEL_OUTOF10: 6
PAINLEVEL_OUTOF10: 0
PAINLEVEL_OUTOF10: 9
PAINLEVEL_OUTOF10: 8
PAINLEVEL_OUTOF10: 0
PAINLEVEL_OUTOF10: 10
PAINLEVEL_OUTOF10: 0
PAINLEVEL_OUTOF10: 9
PAINLEVEL_OUTOF10: 0
PAINLEVEL_OUTOF10: 3
PAINLEVEL_OUTOF10: 2
PAINLEVEL_OUTOF10: 0
PAINLEVEL_OUTOF10: 10
PAINLEVEL_OUTOF10: 10
PAINLEVEL_OUTOF10: 6
PAINLEVEL_OUTOF10: 8
PAINLEVEL_OUTOF10: 0
PAINLEVEL_OUTOF10: 3
PAINLEVEL_OUTOF10: 0
PAINLEVEL_OUTOF10: 8
PAINLEVEL_OUTOF10: 9
PAINLEVEL_OUTOF10: 8
PAINLEVEL_OUTOF10: 1
PAINLEVEL_OUTOF10: 0
PAINLEVEL_OUTOF10: 5
PAINLEVEL_OUTOF10: 0
PAINLEVEL_OUTOF10: 9
PAINLEVEL_OUTOF10: 4
PAINLEVEL_OUTOF10: 8
PAINLEVEL_OUTOF10: 9
PAINLEVEL_OUTOF10: 0
PAINLEVEL_OUTOF10: 10
PAINLEVEL_OUTOF10: 7
PAINLEVEL_OUTOF10: 9
PAINLEVEL_OUTOF10: 2
PAINLEVEL_OUTOF10: 6
PAINLEVEL_OUTOF10: 0
PAINLEVEL_OUTOF10: 0
PAINLEVEL_OUTOF10: 7
PAINLEVEL_OUTOF10: 0
PAINLEVEL_OUTOF10: 9
PAINLEVEL_OUTOF10: 0
PAINLEVEL_OUTOF10: 6
PAINLEVEL_OUTOF10: 8
PAINLEVEL_OUTOF10: 10
PAINLEVEL_OUTOF10: 8
PAINLEVEL_OUTOF10: 0
PAINLEVEL_OUTOF10: 10
PAINLEVEL_OUTOF10: 0
PAINLEVEL_OUTOF10: 9
PAINLEVEL_OUTOF10: 0
PAINLEVEL_OUTOF10: 7
PAINLEVEL_OUTOF10: 8
PAINLEVEL_OUTOF10: 0
PAINLEVEL_OUTOF10: 0
PAINLEVEL_OUTOF10: 9
PAINLEVEL_OUTOF10: 10
PAINLEVEL_OUTOF10: 9
PAINLEVEL_OUTOF10: 0
PAINLEVEL_OUTOF10: 8
PAINLEVEL_OUTOF10: 5
PAINLEVEL_OUTOF10: 0
PAINLEVEL_OUTOF10: 10
PAINLEVEL_OUTOF10: 9
PAINLEVEL_OUTOF10: 6
PAINLEVEL_OUTOF10: 9
PAINLEVEL_OUTOF10: 6
PAINLEVEL_OUTOF10: 0
PAINLEVEL_OUTOF10: 7
PAINLEVEL_OUTOF10: 0
PAINLEVEL_OUTOF10: 9

## 2020-01-01 ASSESSMENT — PAIN DESCRIPTION - LOCATION
LOCATION: ARM
LOCATION: CHEST
LOCATION: BACK
LOCATION: HEAD
LOCATION: EAR
LOCATION: BACK
LOCATION: BACK
LOCATION: FLANK;BACK
LOCATION: BACK
LOCATION: CHEST
LOCATION: CHEST
LOCATION: ABDOMEN
LOCATION: BACK
LOCATION: CHEST
LOCATION: BREAST
LOCATION: FLANK;BACK
LOCATION: HIP;KNEE
LOCATION: HEAD
LOCATION: BACK
LOCATION: ABDOMEN
LOCATION: BACK
LOCATION: EYE;HEAD
LOCATION: BACK
LOCATION: CHEST

## 2020-01-01 ASSESSMENT — ENCOUNTER SYMPTOMS
GASTROINTESTINAL NEGATIVE: 1
COUGH: 0
VOMITING: 0
SORE THROAT: 0
RHINORRHEA: 0
SHORTNESS OF BREATH: 1
ABDOMINAL DISTENTION: 0
COUGH: 0
COUGH: 0
RHINORRHEA: 0
DIARRHEA: 0
EYES NEGATIVE: 1
SHORTNESS OF BREATH: 0
DIARRHEA: 0
RHINORRHEA: 0
NAUSEA: 1
COLOR CHANGE: 0
SORE THROAT: 0
BACK PAIN: 0
SHORTNESS OF BREATH: 0
COLOR CHANGE: 0
ALLERGIC/IMMUNOLOGIC NEGATIVE: 1
GASTROINTESTINAL NEGATIVE: 1
WHEEZING: 0
SORE THROAT: 0
ABDOMINAL DISTENTION: 0
RHINORRHEA: 0
EYES NEGATIVE: 1
NAUSEA: 0
BACK PAIN: 0
SHORTNESS OF BREATH: 1
NAUSEA: 0
STRIDOR: 0
CHEST TIGHTNESS: 0
RESPIRATORY NEGATIVE: 1
SORE THROAT: 0
ABDOMINAL PAIN: 0
VOMITING: 0
WHEEZING: 0
DIARRHEA: 0
NAUSEA: 0
DIARRHEA: 0
VOMITING: 0
ABDOMINAL DISTENTION: 0
RHINORRHEA: 1
DIARRHEA: 1
SHORTNESS OF BREATH: 0
NAUSEA: 0
ABDOMINAL PAIN: 0
BLOOD IN STOOL: 0
VOMITING: 0
CHEST TIGHTNESS: 0
VOMITING: 0
CHEST TIGHTNESS: 1
SHORTNESS OF BREATH: 1
CONSTIPATION: 0
BLOOD IN STOOL: 0
ABDOMINAL PAIN: 0
CONSTIPATION: 0
NAUSEA: 0
EYE PAIN: 0
WHEEZING: 0
BACK PAIN: 0
GASTROINTESTINAL NEGATIVE: 1
RHINORRHEA: 0
SHORTNESS OF BREATH: 0
WHEEZING: 0
NAUSEA: 0
VOMITING: 0
APNEA: 0
WHEEZING: 0
PHOTOPHOBIA: 0
SHORTNESS OF BREATH: 0
DIARRHEA: 0
CHEST TIGHTNESS: 0
BACK PAIN: 0
SHORTNESS OF BREATH: 1
COUGH: 0
COUGH: 0
DIARRHEA: 1
ABDOMINAL PAIN: 0
RESPIRATORY NEGATIVE: 1
ABDOMINAL PAIN: 0
CHOKING: 0
EYES NEGATIVE: 1
COUGH: 0
NAUSEA: 1
ABDOMINAL DISTENTION: 0
SORE THROAT: 0
ALLERGIC/IMMUNOLOGIC NEGATIVE: 1
PHOTOPHOBIA: 0
SHORTNESS OF BREATH: 0
COUGH: 0
EYES NEGATIVE: 1
ABDOMINAL PAIN: 0
WHEEZING: 0
ABDOMINAL PAIN: 0
NAUSEA: 1
DIARRHEA: 0
NAUSEA: 0
TROUBLE SWALLOWING: 0
WHEEZING: 0
GASTROINTESTINAL NEGATIVE: 1
BLOOD IN STOOL: 0
SHORTNESS OF BREATH: 0
EYE DISCHARGE: 0
WHEEZING: 0
BACK PAIN: 0
EYES NEGATIVE: 1
SHORTNESS OF BREATH: 0
CHEST TIGHTNESS: 0
SHORTNESS OF BREATH: 0
SHORTNESS OF BREATH: 0
CHEST TIGHTNESS: 0
ABDOMINAL PAIN: 0
COUGH: 0
BLOOD IN STOOL: 0
VOMITING: 0
SHORTNESS OF BREATH: 1
GASTROINTESTINAL NEGATIVE: 1
NAUSEA: 0
PHOTOPHOBIA: 0
GASTROINTESTINAL NEGATIVE: 1
CHEST TIGHTNESS: 0
ABDOMINAL PAIN: 0
EYES NEGATIVE: 1
DIARRHEA: 0
COLOR CHANGE: 0
ABDOMINAL PAIN: 1
ABDOMINAL DISTENTION: 0
SORE THROAT: 0
SHORTNESS OF BREATH: 0
SORE THROAT: 0
NAUSEA: 0
WHEEZING: 0
EYE DISCHARGE: 0
VOMITING: 1
VOMITING: 0
RESPIRATORY NEGATIVE: 1
BLOOD IN STOOL: 0
VOMITING: 0
COLOR CHANGE: 0
COLOR CHANGE: 0
COUGH: 0
CHOKING: 0
SORE THROAT: 0
ALLERGIC/IMMUNOLOGIC NEGATIVE: 1
SHORTNESS OF BREATH: 0
CHEST TIGHTNESS: 1
TROUBLE SWALLOWING: 0
STRIDOR: 0
DIARRHEA: 1
BLOOD IN STOOL: 0
SHORTNESS OF BREATH: 1
VOMITING: 0
EYE DISCHARGE: 0
COUGH: 0
APNEA: 0
COUGH: 0
BLOOD IN STOOL: 0
EYE PAIN: 0
VOMITING: 0
CHEST TIGHTNESS: 1
EYE DISCHARGE: 0
SORE THROAT: 0
STRIDOR: 0
COLOR CHANGE: 0
STRIDOR: 0
GASTROINTESTINAL NEGATIVE: 1
SHORTNESS OF BREATH: 1
NAUSEA: 0
ALLERGIC/IMMUNOLOGIC NEGATIVE: 1
SHORTNESS OF BREATH: 1
TROUBLE SWALLOWING: 0
DIARRHEA: 0
NAUSEA: 0
COLOR CHANGE: 0
ABDOMINAL PAIN: 0
SHORTNESS OF BREATH: 0
STRIDOR: 0
RESPIRATORY NEGATIVE: 1
ABDOMINAL PAIN: 0
SORE THROAT: 0
CHEST TIGHTNESS: 0
WHEEZING: 0
ABDOMINAL PAIN: 0
PHOTOPHOBIA: 0
CHEST TIGHTNESS: 0
COUGH: 0
COUGH: 0
EYES NEGATIVE: 1
NAUSEA: 1
CHEST TIGHTNESS: 0
VOMITING: 0
COUGH: 0
EYES NEGATIVE: 1
ABDOMINAL PAIN: 0
COUGH: 0
PHOTOPHOBIA: 0
SINUS PRESSURE: 0

## 2020-01-01 ASSESSMENT — PAIN DESCRIPTION - ORIENTATION
ORIENTATION: RIGHT;OUTER
ORIENTATION: ANTERIOR
ORIENTATION: RIGHT
ORIENTATION: ANTERIOR
ORIENTATION: LEFT
ORIENTATION: MID
ORIENTATION: RIGHT;LEFT
ORIENTATION: RIGHT
ORIENTATION: LEFT
ORIENTATION: LOWER
ORIENTATION: RIGHT
ORIENTATION: LEFT;UPPER
ORIENTATION: RIGHT;LEFT;LOWER
ORIENTATION: RIGHT

## 2020-01-01 ASSESSMENT — PAIN DESCRIPTION - PAIN TYPE
TYPE: ACUTE PAIN
TYPE: CHRONIC PAIN
TYPE: CHRONIC PAIN
TYPE: ACUTE PAIN
TYPE: ACUTE PAIN
TYPE: CHRONIC PAIN
TYPE: ACUTE PAIN
TYPE: CHRONIC PAIN
TYPE: ACUTE PAIN
TYPE: CHRONIC PAIN
TYPE: ACUTE PAIN
TYPE: CHRONIC PAIN
TYPE: ACUTE PAIN
TYPE: CHRONIC PAIN
TYPE: ACUTE PAIN
TYPE: ACUTE PAIN
TYPE: CHRONIC PAIN
TYPE: CHRONIC PAIN
TYPE: ACUTE PAIN
TYPE: ACUTE PAIN;CHRONIC PAIN
TYPE: ACUTE PAIN
TYPE: CHRONIC PAIN

## 2020-01-01 ASSESSMENT — PAIN - FUNCTIONAL ASSESSMENT
PAIN_FUNCTIONAL_ASSESSMENT: 0-10

## 2020-01-01 ASSESSMENT — PAIN DESCRIPTION - FREQUENCY
FREQUENCY: CONTINUOUS

## 2020-01-01 ASSESSMENT — PATIENT HEALTH QUESTIONNAIRE - PHQ9
SUM OF ALL RESPONSES TO PHQ QUESTIONS 1-9: 16
SUM OF ALL RESPONSES TO PHQ QUESTIONS 1-9: 18
SUM OF ALL RESPONSES TO PHQ QUESTIONS 1-9: 15
SUM OF ALL RESPONSES TO PHQ QUESTIONS 1-9: 17

## 2020-01-01 ASSESSMENT — LIFESTYLE VARIABLES: HISTORY_ALCOHOL_USE: NO

## 2020-01-01 ASSESSMENT — SLEEP AND FATIGUE QUESTIONNAIRES
DIFFICULTY ARISING: YES
DIFFICULTY STAYING ASLEEP: YES
RESTFUL SLEEP: NO
SLEEP PATTERN: INSOMNIA
DO YOU USE A SLEEP AID: YES
DIFFICULTY FALLING ASLEEP: YES
DO YOU HAVE DIFFICULTY SLEEPING: YES

## 2020-01-01 ASSESSMENT — PAIN DESCRIPTION - DESCRIPTORS
DESCRIPTORS: ACHING
DESCRIPTORS: BURNING
DESCRIPTORS: ACHING
DESCRIPTORS: THROBBING
DESCRIPTORS: THROBBING
DESCRIPTORS: ACHING
DESCRIPTORS: ACHING
DESCRIPTORS: ACHING;SHARP
DESCRIPTORS: STABBING
DESCRIPTORS: ACHING
DESCRIPTORS: ACHING
DESCRIPTORS: NUMBNESS
DESCRIPTORS: ACHING
DESCRIPTORS: CONSTANT;PRESSURE
DESCRIPTORS: CONSTANT;THROBBING

## 2020-01-01 ASSESSMENT — PAIN SCALES - WONG BAKER
WONGBAKER_NUMERICALRESPONSE: 0

## 2020-01-01 ASSESSMENT — PAIN DESCRIPTION - ONSET
ONSET: ON-GOING

## 2020-01-01 ASSESSMENT — PAIN DESCRIPTION - PROGRESSION
CLINICAL_PROGRESSION: GRADUALLY WORSENING
CLINICAL_PROGRESSION: GRADUALLY IMPROVING
CLINICAL_PROGRESSION: GRADUALLY IMPROVING

## 2020-01-13 ENCOUNTER — HOSPITAL ENCOUNTER (EMERGENCY)
Age: 55
Discharge: HOME | End: 2020-01-14
Attending: EMERGENCY MEDICINE
Payer: COMMERCIAL

## 2020-01-13 ENCOUNTER — APPOINTMENT (OUTPATIENT)
Dept: GENERAL RADIOLOGY | Age: 55
End: 2020-01-13
Payer: COMMERCIAL

## 2020-01-13 PROCEDURE — 73660 X-RAY EXAM OF TOE(S): CPT

## 2020-01-13 PROCEDURE — 99283 EMERGENCY DEPT VISIT LOW MDM: CPT

## 2020-01-13 RX ORDER — WARFARIN SODIUM 5 MG/1
5 TABLET ORAL
COMMUNITY
End: 2020-01-01 | Stop reason: SDUPTHER

## 2020-01-13 RX ORDER — AMOXICILLIN AND CLAVULANATE POTASSIUM 875; 125 MG/1; MG/1
1 TABLET, FILM COATED ORAL ONCE
Status: COMPLETED | OUTPATIENT
Start: 2020-01-13 | End: 2020-01-14

## 2020-01-13 ASSESSMENT — ENCOUNTER SYMPTOMS
ABDOMINAL PAIN: 0
DIARRHEA: 0
COUGH: 0
CHEST TIGHTNESS: 0
EYES NEGATIVE: 1
RESPIRATORY NEGATIVE: 1
SINUS PAIN: 0
NAUSEA: 0
BACK PAIN: 0
SHORTNESS OF BREATH: 0
ABDOMINAL DISTENTION: 0
SORE THROAT: 0
EYE PAIN: 0
WHEEZING: 0
BLOOD IN STOOL: 0
EYE DISCHARGE: 0
GASTROINTESTINAL NEGATIVE: 1
VOMITING: 0

## 2020-01-13 ASSESSMENT — PAIN DESCRIPTION - ORIENTATION: ORIENTATION: LEFT

## 2020-01-13 ASSESSMENT — PAIN SCALES - GENERAL: PAINLEVEL_OUTOF10: 8

## 2020-01-13 ASSESSMENT — PAIN DESCRIPTION - PAIN TYPE: TYPE: ACUTE PAIN

## 2020-01-13 ASSESSMENT — PAIN DESCRIPTION - LOCATION: LOCATION: TOE (COMMENT WHICH ONE)

## 2020-01-13 ASSESSMENT — PAIN DESCRIPTION - DESCRIPTORS: DESCRIPTORS: THROBBING;CONSTANT

## 2020-01-14 VITALS
SYSTOLIC BLOOD PRESSURE: 159 MMHG | HEIGHT: 63 IN | OXYGEN SATURATION: 100 % | HEART RATE: 105 BPM | BODY MASS INDEX: 38.09 KG/M2 | DIASTOLIC BLOOD PRESSURE: 78 MMHG | RESPIRATION RATE: 20 BRPM | WEIGHT: 215 LBS | TEMPERATURE: 97.8 F

## 2020-01-14 PROCEDURE — 6370000000 HC RX 637 (ALT 250 FOR IP): Performed by: EMERGENCY MEDICINE

## 2020-01-14 RX ORDER — AMOXICILLIN AND CLAVULANATE POTASSIUM 875; 125 MG/1; MG/1
1 TABLET, FILM COATED ORAL 2 TIMES DAILY
Qty: 20 TABLET | Refills: 0 | Status: SHIPPED | OUTPATIENT
Start: 2020-01-14 | End: 2020-01-24

## 2020-01-14 RX ADMIN — AMOXICILLIN AND CLAVULANATE POTASSIUM 1 TABLET: 875; 125 TABLET, FILM COATED ORAL at 00:00

## 2020-01-14 ASSESSMENT — PAIN SCALES - GENERAL: PAINLEVEL_OUTOF10: 7

## 2020-01-14 NOTE — ED NOTES
Pt laying in bed with no s/sym of distress. Pt given warm blanket for comfort. Call light in reach. Pt medicated per orders.  Will cont to monitor     Sosa De La Torre RN  01/14/20 0020

## 2020-01-14 NOTE — ED PROVIDER NOTES
file   Other Topics Concern    Not on file   Social History Narrative    Not on file       SCREENINGS      @[facility]([id number])@      PHYSICAL EXAM    (up to 7 for level 4, 8 or more for level 5)     ED Triage Vitals   BP Temp Temp Source Pulse Resp SpO2 Height Weight   01/13/20 2329 01/13/20 2328 01/13/20 2328 01/13/20 2328 01/13/20 2328 01/13/20 2328 01/13/20 2328 01/13/20 2328   (!) 211/111 97.8 °F (36.6 °C) Oral 105 20 100 % 5' 3\" (1.6 m) 215 lb (97.5 kg)       Physical Exam  Vitals signs and nursing note reviewed. Constitutional:       Appearance: Normal appearance. HENT:      Head: Normocephalic and atraumatic. Mouth/Throat:      Mouth: Mucous membranes are dry. Pharynx: No oropharyngeal exudate or posterior oropharyngeal erythema. Eyes:      General:         Right eye: No discharge. Left eye: No discharge. Pulmonary:      Effort: No respiratory distress. Musculoskeletal:         General: Tenderness present. No deformity or signs of injury. Skin:     Coloration: Skin is not jaundiced or pale. Neurological:      Mental Status: She is alert.    Psychiatric:         Behavior: Behavior normal.     Left great toe with infection but no lymphangitic streaking's on the plantar aspect of the toe    DIAGNOSTIC RESULTS     EKG: All EKG's are interpreted by the Emergency Department Physician who either signs or Co-signsthis chart in the absence of a cardiologist.    None    RADIOLOGY:   Non-plain filmimages such as CT, Ultrasound and MRI are read by the radiologist. Plain radiographic images are visualized and preliminarily interpreted by the emergency physician with the below findings:    X-rays are negative for any osteomyelitis or fracture    Interpretation per the Radiologist below, if available at the time ofthis note:    XR TOE LEFT (MIN 2 VIEWS)    (Results Pending)         ED BEDSIDE ULTRASOUND:   Performed by ED Physician - none    LABS:  Labs Reviewed - No data to display    All other

## 2020-01-14 NOTE — ED TRIAGE NOTES
Pt states couple days ago noticed left toe started bleeding. Today increased drainage drainage with odor.  Pt stes did not take BP medicine this evening

## 2020-01-31 ENCOUNTER — TELEPHONE (OUTPATIENT)
Dept: INTERVENTIONAL RADIOLOGY/VASCULAR | Age: 55
End: 2020-01-31

## 2020-02-03 NOTE — TELEPHONE ENCOUNTER
Spoke with pt regarding upcoming CVC Removal on 2/7/2020    Told to come to [id number] [facility] lab for lab draw on 2/6/2020. Told to arrive at 8am for 9am procedure. NPO after MN,  may take meds with sips of water the morning of. Bringing a . Pt had no further questions.

## 2020-02-05 ENCOUNTER — TELEPHONE (OUTPATIENT)
Dept: INTERVENTIONAL RADIOLOGY/VASCULAR | Age: 55
End: 2020-02-05

## 2020-02-05 NOTE — TELEPHONE ENCOUNTER
I advised pt her appt has been canceled and I will call her back once I have a new date and time. Appt canceled per Van.

## 2020-02-05 NOTE — TELEPHONE ENCOUNTER
Please cancel CVC HD catheter removal scheduled 2/7/2020. Patient needs cardiac clearance from [facility] cardiologist to hold Warfarin and ASA x 5 days before she can be scheduled for removal. Will need INR drawn morning of procedure by radiology nurse.

## 2020-02-07 ENCOUNTER — TELEPHONE (OUTPATIENT)
Dept: INTERVENTIONAL RADIOLOGY/VASCULAR | Age: 55
End: 2020-02-07

## 2020-02-07 NOTE — TELEPHONE ENCOUNTER
I spoke with pt, she stated her cardiologist is Dr. Nilay Watts. I advised pt, she will be scheduled for CVC removal once an approval is received from Dr. Nilay Watts. Pt verbalized understanding and will wait to hear from the office. A medication hold request was faxed to Dr. Mohan Butler office.

## 2020-02-12 ENCOUNTER — TELEPHONE (OUTPATIENT)
Dept: INTERVENTIONAL RADIOLOGY/VASCULAR | Age: 55
End: 2020-02-12

## 2020-02-14 ENCOUNTER — OFFICE VISIT (OUTPATIENT)
Dept: CARDIOLOGY CLINIC | Age: 55
End: 2020-02-14
Payer: COMMERCIAL

## 2020-02-14 VITALS — HEART RATE: 64 BPM | DIASTOLIC BLOOD PRESSURE: 80 MMHG | SYSTOLIC BLOOD PRESSURE: 130 MMHG

## 2020-02-14 PROBLEM — N18.6 ESRD (END STAGE RENAL DISEASE) (HCC): Status: ACTIVE | Noted: 2020-02-14

## 2020-02-14 PROBLEM — Z95.1 HX OF CABG: Status: ACTIVE | Noted: 2020-02-14

## 2020-02-14 PROBLEM — I48.0 PAROXYSMAL ATRIAL FIBRILLATION (HCC): Status: ACTIVE | Noted: 2020-02-14

## 2020-02-14 PROBLEM — I10 ESSENTIAL HYPERTENSION: Status: ACTIVE | Noted: 2020-02-14

## 2020-02-14 PROCEDURE — G8417 CALC BMI ABV UP PARAM F/U: HCPCS | Performed by: INTERNAL MEDICINE

## 2020-02-14 PROCEDURE — 1036F TOBACCO NON-USER: CPT | Performed by: INTERNAL MEDICINE

## 2020-02-14 PROCEDURE — 3017F COLORECTAL CA SCREEN DOC REV: CPT | Performed by: INTERNAL MEDICINE

## 2020-02-14 PROCEDURE — 99214 OFFICE O/P EST MOD 30 MIN: CPT | Performed by: INTERNAL MEDICINE

## 2020-02-14 PROCEDURE — 93000 ELECTROCARDIOGRAM COMPLETE: CPT | Performed by: INTERNAL MEDICINE

## 2020-02-14 PROCEDURE — G8427 DOCREV CUR MEDS BY ELIG CLIN: HCPCS | Performed by: INTERNAL MEDICINE

## 2020-02-14 PROCEDURE — G8484 FLU IMMUNIZE NO ADMIN: HCPCS | Performed by: INTERNAL MEDICINE

## 2020-02-14 NOTE — PROGRESS NOTES
Technically difficult exam due to suboptimal positioning.  - Exam indication: Chest Pain  - The left ventricle is normal in size. There is mild left ventricular   hypertrophy. Left ventricular systolic function is normal. EF = 70 ± 5% (2D 4-ch.)   Definity contrast used for endocardial border detection.  - The right ventricle is normal in size. Right ventricular systolic function is   low normal.  - There is a prominent septal bounce. Marked respiratory variation of the MV and   TV inflows. Medial and lateral MV TDI are not optimally sampled but appear equal.   Respirophasic septal shift (clip 46,49). IVC appears normal in size 1.7cm,   collapses with sniff. Findings suggestive of pericardial constriction - well   diuresed.     Patient Active Problem List   Diagnosis    Angina pectoris, unspecified ([address]    CHF (congestive heart failure) ([address]    NSTEMI (non-ST elevated myocardial infarction) ([address]    Coronary artery disease of native artery of native heart with stable angina pectoris ([address]    ESRD (end stage renal disease) ([address]    Essential hypertension    Hx of CABG    Paroxysmal atrial fibrillation ([address]       Past Surgical History:   Procedure Laterality Date    BREAST SURGERY       SECTION      CHOLECYSTECTOMY      CORONARY ARTERY BYPASS GRAFT         Social History     Socioeconomic History    Marital status: Single     Spouse name: Not on file    Number of children: Not on file    Years of education: Not on file    Highest education level: Not on file   Occupational History    Not on file   Social Needs    Financial resource strain: Not on file    Food insecurity:     Worry: Not on file     Inability: Not on file    Transportation needs:     Medical: Not on file     Non-medical: Not on file   Tobacco Use    Smoking status: Never Smoker    Smokeless tobacco: Never Used   Substance and Sexual Activity    Alcohol use: Never     Frequency: Never    Drug use: Never    Sexual CHEW Take 500 mg by mouth 3 times daily (with meals)      cyclobenzaprine (FLEXERIL) 5 MG tablet Take 5 mg by mouth 3 times daily as needed for Muscle spasms      oxyCODONE-acetaminophen (PERCOCET) 5-325 MG per tablet Take 1 tablet by mouth every 4 hours as needed for Pain. Sarah Steiner insulin lispro (HUMALOG) 100 UNIT/ML injection vial Inject into the skin 3 times daily (before meals) Indications: does varies per needs       insulin glargine (LANTUS) 100 UNIT/ML injection vial Inject into the skin nightly Indications: varies per needs 10 to 15 units        No current facility-administered medications for this visit. Hydrocodone-acetaminophen; Naproxen; Hydrocodone; and Vicodin [hydrocodone-acetaminophen]    Review of Systems:  General ROS: negative  Psychological ROS: negative  Hematological and Lymphatic ROS: No history of blood clots or bleeding disorder. Respiratory ROS: positive for - shortness of breath  Cardiovascular ROS: positive for - dyspnea on exertion  Gastrointestinal ROS: no abdominal pain, change in bowel habits, or black or bloody stools  Genito-Urinary ROS: no dysuria, trouble voiding, or hematuria  Musculoskeletal ROS: negative  Neurological ROS: no TIA or stroke symptoms  Dermatological ROS: negative    VITALS:  Blood pressure 130/80, pulse 64. There is no height or weight on file to calculate BMI. Physical Examination:  General appearance - alert, well appearing, and in no distress  Mental status - alert, oriented to person, place, and time  Neck - Neck is supple, no JVD or carotid bruits. No thyromegaly or adenopathy.    Chest - clear to auscultation, no wheezes, rales or rhonchi, symmetric air entry  Heart - normal rate, regular rhythm, normal S1, S2, no murmurs, rubs, clicks or gallops  Abdomen - soft, nontender, nondistended, no masses or organomegaly  Neurological - alert, oriented, normal speech, no focal findings or movement disorder noted  Extremities - peripheral pulses normal, no pedal edema, no clubbing or cyanosis  Skin - normal coloration and turgor, no rashes, no suspicious skin lesions noted      EKG: normal sinus rhythm, nonspecific ST and T waves changes    Orders Placed This Encounter   Procedures    EKG 12 Lead       ASSESSMENT:     Diagnosis Orders   1. Pre-operative clearance  EKG 12 Lead   2. Coronary artery disease of native artery of native heart with stable angina pectoris ([address]     3. Chronic diastolic congestive heart failure [facility]     4. ESRD (end stage renal disease) ([facility]     5. Essential hypertension     6. Hx of CABG     7. Paroxysmal atrial fibrillation (HCC)           PLAN:     Patient will need to continue to follow up with you for their general medical care    As always, aggressive risk factor modification is strongly recommended. We should adhere to the JNC VIII guidelines for HTN management and the NCEP ATP III guidelines for LDL-C management. Cardiac diet is always recommended with low fat, cholesterol, calories and sodium. Continue medications at current doses. Patient is a n intermediate risk patient for the proposed procedure/surgery. No active cardiac conditions such as ischemia, CHF or arrhythmias. Recomment peripoperative BBlocker, GI/DVT prophylaxis. Ok to hold coumadin 5 days prior to procedure    Patient was advised and encouraged to check blood pressure at home or at a pharmacy, maintain a logbook, and also call us back if blood pressure are above the target ranges or if it is low. Patient clearly understands and agrees to the instructions. We will need to continue to monitor muscle and liver enzymes, BUN, CR, and electrolytes.

## 2020-02-15 ENCOUNTER — HOSPITAL ENCOUNTER (EMERGENCY)
Age: 55
Discharge: HOME | End: 2020-02-15
Attending: FAMILY MEDICINE
Payer: COMMERCIAL

## 2020-02-15 VITALS
WEIGHT: 210 LBS | TEMPERATURE: 97.3 F | HEIGHT: 63 IN | HEART RATE: 62 BPM | RESPIRATION RATE: 18 BRPM | BODY MASS INDEX: 37.21 KG/M2 | SYSTOLIC BLOOD PRESSURE: 148 MMHG | OXYGEN SATURATION: 100 % | DIASTOLIC BLOOD PRESSURE: 75 MMHG

## 2020-02-15 PROCEDURE — 99282 EMERGENCY DEPT VISIT SF MDM: CPT

## 2020-02-15 RX ORDER — HYDROXYZINE HYDROCHLORIDE 25 MG/1
25 TABLET, FILM COATED ORAL EVERY 8 HOURS PRN
Qty: 30 TABLET | Refills: 0 | Status: SHIPPED | OUTPATIENT
Start: 2020-02-15 | End: 2020-01-01

## 2020-02-15 RX ORDER — HYDROXYZINE HYDROCHLORIDE 25 MG/1
25 TABLET, FILM COATED ORAL EVERY 8 HOURS PRN
Qty: 30 TABLET | Refills: 0 | Status: SHIPPED | OUTPATIENT
Start: 2020-02-15 | End: 2020-02-15 | Stop reason: SDUPTHER

## 2020-02-15 ASSESSMENT — PAIN DESCRIPTION - PAIN TYPE: TYPE: ACUTE PAIN

## 2020-02-15 ASSESSMENT — PAIN DESCRIPTION - DESCRIPTORS: DESCRIPTORS: ACHING

## 2020-02-15 ASSESSMENT — PAIN DESCRIPTION - LOCATION: LOCATION: HEAD

## 2020-02-15 ASSESSMENT — PAIN SCALES - GENERAL: PAINLEVEL_OUTOF10: 9

## 2020-02-15 NOTE — ED PROVIDER NOTES
[facility] ED  eMERGENCY dEPARTMENT eNCOUnter      Pt Name: Joslyn Rust  MRN: [id number]  [place] 1965  Date of evaluation: 2/15/2020  Provider: Gifty Cochran MD    [address] [place] [address]       Chief Complaint   Patient presents with    Abscess     abscess on back of head on going x2 weeks          HISTORY OF PRESENT ILLNESS   (Location/Symptom, Timing/Onset,Context/Setting, Quality, Duration, Modifying Factors, Severity)  Note limiting factors. Joslyn Rust is a 48 y.o. female who presents to the emergency department  Multiple scabbed lesion on the scalp    48years old with known history of known stage renal disease on dialysis 3 days a week history of CABG paroxysmal A. fib presented to the ER today with a concern of multiple scabbed lesion on the scalp some on her back patient . Patient admits to skin picking habit that make her form scabs that her to remove denies any other complaint or concern. Denies any fever chills body ache denies any other complaint or concern    The history is provided by the patient. NursingNotes were reviewed. REVIEW OF SYSTEMS    (2-9 systems for level 4, 10 or more for level 5)     Review of Systems   Constitutional: Negative. Eyes: Negative. Respiratory: Negative. Gastrointestinal: Negative. Genitourinary: Negative. Musculoskeletal: Negative. Neurological: Negative. Hematological: Negative. Psychiatric/Behavioral: Negative. Except as noted above the remainder of the review of systems was reviewed and negative.        PAST MEDICAL HISTORY     Past Medical History:   Diagnosis Date    Atrial fibrillation ([address]     Cancer ([address]     Breast    CHF (congestive heart failure) (HCC)     Chronic kidney disease     Diabetes mellitus ([address]     ESRD (end stage renal disease) ([address] 2/14/2020    Essential hypertension 2/14/2020    Heart murmur     Hx of CABG 2/14/2020    Paroxysmal atrial fibrillation ([address] Discharge 02/15/2020 03:11:17 PM      PATIENT REFERRED TO:  No follow-up provider specified.     DISCHARGE MEDICATIONS:  Discharge Medication List as of 2/15/2020  3:42 PM             (Please note thatportions of this note were completed with a voice recognition program.  Efforts were made to edit the dictations but occasionally words are mis-transcribed.)    Peyman Velarde MD (electronically signed)  Attending Emergency Physician         Janey Solomon MD  02/16/20 2019

## 2020-02-22 NOTE — ED PROVIDER NOTES
Musculoskeletal: Negative for arthralgias. Skin: Negative for pallor and rash. Allergic/Immunologic: Negative for immunocompromised state. Neurological: Negative for dizziness and syncope. Hematological: Negative for adenopathy. Psychiatric/Behavioral: Negative for agitation and hallucinations. All other systems reviewed and are negative. Except as noted above the remainder of the review of systems was reviewed and negative.        PAST MEDICAL HISTORY     Past Medical History:   Diagnosis Date    Atrial fibrillation ([facility]     Cancer ([facility]     Breast    CHF (congestive heart failure) (HCC)     Chronic kidney disease     Diabetes mellitus ([facility]     ESRD (end stage renal disease) ([facility] 2020    Essential hypertension 2020    Heart murmur     Hx of CABG 2020    Paroxysmal atrial fibrillation ([facility] 2020         SURGICALHISTORY       Past Surgical History:   Procedure Laterality Date    BREAST SURGERY       SECTION      CHOLECYSTECTOMY      CORONARY ARTERY BYPASS GRAFT           CURRENT MEDICATIONS       Previous Medications    ASPIRIN 81 MG CHEWABLE TABLET    Take 1 tablet by mouth daily    ATORVASTATIN (LIPITOR) 40 MG TABLET    Take 40 mg by mouth nightly    B COMPLEX-C-FOLIC ACID (NEPHROCAPS) 1 MG CAPSULE    Take 1 capsule by mouth daily    CALCIUM ACETATE (PHOSLO) 667 MG CAPSULE    Take by mouth 3 times daily (with meals)    CARVEDILOL (COREG) 12.5 MG TABLET    Take 12.5 mg by mouth 2 times daily    CLONIDINE (CATAPRES) 0.1 MG TABLET    Take 0.1 mg by mouth nightly    CYCLOBENZAPRINE (FLEXERIL) 5 MG TABLET    Take 5 mg by mouth 3 times daily as needed for Muscle spasms    DILTIAZEM (DILACOR XR) 240 MG EXTENDED RELEASE CAPSULE    Take 240 mg by mouth daily Indications: PT TAKES CARDIZEM CD    FLUOXETINE (PROZAC) 10 MG CAPSULE    Take 40 mg by mouth daily    HYDROXYZINE (ATARAX) 25 MG TABLET    Take 1 tablet by mouth every 8 hours as needed for Itching connections:     Talks on phone: None     Gets together: None     Attends Islam service: None     Active member of club or organization: None     Attends meetings of clubs or organizations: None     Relationship status: None    Intimate partner violence:     Fear of current or ex partner: None     Emotionally abused: None     Physically abused: None     Forced sexual activity: None   Other Topics Concern    None   Social History Narrative    None       SCREENINGS      @FLOW([id number])@      PHYSICAL EXAM    (up to 7 for level 4, 8 or more for level 5)     ED Triage Vitals   BP Temp Temp Source Pulse Resp SpO2 Height Weight   02/22/20 0246 02/22/20 0246 02/22/20 0246 02/22/20 0246 02/22/20 0246 02/22/20 0255 02/22/20 0246 02/22/20 0246   (!) 192/90 98.5 °F (36.9 °C) Oral 77 20 100 % 5' 3\" (1.6 m) 210 lb (95.3 kg)       Physical Exam  Vitals signs and nursing note reviewed. Constitutional:       Appearance: She is well-developed. HENT:      Head: Normocephalic. Nose: Nose normal.   Eyes:      Conjunctiva/sclera: Conjunctivae normal.      Pupils: Pupils are equal, round, and reactive to light. Neck:      Musculoskeletal: Normal range of motion and neck supple. Cardiovascular:      Rate and Rhythm: Normal rate and regular rhythm. Heart sounds: Normal heart sounds. Pulmonary:      Effort: Pulmonary effort is normal.      Breath sounds: Normal breath sounds. Abdominal:      General: Bowel sounds are normal.      Palpations: Abdomen is soft. Tenderness: There is no abdominal tenderness. There is no guarding. Musculoskeletal: Normal range of motion. Arms:    Skin:     General: Skin is warm and dry. Capillary Refill: Capillary refill takes less than 2 seconds. Neurological:      Mental Status: She is alert and oriented to person, place, and time.          DIAGNOSTIC RESULTS     EKG: All EKG's are interpreted by the Emergency Department Physician who either signs or Co-signsthis

## 2020-02-22 NOTE — ED TRIAGE NOTES
Patient complaining of pain at dialysis port site. States the port is going to be taken out but not sure when they are doing that. Patient is alert and oriented x4, placed on cardiac monitor.   Dialysis states missed 3 dialysis appts 139

## 2020-02-27 PROBLEM — I50.43 CHF (CONGESTIVE HEART FAILURE), NYHA CLASS I, ACUTE ON CHRONIC, COMBINED (HCC): Status: ACTIVE | Noted: 2020-01-01

## 2020-02-28 PROBLEM — N19 UREMIA: Status: ACTIVE | Noted: 2020-01-01

## 2020-02-28 NOTE — ED PROVIDER NOTES
[facility] ED  eMERGENCY dEPARTMENT eNCOUnter      Pt Name: Mookie Sahu  MRN: [id number]  [place] 1965  Date of evaluation: 2/27/2020  Provider: Estefania Pino MD    CHIEF COMPLAINT       Chief Complaint   Patient presents with    Shortness of Breath     assoc  with nausea, diarrhea, and L flank pain onset 3 days. Pt has hx of asthma, CHF, AFib, and open heart sx in Oct 2019 at [facility]. Pt sees Dr David Carroll. HISTORY OF PRESENT ILLNESS   (Location/Symptom, Timing/Onset,Context/Setting, Quality, Duration, Modifying Factors, Severity)  Note limiting factors. Mookie Sahu is a 48 y.o. female who presents to the emergency department for evaluation of shortness of breath. Patient has nausea with diarrhea and now shortness of breath over the last couple days. She is dialysis dependent. She is normally Tuesdays Thursdays and Saturday. Last dialysis was 2 weeks ago and when I asked her why she has not gone she states she bruises easily and her last dialysis was uncomfortable for her. She does make some urine. Does not wear oxygen at home. She has no associated chest pain. Does have a cardiac history and sees Dr. Vicky Hair here locally and has had open heart surgery as recently as 2019. HPI    NursingNotes were reviewed. REVIEW OF SYSTEMS    (2-9 systems for level 4, 10 or more for level 5)     Review of Systems   Constitutional: Negative for chills and diaphoresis. HENT: Negative for congestion, ear pain, mouth sores and sore throat. Eyes: Negative for photophobia and discharge. Respiratory: Positive for shortness of breath. Negative for cough, chest tightness and wheezing. Cardiovascular: Negative for chest pain and palpitations. Gastrointestinal: Positive for diarrhea and nausea. Negative for abdominal distention, abdominal pain and vomiting. Endocrine: Negative for cold intolerance. Genitourinary: Negative for difficulty urinating.    Musculoskeletal: Negative for arthralgias. Skin: Negative for pallor and rash. Allergic/Immunologic: Negative for immunocompromised state. Neurological: Negative for dizziness and syncope. Hematological: Negative for adenopathy. Psychiatric/Behavioral: Negative for agitation and hallucinations. All other systems reviewed and are negative. Except as noted above the remainder of the review of systems was reviewed and negative.        PAST MEDICAL HISTORY     Past Medical History:   Diagnosis Date    Atrial fibrillation ([facility]     Cancer ([facility]     Breast    CHF (congestive heart failure) (HCC)     Chronic kidney disease     Diabetes mellitus ([facility]     ESRD (end stage renal disease) ([facility] 2020    Essential hypertension 2020    Heart murmur     Hx of CABG 2020    Paroxysmal atrial fibrillation ([facility] 2020         SURGICALHISTORY       Past Surgical History:   Procedure Laterality Date    BREAST SURGERY       SECTION      CHOLECYSTECTOMY      CORONARY ARTERY BYPASS GRAFT           CURRENT MEDICATIONS       Previous Medications    ASPIRIN 81 MG CHEWABLE TABLET    Take 1 tablet by mouth daily    ATORVASTATIN (LIPITOR) 40 MG TABLET    Take 40 mg by mouth nightly    B COMPLEX-C-FOLIC ACID (NEPHROCAPS) 1 MG CAPSULE    Take 1 capsule by mouth daily    CALCIUM ACETATE (PHOSLO) 667 MG CAPSULE    Take by mouth 3 times daily (with meals)    CARVEDILOL (COREG) 12.5 MG TABLET    Take 12.5 mg by mouth 2 times daily    CLONIDINE (CATAPRES) 0.1 MG TABLET    Take 0.1 mg by mouth nightly    CYCLOBENZAPRINE (FLEXERIL) 5 MG TABLET    Take 5 mg by mouth 3 times daily as needed for Muscle spasms    DILTIAZEM (DILACOR XR) 240 MG EXTENDED RELEASE CAPSULE    Take 240 mg by mouth daily Indications: PT TAKES CARDIZEM CD    FLUOXETINE (PROZAC) 10 MG CAPSULE    Take 40 mg by mouth daily    INSULIN GLARGINE (LANTUS) 100 UNIT/ML INJECTION VIAL    Inject into the skin nightly Indications: varies per needs 10 to 15 or T wave changes. There is a prolonged QT. Normal axis. Abnormal EKG. RADIOLOGY:   Non-plain filmimages such as CT, Ultrasound and MRI are read by the radiologist. Plain radiographic images are visualized and preliminarily interpreted by the emergency physician with the below findings:        Interpretation per the Radiologist below, if available at the time ofthis note:    XR CHEST STANDARD (2 VW)    (Results Pending)         ED BEDSIDE ULTRASOUND:   Performed by ED Physician - none    LABS:  Labs Reviewed   COMPREHENSIVE METABOLIC PANEL - Abnormal; Notable for the following components:       Result Value    CO2 12 (*)     Anion Gap 28 (*)     Glucose 159 (*)      (*)     CREATININE 13.94 (*)     GFR Non- 2.8 (*)     GFR  3.4 (*)     Calcium 7.3 (*)     Total Protein 8.3 (*)     Globulin 4.8 (*)     All other components within normal limits    Narrative:     CALL  [place] tel. [phone],  Chemistry results called to and read back by Thea Bard, 02/27/2020 22:48,  by [address] [place] [address] results called to and read back by Thea Veradale, 02/27/2020 22:48,  by [facility]   CBC WITH AUTO DIFFERENTIAL - Abnormal; Notable for the following components:    RBC 2.56 (*)     Hemoglobin 7.6 (*)     Hematocrit 23.4 (*)     MCHC 32.3 (*)     Lymphocytes Absolute 0.9 (*)     All other components within normal limits   TROPONIN - Abnormal; Notable for the following components:    Troponin 0.331 (*)     All other components within normal limits    Narrative:     Nelli Ruiz tel. [phone],  [facility], 02/27/2020 22:48,  by [facility]  Chemistry results called to and read back by [facility], 02/27/2020 22:48,  by [facility]       All other labs were within normal range or not returned as of this dictation.     EMERGENCY DEPARTMENT COURSE and DIFFERENTIAL DIAGNOSIS/MDM:   Vitals:    Vitals:    02/27/20 2139 02/27/20 2219 02/27/20 2325   BP: (!) 155/77 (!) 187/97   Pulse: 85 84 86   Resp: 18  18   Temp: 97.5 °F (36.4 °C)     TempSrc: Oral     SpO2: 100%  100%   Weight: 218 lb (98.9 kg)     Height: 5' 3\" (1.6 m)            MDM patient has mild CHF. She is unable to get emergent dialysis tomorrow as an outpatient. I discussed the case with her nephrologist and recommending admission so she can be emergently dialyzed here in the next 24 hours. Currently her potassium is okay. Currently her hemoglobin is low from anemia of chronic disease. She is placed on oxygen for symptomatic relief. Care time for this patient is 37 minutes excluding separate billable procedures  CONSULTS:  None    PROCEDURES:  Unless otherwise noted below, none     Procedures    FINAL IMPRESSION      1. Acute systolic congestive heart failure ([facility]    2. Anemia due to chronic kidney disease, on chronic dialysis [facility])          DISPOSITION/PLAN   DISPOSITION Decision To Admit 02/27/2020 11:31:30 PM      PATIENT REFERRED TO:  No follow-up provider specified.     DISCHARGE MEDICATIONS:  New Prescriptions    No medications on file          (Please note that portions of this note were completed with a voice recognition program.  Efforts were made to edit the dictations but occasionally words are mis-transcribed.)    Dora Guevara MD (electronically signed)  Attending Emergency Physician          Dora Guevara MD  02/27/20 3992       Dora Guevara MD  02/27/20 2332       Dora Guevara MD  02/27/20 9758

## 2020-02-28 NOTE — CONSULTS
Department of Psychiatry  Behavioral Health Consult    REASON FOR CONSULT: Depression    CONSULTING PHYSICIAN:     History obtained from: patient    HISTORY OF PRESENT ILLNESS:     The patient is a 48 y.o. female with significant past psychiatric history of MDD  Pt has been feeling increasingly depressed for the past few months  Pt had break up from long term reationship with her BF  Also had by pass surgery done in 2019  Since then she has been feeling very depressed  Severity: Rating mood to be around 1/10 (10- good)  Quality:melancholic  Worse in the morning  Content: Hopeless, worthless and helpless feeling  Suicidal thoughts - passive, go to bed and not wake up  Associated symptoms:  Poor concentration, anhedonia, decrease motivation  Sleep and appetite- poor  Has not been getting out of her house  Did not go to dialysis for past few weeks    The patient is currently receiving care for the above psychiatric illness.       Psychiatric Review of Systems       Depression: yes     Zamzam or Hypomania:  no     Panic Attacks:  no     Phobias:  no     Obsessions and Compulsions:  no     PTSD : no     Hallucinations:  no     Delusions:  no      Substance Abuse History:  ETOH: no  Marijuana: no  Opiates: no  Other Drugs: no      Past Psychiatric History:  Prior Diagnosis: MDD  Psychiatrist: no  Therapist:no  Hospitalization: no  Hx of Suicidal Attempts: no  Hx of violence:  no  ECT: no    Past Medical History:        Diagnosis Date    [facility]     Cancer (HCC)     Breast    CHF (congestive heart failure) (HCC)     Chronic kidney disease     Diabetes mellitus ([facility]     ESRD (end stage renal disease) ([facility] 2020    Essential hypertension 2020    Heart murmur     Hx of CABG 2020    Paroxysmal atrial fibrillation ([facility] [facility] 2020       Past Surgical History:        Procedure Laterality Date    BREAST SURGERY       SECTION      CHOLECYSTECTOMY      CORONARY ARTERY BYPASS by Hui Echevarria MD on 2/28/2020 at 5:01 PM

## 2020-02-28 NOTE — PROGRESS NOTES
present: 8  Scale Used: Numeric Score  Intervention List: Patient able to continue with treatment, Nurse/Physician notified    Pain Reassessment:   Pain Assessment  Patient Currently in Pain: Yes  Pain Assessment: 0-10  Pain Level: 9  Pain Type: Acute pain  Pain Location: Flank, Back  Pain Orientation: Right, Left  Pain Descriptors: Aching  Pain Frequency: Continuous       Prior Level of Function:  Social/Functional History  Lives With: Family(sister and her kids)  Type of Home: House  Home Layout: Two level(pt reports living on first floor)  Home Access: Stairs to enter with rails  Entrance Stairs - Number of Steps: 4  Entrance Stairs - Rails: Both  Bathroom Shower/Tub: Tub/Shower unit, Shower chair with back  [employer]: Hand-held shower  Home Equipment: 4 wheeled walker  Receives Help From: Family  ADL Assistance: Needs assistance  Homemaking Assistance: Needs assistance  Homemaking Responsibilities: No  Ambulation Assistance: Needs assistance  Transfer Assistance: Needs assistance  Active : Yes(very rarely)  Mode of Transportation: Car  Occupation: On disability  Type of occupation: worked at [address] [place] [address]: spend time with her son    OBJECTIVE:     Orientation Status:  Orientation  Overall Orientation Status: Within Functional Limits    Observation:  Observation/Palpation  Posture: Fair  Observation: Pt seated on EOB upon OT arrival. Pr pleasant and agreeable to OT eval    Cognition Status:  Cognition  Overall Cognitive Status: WFL    Perception Status:  Perception  Overall Perceptual Status: Impaired    Sensation Status:  Sensation  Overall Sensation Status: Impaired(neuropathy)    Vision and Hearing Status:  Vision  Vision: Impaired  Vision Exceptions: Wears glasses at all times  Hearing  Hearing: Exceptions to [facility]  Hearing Exceptions: Hard of hearing/hearing concerns     ROM:   LUE AROM (degrees)  LUE AROM : WFL  Left Hand AROM (degrees)  Left Hand AROM: WFL  RUE AROM (degrees)  RUE AROM : WFL  Right Hand AROM (degrees)  Right Hand AROM: WFL    Strength:  LUE Strength  Gross LUE Strength: Exceptions to [facility]  L Hand General: 3-/5  LUE Strength Comment: 3+/5 all planes  RUE Strength  Gross RUE Strength: Exceptions to [facility]  R Hand General: 3+/5  RUE Strength Comment: 3+/5 all planes    Coordination, Tone, Quality of Movement: Tone RUE  RUE Tone: Normotonic  Tone LUE  LUE Tone: Normotonic  Coordination  Movements Are Fluid And Coordinated: No  Coordination and Movement description: Fine motor impairments, Right UE, Left UE  Quality of Movement Other  Comment: Pt reports difficulty with opening medicine bottles    Hand Dominance:  Hand Dominance  Hand Dominance: Right    ADL Status:  ADL  Feeding: Independent  Grooming: Independent  UE Bathing: Independent  LE Bathing: Independent  UE Dressing: Independent  LE Dressing: Independent  Toileting: Independent  Additional Comments: Simulated all ADLs as above. Pt reports receiving help from sister to get bathed and dressed. However, therapist believes pt does not need the help. Therapist believes she demonstrates depressive symptoms and is not in right mental state which is why she asks for the help for majority of ADLs and IADLs.    Toilet Transfers  Toilet - Technique: Ambulating  Equipment Used: Grab bars  Toilet Transfer: Modified independent       Therapy key for assistance levels -   Independent = Pt. is able to perform task with no assistance but may require a device   Stand by assistance = Pt. does not perform task at an independent level but does not need physical assistance, requires verbal cues  Minimal, Moderate, Maximal Assistance = Pt. requires physical assistance (25%, 50%, 75% assist from helper) for task but is able to actively participate in task   Dependent = Pt. requires total assistance with task and is not able to actively participate with task completion     Functional Mobility:  Functional Mobility  Functional - Mobility Device: No device  Activity: To/from bathroom  Assist Level: Independent  Functional Mobility Comments: Pt with no LOB  Transfers  Sit to stand: Independent  Stand to sit: Independent    Bed Mobility  Bed mobility  Supine to Sit: Unable to assess(Pt up on EOB upon OT arrival )  Sit to Supine: Independent    Seated and Standing Balance:  Balance  Sitting Balance: Independent  Standing Balance: Independent    Functional Endurance:  Activity Tolerance  Activity Tolerance: Patient Tolerated treatment well    D/C Recommendations:  OT D/C RECOMMENDATIONS  REQUIRES OT FOLLOW UP: Yes    Equipment Recommendations:   none at this time    OT Education:   OT Education  OT Education: OT Role, Plan of Care  Barriers to Learning: none    OT Follow Up:  OT D/C RECOMMENDATIONS  REQUIRES OT FOLLOW UP: Yes       Assessment/Discharge Disposition:  Assessment: Pt is a 47 y/o female from home with family support who presents to [facility] with the above deficits which impact her independence during ADLs and IADLs. However, pt demonstrates ability to complete ADL tasks independently and therapist believes that her mental state is impacting her ability to be as independent as possible. Pt relies heavily on her sister to help with ADLs and IADLs but therapist believes she does not need further OT services. Addressed these points with LSW.    Performance deficits / Impairments: Decreased strength, Decreased vision/visual deficit, Decreased fine motor control  Prognosis: Good  Discharge Recommendations: Continue to assess pending progress  Decision Making: Low Complexity  History: Pt's history is moderately complex  Exam: Pt with 3 perf deficits  Assistance / Modification: none    Six Click Score   How much help for putting on and taking off regular lower body clothing?: None  How much help for Bathing?: None  How much help for Toileting?: None  How much help for putting on and taking off regular upper body clothing?: None  How much help for taking care of personal grooming?: None  How much help for eating meals?: None  AM-PAC Inpatient Daily Activity Raw Score: 24  AM-PAC Inpatient ADL T-Scale Score : 57.54  ADL Inpatient CMS 0-100% Score: 0    Plan:  Plan  Times per week: 1-3x  Plan weeks: length of acute stay  Current Treatment Recommendations: Strengthening, Patient/Caregiver Education & Training, Equipment Evaluation, Education, & procurement    Goals:   Patient at baseline and does not need further OT services.         Patient Goal: Patient goals : \"to get some dialysis and get therapy so I can get better mentally and physically\"     Discussed and agreed upon: Yes Comments:     Therapy Time:   OT Individual Minutes  Time In: 3790  Time Out: 1430  Minutes: 17    Eval: 17 minutes     Electronically signed by:    JOSELIN Pompa  2/28/2020, 2:48 PM

## 2020-02-28 NOTE — H&P
daily  nystatin (MYCOSTATIN) POWD powder, Apply topically 2 times daily  b complex-C-folic acid (NEPHROCAPS) 1 MG capsule, Take 1 capsule by mouth daily  topiramate (TOPAMAX) 25 MG tablet, Take 25 mg by mouth 2 times daily  calcium acetate (PHOSLO) 667 MG capsule, Take by mouth 3 times daily (with meals)  Multiple Vitamins-Minerals (THERAPEUTIC MULTIVITAMIN-MINERALS) tablet, Take 1 tablet by mouth daily  atorvastatin (LIPITOR) 40 MG tablet, Take 40 mg by mouth nightly  FLUoxetine (PROZAC) 10 MG capsule, Take 40 mg by mouth daily  cloNIDine (CATAPRES) 0.1 MG tablet, Take 0.1 mg by mouth nightly  cyclobenzaprine (FLEXERIL) 5 MG tablet, Take 5 mg by mouth 3 times daily as needed for Muscle spasms  oxyCODONE-acetaminophen (PERCOCET) 5-325 MG per tablet, Take 1 tablet by mouth every 4 hours as needed for Pain. .  insulin lispro (HUMALOG) 100 UNIT/ML injection vial, Inject into the skin 3 times daily (before meals) Indications: does varies per needs   insulin glargine (LANTUS) 100 UNIT/ML injection vial, Inject into the skin nightly Indications: varies per needs 10 to 15 units   nitroGLYCERIN (NITROSTAT) 0.4 MG SL tablet, up to max of 3 total doses. If no relief after 1 dose, call 911. carvedilol (COREG) 12.5 MG tablet, Take 12.5 mg by mouth 2 times daily  diltiazem (DILACOR XR) 240 MG extended release capsule, Take 240 mg by mouth daily Indications: PT TAKES CARDIZEM CD  [DISCONTINUED] Sucroferric Oxyhydroxide (VELPHORO) 500 MG CHEW, Take 500 mg by mouth 3 times daily (with meals)    Allergies:  Aripiprazole    Social History:   Lives at home with family    Family History:   History reviewed. No pertinent family history.     REVIEW OF SYSTEMS:  Positives in bold  Constitutional: fever, chills, fatigue, malaise   HENT:  rhinorrhea, sinus pain, sore throat, epistaxis    Eyes:  photophobia, visual disturbance, eye redness  Respiratory: shortness of breath, cough, hemoptysis    Cardiovascular: chest pain, palpitations, orthopnea, leg swelling   Gastrointestinal: abdominal pain, nausea, vomiting, diarrhea, constipation   Endocrine: polydipsia, polyphagia, cold intolerance, heat intolerance  Genitourinary: dysuria, flank pain, frequency, urgency   Hematologic: easy bruising, easy bleeding  Musculoskeletal: back pain, neck pain, myalgias, arthalgias  Neurological: syncope, lightheadedness, dizziness, seizures, tremors, weakness  Psychiatric/Behavioral: anxiety, depression, hallucinations  Skin: rash, itching    PHYSICAL EXAM:  Vitals:  BP (!) 176/85   Pulse 94   Temp 97.8 °F (36.6 °C) (Oral)   Resp 18   Ht 5' 3\" (1.6 m)   Wt 220 lb (99.8 kg)   SpO2 100%   BMI 38.97 kg/m²     General: alert, in no apparent distress  HEENT: normocephalic, atraumatic, anicteric  Neck: supple, no mass  Lungs: non-labored respirations, clear to auscultation bilaterally  Heart: regular rate and rhythm, no murmurs or rubs  Abdomen: soft, non-tender, non-distended  MSK: no joint swelling or tenderness  Ext: no cyanosis, no peripheral edema  Neuro: alert and oriented, no gross abnormalities  Psych: normal mood and affect  Skin: no rash    DATA:  CBC with Differential:    Lab Results   Component Value Date    WBC 7.1 02/27/2020    RBC 2.56 02/27/2020    RBC 3.51 08/12/2018    HGB 7.6 02/27/2020    HCT 23.4 02/27/2020     02/27/2020    MCV 91.5 02/27/2020    MCH 29.6 02/27/2020    MCHC 32.3 02/27/2020    RDW 14.3 02/27/2020    LYMPHOPCT 13.1 02/27/2020    LYMPHOPCT 20.7 08/12/2018    MONOPCT 7.4 02/27/2020    BASOPCT 0.6 02/27/2020    MONOSABS 0.5 02/27/2020    LYMPHSABS 0.9 02/27/2020    EOSABS 0.4 02/27/2020    BASOSABS 0.0 02/27/2020     CMP:    Lab Results   Component Value Date     02/27/2020    K 4.5 02/27/2020    K 4.3 10/14/2019    CL 95 02/27/2020    CO2 12 02/27/2020     02/27/2020    CREATININE 13.94 02/27/2020    GFRAA 3.4 02/27/2020    LABGLOM 2.8 02/27/2020    GLUCOSE 159 02/27/2020    PROT 8.3 02/27/2020    LABALBU 3.5 02/27/2020    CALCIUM 7.3 02/27/2020    BILITOT 0.3 02/27/2020    ALKPHOS 63 02/27/2020    AST 22 02/27/2020    ALT 18 02/27/2020     Albumin:    Lab Results   Component Value Date    LABALBU 3.5 02/27/2020     Phosphorus:  No results found for: PHOS  Last 3 Troponin:    Lab Results   Component Value Date    TROPONINI 0.331 02/27/2020    TROPONINI 0.198 11/16/2019    TROPONINI 0.111 10/13/2019       ASSESSMENT AND PLAN:    49 yo lady with DM, HTN, CAD s/p relatively recent CABG with extreme noncompliance with HD. Comes in not feeling well. Has admitted to depression as part of reason for missing hd before. Has anemia related to her CKD.   htn due to fluid overload    - admit as inpatient  - PT/OT  - diabetic diet with accuchecks and sliding scale  - psych consult for depression  - hd today and tomorrow  - remove hd catheter Monday if still here    Belinda Jamison MD

## 2020-02-28 NOTE — ED NOTES
Pt SOB at rest.  LS diminished. Edema 1-2+ noted BLE. [address] for comfort, does not wear oxygen at home. Family notified of admission and going home.      Naman Banks RN  21/48/71 [phone]

## 2020-02-28 NOTE — CARE COORDINATION
[facility] EMERGENCY [facility] AT [facility] Case Management Initial Discharge Assessment    Met with Patient to discuss discharge plan. PCP: MD ELMER Jane                         Date of Last Visit: TWO WEEKS AGO    If no PCP, list provided? N/A    Discharge Planning    Living Arrangements: at home dependent on family care    Who do you live with? SISTER RALPH AND SISTER'S KIDS. HER SON LIVES WITH HER BROTHER    Who helps you with your care:  family    If lives at home:     Do you have any barriers navigating in your home? yes - LOSES HER BALANCE    Patient can perform ADL? Yes WITH HELP    Current Services (outpatient and in home) :  None    Dialysis: Yes, Location [facility], Chair Time T, TH SAT    Is transportation available to get to your appointments? Yes    DME Equipment:  yes Trudy Perez, REQUESTING HOSP BED    Respiratory equipment: None    Respiratory provider:  no     Pharmacy:  yes - [facility] with Medication Assistance Program?  No      Patient agreeable to [facility]? Yes, Company NO PREFERENCE    Patient agreeable to SNF/Rehab? No - TOTLLY REFUSES    Other discharge needs identified? Other [facility] AND [facility]    [place] of choice list provided with basic dialogue that supports the patient's individualized plan of care/goals and shares the quality data associated with the providers. Yes    Does Patient Have a High-Risk for Readmission Diagnosis (CHF, PN, MI, COPD)? Yes    If Yes,     Consult with Behavioral health to aid in depression, anxiety, or coping issues? Yes   Palliative Care Consult? Yes    The plan for Transition of Care is related to the following treatment goals: PSYCH CONSULT, EXPLORE PERITONEAL DIALYSIS OPTION, [facility], [facility], PT/OT IF PT [address] [facility]. [facility] 145    Initial Discharge Plan? (Note: please see concurrent daily documentation for any updates after initial note).     TO HOME WITH SISTER    The Patient and/or patient representative: PT was

## 2020-02-28 NOTE — ED NOTES
Telemonitor placed verified by monitor room.   Report called to [employer] on [address] [place] [address]  17/02/99 0920

## 2020-02-28 NOTE — ED NOTES
Pt to be admitted for dialysis in the morning. Pt noncompliant with dialysis. Per family, pt only going approx twice a month. Pt dislikes being in public places/crowds. Pt admitted to being depressed.      Samuel Suazo RN  92/48/26 [phone]

## 2020-02-29 NOTE — FLOWSHEET NOTE
02/29/20 1423   Vital Signs   BP (!) 154/85   Temp 98.3 °F (36.8 °C)   Pulse 78   Resp 18   Weight 209 lb 7 oz (95 kg)   Weight Method Bed scale   Percent Weight Change -3.06   Pain Assessment   Pain Assessment 0-10   Pain Level 0   Post-Hemodialysis Assessment   Post-Treatment Procedures Blood returned; Access bleeding time < 10 minutes   Machine Disinfection Process Acid/Vinegar Clean;Heat Disinfect   Dialyzer Clearance Lightly streaked   Duration of Treatment (minutes) 240 minutes   Hemodialysis Intake (ml) 400 ml   Hemodialysis Output (ml) 3400 ml   NET Removed (ml) 3000 ml   Tolerated Treatment Good   Bilateral Breath Sounds Clear   Edema Generalized +1   Physician Notified?  No

## 2020-02-29 NOTE — FLOWSHEET NOTE
Patient complaining of a headache. Medicated with percocet. Patient voices no other needs at this time.

## 2020-02-29 NOTE — FLOWSHEET NOTE
AM assessment completed. Patient resting in bed at this time with no complaints. Denies any chest pain at this time. Remains NSR on the monitor. +1 pitting edema noted to bilateral lower extremities. Pedal pulses palpable. Denies any shortness of breath at this time. Lungs are clear but diminished bilaterally. SATS 96% on RA. Wears 2L NC PRN. She is A/Ox3 and is up in the room with a stand-by assist. Denies any pain with elimination. Last BM 2/28/20. She receives HD T/TH/Sat. Skin remains warm, dry and pink. Scabbed areas noted to entire body. Right upper chest Vas Cath with dressing dry and intact. Left arm AV Fistula with + bruit/thrill. #20g SL to right forearm, flushed and patent. Vital signs stable and AM medications provided. Call light remains in reach.  Electronically signed by Luis Sheriff RN on 2/29/2020 at 12:09 PM

## 2020-02-29 NOTE — PROGRESS NOTES
Renal Progress Note    Assessment and Plan:      49 yo lady with DM, HTN, CAD s/p relatively recent CABG with extreme noncompliance with HD. Comes in not feeling well. Has admitted to depression as part of reason for missing hd before. Has anemia related to her CKD. htn due to fluid overload     - inpatient admission. Requires more than 2 midnights  - HD yesterday and today  - check stool guaiac  - got aranesp  - removed permcath on Monday  - ok for psych once hgb stable  - reviewed Dr. Ana Maria Gallo note - on coumadin for PAF. For now I will keep on hold due to anemia and need to remove permcath       Patient Active Problem List:     Angina pectoris, unspecified ([facility]     CHF (congestive heart failure) ([facility])     NSTEMI (non-ST elevated myocardial infarction) ([address]     Coronary artery disease of native artery of native heart with stable angina pectoris ([facility] [address] [facility]     ESRD (end stage renal disease) ([facility] [address] [facility]     Essential hypertension     Hx of CABG     Paroxysmal atrial fibrillation (HCC)     CHF (congestive heart failure), NYHA class I, acute on chronic, combined ([address]     Uremia      Subjective:   Admit Date: 2/27/2020    Interval History: pt with drop in hgb. Had hd yesterday and for hd today. Very much appreciate psych help. Plan for [address] [place] [address] on hd. No hd related complications. Using access.   3k bath.  uf 3 liters      Medications:   Scheduled Meds:   sodium chloride  20 mL Intravenous Once    lidocaine-prilocaine   Topical Once    sodium chloride flush  10 mL Intravenous 2 times per day    heparin (porcine)  5,000 Units Subcutaneous 3 times per day    aspirin  81 mg Oral Daily    atorvastatin  40 mg Oral Nightly    calcium acetate  667 mg Oral TID WC    carvedilol  12.5 mg Oral BID    FLUoxetine  40 mg Oral Daily    topiramate  25 mg Oral BID    insulin lispro  0-6 Units Subcutaneous TID WC    insulin lispro  0-3 Units Subcutaneous Nightly    darbepoetin sarina-polysorbate  60 mcg Subcutaneous Weekly Continuous Infusions:   dextrose         CBC:   Recent Labs     02/27/20 2215 02/29/20  0550   WBC 7.1 5.0   HGB 7.6* 6.6*    157     CMP:    Recent Labs     02/27/20 2215 02/29/20  0550    141   K 4.5 4.2   CL 95 99   CO2 12* 20   * 64*   CREATININE 13.94* 9.25*   GLUCOSE 159* 108*   CALCIUM 7.3* 7.0*   LABGLOM 2.8* 4.5*     Troponin:   Recent Labs     02/27/20 2215   TROPONINI 0.331*     BNP: No results for input(s): BNP in the last 72 hours. INR: No results for input(s): INR in the last 72 hours. Lipids: No results for input(s): CHOL, LDLDIRECT, TRIG, HDL, AMYLASE, LIPASE in the last 72 hours. Liver:   Recent Labs     02/27/20 2215   AST 22   ALT 18   ALKPHOS 63   PROT 8.3*   LABALBU 3.5   BILITOT 0.3     Iron:  No results for input(s): IRONS, FERRITIN in the last 72 hours. Invalid input(s): LABIRONS  Urinalysis: No results for input(s): UA in the last 72 hours. Objective:   Vitals: /72   Pulse 87   Temp 97.5 °F (36.4 °C) (Oral)   Resp 17   Ht 5' 3\" (1.6 m)   Wt 216 lb (98 kg)   SpO2 96%   BMI 38.26 kg/m²    Wt Readings from Last 3 Encounters:   02/29/20 216 lb (98 kg)   02/22/20 210 lb (95.3 kg)   02/15/20 210 lb (95.3 kg)      24HR INTAKE/OUTPUT:      Intake/Output Summary (Last 24 hours) at 2/29/2020 1982  Last data filed at 2/28/2020 0767  Gross per 24 hour   Intake 400 ml   Output 2400 ml   Net -2000 ml       Constitutional:  Alert, awake, no apparent distress   Skin:normal, no rash  HEENT:sclera anicteric.   Head atraumatic normocephalic  Neck:supple with no thyromegally  Cardiovascular:  S1, S2 without m/r/g   Respiratory:  CTA B without w/r/r   Abdomen: +bs, soft, nt  Ext: trace LE edema  Musculoskeletal:Intact  Neuro:Alert and oriented with no deficit      Electronically signed by Tiffany Smith MD on 2/29/2020 at 9:39 AM

## 2020-03-01 NOTE — PROGRESS NOTES
Physical Therapy  Facility/Department: [facility] [id number]/[id number]  Physical Therapy Discharge      NAME: Keisha Hamlin    : 1965 (48 y.o.)  MRN: [id number]    Account: [de-identified]  Gender: female  PT evaluation and treatment orders received. Chart reviewed. PT eval attempted. Pt screened for skilled PT services. Pt observed as independent with all functional mobility. Pt states that she has no d/c needs or concerns for safe return home. No skilled PT needs identified at this time. Please re-consult if there is a change in functional mobility status.     We thank you for your referral.  [facility]) PT Department  Electronically signed by Precious Reid PT on 3/1/20 at 1:39 PM

## 2020-03-01 NOTE — CARE COORDINATION
PER [facility] THEY CANNOT ACCEPT THE PT UNTIL AFTER THE PERMACATH IS PULLED. DR PAYNE AWARE. PLAN FOR [address] TO BE PULLED BY RADIOLOGY TOMORROW AND THEN PT TO GO TO [facility].

## 2020-03-01 NOTE — PROGRESS NOTES
Renal Progress Note    Assessment and Plan:      49 yo lady with DM, HTN, CAD s/p relatively recent CABG with extreme noncompliance with HD. Comes in not feeling well. Has admitted to depression as part of reason for missing hd before. Has anemia related to her CKD. htn due to fluid overload     - inpatient admission. Requires more than 2 midnights  - HD last couple days. Next hd tthsa  - check stool guaiac  - got aranesp  - remove permcath on Monday  - ok for psych tomorrow  - reviewed Dr. Cirilo Javier note - on coumadin for PAF. For now I will keep on hold due to anemia and need to remove permcath       Patient Active Problem List:     Angina pectoris, unspecified ([address]     CHF (congestive heart failure) ([facility])     NSTEMI (non-ST elevated myocardial infarction) ([address]     Coronary artery disease of native artery of native heart with stable angina pectoris ([address]     ESRD (end stage renal disease) ([address]     Essential hypertension     Hx of CABG     Paroxysmal atrial fibrillation (HCC)     CHF (congestive heart failure), NYHA class I, acute on chronic, combined ([address]     Uremia      Subjective:   Admit Date: 2/27/2020    Interval History:no active bleeding. Not able to go to psych today. Feels ok.   Got hd yesterday      Medications:   Scheduled Meds:   sodium chloride  20 mL Intravenous Once    lidocaine-prilocaine   Topical Once    calcium acetate  2,001 mg Oral TID WC    sodium chloride flush  10 mL Intravenous 2 times per day    heparin (porcine)  5,000 Units Subcutaneous 3 times per day    aspirin  81 mg Oral Daily    atorvastatin  40 mg Oral Nightly    carvedilol  12.5 mg Oral BID    FLUoxetine  40 mg Oral Daily    topiramate  25 mg Oral BID    insulin lispro  0-6 Units Subcutaneous TID WC    insulin lispro  0-3 Units Subcutaneous Nightly    darbepoetin sarina-polysorbate  60 mcg Subcutaneous Weekly     Continuous Infusions:   dextrose         CBC:   Recent Labs     02/29/20  0550 03/01/20  0612 WBC 5.0 3.9*   HGB 6.6* 7.4*    158     CMP:    Recent Labs     02/27/20 2215 02/29/20  0550 03/01/20  0612    141 137   K 4.5 4.2 3.8   CL 95 99 96   CO2 12* 20 26   * 64* 28*   CREATININE 13.94* 9.25* 5.43*   GLUCOSE 159* 108* 127*   CALCIUM 7.3* 7.0* 7.6*   LABGLOM 2.8* 4.5* 8.3*     Troponin:   Recent Labs     02/27/20 2215   TROPONINI 0.331*     BNP: No results for input(s): BNP in the last 72 hours. INR:   Recent Labs     03/01/20 0612   INR 1.3     Lipids: No results for input(s): CHOL, LDLDIRECT, TRIG, HDL, AMYLASE, LIPASE in the last 72 hours. Liver:   Recent Labs     02/27/20 2215   AST 22   ALT 18   ALKPHOS 63   PROT 8.3*   LABALBU 3.5   BILITOT 0.3     Iron:  No results for input(s): IRONS, FERRITIN in the last 72 hours. Invalid input(s): LABIRONS  Urinalysis: No results for input(s): UA in the last 72 hours. Objective:   Vitals: BP (!) 157/71   Pulse 71   Temp 97.9 °F (36.6 °C) (Oral)   Resp 17   Ht 5' 3\" (1.6 m)   Wt 219 lb 12.8 oz (99.7 kg)   SpO2 99%   BMI 38.94 kg/m²    Wt Readings from Last 3 Encounters:   03/01/20 219 lb 12.8 oz (99.7 kg)   02/22/20 210 lb (95.3 kg)   02/15/20 210 lb (95.3 kg)      24HR INTAKE/OUTPUT:      Intake/Output Summary (Last 24 hours) at 3/1/2020 0935  Last data filed at 3/1/2020 0806  Gross per 24 hour   Intake 1180 ml   Output 3400 ml   Net -2220 ml       Constitutional:  Alert, awake, no apparent distress   Skin:normal, no rash  HEENT:sclera anicteric.   Head atraumatic normocephalic  Neck:supple with no thyromegally  Cardiovascular:  S1, S2 without m/r/g   Respiratory:  CTA B without w/r/r   Abdomen: +bs, soft, nt  Ext: trace LE edema  Musculoskeletal:Intact  Neuro:Alert and oriented with no deficit      Electronically signed by Kandice Devries MD on 3/1/2020 at 9:35 AM

## 2020-03-02 NOTE — CONSULTS
Palliative Care Consult Note  Patient: Mayelin Regan  Gender: female  YOB: 1965  Unit/Bed: B376/E531-56  CodeStatus: Full Code  Inpatient Treatment Team: Treatment Team: Attending Provider: Valery Roger MD; Consulting Physician: Tyrel Bruner MD; Utilization Reviewer: Israel Parson RN; Consulting Physician: Lilly Solano MD; : Shannon Marin; Utilization Reviewer: Matthew Rose RN; Patient Care Tech: Marielle Edi; Registered Nurse: Francisco Schulz RN  Admit Date:  2/27/2020    Chief Complaint: Shortness of breath    History of Presenting Illness:      Mayelin Regan is a 48 y.o. female on hospital day 3 with a history of Shortness of breath. PMH significant for ESRD with Dialysis Tues, Thurs, Sat, DM, HTN, CAD s/p CABG. Per chart, patient has a history of non-adherence with plan of care. She was seen and examined for goals of care. Patient observed laying in bed with her eyes closed. She only opened her eyes during the eye assessment. Speech is bearly audible. However, she was able to tell me the date, season, month, and year. She reports that she lives at home with her sister who assist her with her daily chores. She still drives herself to some of her appointments. She is continent of bowel and bladder. She was seen by psychiatry. Admission to [address] [place] [address] unit is pending. Review of Systems:       Review of Systems   Constitutional: Positive for fatigue. HENT: Negative. Eyes: Negative. Respiratory: Positive for shortness of breath. Negative for wheezing. Cardiovascular: Negative for leg swelling. Gastrointestinal: Negative for constipation, diarrhea and nausea. Endocrine: Negative. Musculoskeletal: Negative. Skin: Negative for pallor. Allergic/Immunologic: Negative. Neurological: Negative for dizziness and weakness. Psychiatric/Behavioral: Positive for dysphoric mood. Negative for confusion.        Physical Examination:       BP (!) 169/65   Pulse 72   Temp 97.3 °F (36.3 °C)   Resp 18   Ht 5' 3\" (1.6 m)   Wt 219 lb 12.8 oz (99.7 kg)   SpO2 97%   BMI 38.94 kg/m²    Physical Exam  Constitutional:       Appearance: Normal appearance. She is well-developed. She is obese. HENT:      Head: Normocephalic and atraumatic. Nose: No congestion. Mouth/Throat:      Mouth: Mucous membranes are moist.   Eyes:      General: No scleral icterus. Pupils: Pupils are equal, round, and reactive to light. Neck:      Musculoskeletal: Normal range of motion. Cardiovascular:      Rate and Rhythm: Normal rate. Heart sounds: No murmur. Pulmonary:      Effort: Pulmonary effort is normal.      Breath sounds: Decreased breath sounds present. Abdominal:      General: Bowel sounds are normal.   Skin:     General: Skin is warm and dry. Capillary Refill: Capillary refill takes less than 2 seconds. Comments: Multiple open areas over generalized body area. Neurological:      Mental Status: She is alert and oriented to person, place, and time. Coordination: Coordination abnormal.   Psychiatric:         Mood and Affect: Mood is depressed. Allergies:       Allergies   Allergen Reactions    Hydrocodone-Acetaminophen Hives, Itching and Nausea Only     Other reaction(s): Nausea      Naproxen Hives and Nausea Only     Other reaction(s): Nausea      Hydrocodone Hives    Vicodin [Hydrocodone-Acetaminophen] Hives       Medications:      Current Facility-Administered Medications   Medication Dose Route Frequency Provider Last Rate Last Dose    lidocaine 2 % injection 10 mL  10 mL Intradermal PRN FRANCHESKA Stanton - CNP        0.9 % sodium chloride bolus  20 mL Intravenous Once Ju Holcomb MD        lidocaine-prilocaine (EMLA) cream   Topical Once Isaac Heath MD        calcium acetate (PHOSLO) capsule 2,001 mg  2,001 mg Oral TID  Isaac Heath MD   2,001 mg at 03/02/20 1522    sodium chloride flush 0.9 % injection 10 mL  10 mL Intravenous 2 times per day Mehrdad Salmeron MD   10 mL at 03/02/20 2124    sodium chloride flush 0.9 % injection 10 mL  10 mL Intravenous PRN Mehrdad Salmeron MD        [facility]ron [facility]) injection 4 mg  4 mg Intravenous Q6H PRN Davidson Guerrero MD   4 mg at 02/28/20 1500    aspirin chewable tablet 81 mg  81 mg Oral Daily Davidson Guerrero MD   81 mg at 03/02/20 0828    atorvastatin (LIPITOR) tablet 40 mg  40 mg Oral Nightly Davidson Guerrero MD   40 mg at 03/02/20 2124    carvedilol (COREG) tablet 12.5 mg  12.5 mg Oral BID Davidson Guerrero MD   12.5 mg at 03/02/20 2124    FLUoxetine (PROZAC) capsule 40 mg  40 mg Oral Daily Davidson Guerrero MD   40 mg at 03/02/20 9674    topiramate (TOPAMAX) tablet 25 mg  25 mg Oral BID Davidson Guerrero MD   25 mg at 03/02/20 2124    hydrALAZINE (APRESOLINE) injection 10 mg  10 mg Intravenous Q6H PRN Davidson Guerrero MD   10 mg at 02/28/20 0447    glucose (GLUTOSE) 40 % oral gel 15 g  15 g Oral PRN Davidson Guerrero MD        dextrose 50 % IV solution  12.5 g Intravenous PRN Davidson Guerrero MD        glucagon (rDNA) injection 1 mg  1 mg Intramuscular PRN Davidson Guerrero MD        dextrose 5 % solution  100 mL/hr Intravenous PRN Gogo Billy MD        insulin lispro (HUMALOG) injection vial 0-6 Units  0-6 Units Subcutaneous TID WC Gogo Billy MD        insulin lispro (HUMALOG) injection vial 0-3 Units  0-3 Units Subcutaneous Nightly Gogo Billy MD        darbepoetin sarina-polysorbate (ARANESP) injection 60 mcg  60 mcg Subcutaneous Weekly Mehrdad Salmeron MD   60 mcg at 02/28/20 2125    oxyCODONE-acetaminophen (PERCOCET) 5-325 MG per tablet 1 tablet  1 tablet Oral Q4H PRN Mehrdad Salmeron MD   1 tablet at 03/02/20 2123       History:       PMHx:  Past Medical History:   Diagnosis Date    [facility]     Cancer ([facility]     Breast    CHF (congestive heart failure) (HCC)     Chronic kidney disease     [facility] mellitus [facility]     ESRD (end stage renal disease) ([facility] 2020    Essential hypertension 2020    Heart murmur     Hx of CABG 2020    Paroxysmal atrial fibrillation ([facility] 2020       PSHx:  Past Surgical History:   Procedure Laterality Date    BREAST SURGERY       SECTION      CHOLECYSTECTOMY      CORONARY ARTERY BYPASS GRAFT         Social Hx:  Social History     Socioeconomic History    Marital status: Single     Spouse name: None    Number of children: None    Years of education: None    Highest education level: None   Occupational History    None   Social Needs    Financial resource strain: None    Food insecurity:     Worry: None     Inability: None    Transportation needs:     Medical: None     Non-medical: None   Tobacco Use    Smoking status: Never Smoker    Smokeless tobacco: Never Used   Substance and Sexual Activity    Alcohol use: Never     Frequency: Never    Drug use: Never    Sexual activity: Not Currently   Lifestyle    Physical activity:     Days per week: None     Minutes per session: None    Stress: None   Relationships    Social connections:     Talks on phone: None     Gets together: None     Attends Nondenominational service: None     Active member of club or organization: None     Attends meetings of clubs or organizations: None     Relationship status: None    Intimate partner violence:     Fear of current or ex partner: None     Emotionally abused: None     Physically abused: None     Forced sexual activity: None   Other Topics Concern    None   Social History Narrative    None       Family Hx:  History reviewed. No pertinent family history.     LABS: Reviewed     CBC:  Lab Results   Component Value Date    WBC 6.1 2020    RBC 2.74 2020    RBC 3.51 2018    HGB 8.2 2020    HCT 25.2 2020    MCV 92.0 2020    MCH 30.0 2020    MCHC 32.6 2020    RDW 14.0 2020     2020 MPV 7.1 08/12/2018     CBC with Differential:   Lab Results   Component Value Date    WBC 6.1 03/02/2020    RBC 2.74 03/02/2020    RBC 3.51 08/12/2018    HGB 8.2 03/02/2020    HCT 25.2 03/02/2020     03/02/2020    MCV 92.0 03/02/2020    MCH 30.0 03/02/2020    MCHC 32.6 03/02/2020    RDW 14.0 03/02/2020    LYMPHOPCT 17.3 03/02/2020    LYMPHOPCT 20.7 08/12/2018    MONOPCT 11.3 03/02/2020    BASOPCT 0.6 03/02/2020    MONOSABS 0.7 03/02/2020    LYMPHSABS 1.1 03/02/2020    EOSABS 0.7 03/02/2020    BASOSABS 0.0 03/02/2020     CMP:    Lab Results   Component Value Date     03/02/2020    K 3.9 03/02/2020    K 4.3 10/14/2019    CL 93 03/02/2020    CO2 24 03/02/2020    BUN 32 03/02/2020    CREATININE 6.05 03/02/2020    GFRAA 8.8 03/02/2020    LABGLOM 7.3 03/02/2020    GLUCOSE 100 03/02/2020    PROT 8.3 02/27/2020    LABALBU 3.5 02/27/2020    CALCIUM 8.1 03/02/2020    BILITOT 0.3 02/27/2020    ALKPHOS 63 02/27/2020    AST 22 02/27/2020    ALT 18 02/27/2020     BMP:    Lab Results   Component Value Date     03/02/2020    K 3.9 03/02/2020    K 4.3 10/14/2019    CL 93 03/02/2020    CO2 24 03/02/2020    BUN 32 03/02/2020    LABALBU 3.5 02/27/2020    CREATININE 6.05 03/02/2020    CALCIUM 8.1 03/02/2020    GFRAA 8.8 03/02/2020    LABGLOM 7.3 03/02/2020    GLUCOSE 100 03/02/2020     TSH:   Lab Results   Component Value Date    TSH 0.847 10/13/2019     Vitamin B12 and Folate: No components found for: FOLIC,  B52  Urinalysis: No results found for: NITRU, [address], BACTERIA, RBCUA, BLOODU, SPECGRAV, GLUCOSEU        FUNCTIONAL ADL´S:     Independent: [X] Eating, [X] Dressing, [X] Transferring, [X] Toileting, [X] Bathing, [X] Continence  Dependent   : [  ] Eating, [   ] Dressing, [   ] Transferring, [   ] Garret Jacobson, [   ] Michelle Cobwillard, [   ] Continence  W. assistant : [  ] Eating, [   ] Dressing, [   ] Transferring, [   ] Garret Jacobson, [   ] Michelle Cobwillard, [   ] Continence    Radiology: Reviewed        Patient MRN: [id number]     Lobo Montelongo is 48 years old with multiple comorbidities which includes multiple hospitalizations and ER visits with the past twelve months, ESRD, CHF, and Depression. Lobo Montelongo is appropriate for Palliative Care Services. Palliative care will continue to follow as outpatient.     Electronically signed by FRANCHESKA Shelton CNP on 3/2/2020 at 10:23 PM

## 2020-03-02 NOTE — PROGRESS NOTES
Hollis Galeana Meter aware that I gave the am dose of SQ heparin and asked for order to hold this afternoon's dose so patient can have dialysis catheter removed this afternoon. 1150 Obtained consent for removal of dialysis catheter with Dr. Mary Becerra, placed in chart. Patient to [facility]. [phone] Patient returned from [facility]. Dialysis cath removed. Site looks good- no bleeding noted. Dressing clean dry and intact. Patient denies needs. 1530 Patient medicated for back pain with percocet. Dressing remains clean dry and intact, site WDL. 1630 Patient hypoglycemic blood sugar in the 30's but asymptomatic. Dinner tray provided.

## 2020-03-02 NOTE — SEDATION DOCUMENTATION
NO SEDATION    1158 Procedure explained to pt and consent verified in chart. 1159 right chest dressing removed. Then cleansed generously with chloraprep and sterile drapes applied. 1215 waiting for Dr Gary Nam at this time. Pt is comfortable on table, no complaints voiced. [address] Gary Nam here discussing procedure with the pt. Time out completed. 1230 2% lidocaine injected around catheter site per Dr Gary Nam    1231 Hemodialysis catheter gently removed, (Ldbddxbtoy96.5 Fr by 19 cm)  intact with cuff per Dr Gary Nam. 1232 Manual pressure held over subclavian site above insertion site per  GELA Vega. 1234 pt placed in reverse trendelenburg position. 1242 Manual pressure released once hemostasis achieved. 1243 Steri strips applied to site. No bleeding noted. Guaze and Tegaderm dressing applied. Pt tolerated well. Vitals stable. 1245 Post chest xray images completed. [phone] Discharge instructions given with verbalized understanding. Report called to PAYMILL Rack on 1W. 1250 pt assisted back to cart, transport taking pt back to room via cart.

## 2020-03-02 NOTE — DISCHARGE SUMMARY
Physician Discharge Summary     Patient ID:  Justice Ennis  [id number]  48 y.o.  1965    Admit date: 2/27/2020    Discharge date and time: No discharge date for patient encounter. Admitting Physician: Tiffany Smith MD     Discharge Physician: Kody Burton MD    Admission Diagnoses: CHF (congestive heart failure), NYHA class I, acute on chronic, combined ([facility] [I50.43]  Uremia [N19]    Discharge Diagnoses: depression, ESRD    Admission Condition: fair    Discharged Condition: fair    Indication for Admission: fluid overload, ESRD, uremia    Hospital Course: admitted for above, h/o non-compliance, states depression is part of why she is non-compliance, plan for transfer to psych floor however needed to remove dialysis catheter prior to this.  Going to use her LUE AVF    Consults: psychiatry    Significant Diagnostic Studies: psych eval,     Treatments: dialysis    Discharge Exam:  General: alert, in no apparent distress  HEENT: normocephalic, atraumatic, anicteric  Neck: supple, no mass  Lungs: non-labored respirations, clear to auscultation bilaterally  Heart: regular rate and rhythm, no murmurs or rubs  Abdomen: soft, non-tender, non-distended  MSK: no joint swelling or tenderness  Ext: no cyanosis, no peripheral edema  Neuro: alert and oriented, no gross abnormalities  Psych: normal mood and affect  Skin: no rash    Disposition: psych admit    In process/preliminary results:  Outstanding Order Results     Date and Time Order Name Status Description    3/2/2020 1158 IR REMOVE TUNNELED CVAD WO SQ PORT/PUMP In process     2/29/2020 0749 PREPARE RBC (CROSSMATCH), 1 Units Preliminary           Patient Instructions:   Current Discharge Medication List      CONTINUE these medications which have NOT CHANGED    Details   diphenhydrAMINE (BENADRYL) 25 MG capsule Take 25 mg by mouth every 6 hours as needed for Itching      QUEtiapine (SEROQUEL XR) 50 MG extended release tablet Take 50 mg by mouth nightly

## 2020-03-02 NOTE — CONSULTS
HPI:  Patient is a pleasant 57-year-old woman with chronic kidney disease on dialysis. Patient had a tunneled subcutaneous right internal jugular vein dialysis catheter placed approximately 10 months ago. Since then, patient now has a functional left arm fistula and is being successfully used for dialysis. Patient no longer requires the dialysis catheter. Interventional radiology was consulted to have the catheter removed. Patient understands the procedure, including the risks, benefits, and alternatives, and wishes to proceed. Patient has no complaints, pain, or discomfort. History reviewed. No pertinent family history.     Past Surgical History:   Procedure Laterality Date    BREAST SURGERY       SECTION      CHOLECYSTECTOMY      CORONARY ARTERY BYPASS GRAFT          Past Medical History:   Diagnosis Date    Atrial fibrillation ([facility]     Cancer ([facility]     Breast    CHF [facility]congestive heart failure) ([facility]     Chronic kidney disease     Diabetes mellitus ([facility]     ESRD (end stage renal disease) ([facility] 2020    Essential hypertension 2020    Heart murmur     Hx of CABG 2020    Paroxysmal atrial fibrillation ([facility] 2020       Social History     Socioeconomic History    Marital status: Single     Spouse name: None    Number of children: None    Years of education: None    Highest education level: None   Occupational History    None   Social Needs    Financial resource strain: None    Food insecurity:     Worry: None     Inability: None    Transportation needs:     Medical: None     Non-medical: None   Tobacco Use    Smoking status: Never Smoker    Smokeless tobacco: Never Used   Substance and Sexual Activity    Alcohol use: Never     Frequency: Never    Drug use: Never    Sexual activity: Not Currently   Lifestyle    Physical activity:     Days per week: None     Minutes per session: None    Stress: None   Relationships    Social connections:     Talks on phone: None     Gets together: None     Attends Faith service: None     Active member of club or organization: None     Attends meetings of clubs or organizations: None     Relationship status: None    Intimate partner violence:     Fear of current or ex partner: None     Emotionally abused: None     Physically abused: None     Forced sexual activity: None   Other Topics Concern    None   Social History Narrative    None       Allergies   Allergen Reactions    Hydrocodone-Acetaminophen Hives, Itching and Nausea Only     Other reaction(s): Nausea      Naproxen Hives and Nausea Only     Other reaction(s): Nausea      Hydrocodone Hives    Vicodin [Hydrocodone-Acetaminophen] Hives       No current facility-administered medications on file prior to encounter.       Current Outpatient Medications on File Prior to Encounter   Medication Sig Dispense Refill    diphenhydrAMINE (BENADRYL) 25 MG capsule Take 25 mg by mouth every 6 hours as needed for Itching      QUEtiapine (SEROQUEL XR) 50 MG extended release tablet Take 50 mg by mouth nightly      metoprolol succinate (TOPROL XL) 50 MG extended release tablet Take 50 mg by mouth 2 times daily      warfarin (COUMADIN) 5 MG tablet Take 5 mg by mouth      aspirin 81 MG chewable tablet Take 1 tablet by mouth daily 30 tablet 3    nystatin (MYCOSTATIN) POWD powder Apply topically 2 times daily      b complex-C-folic acid (NEPHROCAPS) 1 MG capsule Take 1 capsule by mouth daily      topiramate (TOPAMAX) 25 MG tablet Take 25 mg by mouth 2 times daily      calcium acetate (PHOSLO) 667 MG capsule Take by mouth 3 times daily (with meals)      Multiple Vitamins-Minerals (THERAPEUTIC MULTIVITAMIN-MINERALS) tablet Take 1 tablet by mouth daily      atorvastatin (LIPITOR) 40 MG tablet Take 40 mg by mouth nightly      FLUoxetine (PROZAC) 10 MG capsule Take 40 mg by mouth daily      cloNIDine (CATAPRES) 0.1 MG tablet Take 0.1 mg by mouth nightly      cyclobenzaprine (FLEXERIL) 5 MG tablet Take 5 mg by mouth 3 times daily as needed for Muscle spasms      oxyCODONE-acetaminophen (PERCOCET) 5-325 MG per tablet Take 1 tablet by mouth every 4 hours as needed for Pain. Mona Beaver insulin lispro (HUMALOG) 100 UNIT/ML injection vial Inject into the skin 3 times daily (before meals) Indications: does varies per needs       insulin glargine (LANTUS) 100 UNIT/ML injection vial Inject into the skin nightly Indications: varies per needs 10 to 15 units       nitroGLYCERIN (NITROSTAT) 0.4 MG SL tablet up to max of 3 total doses. If no relief after 1 dose, call 911. 03 tablet 3    carvedilol (COREG) 12.5 MG tablet Take 12.5 mg by mouth 2 times daily      diltiazem (DILACOR XR) 240 MG extended release capsule Take 240 mg by mouth daily Indications: PT TAKES CARDIZEM CD         Review of Systems   Constitutional: Negative. Negative for chills, diaphoresis and fever. HENT: Negative. Negative for congestion, ear pain, hearing loss and tinnitus. Eyes: Negative. Negative for photophobia. Respiratory: Positive for shortness of breath. Negative for cough and wheezing. Cardiovascular: Negative. Negative for chest pain, palpitations and leg swelling. Gastrointestinal: Positive for diarrhea and nausea. Negative for abdominal pain, constipation and vomiting. Genitourinary: Negative. Negative for dysuria, flank pain, frequency, hematuria and urgency. Musculoskeletal: Negative. Negative for back pain and neck pain. Skin: Negative. Negative for rash. Allergic/Immunologic: Negative for environmental allergies. Neurological: Negative. Negative for dizziness, tremors, weakness and headaches. Hematological: Does not bruise/bleed easily. Psychiatric/Behavioral: Negative. Negative for hallucinations and suicidal ideas. The patient is not nervous/anxious.         OBJECTIVE:  BP (!) 170/69   Pulse 76   Temp 98.2 °F (36.8 °C)   Resp 16   Ht 5' 3\" (1.6 m)   Wt 219 lb 12.8 oz (99.7 kg) SpO2 96%   BMI 38.94 kg/m²     Physical Exam  Constitutional:       General: She is not in acute distress. Appearance: She is well-developed. She is not diaphoretic. HENT:      Head: Normocephalic and atraumatic. Nose: Nose normal.      Mouth/Throat:      Pharynx: No oropharyngeal exudate. Eyes:      General: No scleral icterus. Right eye: No discharge. Left eye: No discharge. Conjunctiva/sclera: Conjunctivae normal.   Neck:      Musculoskeletal: Neck supple. Thyroid: No thyromegaly. Vascular: No JVD. Trachea: No tracheal deviation. Cardiovascular:      Rate and Rhythm: Normal rate and regular rhythm. Heart sounds: Normal heart sounds. No murmur. No friction rub. No gallop. Pulmonary:      Effort: No respiratory distress. Breath sounds: No stridor. No wheezing or rales. Chest:      Chest wall: No tenderness. Abdominal:      General: Bowel sounds are normal. There is no distension. Palpations: Abdomen is soft. There is no mass. Tenderness: There is no abdominal tenderness. There is no guarding or rebound. Hernia: No hernia is present. Musculoskeletal:         General: No tenderness or deformity. Skin:     General: Skin is dry. Coloration: Skin is not pale. Findings: No erythema or rash. Neurological:      Mental Status: She is alert and oriented to person, place, and time. Cranial Nerves: No cranial nerve deficit. Psychiatric:         Behavior: Behavior normal.         Thought Content:  Thought content normal.         Judgment: Judgment normal.         ASSESSMENT ANDPLAN:      ASSESSMENT: Tunneled right internal jugular vein dialysis catheter, catheter is no longer needed since patient has a functional left arm AV fistula    PLAN: Removal of a subcutaneous tunneled right chest internal jugular vein central venous dialysis catheter

## 2020-03-03 PROBLEM — F33.2 MDD (MAJOR DEPRESSIVE DISORDER), RECURRENT SEVERE, WITHOUT PSYCHOSIS (HCC): Status: ACTIVE | Noted: 2020-01-01

## 2020-03-03 NOTE — PROGRESS NOTES
Pt returned from dialysis. Planning to discharge to 3w today. Voluntary signed. [employer] sent to Dr. Rosemary Perez for discharge readmit order and statement saying pt is medically cleared to go to 3w.   Electronically signed by Jono eBll RN on 3/3/2020 at 3:34 PM

## 2020-03-03 NOTE — FLOWSHEET NOTE
03/03/20 1420   Observations & Evaluations   Level of Consciousness 0   Oriented X 3   Heart Rhythm Regular   Respiratory Quality/Effort Unlabored   O2 Device None (Room air)   Bilateral Breath Sounds Clear;Diminished   Skin Condition/Temp Dry; Warm   Abdomen Inspection Soft   Bowel Sounds (All Quadrants) Present   Edema Generalized   Edema Generalized Non-pitting   Vital Signs   BP (!) 168/81   Temp 97.7 °F (36.5 °C)   Pulse 77   Resp 18   Weight 218 lb 11.1 oz (99.2 kg)   Weight Method Actual;Bed scale   Percent Weight Change -2.46   Pain Assessment   Pain Assessment 0-10   Pain Level 6   Pain Type Chronic pain   Pain Location Back   Hemodialysis Fistula/Graft Arteriovenous fistula Left Arm   Placement Date/Time: 10/13/19 1648   Pre-existing: Yes  Access Type: Arteriovenous fistula  Orientation: Left  Access Location: Arm   Site Assessment Clean;Dry   Thrill Present   Bruit Present   Dressing Intervention New   Dressing Status Clean;Dry; Intact   Post-Hemodialysis Assessment   Post-Treatment Procedures Blood returned; Access bleeding time < 10 minutes   Machine Disinfection Process Acid/Vinegar Clean;Heat Disinfect; Exterior Machine Disinfection   Rinseback Volume (ml) 200 ml   Total Liters Processed (l/min) 68.6 l/min   Dialyzer Clearance Moderately streaked   Duration of Treatment (minutes) 240 minutes   Heparin amount administered during treatment (units) 0 units   Hemodialysis Intake (ml) 400 ml   Hemodialysis Output (ml) 3400 ml   NET Removed (ml) 3000 ml   Tolerated Treatment   (well)

## 2020-03-03 NOTE — PROGRESS NOTES
3w to call back when they have a bed assignment. Pt updated.      Electronically signed by Dino Fitzpatrick RN on 3/3/2020 at 3:41 PM

## 2020-03-03 NOTE — PROGRESS NOTES
Renal Progress Note    Assessment and Plan:      47 yo lady with DM, HTN, CAD s/p relatively recent CABG with extreme noncompliance with HD. Comes in not feeling well. Has admitted to depression as part of reason for missing hd before. Has anemia related to her CKD.   htn due to fluid overload     - awaiting bed to allow transfer to psych floor  - continue aranesp  - continue IHD TTS       Patient Active Problem List:     Angina pectoris, unspecified (HCC)     CHF (congestive heart failure) ([facility])     NSTEMI (non-ST elevated myocardial infarction) ([facility]     Coronary artery disease of native artery of native heart with stable angina pectoris ([facility]     ESRD (end stage renal disease) ([facility]     Essential hypertension     Hx of CABG     Paroxysmal atrial fibrillation (HCC)     CHF (congestive heart failure), NYHA class I, acute on chronic, combined ([facility]     Uremia      Subjective:   Admit Date: 2/27/2020    Interval History: seen on IHD, tolerating, flowsheets reviewed, sleepy       Medications:   Scheduled Meds:   sodium chloride  20 mL Intravenous Once    lidocaine-prilocaine   Topical Once    calcium acetate  2,001 mg Oral TID WC    sodium chloride flush  10 mL Intravenous 2 times per day    aspirin  81 mg Oral Daily    atorvastatin  40 mg Oral Nightly    carvedilol  12.5 mg Oral BID    FLUoxetine  40 mg Oral Daily    topiramate  25 mg Oral BID    insulin lispro  0-6 Units Subcutaneous TID WC    insulin lispro  0-3 Units Subcutaneous Nightly    darbepoetin sarina-polysorbate  60 mcg Subcutaneous Weekly     Continuous Infusions:   dextrose         CBC:   Recent Labs     03/01/20  0612 03/02/20  0554   WBC 3.9* 6.1   HGB 7.4* 8.2*    200     CMP:    Recent Labs     03/01/20  0612 03/02/20  0554    133*   K 3.8 3.9   CL 96 93*   CO2 26 24   BUN 28* 32*   CREATININE 5.43* 6.05*   GLUCOSE 127* 100*   CALCIUM 7.6* 8.1*   LABGLOM 8.3* 7.3*     Troponin:   No results for input(s): TROPONINI in the last 72 hours. BNP: No results for input(s): BNP in the last 72 hours. INR:   Recent Labs     03/01/20  0612   INR 1.3     Lipids: No results for input(s): CHOL, LDLDIRECT, TRIG, HDL, AMYLASE, LIPASE in the last 72 hours. Liver:   No results for input(s): AST, ALT, ALKPHOS, PROT, LABALBU, BILITOT in the last 72 hours. Invalid input(s): BILDIR  Iron:    Recent Labs     03/01/20  0612   FERRITIN 703.0*     Urinalysis: No results for input(s): UA in the last 72 hours. Objective:   Vitals: BP (!) 157/83   Pulse 73   Temp 97.4 °F (36.3 °C)   Resp 20   Ht 5' 3\" (1.6 m)   Wt 224 lb 3.3 oz (101.7 kg)   SpO2 97%   BMI 39.72 kg/m²    Wt Readings from Last 3 Encounters:   03/03/20 224 lb 3.3 oz (101.7 kg)   02/22/20 210 lb (95.3 kg)   02/15/20 210 lb (95.3 kg)      24HR INTAKE/OUTPUT:    No intake or output data in the 24 hours ending 03/03/20 1145    Constitutional:  Alert, awake, no apparent distress   Skin:normal, no rash  HEENT:sclera anicteric.   Head atraumatic normocephalic  Neck:supple with no thyromegally  Cardiovascular:  S1, S2 without m/r/g   Respiratory:  CTA B without w/r/r   Abdomen: +bs, soft, nt  Ext: trace LE edema  Musculoskeletal:Intact  Neuro:Alert and oriented with no deficit      Electronically signed by Adis Sinha MD on 3/3/2020 at 11:45 AM

## 2020-03-04 NOTE — PROGRESS NOTES
Received report from [facility] on 1W. Pt originally admitted to hospital on 2/27/20 for CHF/Renal failure. She is a dialysis pt, went today, 3L fluid removed. Pt has left AV fistula. Pt had not been going to dialysis regularly due to increased depression. Pt lives at home with her sister, is up independently. Vitals WNL. Pt is diabetic, one touch AC/HS. Pt admitted voluntary by Dr. Hilda Corrales, diagnosis MDD.

## 2020-03-04 NOTE — PROGRESS NOTES
Pt out waiting to meet with Dr. Bri Glaser this morning. Currently rates her depression 10/10 and her anxiety 9/10. Explains that she does not know why she is feeling depressed. Unsure if related to dialysis or not. States she is anxious to be away from home and that she does not \"like people\" and doesn't \"like to be around groups of people\". Currently denies any SI or HI thoughts. States she occasionally hears voices that are no command in nature. States it was been [place] few days\" since she has last heard them. Denies any current voices. During conversation, pt appears withdrawn and with flat affect. Not maintaining good eye contact during conversation. Currently in group session. Will continue to monitor.

## 2020-03-04 NOTE — PROGRESS NOTES
Vascular and Interventional Radiology   Óscar Vu. Mary Lyles      Subjective: Karel Olivo is a 48 y.o. female post right tunneled catheter removal. Doing well, no complaints, pain or discomfort at site. Objective:   BP (!) 168/81   Pulse 77   Temp 97.7 °F (36.5 °C)   Resp 18   Ht 5' 3\" (1.6 m)   Wt 218 lb 11.1 oz (99.2 kg)   SpO2 97%   BMI 38.74 kg/m²     Physical:  healthy,  alert and  not in acute distress    Normocephalic, without obvious abnormality, atraumatic    Neck supple. No adenopathy. Thyroid symmetric, normal size, and without nodularity    normal rate, normal S1 and S2, no gallops, intact distal pulses and no carotid bruits    chest clear, no wheezing, rales, normal symmetric air entry    Site in right chest of CVC removal is unremarkable. Lab:  Recent Labs     03/02/20  0554   WBC 6.1   RBC 2.74*   HGB 8.2*   HCT 25.2*   MCV 92.0   MCH 30.0   MCHC 32.6*   RDW 14.0          No results for input(s): INR, PROTIME in the last 72 hours. Recent Labs     03/02/20  0554 03/04/20  0718   CREATININE 6.05* 4.47*       Assessment: Karel Olivo is a 48 y.o. female post right tunneled CVC removal. No issues. Plan: Nothing further from NIELS Vu.  Mary Lyles  3/4/2020,  8:24 AM

## 2020-03-04 NOTE — PROGRESS NOTES
Pt. presents depressed with poor eye contact. Soft spoken. Feels hopeless and helpless. Poor appetite and not sleeping well. Poor management of ADLs. Anhedonia. Lacks energy and has no motivation. Lives with sister and she is her only support and friend. Unclear when court date is for stealing charges. Reports AH at times and sometimes might see things, but isnt sure. Suffers from kidney failure and doesn't want to leave the house. Denies si/hi. Has a fear of dying and often feels alone and sad.  Stressors include  1.health  2.10 year old son, who has health issues and ADD and autism, lives with her brother   *used to shop, go to movies, and eat out  Electronically signed by David Damico on 3/4/2020 at 8:48 AM

## 2020-03-04 NOTE — CONSULTS
Substance and Sexual Activity    Alcohol use: Never     Frequency: Never    Drug use: Never    Sexual activity: Not Currently   Lifestyle    Physical activity:     Days per week: Not on file     Minutes per session: Not on file    Stress: Not on file   Relationships    Social connections:     Talks on phone: Not on file     Gets together: Not on file     Attends Judaism service: Not on file     Active member of club or organization: Not on file     Attends meetings of clubs or organizations: Not on file     Relationship status: Not on file    Intimate partner violence:     Fear of current or ex partner: Not on file     Emotionally abused: Not on file     Physically abused: Not on file     Forced sexual activity: Not on file   Other Topics Concern    Not on file   Social History Narrative    Not on file     MEDICATIONS:    Current Facility-Administered Medications   Medication Dose Route Frequency Provider Last Rate Last Dose    aluminum & magnesium hydroxide-simethicone (MAALOX) 200-200-20 MG/5ML suspension 30 mL  30 mL Oral PRN Rosalinda Ballard MD        benztropine mesylate (COGENTIN) injection 2 mg  2 mg Intramuscular BID PRN Rosalinda Ballard MD        magnesium hydroxide (MILK OF MAGNESIA) 400 MG/5ML suspension 30 mL  30 mL Oral Daily PRN Rosalinda Ballard MD        traZODone (DESYREL) tablet 50 mg  50 mg Oral Nightly PRN Rosalinda Ballard MD        dextrose 5 % solution  100 mL/hr Intravenous PRN Isaac Heath MD        dextrose 50 % IV solution  12.5 g Intravenous PRN Isaac Heath MD        glucagon (rDNA) injection 1 mg  1 mg Intramuscular PRN Isaac Heath MD        glucose (GLUTOSE) 40 % oral gel 15 g  15 g Oral PRN Isaac Heath MD        insulin lispro (HUMALOG) injection vial 0-3 Units  0-3 Units Subcutaneous Nightly Isaac Heath MD        insulin lispro (HUMALOG) injection vial 0-6 Units  0-6 Units Subcutaneous TID  Isaac Heath MD   Stopped at 03/04/20 3548    aspirin chewable tablet 81 mg  81 mg Oral Daily Rubio Swanson MD   81 mg at 03/04/20 0846    atorvastatin (LIPITOR) tablet 40 mg  40 mg Oral Nightly Rubio Swanson MD   40 mg at 03/03/20 2208    calcium acetate (PHOSLO) capsule 2,001 mg  2,001 mg Oral TID  Rubio Swanson MD   2,001 mg at 03/04/20 0846    carvedilol (COREG) tablet 12.5 mg  12.5 mg Oral BID Rubio Swanson MD   12.5 mg at 03/04/20 0846    [START ON 3/6/2020] darbepoetin sarina-polysorbate (ARANESP) injection 60 mcg  60 mcg Subcutaneous Weekly Rubio Swanson MD        FLUoxetine (PROZAC) capsule 40 mg  40 mg Oral Daily Rubio Swanson MD   40 mg at 03/04/20 0846    [START ON 3/5/2020] lidocaine-prilocaine (EMLA) cream   Topical Once Rubio Swanson MD        oxyCODONE-acetaminophen (PERCOCET) 5-325 MG per tablet 1 tablet  1 tablet Oral Q4H PRN Rubio Swanson MD   1 tablet at 03/04/20 0850    topiramate (TOPAMAX) tablet 25 mg  25 mg Oral BID Rubio Swanson MD   25 mg at 03/04/20 0846       ALLERGIES: Hydrocodone-acetaminophen; Naproxen; Hydrocodone; and Vicodin [hydrocodone-acetaminophen]    REVIEW OF SYSTEM:   ROS as noted in HPI, 12 point ROS reviewed and otherwise negative. OBJECTIVE  PHYSICAL EXAM: BP (!) 184/73 Comment: RN notified  Pulse 80   Temp 98 °F (36.7 °C) (Oral)   Resp 16   SpO2 93%   CONSTITUTIONAL:  awake, alert, cooperative, no apparent distress, and appears stated age  EYES:  Lids and lashes normal, pupils equal, round and reactive to light, extra ocular muscles intact, sclera clear, conjunctiva normal  ENT:  Normocephalic, without obvious abnormality, atraumatic, sinuses nontender on palpation, external ears without lesions, oral pharynx with moist mucus membranes, tonsils without erythema or exudates, gums normal and good dentition.   NECK:  Supple, symmetrical, trachea midline, no adenopathy, thyroid symmetric, not enlarged and no tenderness, skin normal  LUNGS:  No increased work of breathing, good air exchange, clear to auscultation bilaterally, no crackles or wheezing  CARDIOVASCULAR:  Normal apical impulse, regular rate and rhythm, normal S1 and S2, no S3 or S4, and no murmur noted  ABDOMEN:  No scars, normal bowel sounds, soft, non-distended, non-tender, no masses palpated, no hepatosplenomegally  MUSCULOSKELETAL:  There is no redness, warmth, or swelling of the joints. Full range of motion noted. Motor strength is 5 out of 5 all extremities bilaterally. Tone is normal.  NEUROLOGIC:  Awake, alert, oriented to name, place and time. Cranial nerves II-XII are grossly intact. Motor is 5 out of 5 bilaterally. Cerebellar finger to nose, heel to shin intact. Sensory is intact. Babinski down going, Romberg negative, and gait is normal.  SKIN:  no bruising or bleeding, normal skin color, texture, turgor, no redness, warmth, or swelling and no jaundice    DATA:     Diagnostic tests reviewed for today's visit:    Most recent labs and imaging results reviewed. ASSESSMENT AND PLAN   MDD (major depressive disorder), recurrent severe, without psychosis ([facility]): Continue current medication and management by psychiatrist    ESRD: Follow-up with nephrologist for dialysis treatment, to daily renal function    DM II: POCT glucose with sliding scale insulin    Hypertension:uncontrooled with SBP in the 180's , will increase Coreg to 25 mg twice daily, add PRN hydralazine for adequate blood pressure control    Anemia: of CKD, continue weekly Aranesp and monitor H&H for any depletion    History of paroxsymal A.fib: HR controlled, Continue daily Coreg , warfarin on hold per nephrology d/t anemia    CAD: Status post CABG,  continue cardiac meds    Hyperlipidemia : Continue daily statins  VTE Prophylaxis: Aspirin  Code Status: full    This is only a history and physical examination and not medical management.  The patient is to contact and follow up with their primary care physician and go over any abnormal labs, imaging,

## 2020-03-04 NOTE — PROGRESS NOTES
Pt out on unit, but not social with peers, observed sitting in day, alone. Pt evasive and would not engage in conversation,answering most questions with one or two words. Poor eye contact. Pt reports showering today. Pt presents with clean and well kept appearance. Pt reports poor appetite. Pt states, \" \" not hungry. Pt reports poor sleep, due to trouble falling asleep and staying asleep. Pt states, \" \"trying to stay awake during the day. Pt rates anxiety,6 /10. Pt rates depression,7 / 10. Pt reports attending groups. Pt denies SI, HI and A/V hallucinations. No voiced delusions at this time. Pt alert and oriented x 4. Will continue to monitor.

## 2020-03-04 NOTE — GROUP NOTE
Group Therapy Note    Date: 3/4/2020    Group Start Time: 1000  Group End Time: 1100  Group Topic: Psychoeducation    [facility]    Aj Miranda, CTRS        Group Therapy Note    Attendees: 12         Patient's Goal:  to find and get on the right meds    Notes:  Pt. attended the 1000 skill group. Quiet and to herself but was attentive. Calm and relaxed. Status After Intervention:  Improved    Participation Level:  Active Listener and Minimal    Participation Quality: Appropriate      Speech:  normal      Thought Process/Content: Logical      Affective Functioning: Flat      Mood: calm      Level of consciousness:  Alert, Oriented x4 and Attentive      Response to Learning: Progressing to goal      Endings: None Reported    Modes of Intervention: Education, Support, Socialization and Activity      Discipline Responsible: Psychoeducational Specialist      Signature:  Aminah Lopez

## 2020-03-04 NOTE — H&P
[hydrocodone-acetaminophen]    Family History  History reviewed. No pertinent family history. Social History:  Born and Raised: [place]  Describes Childhood:   supportive  Education: [employer]  Employment: Retired  Relationships: single  Children: 10y/o live with brother  Current Support: extended family  Live with sister  Legal Hx: none  Access to weapons?:  No      EXAMINATION:    REVIEW OF SYSTEMS:    ROS:  [x] All negative/unchanged except if checked.  Explain positive(checked items) below:  [] Constitutional  [] Eyes  [] Ear/Nose/Mouth/Throat  [] Respiratory  [] CV  [] GI  []   [] Musculoskeletal  [] Skin/Breast  [] Neurological  [] Endocrine  [] Heme/Lymph  [] Allergic/Immunologic    Explanation:     Vitals:  BP (!) 184/73 Comment: RN notified  Pulse 80   Temp 98 °F (36.7 °C) (Oral)   Resp 16   SpO2 93%      Neurologic Exam:   Muscle Strength & Tone: full ROM  Gait: normal gait   Involuntary Movements: No    Mental Status Examination:    Level of consciousness:  within normal limits   Appearance:  ill-appearing  Behavior/Motor:  psychomotor retardation  Attitude toward examiner:  withdrawn  Speech:  slow   Mood: decreased range, depressed and dysthymic  Affect:  mood congruent  Thought processes:  slow   Thought content:  Suicidal Ideation:  active  Delusions:  no evidence of delusions  Perceptual Disturbance:  denies any perceptual disturbance  Cognition:  oriented to person, place, and time   Concentration poor  Memory intact  Insight poor   Judgement fair   Fund of Knowledge adequate    Mini Mental Status 30/30      DIAGNOSIS:     Major depressive disorder; recurrent, severe and without psychotic features   Generalized anxiety disorder    RISK ASSESSMENT:    SUICIDE RISK ASSESSMENT: high  HOMICIDE: low  AGITATION/VIOLENCE: low  ELOPEMENT: low    LABS: REVIEWED TODAY:  Recent Labs     03/02/20  0554   WBC 6.1   HGB 8.2*        Recent Labs     03/02/20  0554 03/04/20  0718   NA Successful removal of a tunneled subcutaneous right internal jugular vein dialysis catheter. COMPARISON:No prior studies are available for comparison. DIAGNOSIS: Patient with a tunneled subcutaneous right internal jugular vein dialysis catheter. Patient has a functional left arm AV fistula and no longer requires the catheter. Informed consent was obtained from the patient including the risks, benefits, and alternatives to the procedure. RADIATION DOSE: 0.18 mGy PROCEDURE: Timeout was performed verifying patient name, age, and procedure to be performed. Patient was placed supine on the procedure table. The chest and neck including the existing tunneled catheter were prepped and draped in sterile fashion. Lidocaine was used to anesthetize the skin tunnel site. Hemostats were used to dissect the Dacron anchor cuff from the surrounding tissue. Under gentle traction and while holding pressure at the neck venotomy site, the catheter was easily removed. The catheter was checked for any missing components, catheter was intact. Pressure was held at the neck until hemostasis was achieved. Sterile dressing was applied. Patient tolerated the procedure well without any complications. EKG: TRACING REVIEWED    TREATMENT PLAN:    Risk Management:  close watch and suicide risk    Collateral Information:  Will obtain collateral information from the family or friends. Will obtain medical records as appropriate from out patient providers  Will consult the hospitalist for a physical exam to rule out any co-morbid physical condition. Home medication Reconciled       New Medications started during this admission :    See orders  Prn Haldol 5mg and Vistaril 50mg q6hr for extreme agitation. Trazodone as ordered for insomnia  Vistaril as ordered for anxiety  Discussed with the patient risk, benefit, alternative and common side effects for the  proposed medication treatment. Patient is consenting to the treatment.     Psychotherapy:

## 2020-03-04 NOTE — CARE COORDINATION
Patient's sister called had the [address] code and stated she wanted patient discharged. Sister not feel like patient needs to be here anymore, family thinks the doctor would see her and let her go. Patient is telling sister that she feels like she is in residential. Sister wants patient to do AMA, explained that is possible on a medical floor but a psych unit is diff: Patients stressor is the dialysis. Sister stated patient so so depressed she did not want to get out of bed or do dialysis. Sister stated she wants patient to do everything outpt. Past hx of : 2 stillborn babies, breast cancer. Sister placed this clinician on hold for over 5 mins so clinician hung up.            Noe Noyola, [facility]

## 2020-03-04 NOTE — PROGRESS NOTES
Patient refused Humalog, 1 unit, blood glucose reading 143, voiced insulin will drop blood sugar too low.

## 2020-03-05 NOTE — PROGRESS NOTES
Renal Progress Note    Assessment and Plan:      47 yo lady with DM, HTN, CAD s/p relatively recent CABG with extreme noncompliance with HD. Comes in not feeling well. Has admitted to depression as part of reason for missing hd before. Has anemia related to her CKD. htn due to fluid overload. permcath removed. On HD TThSa now.       - HD TThSa  - repeat labs in am  - ok from medical standpoint for ECT       Patient Active Problem List:     Angina pectoris, unspecified (HCC)     CHF (congestive heart failure) ([facility])     NSTEMI (non-ST elevated myocardial infarction) ([facility] [address]     Coronary artery disease of native artery of native heart with stable angina pectoris ([facility] [address]     ESRD (end stage renal disease) ([facility] [address]     Essential hypertension     Hx of CABG     Paroxysmal atrial fibrillation ([facility])     CHF (congestive heart failure), NYHA class I, acute on chronic, combined ([facility] [address]     Uremia      Subjective:   Admit Date: 3/3/2020    Interval History: bp has been good. Last hd Tuesday. For hd today. Plan for ECT. Medications:   Scheduled Meds:   carvedilol  25 mg Oral BID    venlafaxine  37.5 mg Oral Daily with breakfast    traZODone  100 mg Oral Nightly    insulin lispro  0-3 Units Subcutaneous Nightly    insulin lispro  0-6 Units Subcutaneous TID WC    aspirin  81 mg Oral Daily    atorvastatin  40 mg Oral Nightly    calcium acetate  2,001 mg Oral TID WC    [START ON 3/6/2020] darbepoetin sarina-polysorbate  60 mcg Subcutaneous Weekly    topiramate  25 mg Oral BID     Continuous Infusions:   dextrose         CBC:   No results for input(s): WBC, HGB, PLT in the last 72 hours. CMP:    Recent Labs     03/04/20  0718      K 4.1   CL 93*   CO2 27   BUN 20   CREATININE 4.47*   GLUCOSE 101*   CALCIUM 8.1*   LABGLOM 10.3*     Troponin:   No results for input(s): TROPONINI in the last 72 hours. BNP: No results for input(s): BNP in the last 72 hours.   INR:   No results for input(s): INR in the last 72 hours. Lipids:   Recent Labs     03/04/20  0718   CHOL 68   TRIG 70   HDL 39*     Liver:   Recent Labs     03/04/20  0718   AST 22   ALT 14   ALKPHOS 61   PROT 7.4   LABALBU 3.2*   BILITOT 0.4     Iron:  No results for input(s): IRONS, FERRITIN in the last 72 hours. Invalid input(s): LABIRONS  Urinalysis: No results for input(s): UA in the last 72 hours. Objective:   Vitals: /70   Pulse 73   Temp 98 °F (36.7 °C)   Resp 18   Wt 211 lb 10.3 oz (96 kg)   SpO2 95%   BMI 37.49 kg/m²    Wt Readings from Last 3 Encounters:   03/05/20 211 lb 10.3 oz (96 kg)   03/03/20 218 lb 11.1 oz (99.2 kg)   02/22/20 210 lb (95.3 kg)      24HR INTAKE/OUTPUT:    No intake or output data in the 24 hours ending 03/05/20 1226    Constitutional:  Alert, awake, no apparent distress   Skin:normal, no rash  HEENT:sclera anicteric.   Head atraumatic normocephalic  Neck:supple with no thyromegally  Cardiovascular:  S1, S2 without m/r/g   Respiratory:  CTA B without w/r/r   Abdomen: +bs, soft, nt  Ext: trace LE edema  Musculoskeletal:Intact  Neuro:Alert and oriented with no deficit      Electronically signed by Jammie Ivey MD on 3/5/2020 at 12:26 PM

## 2020-03-05 NOTE — PROGRESS NOTES
 Physical activity:     Days per week: Not on file     Minutes per session: Not on file    Stress: Not on file   Relationships    Social connections:     Talks on phone: Not on file     Gets together: Not on file     Attends Taoist service: Not on file     Active member of club or organization: Not on file     Attends meetings of clubs or organizations: Not on file     Relationship status: Not on file    Intimate partner violence:     Fear of current or ex partner: Not on file     Emotionally abused: Not on file     Physically abused: Not on file     Forced sexual activity: Not on file   Other Topics Concern    Not on file   Social History Narrative    Not on file           ROS:  [x] All negative/unchanged except if checked.  Explain positive(checked items) below:  [] Constitutional  [] Eyes  [] Ear/Nose/Mouth/Throat  [] Respiratory  [] CV  [] GI  []   [] Musculoskeletal  [] Skin/Breast  [] Neurological  [] Endocrine  [] Heme/Lymph  [] Allergic/Immunologic    Explanation:     MEDICATIONS:    Current Facility-Administered Medications:     albumin human 25 % IV solution 25 g, 25 g, Intravenous, Daily PRN, Rubio Swanson MD    carvedilol (COREG) tablet 25 mg, 25 mg, Oral, BID, Mick García APRN - CNP, 25 mg at 03/05/20 0919    hydrALAZINE (APRESOLINE) tablet 25 mg, 25 mg, Oral, Q8H PRN, Mick García APRN - CNP    venlafaxine (EFFEXOR XR) extended release capsule 37.5 mg, 37.5 mg, Oral, Daily with breakfast, Jameel Mendes MD, 37.5 mg at 03/05/20 0919    traZODone (DESYREL) tablet 100 mg, 100 mg, Oral, Nightly, Jameel Mendes MD, 100 mg at 03/04/20 2030    aluminum & magnesium hydroxide-simethicone (MAALOX) 200-200-20 MG/5ML suspension 30 mL, 30 mL, Oral, PRN, Jameel Mendes MD    benztropine mesylate (COGENTIN) injection 2 mg, 2 mg, Intramuscular, BID PRN, Jameel Mendes MD    magnesium hydroxide (MILK OF MAGNESIA) 400 MG/5ML suspension 30 mL, 30 mL, Oral, Daily PRN, Mohini Marie Harvey Cr MD    dextrose 5 % solution, 100 mL/hr, Intravenous, PRN, Priyanka Dewitt MD    dextrose 50 % IV solution, 12.5 g, Intravenous, PRN, Priyanka Dewitt MD    glucagon (rDNA) injection 1 mg, 1 mg, Intramuscular, PRN, Priyanka Dewitt MD    glucose (GLUTOSE) 40 % oral gel 15 g, 15 g, Oral, PRN, Priyanka Dewitt MD    insulin lispro (HUMALOG) injection vial 0-3 Units, 0-3 Units, Subcutaneous, Nightly, Priyanka Dewitt MD, 1 Units at 03/04/20 2031    insulin lispro (HUMALOG) injection vial 0-6 Units, 0-6 Units, Subcutaneous, TID WC, Priyanka Dewitt MD, Stopped at 03/04/20 8772    aspirin chewable tablet 81 mg, 81 mg, Oral, Daily, Priyanka Dewitt MD, 81 mg at 03/05/20 0919    atorvastatin (LIPITOR) tablet 40 mg, 40 mg, Oral, Nightly, Priyanka Dewitt MD, 40 mg at 03/04/20 2031    calcium acetate (PHOSLO) capsule 2,001 mg, 2,001 mg, Oral, TID WC, Priyanka Dewitt MD, 2,001 mg at 03/05/20 0919    [START ON 3/6/2020] darbepoetin sarina-polysorbate (ARANESP) injection 60 mcg, 60 mcg, Subcutaneous, Weekly, Priyanka Dewitt MD    oxyCODONE-acetaminophen (PERCOCET) 5-325 MG per tablet 1 tablet, 1 tablet, Oral, Q4H PRN, Priyanka Dewitt MD, 1 tablet at 03/05/20 0919    topiramate (TOPAMAX) tablet 25 mg, 25 mg, Oral, BID, Priyanka Dewitt MD, 25 mg at 03/05/20 8673      Examination:  /73   Pulse 75   Temp 98 °F (36.7 °C)   Resp 18   Wt 211 lb 10.3 oz (96 kg)   SpO2 95%   BMI 37.49 kg/m²   Gait - steady  Medication side effects(SE): no    Mental Status Examination:    Level of consciousness:  within normal limits   Appearance:  poor grooming and poor hygiene  Behavior/Motor:  psychomotor retardation  Attitude toward examiner:  cooperative  Speech:  slow   Mood: constricted and depressed  Affect:  mood congruent  Thought processes:  slow   Thought content:  Suicidal Ideation:  passive  Cognition:  oriented to person, place, and time   Concentration poor  Insight poor   Judgement poor     ASSESSMENT:   Patient symptoms are:  [] Well controlled  [] Improving  [] Worsening  [x] No change      Diagnosis:   Principal Problem:    MDD (major depressive disorder), recurrent severe, without psychosis ([facility] [address]  Resolved Problems:    * No resolved hospital problems. *      LABS:    No results for input(s): WBC, HGB, PLT in the last 72 hours. Recent Labs     03/04/20  0718      K 4.1   CL 93*   CO2 27   BUN 20   CREATININE 4.47*   GLUCOSE 101*     Recent Labs     03/04/20  0718   BILITOT 0.4   ALKPHOS 61   AST 22   ALT 14     No results found for: Estuardo Napier, BARBSCNU, LABBENZ, CANNAB, COCAINESCRN, LABMETH, OPIATESCREENURINE, PHENCYCLIDINESCREENURINE, PPXUR, ETOH  Lab Results   Component Value Date    TSH 0.847 10/13/2019     No results found for: LITHIUM  No results found for: VALPROATE, CBMZ    RISK ASSESSMENT: High risk for neglect    Treatment Plan:  Reviewed current Medications with the patient. ECT # 1   HD today  Risks, benefits, side effects, drug-to-drug interactions and alternatives to treatment were discussed. Collateral information:   CD evaluation  Encourage patient to attend group and other milieu activities.   Discharge planning discussed with the patient and treatment team.    PSYCHOTHERAPY/COUNSELING:  [x] Therapeutic interview  [x] Supportive  [] CBT  [] Ongoing  [] Other    [x] Patient continues to need, on a daily basis, active treatment furnished directly by or requiring the supervision of inpatient psychiatric personnel      Anticipated Length of stay:            Electronically signed by Anupama Key MD on 3/5/2020 at 3:20 PM

## 2020-03-05 NOTE — PROGRESS NOTES
Pt noted to have head lice, this nurse messaged NP, waiting for orders, House RN supervisor notified, house keeping notified, dialysis made aware.

## 2020-03-05 NOTE — CARE COORDINATION
Patient did not attend group despite staff encouragement.   Electronically signed by Mello Wallace on 3/5/2020 at 12:02 PM

## 2020-03-05 NOTE — PROGRESS NOTES
Pt out of room for breakfast and to use the telephone. Pt noted to have red spot of blood on the back of her gown, scab formed on back with other skin abrasions and scratches, pt reports she scratches it because she is dry, pt refused covering, pt has wound care consult. no current bleeding noted, pt assisted with gown change refused shower at this time. Pt reports she will take one after dialysis. Pt denies SI,HI,AVH, depression and anxiety 8/10. Reports back pain but percocet helps bring pain down to a mangeable level. Pt denies any needs at this time.

## 2020-03-05 NOTE — PROGRESS NOTES
Pt. declined to attend the 0900 community meeting, despite staff encouragement. Electronically signed by Arin Cisneros, [address] on 3/5/2020 at 9:56 AM

## 2020-03-06 NOTE — CARE COORDINATION
BHI Biopsychosocial Assessment    Current Level of Psychosocial Functioning     Independent x  Dependent    Minimal Assist     Comments:  Patient reported independent in daily activities; Patient reported social support and demonstrated social skills    Psychosocial High Risk Factors (check all that apply)    Unable to obtain meds   Chronic illness/pain    Substance abuse   Lack of Family Support   Financial stress   Isolation   Inadequate Community Resources  Suicide attempt(s) past  Not taking medications   Victim of crime   Developmental Delay  Unable to manage personal needs    Age 72 or older   Homeless  No transportation   Readmission within 30 days  Unemployment x  Traumatic Event x    Psychiatric Advanced Directive: none reported    Family to involve in treatment: sister    Sexual Orientation:  Patient reported opposite sex attraction    Patient Strengths: willing to cooperate; social support and social skills present    Patient Barriers: Feelings of hopelessness and depression with SI    Opiate education provided: na    Safety plan: completed; signed and filed    [facility]/ history: [address] [facility]. 2000    Plan of Care:  medication management, group/individual therapies, family meetings, psycho -education, treatment team meetings to assist with stabilization    Initial Discharge Plan: To return to live with sister    Clinical Summary:      48year old female admitted to [facility] from [facility] ED. Patient has somatic diagnoses and is being treated for them. Patient maintained fair eye contact, was cooperative and consistent in answering questions during assessment. Patient admitted to [facility] with some occasional plans; Patient denied HI and also denied any A/V hallucinations.      Electronically signed by Abraham Henning on 3/6/2020 at 2:35 PM

## 2020-03-06 NOTE — PROGRESS NOTES
Patient did not attend the [address] [facility] despite staff encouragement.  Electronically signed by Shira Mckenzie on 3/6/2020 at 4:44 PM

## 2020-03-06 NOTE — PROGRESS NOTES
hygiene  Behavior/Motor:  psychomotor retardation  Attitude toward examiner:  cooperative  Speech:  slow   Mood: constricted and depressed  Affect:  mood congruent  Thought processes:  slow   Thought content:  Suicidal Ideation:  passive  Cognition:  oriented to person, place, and time   Concentration poor  Insight poor   Judgement poor     ASSESSMENT:   Patient symptoms are:  [] Well controlled  [] Improving  [] Worsening  [x] No change      Diagnosis:   Principal Problem:    MDD (major depressive disorder), recurrent severe, without psychosis ([facility] [address]  Resolved Problems:    * No resolved hospital problems. *      LABS:    Recent Labs     03/06/20  0605   WBC 5.5   HGB 8.1*        Recent Labs     03/04/20  0718 03/06/20  0606    132*   K 4.1 3.6   CL 93* 94*   CO2 27 27   BUN 20 18   CREATININE 4.47* 4.28*   GLUCOSE 101* 88     Recent Labs     03/04/20  0718   BILITOT 0.4   ALKPHOS 61   AST 22   ALT 14     No results found for: Shital Hussein, BARBSCNU, LABBENZ, CANNAB, COCAINESCRN, LABMETH, OPIATESCREENURINE, PHENCYCLIDINESCREENURINE, PPXUR, ETOH  Lab Results   Component Value Date    TSH 0.847 10/13/2019     No results found for: LITHIUM  No results found for: VALPROATE, CBMZ    RISK ASSESSMENT: High risk for neglect    Treatment Plan:  Reviewed current Medications with the patient. ECT cancelled sec to the Lice infection for which she is getting treatment  HD tomorrow  ECT # 1 on monday  Risks, benefits, side effects, drug-to-drug interactions and alternatives to treatment were discussed. Collateral information:   CD evaluation  Encourage patient to attend group and other milieu activities.   Discharge planning discussed with the patient and treatment team.    PSYCHOTHERAPY/COUNSELING:  [x] Therapeutic interview  [x] Supportive  [] CBT  [] Ongoing  [] Other    [x] Patient continues to need, on a daily basis, active treatment furnished directly by or requiring the supervision of inpatient psychiatric personnel      Anticipated Length of stay:            Electronically signed by Jori Alonzo MD on 3/6/2020 at 11:30 AM

## 2020-03-06 NOTE — PROGRESS NOTES
Pt out on unit , social with peers, until observed lice in hair. Shower, lice treatment done. Will continue to monitor. Pt reports good appetite. Pt reports good sleep. Pt rates anxiety,4 /10. Pt rates depression,5 / 10. Voiced missing son, health issues. Pt denies SI, HI and A/V hallucinations. No voiced delusions at this time. Pt alert and oriented x 4. Will continue to monitor.

## 2020-03-06 NOTE — PROGRESS NOTES
Pt was given the Aranesp in the LLQ without incident, per Dr. Barabara Leyden. This was clarified with the ordering Dr as pt usually gets this medication at dialysis.  Electronically signed by Tevin Bergman LPN on 5/5/8775 at 4:22 PM

## 2020-03-06 NOTE — PROGRESS NOTES
Renal Progress Note    Assessment and Plan:      49 yo lady with DM, HTN, CAD s/p relatively recent CABG with extreme noncompliance with HD. Comes in not feeling well. Has admitted to depression as part of reason for missing hd before. Has anemia related to her CKD. htn due to fluid overload. permcath removed. On HD TThSa now.       - continue HD TThSa       Patient Active Problem List:     Angina pectoris, unspecified ([facility] [address]     CHF (congestive heart failure) ([facility])     NSTEMI (non-ST elevated myocardial infarction) ([facility] [address]     Coronary artery disease of native artery of native heart with stable angina pectoris ([facility])     ESRD (end stage renal disease) ([facility] [address]     Essential hypertension     Hx of CABG     Paroxysmal atrial fibrillation ([facility])     CHF (congestive heart failure), NYHA class I, acute on chronic, combined ([facility] [address]     Uremia      Subjective:   Admit Date: 3/3/2020    Interval History: ECT canceled, apparently pt mood improving, BP continues to fluctuate       Medications:   Scheduled Meds:   mupirocin   Topical BID    miconazole   Topical BID    carvedilol  25 mg Oral BID    venlafaxine  37.5 mg Oral Daily with breakfast    traZODone  100 mg Oral Nightly    insulin lispro  0-3 Units Subcutaneous Nightly    insulin lispro  0-6 Units Subcutaneous TID WC    aspirin  81 mg Oral Daily    atorvastatin  40 mg Oral Nightly    calcium acetate  2,001 mg Oral TID WC    darbepoetin sarina-polysorbate  60 mcg Subcutaneous Weekly    topiramate  25 mg Oral BID     Continuous Infusions:   dextrose         CBC:   Recent Labs     03/06/20  0605   WBC 5.5   HGB 8.1*        CMP:    Recent Labs     03/04/20  0718 03/06/20  0606    132*   K 4.1 3.6   CL 93* 94*   CO2 27 27   BUN 20 18   CREATININE 4.47* 4.28*   GLUCOSE 101* 88   CALCIUM 8.1* 8.3*   LABGLOM 10.3* 10.9*     Troponin:   No results for input(s): TROPONINI in the last 72 hours. BNP: No results for input(s): BNP in the last 72 hours.   INR:   No results for input(s): INR in the last 72 hours. Lipids:   Recent Labs     03/04/20  0718   CHOL 68   TRIG 70   HDL 39*     Liver:   Recent Labs     03/04/20  0718   AST 22   ALT 14   ALKPHOS 61   PROT 7.4   LABALBU 3.2*   BILITOT 0.4     Iron:  No results for input(s): IRONS, FERRITIN in the last 72 hours. Invalid input(s): LABIRONS  Urinalysis: No results for input(s): UA in the last 72 hours.     Objective:   Vitals: BP (!) 143/76 Comment: RN Notified  Pulse 82   Temp 97 °F (36.1 °C) (Oral)   Resp 18   Wt 207 lb 3.7 oz (94 kg)   SpO2 92%   BMI 36.71 kg/m²    Wt Readings from Last 3 Encounters:   03/05/20 207 lb 3.7 oz (94 kg)   03/03/20 218 lb 11.1 oz (99.2 kg)   02/22/20 210 lb (95.3 kg)      24HR INTAKE/OUTPUT:      Intake/Output Summary (Last 24 hours) at 3/6/2020 1121  Last data filed at 3/5/2020 1515  Gross per 24 hour   Intake 400 ml   Output 2700 ml   Net -2300 ml       General: alert, in no apparent distress  HEENT: normocephalic, atraumatic, anicteric  Neck: supple, no mass  Lungs: non-labored respirations, clear to auscultation bilaterally  Heart: regular rate and rhythm, no murmurs or rubs  Abdomen: soft, non-tender, non-distended  MSK: no joint swelling or tenderness  Ext: no cyanosis, no peripheral edema  Neuro: alert and oriented, no gross abnormalities      Electronically signed by Rosita Salas MD on 3/6/2020 at 11:21 AM

## 2020-03-06 NOTE — PROGRESS NOTES
mouth every 4 hours as needed for Pain. Diania Medico insulin lispro (HUMALOG) 100 UNIT/ML injection vial Inject into the skin 3 times daily (before meals) Indications: does varies per needs       insulin glargine (LANTUS) 100 UNIT/ML injection vial Inject into the skin nightly Indications: varies per needs 10 to 15 units          Objective    BP (!) 143/76 Comment: RN Notified  Pulse 82   Temp 97 °F (36.1 °C) (Oral)   Resp 18   Wt 207 lb 3.7 oz (94 kg)   SpO2 92%   BMI 36.71 kg/m²     LABS:  WBC:    Lab Results   Component Value Date    WBC 5.5 03/06/2020     H/H:    Lab Results   Component Value Date    HGB 8.1 03/06/2020    HCT 25.2 03/06/2020     PTT:    Lab Results   Component Value Date    APTT 28.2 11/16/2019   [APTT}  PT/INR:    Lab Results   Component Value Date    PROTIME 16.5 03/01/2020    INR 1.3 03/01/2020     HgBA1c:  No results found for: LABA1C    Assessment   Meliton Risk Score: Meliton Scale Score: 22    Patient Active Problem List   Diagnosis    [facility], [facility]    [facility]congest[facility] failure) [facility]    NSTEMI (non-ST elevated [facility] infarction) [facility]    Coronary artery disease of native artery of native heart with stable angina pectoris ([facility]    ESRD (end stage renal disease) ([facility]    Essential hypertension    Hx of CABG    Paroxysmal atrial fibrillation ([facility]    CHF (congestive heart failure), NYHA class I, acute on chronic, combined ([facility]    Uremia    Goals of care, counseling/discussion    Anemia due to chronic kidney disease, on chronic dialysis ([facility]    MDD (major depressive disorder), recurrent severe, without psychosis ([facility]       Plan   Plan of Care:   Antibiotic ointment and antifungal powder    Specialty Bed Required : N/A   [] Low Air Loss   [] Pressure Redistribution  [] Fluid Immersion  [] Bariatric  [] Other:     Current Diet: DIET RENAL; Daily Fluid Restriction: 1200 ml  Dietician consult:  N/A    Discharge Plan:  Placement for patient upon

## 2020-03-07 NOTE — PROGRESS NOTES
Pt moods have been very stable today. Flat affect with blunt responses. Pt denies SI./HI/ AVH. Pt feels tired after dialysis.

## 2020-03-07 NOTE — PROGRESS NOTES
Pt out in dining area completing a puzzle. She was notified transport will picking her up soon for dialysis.

## 2020-03-07 NOTE — PROGRESS NOTES
Patient did not attend the 2100 Wrap Up Group despite staff encouragement.  Electronically signed by Yves Morrell on 3/6/2020 at 9:27 PM

## 2020-03-07 NOTE — PROGRESS NOTES
Patient c/o lower back pain, 9/10, medicated with Percocet 1 tablet, per patient request. Will continue to monitor.

## 2020-03-07 NOTE — GROUP NOTE
Group Therapy Note    Date: 3/7/2020    Group Start Time: 1000  Group End Time: 1100  Group Topic: Psychoeducation    [facility]    Latasha Jeffers        Group Therapy Note    Attendees: 9         Patient's Goal:  \"To attend the groups\"    Notes:  Patient had a flat affect and was quiet in group. She work fairly on her project.     Status After Intervention:  Unchanged    Participation Level: Adequate    Participation Quality: Appropriate      Speech:  quiet      Thought Process/Content: Linear      Affective Functioning: Flat      Mood: calm      Level of consciousness:  Alert      Response to Learning: Progressing to goal      Endings: None Reported    Modes of Intervention: Education, Socialization and Activity      Discipline Responsible: Psychoeducational Specialist      Signature:  Latasha Jefefrs

## 2020-03-07 NOTE — PROGRESS NOTES
Patient c/o head congestion and a cough. NP informed and Mucinex is given.  Electronically signed by Miguel Tapia LPN on 0/0/1022 at 37:17 AM

## 2020-03-07 NOTE — GROUP NOTE
Group Therapy Note    Date: 3/6/2020    Group Start Time: 1905  Group End Time: 1945  Group Topic: Recreational    [facility]    Alexei Singleton        Group Therapy Note    Attendees: 8/19         Patient's Goal:  To participate in the 1900 Activity Group. Notes:  Patient actively participated in the 1900 Activity Group.     Status After Intervention:  Improved    Participation Level: Interactive    Participation Quality: Appropriate      Speech:  normal      Thought Process/Content: Logical      Affective Functioning: Congruent      Mood: euthymic      Level of consciousness:  Attentive      Response to Learning: Able to verbalize current knowledge/experience      Endings: None Reported    Modes of Intervention: Activity      Discipline Responsible: [address], [employer]      Signature:  Alexei Singleton

## 2020-03-07 NOTE — PROGRESS NOTES
BEHAVIORAL HEALTH FOLLOW-UP NOTE     3/7/2020     Patient was seen and examined in person, Chart reviewed   Patient's case discussed with staff/team    Chief Complaint: Depression    Interim History:   Patient said she is still quite depressed. She was able to sleep \"some\", but still has difficulty falling asleep. Her appetite is \"OK\". She denies suicidal or homicidal ideations, but still has auditory hallucinations, as \"conversations\", and visual hallucinations at times. She still c/o high anxiety. She is still hopeless and helpless. Her ADL's are still very poor. Appetite:   [] Normal/Unchanged  [] Increased  [x] Decreased      Sleep:       [] Normal/Unchanged  [x] Fair       [x] Poor              Energy:    [] Normal/Unchanged  [] Increased  [x] Decreased        SI [] Present  [x] Absent    HI  []Present  [x] Absent     Aggression:  [] yes  [x] no    Patient is [] able  [x] unable to CONTRACT FOR SAFETY     PAST MEDICAL/PSYCHIATRIC HISTORY:   Past Medical History:   Diagnosis Date    [facility]rial fibrillation ([facility]     Cancer ([facility]     Breast    CHF (congestive heart failure) (HCC)     Chronic kidney disease     [facility]     ESRD (end stage renal disease) ([facility] 2/14/2020    Essential hypertension 2/14/2020    Heart murmur     Hx of CABG 2/14/2020    Paroxysmal atrial fibrillation ([facility] 2/14/2020       FAMILY/SOCIAL HISTORY:  History reviewed. No pertinent family history.   Social History     Socioeconomic History    Marital status: Single     Spouse name: Not on file    Number of children: Not on file    Years of education: Not on file    Highest education level: Not on file   Occupational History    Not on file   Social Needs    Financial resource strain: Not on file    Food insecurity     Worry: Not on file     Inability: Not on file    Transportation needs     Medical: Not on file     Non-medical: Not on file   Tobacco Use    Smoking status: Never Smoker    Smokeless tobacco: 36.12 kg/m²   Gait - steady  Medication side effects(SE): no    Mental Status Examination:    Level of consciousness:  within normal limits   Appearance:  poor grooming and poor hygiene  Behavior/Motor:  psychomotor retardation  Attitude toward examiner:  cooperative  Speech:  slow   Mood: quite depressed  Affect:  mood congruent  Thought processes:  slow   Thought content:  Suicidal Ideation:  Denies, but may be just minimizing  Cognition:  oriented to person, place, and time   Concentration poor  Insight poor   Judgement poor     ASSESSMENT:   Patient symptoms are:  [] Well controlled  [] Improving  [] Worsening  [x] No change      Diagnosis:   Principal Problem:    MDD (major depressive disorder), recurrent severe, without psychosis ([facility]  Resolved Problems:    * No resolved hospital problems. *      LABS:    Recent Labs     03/06/20  0605   WBC 5.5   HGB 8.1*        Recent Labs     03/06/20  0606   *   K 3.6   CL 94*   CO2 27   BUN 18   CREATININE 4.28*   GLUCOSE 88     No results for input(s): BILITOT, ALKPHOS, AST, ALT in the last 72 hours. No results found for: [place], LABBENZ, CANNAB, COCAINESCRN, LABMETH, [address], PHENCYCLIDINESCREENURINE, PPXUR, ETOH  Lab Results   Component Value Date    TSH 0.847 10/13/2019     No results found for: LITHIUM  No results found for: VALPROATE, CBMZ    RISK ASSESSMENT: High risk for neglect    Treatment Plan:  Reviewed current Medications with the patient. ECT # 1 on monday  Risks, benefits, side effects, drug-to-drug interactions and alternatives to treatment were discussed. Collateral information:   CD evaluation  Encourage patient to attend group and other milieu activities.   Discharge planning discussed with the patient and treatment team.    PSYCHOTHERAPY/COUNSELING:  [x] Therapeutic interview  [x] Supportive  [] CBT  [] Ongoing  [] Other    [x] Patient continues to need, on a daily basis, active treatment furnished directly by or requiring the supervision of inpatient psychiatric personnel      Anticipated Length of stay:            Electronically signed by Lakeshia Saunders MD on 3/7/2020 at 6:13 PM

## 2020-03-08 NOTE — GROUP NOTE
Group Therapy Note    Date: 3/8/2020    Group Start Time: 1000  Group End Time: 1100  Group Topic: Psychoeducation    [facility] ANALI Kwong        Group Therapy Note    Attendees: 10         Patient's Goal:  \"To enjoy my visit with my family\"    Notes:  Patient was talkative and sociable in group. She work well on her task. Status After Intervention:  Improved    Participation Level:  Active Listener    Participation Quality: Appropriate and Attentive      Speech:  normal      Thought Process/Content: Linear      Affective Functioning: Congruent      Mood: Better      Level of consciousness:  Alert      Response to Learning: Progressing to goal      Endings: None Reported    Modes of Intervention: Education, Socialization and Activity      Discipline Responsible: Psychoeducational Specialist      Signature:  David Kwong no

## 2020-03-08 NOTE — PROGRESS NOTES
Pt. attended the 0900 community meeting. Electronically signed by [employer], [address] on 3/8/2020 at 9:59 AM

## 2020-03-08 NOTE — PROGRESS NOTES
Group Therapy Note    Date: 3/8/2020  Start Time: 1100  End Time:  1140    Number of Participants:9    Type of Group: Cognitive Skills    Patient's Goal:  To participate in mood management group. Notes: Patient declined to attend psychoeducation group at 1100 despite encouragement by staff.      Discipline Responsible: /Counselor    WOODY Jacob

## 2020-03-08 NOTE — PROGRESS NOTES
Pt visible on unit in dining room completing a puzzle with a peer. Pt has a flat/blunt affect and avoids eye contact during assessment. Pt rates current depression and anxiety 9/10 and denies any improvement. Pt denies SI/HI. Pt denies auditory hallucinations; however, pt states she sees \"shadows or a bug crawling\" sometimes and stated she has to look twice to make sure nothing is really there. Pt reports she has a difficult time falling asleep; however, sleep is restful once she gets to sleep. Pt reports appetite slightly decreased.

## 2020-03-08 NOTE — PROGRESS NOTES
Pt denies SI, HI, & AVH. Pt complains of feeling tired today due to dialysis yesterday. Pt out on unit and social with peers. Pt encouraged to participate in proper hygiene practices. Pt reports good appetite. Pt reports good sleep. Pt rates anxiety 2/10. Pt rates depression 1/ 10. Pt reports attending groups. Pt alert and oriented x 4. Pt calm and cooperative. No s/s of distress noted. Will continue to monitor.

## 2020-03-08 NOTE — PROGRESS NOTES
Never    Sexual activity: Not Currently   Lifestyle    Physical activity     Days per week: Not on file     Minutes per session: Not on file    Stress: Not on file   Relationships    Social connections     Talks on phone: Not on file     Gets together: Not on file     Attends [employer] service: Not on file     Active member of club or organization: Not on file     Attends meetings of clubs or organizations: Not on file     Relationship status: Not on file    Intimate partner violence     Fear of current or ex partner: Not on file     Emotionally abused: Not on file     Physically abused: Not on file     Forced sexual activity: Not on file   Other Topics Concern    Not on file   Social History Narrative    Not on file           ROS:  [x] All negative/unchanged except if checked.  Explain positive(checked items) below:  [] Constitutional  [] Eyes  [] Ear/Nose/Mouth/Throat  [] Respiratory  [] CV  [] GI  []   [] Musculoskeletal  [] Skin/Breast  [] Neurological  [] Endocrine  [] Heme/Lymph  [] Allergic/Immunologic    Explanation:     MEDICATIONS:    Current Facility-Administered Medications:     guaiFENesin (MUCINEX) extended release tablet 600 mg, 600 mg, Oral, BID PRN, Priyanka Simpers, APRN - CNP, 600 mg at 03/08/20 0906    lidocaine-prilocaine (EMLA) cream, , Topical, PRN, Priyanka Simpers, APRN - CNP, 5 g at 03/07/20 1131    mupirocin (BACTROBAN) 2 % ointment, , Topical, BID, Rosanna Brown MD    miconazole (MICOTIN) 2 % powder, , Topical, BID, Rosanna Brown MD    albumin human 25 % IV solution 25 g, 25 g, Intravenous, Daily PRN, Deniz Bullock MD    carvedilol (COREG) tablet 25 mg, 25 mg, Oral, BID, Bhargavifiltreva Lee APRN - CNP, 25 mg at 03/08/20 0904    hydrALAZINE (APRESOLINE) tablet 25 mg, 25 mg, Oral, Q8H PRN, Priyanka Simpers, APRN - CNP, 25 mg at 03/07/20 2008    venlafaxine (EFFEXOR XR) extended release capsule 37.5 mg, 37.5 mg, Oral, Daily with breakfast, Rosanna Brown MD, 37.5 mg at 03/08/20 0905    traZODone (DESYREL) tablet 100 mg, 100 mg, Oral, Nightly, Jameel Mendes MD, 100 mg at 03/07/20 2009    aluminum & magnesium hydroxide-simethicone (MAALOX) 200-200-20 MG/5ML suspension 30 mL, 30 mL, Oral, PRN, Jamele Mendes MD    benztropine mesylate (COGENTIN) injection 2 mg, 2 mg, Intramuscular, BID PRN, Jameel Mendes MD    magnesium hydroxide (MILK OF MAGNESIA) 400 MG/5ML suspension 30 mL, 30 mL, Oral, Daily PRN, Jameel Mendes MD    dextrose 5 % solution, 100 mL/hr, Intravenous, PRN, Rubio Swanson MD    dextrose 50 % IV solution, 12.5 g, Intravenous, PRN, Rubio Swanson MD    glucagon (rDNA) injection 1 mg, 1 mg, Intramuscular, PRN, Rubio Swanson MD    glucose (GLUTOSE) 40 % oral gel 15 g, 15 g, Oral, PRN, Rubio Swanson MD    insulin lispro (HUMALOG) injection vial 0-3 Units, 0-3 Units, Subcutaneous, Nightly, Rubio Swanson MD, 1 Units at 03/07/20 2130    insulin lispro (HUMALOG) injection vial 0-6 Units, 0-6 Units, Subcutaneous, TID , Rubio Swanson MD, 1 Units at 03/08/20 1708    aspirin chewable tablet 81 mg, 81 mg, Oral, Daily, Rubio Swanson MD, 81 mg at 03/08/20 0905    atorvastatin (LIPITOR) tablet 40 mg, 40 mg, Oral, Nightly, Rubio Swanson MD, 40 mg at 03/07/20 2009    calcium acetate (PHOSLO) capsule 2,001 mg, 2,001 mg, Oral, TID WC, Rubio Swanson MD, 2,001 mg at 03/08/20 1710    darbepoetin sarina-polysorbate (ARANESP) injection 60 mcg, 60 mcg, Subcutaneous, Weekly, Rubio Swanson MD, 60 mcg at 03/06/20 1443    oxyCODONE-acetaminophen (PERCOCET) 5-325 MG per tablet 1 tablet, 1 tablet, Oral, Q4H PRN, Rubio Swanson MD, 1 tablet at 03/08/20 0905    topiramate (TOPAMAX) tablet 25 mg, 25 mg, Oral, BID, Rubio Swanson MD, 25 mg at 03/08/20 9698      Examination:  /72   Pulse 67   Temp 97 °F (36.1 °C) (Oral)   Resp 16   Wt 203 lb 14.8 oz (92.5 kg)   SpO2 96%   BMI 36.12 kg/m²   Gait - steady  Medication side effects(SE): no    Mental psychiatric personnel      Anticipated Length of stay:            Electronically signed by Marielos Prince MD on 3/8/2020 at 6:33 PM

## 2020-03-09 NOTE — PROGRESS NOTES
Patient did not attend wrap up group despite staff encouragement.  Electronically signed by Geo Brooks on 3/8/2020 at 9:25 PM

## 2020-03-09 NOTE — PROGRESS NOTES
Pt took awhile to awake from anesthesia. ECT complete, pt tolerated well.  No Complaints of pain or H/A.

## 2020-03-09 NOTE — ANESTHESIA POSTPROCEDURE EVALUATION
Department of Anesthesiology  Postprocedure Note    Patient: Diamond Smith  MRN: [id number]  YOB: 1965  Date of evaluation: 3/9/2020  Time:  1:19 PM     Procedure Summary     Date:  03/09/20 Room / Location:  [facility]    Anesthesia Start:  1249 Anesthesia Stop:  1254    Procedure:  ECT W/ ANESTHESIA Diagnosis:       MDD (major depressive disorder)      MDD (major depressive disorder), recurrent severe, without psychosis ([facility]    Scheduled Providers:   Responsible Provider:  FRANCHESKA French CRNA    Anesthesia Type:  general ASA Status:  3          Anesthesia Type: general    Cris Phase I: Cris Score: 8    Cris Phase II:      Last vitals: Reviewed and per EMR flowsheets.        Anesthesia Post Evaluation    Patient location during evaluation: PACU  Patient participation: waiting for patient participation  Level of consciousness: sleepy but conscious  Pain score: 0  Airway patency: patent  Nausea & Vomiting: no nausea and no vomiting  Complications: no  Cardiovascular status: blood pressure returned to baseline and hemodynamically stable  Respiratory status: acceptable  Hydration status: euvolemic

## 2020-03-09 NOTE — PROGRESS NOTES
Patient arrived back on the unit 1345, pts vitals are taken WNL. Patient denies pain other than back pain at this time. Patients gag checked not back at this time. Patient will be rechecked in 1/2 hour.   Pt remains in room to rest. Electronically signed by Ramiro Sullivan LPN on 9/7/3933 at 7:44 PM

## 2020-03-09 NOTE — ANESTHESIA PRE PROCEDURE
Department of Anesthesiology  Preprocedure Note       Name:  Chetan Reed   Age:  48 y.o.  :  1965                                          MRN:  [id number]         Date:  3/9/2020      Surgeon: * No surgeons listed *    Procedure: ECT W/ ANESTHESIA    Medications prior to admission:   Prior to Admission medications    Medication Sig Start Date End Date Taking? Authorizing Provider   diphenhydrAMINE (BENADRYL) 25 MG capsule Take 25 mg by mouth every 6 hours as needed for Itching    Historical Provider, MD   QUEtiapine (SEROQUEL XR) 50 MG extended release tablet Take 50 mg by mouth nightly    Historical Provider, MD   metoprolol succinate (TOPROL XL) 50 MG extended release tablet Take 50 mg by mouth 2 times daily    Historical Provider, MD   warfarin (COUMADIN) 5 MG tablet Take 5 mg by mouth    Historical Provider, MD   aspirin 81 MG chewable tablet Take 1 tablet by mouth daily 10/15/19   Raynaldo Daily, DO   nitroGLYCERIN (NITROSTAT) 0.4 MG SL tablet up to max of 3 total doses.  If no relief after 1 dose, call 911. 10/15/19   Raynaldo Daily, DO   nystatin (MYCOSTATIN) POWD powder Apply topically 2 times daily    Historical Provider, MD   carvedilol (COREG) 12.5 MG tablet Take 12.5 mg by mouth 2 times daily    Historical Provider, MD   diltiazem (DILACOR XR) 240 MG extended release capsule Take 240 mg by mouth daily Indications: PT TAKES CARDIZEM CD    Historical Provider, MD   b complex-C-folic acid (NEPHROCAPS) 1 MG capsule Take 1 capsule by mouth daily    Historical Provider, MD   topiramate (TOPAMAX) 25 MG tablet Take 25 mg by mouth 2 times daily    Historical Provider, MD   calcium acetate (PHOSLO) 667 MG capsule Take by mouth 3 times daily (with meals)    Historical Provider, MD   Multiple Vitamins-Minerals (THERAPEUTIC MULTIVITAMIN-MINERALS) tablet Take 1 tablet by mouth daily    Historical Provider, MD   atorvastatin (LIPITOR) 40 MG tablet Take 40 mg by mouth nightly    Historical Provider, MD [Hydrocodone-Acetaminophen] Hives       Problem List:    Patient Active Problem List   Diagnosis Code    Angina pectoris, unspecified ([facility] I20.9    CHF (congestive heart failure) ([facility]) I50.9    NSTEMI (non-ST elevated myocardial infarction) ([facility] I21.4    Coronary artery disease of native artery of native heart with stable angina pectoris ([facility]) I25.118    ESRD (end stage renal disease) ([facility] N18.6    Essential hypertension I10    Hx of CABG Z95.1    Paroxysmal atrial fibrillation ([facility]) I48.0    CHF (congestive heart failure), NYHA class I, acute on chronic, combined ([facility]) I50.43    Uremia N19    Goals of care, counseling/discussion Z71.89    Anemia due to chronic kidney disease, on chronic dialysis ([facility]) N18.6, D63.1, Z99.2    MDD (major depressive disorder), recurrent severe, without psychosis [facility] F33.2       Past Medical History:        Diagnosis Date    [facility]     Cancer ([facility]     Breast    CHF (congestive heart failure) ([facility])     Chronic kidney disease     Diabetes mellitus ([facility]     ESRD (end stage renal disease) ([facility] 2020    Essential hypertension 2020    Heart murmur     Hx of CABG 2020    Paroxysmal atrial fibrillation ([facility] 2020       Past Surgical History:        Procedure Laterality Date    BREAST SURGERY       SECTION      CHOLECYSTECTOMY      CORONARY ARTERY BYPASS GRAFT         Social History:    Social History     Tobacco Use    Smoking status: Never Smoker    Smokeless tobacco: Never Used   Substance Use Topics    Alcohol use: Never     Frequency: Never                                Counseling given: Not Answered      Vital Signs (Current):   Vitals:    20 1615 20 1043 20 1044 20 1103   BP: 137/72 (!) 160/78 (!) 151/77 (!) 141/63   Pulse: 67 74 77 73   Resp:  18  16   Temp:  98 °F (36.7 °C)  98.7 °F (37.1 °C)   TempSrc:  Oral  Temporal   SpO2:  95%  93%   Weight:    203 lb (92.1 kg)   Height: 5' 3\" (1.6 m)                                              BP Readings from Last 3 Encounters:   03/09/20 (!) 141/63   03/03/20 (!) 168/81   02/22/20 (!) 146/95       NPO Status: Time of last liquid consumption: 2100                        Time of last solid consumption: 2100                        Date of last liquid consumption: 03/09/20                        Date of last solid food consumption: 03/09/20    BMI:   Wt Readings from Last 3 Encounters:   03/09/20 203 lb (92.1 kg)   03/03/20 218 lb 11.1 oz (99.2 kg)   02/22/20 210 lb (95.3 kg)     Body mass index is 35.96 kg/m².     CBC:   Lab Results   Component Value Date    WBC 5.5 03/06/2020    RBC 2.68 03/06/2020    RBC 3.51 08/12/2018    HGB 8.1 03/06/2020    HCT 25.2 03/06/2020    MCV 94.1 03/06/2020    RDW 14.6 03/06/2020     03/06/2020       CMP:   Lab Results   Component Value Date     03/06/2020    K 3.6 03/06/2020    K 4.1 03/04/2020    CL 94 03/06/2020    CO2 27 03/06/2020    BUN 18 03/06/2020    CREATININE 4.28 03/06/2020    GFRAA 13.1 03/06/2020    LABGLOM 10.9 03/06/2020    GLUCOSE 88 03/06/2020    PROT 7.4 03/04/2020    CALCIUM 8.3 03/06/2020    BILITOT 0.4 03/04/2020    ALKPHOS 61 03/04/2020    AST 22 03/04/2020    ALT 14 03/04/2020       POC Tests:   Recent Labs     03/09/20  0616   POCGLU 101       Coags:   Lab Results   Component Value Date    PROTIME 16.5 03/01/2020    INR 1.3 03/01/2020    APTT 28.2 11/16/2019       HCG (If Applicable): No results found for: PREGTESTUR, PREGSERUM, HCG, HCGQUANT     ABGs: No results found for: PHART, PO2ART, LGE6VFW, ECQ0RIL, BEART, T4UYXYBD     Type & Screen (If Applicable):  No results found for: POLO [facility]    Anesthesia Evaluation  Patient summary reviewed and Nursing notes reviewed no history of anesthetic complications:   Airway: Mallampati: II  TM distance: >3 FB   Neck ROM: full  Mouth opening: > = 3 FB Dental: normal exam         Pulmonary:Negative Pulmonary ROS

## 2020-03-09 NOTE — PROGRESS NOTES
Pt. refused to attend the 1000 skills group, despite staff encouragement.  Electronically signed by Sena Ornelas on 3/9/2020 at 12:06 PM

## 2020-03-09 NOTE — PROGRESS NOTES
Patient did not attend 1600 group despite staff encouragement.  Electronically signed by [employer] on 3/9/2020 at 4:59 PM

## 2020-03-09 NOTE — CARE COORDINATION
Patient did not attend group despite staff encouragement. Electronically signed by Thuy Castrejon on 3/9/2020 at 11:50 AM

## 2020-03-09 NOTE — PROGRESS NOTES
48year old female who was treated for having lice a few days ago. The patient and nursing staff noted that the patient still has lice. Spoke with pharmacy regarding medicating the patient with nix again. Under the circumstances, pharmacy recommends an additional treatment. 1. Lice-will medicate with nix topically  2. DM2-accuchecks with ss coverage  3. A-fib  4. Hx of CHF  5.  CKD      Corry Last PA-C

## 2020-03-09 NOTE — PROGRESS NOTES
Pt resting in  Bed at time of assessment. Pt reports anxiety and depression remain 9/10. Denies SI,HI,AVH. BM last evening. Reports sleeping and eating better since admission. Pt reports feeling tired, remains NPO for ECT.

## 2020-03-09 NOTE — PROGRESS NOTES
Renal Progress Note    Assessment and Plan:      49 yo lady with DM, HTN, CAD s/p relatively recent CABG with extreme noncompliance with HD. Comes in not feeling well. Has admitted to depression as part of reason for missing hd before. Has anemia related to her CKD. htn due to fluid overload. permcath removed. On HD TThSa now.       - HD TThSa  - ok from medical standpoint for ECT       Patient Active Problem List:     Angina pectoris, unspecified (HCC)     CHF (congestive heart failure) ([facility])     NSTEMI (non-ST elevated myocardial infarction) ([facility]     Coronary artery disease of native artery of native heart with stable angina pectoris ([facility]     ESRD (end stage renal disease) ([facility]     Essential hypertension     Hx of CABG     Paroxysmal atrial fibrillation (HCC)     CHF (congestive heart failure), NYHA class I, acute on chronic, combined ([facility]     Uremia      Subjective:   Admit Date: 3/3/2020    Interval History: bp has been good. Plan for ect today. Nervous. Appetite has been ok. No fevers      Medications:   Scheduled Meds:   venlafaxine  75 mg Oral Daily with breakfast    mupirocin   Topical BID    miconazole   Topical BID    carvedilol  25 mg Oral BID    traZODone  100 mg Oral Nightly    insulin lispro  0-3 Units Subcutaneous Nightly    insulin lispro  0-6 Units Subcutaneous TID WC    aspirin  81 mg Oral Daily    atorvastatin  40 mg Oral Nightly    calcium acetate  2,001 mg Oral TID WC    darbepoetin sarina-polysorbate  60 mcg Subcutaneous Weekly    topiramate  25 mg Oral BID     Continuous Infusions:   sodium chloride 100 mL/hr at 03/09/20 1113    dextrose         CBC:   No results for input(s): WBC, HGB, PLT in the last 72 hours. CMP:    No results for input(s): NA, K, CL, CO2, BUN, CREATININE, GLUCOSE, CALCIUM, LABGLOM in the last 72 hours. Troponin:   No results for input(s): TROPONINI in the last 72 hours. BNP: No results for input(s): BNP in the last 72 hours.   INR:   No results

## 2020-03-09 NOTE — OP NOTE
Department of Psychiatry  Electroconvulsive Therapy Treatment Note        3/9/2020    PURPOSE FOR ECT:  Therapeutic NUMBER: 1    DIAGNOSIS:   Principal Problem:    MDD (major depressive disorder), recurrent severe, without psychosis [facility]  Resolved Problems:    * No resolved hospital problems.  *      MEDICATIONS:    Current Facility-Administered Medications:     promethazine (PHENERGAN) tablet 12.5 mg, 12.5 mg, Oral, Q6H PRN **OR** ondansetron (ZOFRAN) injection 4 mg, 4 mg, Intravenous, Q6H PRN, Maria D Gaytan MD    venlafaxine (EFFEXOR XR) extended release capsule 75 mg, 75 mg, Oral, Daily with breakfast, Lauryn Bartlett MD    0.9 % sodium chloride infusion, , Intravenous, Continuous, Maria D Gaytan MD, Last Rate: 100 mL/hr at 03/09/20 1113    fentaNYL (SUBLIMAZE) injection 50 mcg, 50 mcg, Intravenous, Q10 Min PRN, Hermann Shone, MD    diphenhydrAMINE (BENADRYL) injection 12.5 mg, 12.5 mg, Intravenous, Once PRN, Hermann Shone, MD    ondansetron [facility]) injection 4 mg, 4 mg, Intravenous, Once PRN, Hermann Shone, MD    metoclopramide (REGLAN) injection 10 mg, 10 mg, Intravenous, Once PRN, Hermann Shone, MD    meperidine (DEMEROL) injection 12.5 mg, 12.5 mg, Intravenous, Q5 Min PRN, Hermann Shone, MD    guaiFENesin [facility]) extended release tablet 600 mg, 600 mg, Oral, BID PRN, FRANCHESKA Shaffer - CNP, 600 mg at 03/08/20 2101    lidocaine-prilocaine (EMLA) cream, , Topical, PRN, FRANCHESKA Shaffer - CNP, 5 g at 03/07/20 1131    mupirocin (BACTROBAN) 2 % ointment, , Topical, BID, Maria D Gaytan MD    miconazole (MICOTIN) 2 % powder, , Topical, BID, Maria D Gaytan MD    albumin human 25 % IV solution 25 g, 25 g, Intravenous, Daily PRN, Tiffany Smith MD    carvedilol (COREG) tablet 25 mg, 25 mg, Oral, BID, FRANCHESKA Shaffer CNP, 25 mg at 03/08/20 2100    hydrALAZINE (APRESOLINE) tablet 25 mg, 25 mg, Oral, Q8H PRN, FRANCHESKA Shaffer CNP, 25 mg at 03/07/20 2008    traZODone (DESYREL) tablet 100 mg, 100 mg, Oral, Nightly, Crystal June MD, 100 mg at 03/08/20 2100    aluminum & magnesium hydroxide-simethicone (MAALOX) 200-200-20 MG/5ML suspension 30 mL, 30 mL, Oral, PRN, Crystal June MD    benztropine mesylate (COGENTIN) injection 2 mg, 2 mg, Intramuscular, BID PRN, Crystal June MD    magnesium hydroxide (MILK OF MAGNESIA) 400 MG/5ML suspension 30 mL, 30 mL, Oral, Daily PRN, Crystal June MD    dextrose 5 % solution, 100 mL/hr, Intravenous, PRN, Randy Rojas MD    dextrose 50 % IV solution, 12.5 g, Intravenous, PRN, Randy Rojas MD    glucagon (rDNA) injection 1 mg, 1 mg, Intramuscular, PRN, Randy Rojas MD    glucose (GLUTOSE) 40 % oral gel 15 g, 15 g, Oral, PRN, Randy Rojas MD    insulin lispro (HUMALOG) injection vial 0-3 Units, 0-3 Units, Subcutaneous, Nightly, Randy Rojas MD, 1 Units at 03/08/20 2058    insulin lispro (HUMALOG) injection vial 0-6 Units, 0-6 Units, Subcutaneous, TID Randy RAMOS MD, Stopped at 03/09/20 0945    aspirin chewable tablet 81 mg, 81 mg, Oral, Daily, Randy Rojas MD, 81 mg at 03/08/20 0905    atorvastatin (LIPITOR) tablet 40 mg, 40 mg, Oral, Nightly, Randy Rojas MD, 40 mg at 03/08/20 2101    calcium acetate (PHOSLO) capsule 2,001 mg, 2,001 mg, Oral, TID Randy RAMOS MD, Stopped at 03/09/20 0943    darbepoetin sarina-polysorbate (ARANESP) injection 60 mcg, 60 mcg, Subcutaneous, Weekly, Randy Roajs MD, 60 mcg at 03/06/20 1443    oxyCODONE-acetaminophen (PERCOCET) 5-325 MG per tablet 1 tablet, 1 tablet, Oral, Q4H PRN, Randy Rojas MD, 1 tablet at 03/08/20 2100    topiramate (TOPAMAX) tablet 25 mg, 25 mg, Oral, BID, Randy Rojas MD, 25 mg at 03/08/20 2101    CHANGE IN PSYCHIATRY STATUS SINCE LAST ECT:   n/a    INFORMED CONSENT OBTAINED : YES    PRE ECT MEDICATION ADMINISTERED:   YES    NPO STATUS: Confirmed    ELECTRODE PLACEMENT : RUMARCIO    TIME OUT :  TEAM CONFIRMS THE CORRECT

## 2020-03-10 NOTE — PROGRESS NOTES
kg/m²   Gait - steady  Medication side effects(SE): no    Mental Status Examination:    Level of consciousness:  within normal limits   Appearance:  poor grooming and poor hygiene  Behavior/Motor:  psychomotor retardation  Attitude toward examiner:  cooperative  Speech:  slow   Mood: constricted, decreased range and depressed  Affect:  mood congruent  Thought processes:  slow   Thought content:  Suicidal Ideation:  active  Delusions:  denies  Perceptual Disturbance:  denies any perceptual disturbance  Cognition:  oriented to person, place, and time   Concentration poor  Insight poor   Judgement poor     ASSESSMENT:   Patient symptoms are:  [] Well controlled  [] Improving  [] Worsening  [x] No change      Diagnosis:   Principal Problem:    MDD (major depressive disorder), recurrent severe, without psychosis ([facility]  Resolved Problems:    * No resolved hospital problems. *      LABS:    Recent Labs     03/10/20  0605   WBC 4.9   HGB 7.0*        Recent Labs     03/10/20  0605      K 5.8*   CL 98   CO2 27   BUN 36*   CREATININE 6.44*   GLUCOSE 87     No results for input(s): BILITOT, ALKPHOS, AST, ALT in the last 72 hours. No results found for: Darrol Pipes, LABBENZ, CANNAB, COCAINESCRN, LABMETH, OPIATESCREENURINE, PHENCYCLIDINESCREENURINE, PPXUR, ETOH  Lab Results   Component Value Date    TSH 0.847 10/13/2019     No results found for: LITHIUM  No results found for: VALPROATE, CBMZ    RISK ASSESSMENT: high risk of neglect and suicide    Treatment Plan:  Reviewed current Medications with the patient. ECT # 2 tomorrow  Medication as ordered  Risks, benefits, side effects, drug-to-drug interactions and alternatives to treatment were discussed. Collateral information: pending  CD evaluation  Encourage patient to attend group and other milieu activities.   Discharge planning discussed with the patient and treatment team.    PSYCHOTHERAPY/COUNSELING:  [x] Therapeutic interview  [x] Supportive  [] CBT  [] Ongoing  [] Other    [x] Patient continues to need, on a daily basis, active treatment furnished directly by or requiring the supervision of inpatient psychiatric personnel      Anticipated Length of stay:            Electronically signed by Joslyn Maxwell MD on 3/10/2020 at 5:13 PM

## 2020-03-10 NOTE — PROGRESS NOTES
Patient back from dialysis. Ate lunch . Medication given. Patient complaining of her legs being very week,  Couldn't get herself up to go back to her room. This nurse got a wheelchair and transported her back to her room. She was able to stand and pivot herself. Earlier she was requesting her walker from home. Told her it wouldn't happen till probably tomorrow. Multiple  Scabs / sores on back , arms, and abdomen. Each covered with  band aides and Robb Wheatleyin.   micotin powder to right breast.

## 2020-03-10 NOTE — PROGRESS NOTES
Group Therapy Note    Date: 3/10/2020  Start Time:1430  End Time:  7421    Number of Participants:7    Type of Group: Cognitive Skills    Patient's Goal:  To participate in mood management group. Notes: Patient declined to attend psychoeducation group at 1430 despite encouragement by staff.      Discipline Responsible: /Counselor    WOODY De Paz

## 2020-03-10 NOTE — PROGRESS NOTES
Patient did not attend 1900 group despite staff encouragement.  Electronically signed by ChaChale on 3/9/2020 at 8:01 PM

## 2020-03-10 NOTE — PROGRESS NOTES
Dialysis note    2500mLs removed per order, tolerated well. Blood returned post HD, needles pulled and sites held until hemostasis was achieved.     Report called to [facility]

## 2020-03-10 NOTE — PROGRESS NOTES
Patient did not attend wrap up/relaxation group despite staff encouragement.  Electronically signed by Edith Chen on 3/9/2020 at 9:21 PM

## 2020-03-10 NOTE — PROGRESS NOTES
Pt isolating to room, voiced weakness after dialysis. Pt denies shower or ADL's today. Pt encouraged to participate in proper hygiene practices, hair observed, no lice visable. Pt reports good appetite, consumed 100% of supper. Pt reports good sleep, medication, effective. Pt rates anxiety,5 /10, missing son. Pt rates depression,6 / 10. Pt reports attending groups. Pt denies SI, HI and A/V hallucinations. No voiced delusions at this time. Pt alert and oriented x 4. Pt calm and cooperative. Will continue to monitor.

## 2020-03-10 NOTE — PROGRESS NOTES
Patient did not attend the 0900 community meeting due to going off the unit for dialysis.  Electronically signed by Avelina Nguyen, [address] on 3/10/2020 at 9:56 AM

## 2020-03-10 NOTE — PROGRESS NOTES
Critical lab value for creatinine received from Dante An of 6.44. Reported immediately via perfectserve to Dr. Whitney Sellers.  Awaiting response at this time

## 2020-03-10 NOTE — PROGRESS NOTES
Pt returned from dialysis, sitting in day room at this time eating lunch. Pt calm and cooperative with care. Denies any needs at this time.

## 2020-03-10 NOTE — PROGRESS NOTES
Patient did not attend the 1000 skills group due to being off the unit for dialysis.  Electronically signed by Arnaldo Francis, [address] on 3/10/2020 at 11:44 AM

## 2020-03-10 NOTE — PROGRESS NOTES
Renal Progress Note    Assessment and Plan:      49 yo lady with DM, HTN, CAD s/p relatively recent CABG with extreme noncompliance with HD. Comes in not feeling well. Has admitted to depression as part of reason for missing hd before. Has anemia related to her CKD. htn due to fluid overload. permcath removed. On HD TThSa now.       - HD TThSa  - ok from medical standpoint for ECT       Patient Active Problem List:     Angina pectoris, unspecified (HCC)     CHF (congestive heart failure) ([facility])     NSTEMI (non-ST elevated myocardial infarction) ([facility]     Coronary artery disease of native artery of native heart with stable angina pectoris ([facility]     ESRD (end stage renal disease) ([facility]     Essential hypertension     Hx of CABG     Paroxysmal atrial fibrillation (HCC)     CHF (congestive heart failure), NYHA class I, acute on chronic, combined ([facility]     Uremia      Subjective:   Admit Date: 3/3/2020    Interval History: ECT done yesterday, got IHD today, 2.5L taken off      Medications:   Scheduled Meds:   venlafaxine  75 mg Oral Daily with breakfast    mupirocin   Topical BID    miconazole   Topical BID    carvedilol  25 mg Oral BID    traZODone  100 mg Oral Nightly    insulin lispro  0-3 Units Subcutaneous Nightly    insulin lispro  0-6 Units Subcutaneous TID WC    aspirin  81 mg Oral Daily    atorvastatin  40 mg Oral Nightly    calcium acetate  2,001 mg Oral TID WC    darbepoetin sarina-polysorbate  60 mcg Subcutaneous Weekly    topiramate  25 mg Oral BID     Continuous Infusions:   dextrose         CBC:   Recent Labs     03/10/20  0605   WBC 4.9   HGB 7.0*        CMP:    Recent Labs     03/10/20  0605      K 5.8*   CL 98   CO2 27   BUN 36*   CREATININE 6.44*   GLUCOSE 87   CALCIUM 8.4*   LABGLOM 6.8*     Troponin:   No results for input(s): TROPONINI in the last 72 hours. BNP: No results for input(s): BNP in the last 72 hours.   INR:   No results for input(s): INR in the last 72 hours. Lipids:   No results for input(s): CHOL, LDLDIRECT, TRIG, HDL, AMYLASE, LIPASE in the last 72 hours. Liver:   No results for input(s): AST, ALT, ALKPHOS, PROT, LABALBU, BILITOT in the last 72 hours. Invalid input(s): BILDIR  Iron:  No results for input(s): IRONS, FERRITIN in the last 72 hours. Invalid input(s): LABIRONS  Urinalysis: No results for input(s): UA in the last 72 hours. Objective:   Vitals: /63   Pulse 86   Temp 98.3 °F (36.8 °C)   Resp 16   Ht 5' 3\" (1.6 m)   Wt 209 lb 14.1 oz (95.2 kg)   SpO2 93%   BMI 37.18 kg/m²    Wt Readings from Last 3 Encounters:   03/10/20 209 lb 14.1 oz (95.2 kg)   03/03/20 218 lb 11.1 oz (99.2 kg)   02/22/20 210 lb (95.3 kg)      24HR INTAKE/OUTPUT:      Intake/Output Summary (Last 24 hours) at 3/10/2020 1344  Last data filed at 3/10/2020 1234  Gross per 24 hour   Intake 0 ml   Output 2900 ml   Net -2900 ml       Constitutional:  Alert, awake, no apparent distress   Skin:normal, no rash  HEENT:sclera anicteric.   Head atraumatic normocephalic  Neck:supple with no thyromegally  Cardiovascular:  S1, S2 without m/r/g   Respiratory:  CTA B without w/r/r   Abdomen: +bs, soft, nt  Ext: trace LE edema  Musculoskeletal:Intact  Neuro:Alert and oriented with no deficit      Electronically signed by Christiane Lacey MD on 3/10/2020 at 1:44 PM

## 2020-03-11 NOTE — PROGRESS NOTES
Pt friendly and cooperative with this nurse this morning during assessment and conversation. Maintains good eye contact. Rates her anxiety 8/10 and her depression 7/10. States, \"not as bad as before\" upon questioning about anxiety and depression. Denies any SI, or HI. States \"sometimes I do a double take because I think I see stuff, but its never really there\". Denies any auditory hallucinations. Pt observed reading book in her room after assessment. Scheduled for ECT today. Will continue to monitor.

## 2020-03-11 NOTE — PROGRESS NOTES
Pt reminded of NPO status after midnight for ECT tx in AM. Pt verbalizes an understanding. Denies needs/ concerns r/t ECT. Pt declines to be woken up for snack prior to NPO status.

## 2020-03-11 NOTE — CARE COORDINATION
Per Kiera Esposito- ECT coordinator- this patient has a heplock in right hand and this needs to stay in at least til ater her next ECT treatment on friday

## 2020-03-11 NOTE — OP NOTE
Department of Psychiatry  Electroconvulsive Therapy Treatment Note        3/11/2020    PURPOSE FOR ECT:  Therapeutic NUMBER: 2    DIAGNOSIS:   Principal Problem:    MDD (major depressive disorder), recurrent severe, without psychosis [facility]  Resolved Problems:    * No resolved hospital problems.  *      MEDICATIONS:    Current Facility-Administered Medications:     fentaNYL (SUBLIMAZE) injection 50 mcg, 50 mcg, Intravenous, Q10 Min PRN, Federico Belle MD    diphenhydrAMINE (BENADRYL) injection 12.5 mg, 12.5 mg, Intravenous, Once PRN, Federico Belle MD    ondansetron [facility]) injection 4 mg, 4 mg, Intravenous, Once PRN, Federico Belle MD    metoclopramide (REGLAN) injection 10 mg, 10 mg, Intravenous, Once PRN, Federico Belle MD    meperidine (DEMEROL) injection 12.5 mg, 12.5 mg, Intravenous, Q5 Min PRN, Federico Belle MD    promethazine (PHENERGAN) tablet 12.5 mg, 12.5 mg, Oral, Q6H PRN **OR** ondansetron (ZOFRAN) injection 4 mg, 4 mg, Intravenous, Q6H PRN, Jon Chopra MD    venlafaxine (EFFEXOR XR) extended release capsule 75 mg, 75 mg, Oral, Daily with breakfast, Jon Chopra MD, 75 mg at 03/10/20 1341    guaiFENesin (MUCINEX) extended release tablet 600 mg, 600 mg, Oral, BID PRN, Jon Chopra MD, 600 mg at 03/10/20 1353    lidocaine-prilocaine (EMLA) cream, , Topical, PRN, Jon Chopra MD    mupirocin (BACTROBAN) 2 % ointment, , Topical, BID, Jon Chopra MD    miconazole (MICOTIN) 2 % powder, , Topical, BID, Jon Chopra MD    albumin human 25 % IV solution 25 g, 25 g, Intravenous, Daily PRN, Jon Chopra MD    carvedilol (COREG) tablet 25 mg, 25 mg, Oral, BID, Jon Chopra MD, 25 mg at 03/10/20 2017    hydrALAZINE (APRESOLINE) tablet 25 mg, 25 mg, Oral, Q8H PRN, Jon Chopra MD, 25 mg at 03/07/20 2008    traZODone (DESYREL) tablet 100 mg, 100 mg, Oral, Nightly, Jon Chopra MD, 100 mg at 03/10/20 2017    aluminum & magnesium hydroxide-simethicone

## 2020-03-11 NOTE — ANESTHESIA PRE PROCEDURE
Department of Anesthesiology  Preprocedure Note       Name:  Tutu Banerjee   Age:  48 y.o.  :  1965                                          MRN:  [id number]         Date:  3/11/2020      Surgeon: * No surgeons listed *    Procedure: ECT W/ ANESTHESIA    Medications prior to admission:   Prior to Admission medications    Medication Sig Start Date End Date Taking? Authorizing Provider   diphenhydrAMINE (BENADRYL) 25 MG capsule Take 25 mg by mouth every 6 hours as needed for Itching    Historical Provider, MD   QUEtiapine (SEROQUEL XR) 50 MG extended release tablet Take 50 mg by mouth nightly    Historical Provider, MD   metoprolol succinate (TOPROL XL) 50 MG extended release tablet Take 50 mg by mouth 2 times daily    Historical Provider, MD   warfarin (COUMADIN) 5 MG tablet Take 5 mg by mouth    Historical Provider, MD   aspirin 81 MG chewable tablet Take 1 tablet by mouth daily 10/15/19   Ethan Flor, DO   nitroGLYCERIN (NITROSTAT) 0.4 MG SL tablet up to max of 3 total doses.  If no relief after 1 dose, call 911. 10/15/19   Ethan Flor, DO   nystatin (MYCOSTATIN) POWD powder Apply topically 2 times daily    Historical Provider, MD   carvedilol (COREG) 12.5 MG tablet Take 12.5 mg by mouth 2 times daily    Historical Provider, MD   diltiazem (DILACOR XR) 240 MG extended release capsule Take 240 mg by mouth daily Indications: PT TAKES CARDIZEM CD    Historical Provider, MD   b complex-C-folic acid (NEPHROCAPS) 1 MG capsule Take 1 capsule by mouth daily    Historical Provider, MD   topiramate (TOPAMAX) 25 MG tablet Take 25 mg by mouth 2 times daily    Historical Provider, MD   calcium acetate (PHOSLO) 667 MG capsule Take by mouth 3 times daily (with meals)    Historical Provider, MD   Multiple Vitamins-Minerals (THERAPEUTIC MULTIVITAMIN-MINERALS) tablet Take 1 tablet by mouth daily    Historical Provider, MD   atorvastatin (LIPITOR) 40 MG tablet Take 40 mg by mouth nightly    Historical Provider, traZODone (DESYREL) tablet 100 mg  100 mg Oral Nightly José Johnson MD   100 mg at 03/10/20 2017    aluminum & magnesium hydroxide-simethicone (MAALOX) 200-200-20 MG/5ML suspension 30 mL  30 mL Oral PRN José Johnson MD        benztropine mesylate (COGENTIN) injection 2 mg  2 mg Intramuscular BID PRN José Johnson MD        magnesium hydroxide (MILK OF MAGNESIA) 400 MG/5ML suspension 30 mL  30 mL Oral Daily PRN José Johnson MD        dextrose 5 % solution  100 mL/hr Intravenous PRN José Johnson MD        dextrose 50 % IV solution  12.5 g Intravenous PRN José Johnson MD        glucagon (rDNA) injection 1 mg  1 mg Intramuscular PRN José Johnson MD        glucose (GLUTOSE) 40 % oral gel 15 g  15 g Oral PRN José Johnson MD        insulin lispro (HUMALOG) injection vial 0-3 Units  0-3 Units Subcutaneous Nightly José Johnson MD   1 Units at 03/10/20 2018    insulin lispro (HUMALOG) injection vial 0-6 Units  0-6 Units Subcutaneous TID  José Johnson MD   Stopped at 03/09/20 0945    aspirin chewable tablet 81 mg  81 mg Oral Daily José Johnson MD   81 mg at 03/10/20 1341    atorvastatin (LIPITOR) tablet 40 mg  40 mg Oral Nightly José Johnson MD   40 mg at 03/10/20 2017    calcium acetate (PHOSLO) capsule 2,001 mg  2,001 mg Oral TID  José Johnson MD   Stopped at 03/11/20 0915    darbepoetin sarina-polysorbate (ARANESP) injection 60 mcg  60 mcg Subcutaneous Weekly José Johnson MD   60 mcg at 03/06/20 1443    oxyCODONE-acetaminophen (PERCOCET) 5-325 MG per tablet 1 tablet  1 tablet Oral Q4H PRN José Johnson MD   1 tablet at 03/10/20 2017       Allergies:     Allergies   Allergen Reactions    Hydrocodone-Acetaminophen Hives, Itching and Nausea Only     Other reaction(s): Nausea      Naproxen Hives and Nausea Only     Other reaction(s): Nausea      Hydrocodone Hives    Vicodin [Hydrocodone-Acetaminophen] Hives       Problem List:    Patient Active Problem List   Diagnosis Code    Angina pectoris, unspecified ([facility] I20.9    CHF (congestive heart failure) ([facility]) I50.9    NSTEMI (non-ST elevated myocardial infarction) ([facility] I21.4    Coronary artery disease of native artery of native heart with stable angina pectoris ([facility]) I25.118    ESRD (end stage renal disease) ([facility] N18.6    Essential hypertension I10    Hx of CABG Z95.1    Paroxysmal atrial fibrillation ([facility]) I48.0    CHF (congestive heart failure), NYHA class I, acute on chronic, combined ([facility]) I50.43    Uremia N19    Goals of care, counseling/discussion Z71.89    Anemia due to chronic kidney disease, on chronic dialysis ([facility]) N18.6, D63.1, Z99.2    MDD (major depressive disorder), recurrent severe, without psychosis ([facility] F33.2       Past Medical History:        Diagnosis Date    Atrial fibrillation ([facility])     Cancer ([facility]     Breast    CHF (congestive heart failure) ([facility])     Chronic kidney disease     Diabetes mellitus ([facility]     ESRD (end stage renal disease) ([facility] 2020    Essential hypertension 2020    Heart murmur     Hx of CABG 2020    Paroxysmal atrial fibrillation ([facility] 2020       Past Surgical History:        Procedure Laterality Date    BREAST SURGERY       SECTION      CHOLECYSTECTOMY      CORONARY ARTERY BYPASS GRAFT         Social History:    Social History     Tobacco Use    Smoking status: Never Smoker    Smokeless tobacco: Never Used   Substance Use Topics    Alcohol use: Never     Frequency: Never                                Counseling given: Not Answered      Vital Signs (Current):   Vitals:    03/10/20 1200 03/10/20 1234 03/10/20 1322 03/10/20 2004   BP: 133/75 131/70 134/63 (!) 149/79   Pulse: 74 75 86 68   Resp: 16 16     Temp:  98.3 °F (36.8 °C)     TempSrc:       SpO2:   93%    Weight:  209 lb 14.1 oz (95.2 kg)     Height:                                                  BP Readings from Last 3 Encounters:   03/10/20 fibrillation, CHF:, hyperlipidemia         Beta Blocker:  Dose within 24 Hrs         Neuro/Psych:   (+) psychiatric history:depression/anxiety             GI/Hepatic/Renal:   (+) renal disease: ESRD, morbid obesity (class II obesity)          Endo/Other:    (+) DiabetesType II DM, , blood dyscrasia: anticoagulation therapy and anemia:., .                 Abdominal:           Vascular: negative vascular ROS. Anesthesia Plan      general     ASA 4       Induction: intravenous. MIPS: Prophylactic antiemetics administered. Anesthetic plan and risks discussed with patient. Plan discussed with CRNA.     Attending anesthesiologist reviewed and agrees with Edi Garza MD   3/11/2020

## 2020-03-11 NOTE — PROGRESS NOTES
Pt returned from ECT. Currently out in day room completing a puzzle. VS assessed and noted to have gag at this time.

## 2020-03-11 NOTE — PROGRESS NOTES
Renal Progress Note    Assessment and Plan:      49 yo lady with DM, HTN, CAD s/p relatively recent CABG with extreme noncompliance with HD. Comes in not feeling well. Has admitted to depression as part of reason for missing hd before. Has anemia related to her CKD. No signs of blood loss. Given aranesp.  htn due to fluid overload. permcath removed. On HD TThSa now. Had some mild hyperkalemia.       - HD TThSa  - ok from medical standpoint for ECT  - got aranesp  - repeat k an hgb in am  - ok to stop topamax - d/w Dr. Kiel Alba    Patient Active Problem List:     Angina pectoris, unspecified ([address]     CHF (congestive heart failure) ([address]     NSTEMI (non-ST elevated myocardial infarction) ([address]     Coronary artery disease of native artery of native heart with stable angina pectoris ([address]     ESRD (end stage renal disease) ([address]     Essential hypertension     Hx of CABG     Paroxysmal atrial fibrillation ([facility])     CHF (congestive heart failure), NYHA class I, acute on chronic, combined ([address]     Uremia      Subjective:   Admit Date: 3/3/2020    Interval History: ECT done just now. Had hd yesterday.  hgb is improved.         Medications:   Scheduled Meds:   venlafaxine  75 mg Oral Daily with breakfast    mupirocin   Topical BID    miconazole   Topical BID    carvedilol  25 mg Oral BID    traZODone  100 mg Oral Nightly    insulin lispro  0-3 Units Subcutaneous Nightly    insulin lispro  0-6 Units Subcutaneous TID WC    aspirin  81 mg Oral Daily    atorvastatin  40 mg Oral Nightly    calcium acetate  2,001 mg Oral TID WC    darbepoetin sarina-polysorbate  60 mcg Subcutaneous Weekly     Continuous Infusions:   dextrose         CBC:   Recent Labs     03/10/20  0605 03/11/20  1039   WBC 4.9 5.6   HGB 7.0* 8.1*    195     CMP:    Recent Labs     03/10/20  0605      K 5.8*   CL 98   CO2 27   BUN 36*   CREATININE 6.44*   GLUCOSE 87   CALCIUM 8.4*   LABGLOM 6.8*     Troponin:   No results for

## 2020-03-11 NOTE — PROGRESS NOTES
Pt out on unit, but not social with peers. Observed sitting, dayroom, putting a puzzle together. Quiet, verbalized missing a peer, that has been discharged. Pt denies shower or ADL's today. Pt encouraged to participate in proper hygiene, voiced will shower tomorrow. Pt reports good appetite. Pt reports good sleep. Pt rates anxiety,7 /10, missing son. Pt rates depression ,7/ 10, would not elaborate. Pt reports attending groups, art groups, patient encouraged to attend all groups, verbalized understanding. Pt denies SI, HI and A/V hallucinations. Pt  voiced delusions at this time. Pt alert and oriented x 4. Will continue to monitor.

## 2020-03-11 NOTE — PROGRESS NOTES
Pt. declined to attend the 0900 community meeting, despite staff encouragement. Electronically signed by Latasha Jeffers [address] on 3/11/2020 at 10:51 AM

## 2020-03-11 NOTE — ADDENDUM NOTE
Addendum  created 03/11/20 1312 by FRANCHESKA Dempsey - CRNA    Attestation recorded in [facility] filed

## 2020-03-12 NOTE — PROGRESS NOTES
Pt transported to Dialysis unit via wheel accompanied by staff as a 1:1 . Pt reports pain to her back #9 stated she takes percocet for it and will ask for it when she returns to the unit. Pt is bright and pleasant.

## 2020-03-12 NOTE — PROGRESS NOTES
Patient did not attend the 1000 skills group due to being off the unit for dialysis.  Electronically signed by Lilian [address] on 3/12/2020 at 2:10 PM

## 2020-03-12 NOTE — PROGRESS NOTES
BEHAVIORAL HEALTH FOLLOW-UP NOTE     3/12/2020     Patient was seen and examined in person, Chart reviewed   Patient's case discussed with staff/team    Chief Complaint: Depression    Interim History:   No change in her prescription  Pt is still feeling depressed hopeless and worthless  Suicidal thoughts -passive  Feel weak and tired  In HD today    Appetite:   [] Normal/Unchanged  [] Increased  [x] Decreased      Sleep:       [] Normal/Unchanged  [x] Fair       [] Poor              Energy:    [] Normal/Unchanged  [] Increased  [x] Decreased        SI [x] Present  [] Absent    HI  []Present  [] Absent     Aggression:  [] yes  [] no    Patient is [] able  [x] unable to CONTRACT FOR SAFETY     PAST MEDICAL/PSYCHIATRIC HISTORY:   Past Medical History:   Diagnosis Date    Atrial fibrillation ([facility]     Cancer ([facility]     Breast    CHF (congestive heart failure) ([facility]     Chronic kidney disease     Diabetes mellitus ([facility]     ESRD (end stage renal disease) ([facility] 2/14/2020    Essential hypertension 2/14/2020    Heart murmur     Hepatitis C     Hx of CABG 2/14/2020    Paroxysmal atrial fibrillation ([facility] 2/14/2020       FAMILY/SOCIAL HISTORY:  History reviewed. No pertinent family history.   Social History     Socioeconomic History    Marital status: Single     Spouse name: Not on file    Number of children: Not on file    Years of education: Not on file    Highest education level: Not on file   Occupational History    Not on file   Social Needs    Financial resource strain: Not on file    Food insecurity     Worry: Not on file     Inability: Not on file    Transportation needs     Medical: Not on file     Non-medical: Not on file   Tobacco Use    Smoking status: Never Smoker    Smokeless tobacco: Never Used   Substance and Sexual Activity    Alcohol use: Never     Frequency: Never    Drug use: Never    Sexual activity: Not Currently   Lifestyle    Physical activity     Days per week: Not on file Minutes per session: Not on file    Stress: Not on file   Relationships    Social connections     Talks on phone: Not on file     Gets together: Not on file     Attends Orthodoxy service: Not on file     Active member of club or organization: Not on file     Attends meetings of clubs or organizations: Not on file     Relationship status: Not on file    Intimate partner violence     Fear of current or ex partner: Not on file     Emotionally abused: Not on file     Physically abused: Not on file     Forced sexual activity: Not on file   Other Topics Concern    Not on file   Social History Narrative    Not on file           ROS:  [x] All negative/unchanged except if checked.  Explain positive(checked items) below:  [] Constitutional  [] Eyes  [] Ear/Nose/Mouth/Throat  [] Respiratory  [] CV  [] GI  []   [] Musculoskeletal  [] Skin/Breast  [] Neurological  [] Endocrine  [] Heme/Lymph  [] Allergic/Immunologic    Explanation:     MEDICATIONS:    Current Facility-Administered Medications:     promethazine (PHENERGAN) tablet 12.5 mg, 12.5 mg, Oral, Q6H PRN **OR** ondansetron (ZOFRAN) injection 4 mg, 4 mg, Intravenous, Q6H PRN, Crystal June MD    venlafaxine (EFFEXOR XR) extended release capsule 75 mg, 75 mg, Oral, Daily with breakfast, Crystal June MD, 75 mg at 03/12/20 1328    guaiFENesin (MUCINEX) extended release tablet 600 mg, 600 mg, Oral, BID PRN, Crystal June MD, 600 mg at 03/12/20 1345    lidocaine-prilocaine (EMLA) cream, , Topical, PRN, Crystal June MD    mupirocin (BACTROBAN) 2 % ointment, , Topical, BID, Crystal June MD    miconazole (MICOTIN) 2 % powder, , Topical, BID, Crystal June MD    albumin human 25 % IV solution 25 g, 25 g, Intravenous, Daily PRN, Crystal June MD    carvedilol (COREG) tablet 25 mg, 25 mg, Oral, BID, Crystal June MD, 25 mg at 03/12/20 1329    hydrALAZINE (APRESOLINE) tablet 25 mg, 25 mg, Oral, Q8H PRN, Crystal June MD, 25 mg at 03/07/20 2008    traZODone (DESYREL) tablet 100 mg, 100 mg, Oral, Nightly, Alyssa Grullon MD, 100 mg at 03/11/20 2115    aluminum & magnesium hydroxide-simethicone (MAALOX) 200-200-20 MG/5ML suspension 30 mL, 30 mL, Oral, PRN, Alyssa Grullon MD    benztropine mesylate (COGENTIN) injection 2 mg, 2 mg, Intramuscular, BID PRN, Alyssa Grullon MD    magnesium hydroxide (MILK OF MAGNESIA) 400 MG/5ML suspension 30 mL, 30 mL, Oral, Daily PRN, Alyssa Grullon MD    dextrose 5 % solution, 100 mL/hr, Intravenous, PRN, Alyssa Grullon MD    dextrose 50 % IV solution, 12.5 g, Intravenous, PRN, Alyssa Grullon MD    glucagon (rDNA) injection 1 mg, 1 mg, Intramuscular, PRN, Alyssa Grullon MD    glucose (GLUTOSE) 40 % oral gel 15 g, 15 g, Oral, PRN, Alyssa Grullon MD    insulin lispro (HUMALOG) injection vial 0-3 Units, 0-3 Units, Subcutaneous, Nightly, Alyssa Grullon MD, 1 Units at 03/10/20 2018    insulin lispro (HUMALOG) injection vial 0-6 Units, 0-6 Units, Subcutaneous, TID WC, Alyssa Grullon MD, Stopped at 03/12/20 1340    aspirin chewable tablet 81 mg, 81 mg, Oral, Daily, Alyssa Grullon MD, 81 mg at 03/12/20 1329    atorvastatin (LIPITOR) tablet 40 mg, 40 mg, Oral, Nightly, Alyssa Grullon MD, 40 mg at 03/11/20 2115    calcium acetate (PHOSLO) capsule 2,001 mg, 2,001 mg, Oral, TID WC, Alyssa Grullon MD, 2,001 mg at 03/12/20 1328    darbepoetin sarina-polysorbate (ARANESP) injection 60 mcg, 60 mcg, Subcutaneous, Weekly, Alyssa Grullon MD, 60 mcg at 03/06/20 1443    oxyCODONE-acetaminophen (PERCOCET) 5-325 MG per tablet 1 tablet, 1 tablet, Oral, Q4H PRN, Alyssa Grullon MD, 1 tablet at 03/12/20 1345      Examination:  BP (!) 163/80   Pulse 70   Temp 98 °F (36.7 °C) (Oral)   Resp 18   Ht 5' 3\" (1.6 m)   Wt 200 lb 6.4 oz (90.9 kg)   SpO2 95%   BMI 35.50 kg/m²   Gait - steady  Medication side effects(SE): no    Mental Status Examination:    Level of consciousness:  within normal limits   Appearance:  poor grooming and poor hygiene  Behavior/Motor:  psychomotor retardation  Attitude toward examiner:  cooperative  Speech:  slow   Mood: constricted, decreased range and depressed  Affect:  mood congruent  Thought processes:  slow   Thought content:  Suicidal Ideation:  active  Delusions:  denies  Perceptual Disturbance:  denies any perceptual disturbance  Cognition:  oriented to person, place, and time   Concentration poor  Insight poor   Judgement poor     ASSESSMENT:   Patient symptoms are:  [] Well controlled  [] Improving  [] Worsening  [x] No change      Diagnosis:   Principal Problem:    MDD (major depressive disorder), recurrent severe, without psychosis ([facility]  Resolved Problems:    * No resolved hospital problems. *      LABS:    Recent Labs     03/10/20  0605 03/11/20  1039 03/12/20  0638   WBC 4.9 5.6 4.9   HGB 7.0* 8.1* 8.2*    195 199     Recent Labs     03/10/20  0605 03/12/20  0639    136   K 5.8* 5.5*   CL 98 96   CO2 27 25   BUN 36* 38*   CREATININE 6.44* 5.85*   GLUCOSE 87 104*     No results for input(s): BILITOT, ALKPHOS, AST, ALT in the last 72 hours. No results found for: Bailey Raring, LABBENZ, CANNAB, COCAINESCRN, LABMETH, OPIATESCREENURINE, PHENCYCLIDINESCREENURINE, PPXUR, ETOH  Lab Results   Component Value Date    TSH 0.847 10/13/2019     No results found for: LITHIUM  No results found for: VALPROATE, CBMZ    RISK ASSESSMENT: high risk of neglect and suicide    Treatment Plan:  Reviewed current Medications with the patient. ECT # 2 tomorrow  Medication as ordered  Risks, benefits, side effects, drug-to-drug interactions and alternatives to treatment were discussed. Collateral information: pending  CD evaluation  Encourage patient to attend group and other milieu activities.   Discharge planning discussed with the patient and treatment team.    PSYCHOTHERAPY/COUNSELING:  [x] Therapeutic interview  [x] Supportive  [] CBT  [] Ongoing  []

## 2020-03-12 NOTE — CARE COORDINATION
Patient did not attend group. The patient was off of the unit receiving dialysis during the time of group.

## 2020-03-12 NOTE — PROGRESS NOTES
Pt arrives back from dialysis in wheelchair. Pts color is good. Vitals are WNL. Patients morning meds will be given and accuchek done. Pt requests prn Percocet and mucinex. Pt will be given lunch to eat.   Electronically signed by Yaima Day LPN on 5/35/8313 at 4:82 PM

## 2020-03-12 NOTE — PROGRESS NOTES
on a puzzle, agreeable to PT lolita.    Restrictions:        SUBJECTIVE:      Pain  Pre Treatment Pain Screening  Pain at present: 4  Intervention List: Patient able to continue with treatment    Post Treatment Pain Screening:   Pain Screening  Patient Currently in Pain: Yes  Pain Assessment  Pain Level: 4  Pain Type: Chronic pain  Pain Location: Back    Prior Level of Function:  Social/Functional History  Lives With: Family(sister and her kids)  Type of Home: House  Home Layout: Two level(pt reports living on first floor)  Home Access: Stairs to enter with rails  Entrance Stairs - Number of Steps: 4  Entrance Stairs - Rails: Both  Bathroom Shower/Tub: Tub/Shower unit, Shower chair with back  [employer]: Hand-held shower  Home Equipment: 4 wheeled walker  Receives Help From: Family  ADL Assistance: Needs assistance(sister has been assisting with showers and socks/shoes)  Homemaking Assistance: Needs assistance  Homemaking Responsibilities: No  Ambulation Assistance: Independent(rollator)  Transfer Assistance: Independent  Active : Yes(very rarely)  Mode of Transportation: Car  Occupation: On disability  Type of occupation: worked at [employer] [address]: spend time with her son    OBJECTIVE:   Vision: Impaired  Vision Exceptions: Wears glasses at all times  Hearing: Exceptions to [facility]  Hearing Exceptions: Hard of hearing/hearing concerns    Cognition:  Overall Orientation Status: Within Functional Limits  Follows Commands: Within Functional Limits    Observation/Palpation  Posture: Fair(flexed, posterior pelvic tilt)    ROM:  RLE AROM: WFL  LLE AROM : WFL    Strength:  Strength RLE  Comment: grossly 4-/5  Strength LLE  Comment: grossly 4-/5    Neuro:  Balance  Sitting - Static: Good  Sitting - Dynamic: Good  Standing - Static: Fair;+  Standing - Dynamic: Fair;+             Bed mobility  Comment: DNT, pt out in common room    Transfers  Sit to Stand: Stand by assistance  Stand to sit: Stand by devices: All fall risk precautions in place    Goals:  Patient goals : to go home  Long term goals  Long term goal 1: indep with transfers/gait around unit with WW vs rollator   Long term goal 2: pt to be indep with HEP    AMPAC (6 CLICK) BASIC MOBILITY  AM-PAC Inpatient Mobility Raw Score : 19     Therapy Time:   Individual   Time In 1550   Time Out 1604   Minutes [address], [place], 03/12/20 at 4:45 PM         Definitions for assistance levels  Independent = pt does not require any physical supervision or assistance from another person for activity completion. Device may be needed.   Stand by assistance = pt requires verbal cues or instructions from another person, close to but not touching, to perform the activity  Minimal assistance= pt performs 75% or more of the activity; assistance is required to complete the activity  Moderate assistance= pt performs 50% of the activity; assistance is required to complete the activity  Maximal assistance = pt performs 25% of the activity; assistance is required to complete the activity  Dependent = pt requires total physical assistance to accomplish the task

## 2020-03-12 NOTE — PROGRESS NOTES
Patient did not attend wrap up/relaxation group despite staff encouragement.  Electronically signed by Geo Brooks on 3/11/2020 at 9:34 PM

## 2020-03-12 NOTE — PROGRESS NOTES
Since return from dialysis, pt out in day room working on [employer]. Observed socializing if others approach her. Friendly and cooperative with staff and others. States her depression and anxiety are both 7/10 at this time. Unable to explain what is making her anxious or depressed. Denies any SI, HI or AVH. Maintains good eye contact during conversation. Explains that she wants to go home and not leave her house again. Remains in day room, working on [employer]. Will continue to monitor.

## 2020-03-13 NOTE — OP NOTE
Department of Psychiatry  Electroconvulsive Therapy Treatment Note        3/13/2020    PURPOSE FOR ECT:  Therapeutic NUMBER: 3    DIAGNOSIS:   Principal Problem:    MDD (major depressive disorder), recurrent severe, without psychosis ([facility]  Resolved Problems:    * No resolved hospital problems.  *      MEDICATIONS:    Current Facility-Administered Medications:     0.9 % sodium chloride infusion, , Intravenous, Continuous, Beena Menjivar MD    sodium chloride 0.9 % infusion, , , ,     HYDROcodone-acetaminophen (NORCO) 5-325 MG per tablet 1 tablet, 1 tablet, Oral, PRN **OR** HYDROcodone-acetaminophen (NORCO) 5-325 MG per tablet 2 tablet, 2 tablet, Oral, PRN, Kevin Kilgore MD    diphenhydrAMINE (BENADRYL) injection 12.5 mg, 12.5 mg, Intravenous, Once PRN, Kevin Kilgore MD    ondansetron [facility]) injection 4 mg, 4 mg, Intravenous, Once PRN, Kevin Kilgore MD    metoclopramide [facility]) injection 10 mg, 10 mg, Intravenous, Once PRN, Kevin Kilgore MD    promethazine (PHENERGAN) tablet 12.5 mg, 12.5 mg, Oral, Q6H PRN **OR** ondansetron (ZOFRAN) injection 4 mg, 4 mg, Intravenous, Q6H PRN, Beena Menjivar MD    venlafaxine (EFFEXOR XR) extended release capsule 75 mg, 75 mg, Oral, Daily with breakfast, Beena Menjivar MD, 75 mg at 03/12/20 1328    guaiFENesin (MUCINEX) extended release tablet 600 mg, 600 mg, Oral, BID PRN, Beena Menjivar MD, 600 mg at 03/12/20 1345    lidocaine-prilocaine (EMLA) cream, , Topical, PRN, Beena Menjivar MD    mupirocin (BACTROBAN) 2 % ointment, , Topical, BID, Beena Menjivar MD    miconazole (MICOTIN) 2 % powder, , Topical, BID, Beena Menjivar MD    albumin human 25 % IV solution 25 g, 25 g, Intravenous, Daily PRN, Beena Menjivar MD    carvedilol (COREG) tablet 25 mg, 25 mg, Oral, BID, Beena Menjivar MD, 25 mg at 03/12/20 2130    hydrALAZINE (APRESOLINE) tablet 25 mg, 25 mg, Oral, Q8H PRN, Beena Menjivar MD, 25 mg at AND CORRECT SITE.     DEVICE PARAMETERS:   Charge- 211  PW - 0.3  Freq- 55  Duration- 8  EEG- 14  Motor-15    VITALS:   Vitals:    03/13/20 1105   BP: 138/77   Pulse: 68   Resp: 18   Temp: 97 °F (36.1 °C)   SpO2: 76%         COMPLICATIONS: no      RECOMMENDATIONS FOR NEXT TREATMENT:  monday        PSYCHIATRIST:  Mara Han

## 2020-03-13 NOTE — PROGRESS NOTES
Renal Progress Note    Assessment and Plan:      47 yo lady with DM, HTN, CAD s/p relatively recent CABG with extreme noncompliance with HD. Comes in not feeling well. Has admitted to depression as part of reason for missing hd before. Has anemia related to her CKD. htn due to fluid overload. permcath removed. On HD TThSa now.       - HD TThSa     Patient Active Problem List:     Angina pectoris, unspecified ([facility]     CHF (congestive heart failure) ([facility])     NSTEMI (non-ST elevated myocardial infarction) ([facility]     Coronary artery disease of native artery of native heart with stable angina pectoris ([facility])     ESRD (end stage renal disease) ([facility]     Essential hypertension     Hx of CABG     Paroxysmal atrial fibrillation ([facility])     CHF (congestive heart failure), NYHA class I, acute on chronic, combined ([facility]     Uremia      Subjective:   Admit Date: 3/3/2020    Interval History: got HD yesterday, remains depressed, getting ECT today       Medications:   Scheduled Meds:   venlafaxine  75 mg Oral Daily with breakfast    mupirocin   Topical BID    miconazole   Topical BID    carvedilol  25 mg Oral BID    traZODone  100 mg Oral Nightly    insulin lispro  0-3 Units Subcutaneous Nightly    insulin lispro  0-6 Units Subcutaneous TID WC    aspirin  81 mg Oral Daily    atorvastatin  40 mg Oral Nightly    calcium acetate  2,001 mg Oral TID WC    darbepoetin sarina-polysorbate  60 mcg Subcutaneous Weekly     Continuous Infusions:   sodium chloride      dextrose         CBC:   Recent Labs     03/11/20  1039 03/12/20  0638   WBC 5.6 4.9   HGB 8.1* 8.2*    199     CMP:    Recent Labs     03/12/20  0639 03/13/20  1145     --    K 5.5* 5.3*   CL 96  --    CO2 25  --    BUN 38*  --    CREATININE 5.85*  --    GLUCOSE 104*  --    CALCIUM 8.7  --    LABGLOM 7.6*  --      Troponin:   No results for input(s): TROPONINI in the last 72 hours. BNP: No results for input(s): BNP in the last 72 hours.   INR:   No

## 2020-03-13 NOTE — PROGRESS NOTES
Pt. declined to attend the 0900 community meeting, despite staff encouragement.  Electronically signed by Snow Auguste on 3/13/2020 at 9:36 AM

## 2020-03-13 NOTE — PROGRESS NOTES
Patient did not participate in 1900 group despite staff encouragement.  Electronically signed by Xiomara Gastelum on 3/12/2020 at 8:06 PM

## 2020-03-13 NOTE — PROGRESS NOTES
Pt arrives back on the unit from ECT via wheelchair and Precious Ospina. Patients vitals are checked, BP is high and will be monitored next vital, RN is informed. Pt comfortable sitting out in the day area, gag reflex is back and pt is given morning meds.   Electronically signed by Coreen Barber LPN on 4/57/4161 at 6:18 PM

## 2020-03-13 NOTE — CARE COORDINATION
Patient did not attend group despite staff encouragement.   Electronically signed by Chucho Bowman on 3/13/2020 at 11:52 AM

## 2020-03-14 NOTE — PROGRESS NOTES
Patient in  Room . Lidocaine cream applied to left fistula and transparent dressing applied. bactroban applied to numerous spots on back ,  Right arm and abdomen. bandaides applied ( 2 on lower back )  ( 2 on abdomen )  Abdomen  Has drainage. Rest of the spots healing up well. micotin powder applied to right breast. Red area  Almost gone. Nadia area skin folds very reddened. micotin applied. Told patient she needed to make sure she cleaned, and dried area, well. And let air get to it.    ( suggested a shower would be good.)

## 2020-03-14 NOTE — PROGRESS NOTES
Pt out social with select peers, flat affect noted, pt reports she is feeling better reports anxiety and depression 6/10. Reports feeling tired and unmativated. No lice noted by this nurse. Pt reports feeling itchy at times. Dry skin r/t dialysis per pt.pt denies SI,HI,AVH.

## 2020-03-14 NOTE — PROGRESS NOTES
Patient did not attend wrap up/relaxation group despite staff encouragement.  Electronically signed by Elmer Shown on 3/13/2020 at 9:35 PM

## 2020-03-14 NOTE — PROGRESS NOTES
CALCIUM 8.7  --    LABGLOM 7.6*  --      Troponin: No results for input(s): TROPONINI in the last 72 hours. BNP: No results for input(s): BNP in the last 72 hours. INR: No results for input(s): INR in the last 72 hours. Lipids: No results for input(s): CHOL, LDLDIRECT, TRIG, HDL, AMYLASE, LIPASE in the last 72 hours. Liver: No results for input(s): AST, ALT, ALKPHOS, PROT, LABALBU, BILITOT in the last 72 hours. Invalid input(s): BILDIR  Iron:  No results for input(s): IRONS, FERRITIN in the last 72 hours. Invalid input(s): LABIRONS  Urinalysis: No results for input(s): UA in the last 72 hours. Objective:   Vitals: BP (!) 157/82   Pulse 64   Temp 96.4 °F (35.8 °C)   Resp 18   Ht 5' 3\" (1.6 m)   Wt 200 lb 6.4 oz (90.9 kg)   SpO2 95%   BMI 35.50 kg/m²    Wt Readings from Last 3 Encounters:   03/12/20 200 lb 6.4 oz (90.9 kg)   03/03/20 218 lb 11.1 oz (99.2 kg)   02/22/20 210 lb (95.3 kg)      24HR INTAKE/OUTPUT:      Intake/Output Summary (Last 24 hours) at 3/14/2020 1415  Last data filed at 3/13/2020 1430  Gross per 24 hour   Intake 8 ml   Output --   Net 8 ml       Constitutional:  Alert, awake, no apparent distress   Skin:normal, no rash  HEENT:sclera anicteric.   Head atraumatic normocephalic  Neck:supple with no thyromegally  Cardiovascular:  S1, S2 without m/r/g   Respiratory:  CTA B without w/r/r Abdomen: +bs, soft, nt  Ext: +1 bilatLE edema  Musculoskeletal:Intact  Neuro:Alert and oriented with no deficit      Electronically signed by Mary Avitia MD on 3/14/2020 at 2:15 PM

## 2020-03-14 NOTE — GROUP NOTE
Group Therapy Note    Date: 3/14/2020    Group Start Time: 1000  Group End Time: 1100  Group Topic: Psychoeducation    [facility]    Hasmukh Roth, CTRS        Group Therapy Note    Attendees: 9         Patient's Goal:  To go to dialysis    Notes:  Pt. worked on project with interest. Estefany Sutton and to herself but attentive. Status After Intervention:  Improved    Participation Level:  Active Listener and Interactive    Participation Quality: Appropriate, Attentive and Sharing      Speech:  normal      Thought Process/Content: Logical      Affective Functioning: Flat      Mood: calm      Level of consciousness:  Alert, Oriented x4 and Attentive      Response to Learning: Progressing to goal      Endings: None Reported    Modes of Intervention: Education, Support, Socialization and Activity      Discipline Responsible: Psychoeducational Specialist      Signature:  Sonia Turk

## 2020-03-14 NOTE — GROUP NOTE
Group Therapy Note    Date: 3/13/2020    Group Start Time: 1900  Group End Time: 1950  Group Topic: Recreational    [facility] ANALI Galindo        Group Therapy Note    Attendees: 5/13         Patient's Goal:  To participate in group game of Heads Up. Notes:  Patient participated in group game.      Status After Intervention:  Improved    Participation Level: Interactive    Participation Quality: Appropriate and Attentive      Speech:  normal      Thought Process/Content: Logical      Affective Functioning: Congruent      Mood: euthymic      Level of consciousness:  Alert and Attentive      Response to Learning: Progressing to goal      Endings: None Reported    Modes of Intervention: Activity      Discipline Responsible: [employer]      Signature:  Yoli Galindo

## 2020-03-14 NOTE — PROGRESS NOTES
BEHAVIORAL HEALTH FOLLOW-UP NOTE     3/14/2020     Patient was seen and examined in person, Chart reviewed   Patient's case discussed with staff/team    Chief Complaint: Depression    Interim History:   Pt admit to feeling slightly better in her mood  Going for HD today  Pt is feeling tired  Doing puzzle, able to focus better  Less feeling of hopeless  No active SI  Want to do OP ECT after Monday treatment  Appetite:   [] Normal/Unchanged  [] Increased  [x] Decreased      Sleep:       [] Normal/Unchanged  [x] Fair       [] Poor              Energy:    [] Normal/Unchanged  [] Increased  [x] Decreased        SI [x] Present  [] Absent    HI  []Present  [] Absent     Aggression:  [] yes  [] no    Patient is [] able  [x] unable to CONTRACT FOR SAFETY     PAST MEDICAL/PSYCHIATRIC HISTORY:   Past Medical History:   Diagnosis Date    Atrial fibrillation ([facility]     Cancer ([facility]     Breast    CHF (congestive heart failure) ([facility]     Chronic kidney disease     Diabetes mellitus ([facility]     ESRD (end stage renal disease) ([facility] 2/14/2020    Essential hypertension 2/14/2020    Heart murmur     Hepatitis C     Hx of CABG 2/14/2020    Paroxysmal atrial fibrillation ([facility] 2/14/2020       FAMILY/SOCIAL HISTORY:  History reviewed. No pertinent family history.   Social History     Socioeconomic History    Marital status: Single     Spouse name: Not on file    Number of children: Not on file    Years of education: Not on file    Highest education level: Not on file   Occupational History    Not on file   Social Needs    Financial resource strain: Not on file    Food insecurity     Worry: Not on file     Inability: Not on file    Transportation needs     Medical: Not on file     Non-medical: Not on file   Tobacco Use    Smoking status: Never Smoker    Smokeless tobacco: Never Used   Substance and Sexual Activity    Alcohol use: Never     Frequency: Never    Drug use: Never    Sexual activity: Not Currently Lifestyle    Physical activity     Days per week: Not on file     Minutes per session: Not on file    Stress: Not on file   Relationships    Social connections     Talks on phone: Not on file     Gets together: Not on file     Attends Orthodoxy service: Not on file     Active member of club or organization: Not on file     Attends meetings of clubs or organizations: Not on file     Relationship status: Not on file    Intimate partner violence     Fear of current or ex partner: Not on file     Emotionally abused: Not on file     Physically abused: Not on file     Forced sexual activity: Not on file   Other Topics Concern    Not on file   Social History Narrative    Not on file           ROS:  [x] All negative/unchanged except if checked.  Explain positive(checked items) below:  [] Constitutional  [] Eyes  [] Ear/Nose/Mouth/Throat  [] Respiratory  [] CV  [] GI  []   [] Musculoskeletal  [] Skin/Breast  [] Neurological  [] Endocrine  [] Heme/Lymph  [] Allergic/Immunologic    Explanation:     MEDICATIONS:    Current Facility-Administered Medications:     diphenhydrAMINE (BENADRYL) tablet 25 mg, 25 mg, Oral, Q6H PRN, Argentina Scanlon MD    promethazine (PHENERGAN) tablet 12.5 mg, 12.5 mg, Oral, Q6H PRN **OR** ondansetron (ZOFRAN) injection 4 mg, 4 mg, Intravenous, Q6H PRN, Argentina Scanlon MD    venlafaxine (EFFEXOR XR) extended release capsule 75 mg, 75 mg, Oral, Daily with breakfast, Argentina Scanlon MD, 75 mg at 03/14/20 0903    guaiFENesin (MUCINEX) extended release tablet 600 mg, 600 mg, Oral, BID PRN, Argentina Scanlon MD, 600 mg at 03/12/20 1345    lidocaine-prilocaine (EMLA) cream, , Topical, PRN, Argentina Scanlon MD    mupirocin (BACTROBAN) 2 % ointment, , Topical, BID, Argentina Scanlon MD    miconazole (MICOTIN) 2 % powder, , Topical, BID, Argentina Scanlon MD    albumin human 25 % IV solution 25 g, 25 g, Intravenous, Daily PRN, Argentina Scanlon MD    carvedilol (COREG) tablet 25 mg, 25 mg, Oral, BID, Gina Echavarria MD, 25 mg at 03/14/20 1330    hydrALAZINE (APRESOLINE) tablet 25 mg, 25 mg, Oral, Q8H PRN, Gina Echavarria MD, 25 mg at 03/07/20 2008    traZODone (DESYREL) tablet 100 mg, 100 mg, Oral, Nightly, Gina Echavarria MD, 100 mg at 03/13/20 2023    aluminum & magnesium hydroxide-simethicone (MAALOX) 200-200-20 MG/5ML suspension 30 mL, 30 mL, Oral, PRN, Gina Echavarria MD    benztropine mesylate (COGENTIN) injection 2 mg, 2 mg, Intramuscular, BID PRN, Gina Echavarria MD    magnesium hydroxide (MILK OF MAGNESIA) 400 MG/5ML suspension 30 mL, 30 mL, Oral, Daily PRN, Gina Echavarria MD    dextrose 5 % solution, 100 mL/hr, Intravenous, PRN, Gina Echavarria MD    dextrose 50 % IV solution, 12.5 g, Intravenous, PRN, Gina Echavarria MD    glucagon (rDNA) injection 1 mg, 1 mg, Intramuscular, PRN, Gina Echavarria MD    glucose (GLUTOSE) 40 % oral gel 15 g, 15 g, Oral, PRN, Gina Echavarria MD    insulin lispro (HUMALOG) injection vial 0-3 Units, 0-3 Units, Subcutaneous, Nightly, Gina Echavarria MD, Stopped at 03/13/20 2029    insulin lispro (HUMALOG) injection vial 0-6 Units, 0-6 Units, Subcutaneous, TID , Gina Echavarria MD, Stopped at 03/12/20 1340    aspirin chewable tablet 81 mg, 81 mg, Oral, Daily, Gina Echavarria MD, 81 mg at 03/14/20 0903    atorvastatin (LIPITOR) tablet 40 mg, 40 mg, Oral, Nightly, Gina Echavarria MD, 40 mg at 03/13/20 2023    calcium acetate (PHOSLO) capsule 2,001 mg, 2,001 mg, Oral, TID WC, Gina Echavarria MD, 2,001 mg at 03/14/20 1208    darbepoetin sarina-polysorbate (ARANESP) injection 60 mcg, 60 mcg, Subcutaneous, Weekly, Gina Echavarria MD, 60 mcg at 03/13/20 1015    oxyCODONE-acetaminophen (PERCOCET) 5-325 MG per tablet 1 tablet, 1 tablet, Oral, Q4H PRN, Gina Echavarria MD, 1 tablet at 03/14/20 8429      Examination:  BP (!) 143/84   Pulse 66   Temp 96.4 °F (35.8 °C)   Resp 18   Ht 5' 3\" (1.6 m)   Wt 200 lb 6.4 oz (90.9 kg)   SpO2 95%   BMI 35.50 kg/m²   Gait - steady  Medication side effects(SE): no    Mental Status Examination:    Level of consciousness:  within normal limits   Appearance:  poor grooming and poor hygiene  Behavior/Motor:  psychomotor retardation  Attitude toward examiner:  cooperative  Speech:  slow   Mood: constricted, decreased range and depressed  Affect:  mood congruent  Thought processes:  slow   Thought content:  Suicidal Ideation:  active  Delusions:  denies  Perceptual Disturbance:  denies any perceptual disturbance  Cognition:  oriented to person, place, and time   Concentration poor  Insight poor   Judgement poor     ASSESSMENT:   Patient symptoms are:  [] Well controlled  [] Improving  [] Worsening  [x] No change      Diagnosis:   Principal Problem:    MDD (major depressive disorder), recurrent severe, without psychosis ([facility]  Resolved Problems:    * No resolved hospital problems. *      LABS:    Recent Labs     03/12/20  0638   WBC 4.9   HGB 8.2*        Recent Labs     03/12/20  0639 03/13/20  1145     --    K 5.5* 5.3*   CL 96  --    CO2 25  --    BUN 38*  --    CREATININE 5.85*  --    GLUCOSE 104*  --      No results for input(s): BILITOT, ALKPHOS, AST, ALT in the last 72 hours. No results found for: José Miguel Marsh, LABBENZ, CANNAB, COCAINESCRN, LABMETH, OPIATESCREENURINE, PHENCYCLIDINESCREENURINE, PPXUR, ETOH  Lab Results   Component Value Date    TSH 0.847 10/13/2019     No results found for: LITHIUM  No results found for: VALPROATE, CBMZ    RISK ASSESSMENT: high risk of neglect and suicide    Treatment Plan:  Reviewed current Medications with the patient. ECT on monday  Medication as ordered  Risks, benefits, side effects, drug-to-drug interactions and alternatives to treatment were discussed. Collateral information: pending  CD evaluation  Encourage patient to attend group and other milieu activities.   Discharge planning discussed with the patient and treatment team.    PSYCHOTHERAPY/COUNSELING:  [x] Therapeutic interview  [x] Supportive  [] CBT  [] Ongoing  [] Other    [x] Patient continues to need, on a daily basis, active treatment furnished directly by or requiring the supervision of inpatient psychiatric personnel      Anticipated Length of stay:            Electronically signed by Sharlene Sharma MD on 3/14/2020 at 3:30 PM

## 2020-03-15 NOTE — PROGRESS NOTES
Patient did not attend the [address] [facility] despite staff encouragement.  Electronically signed by Amrik Rodriguez on 3/15/2020 at 4:58 PM

## 2020-03-15 NOTE — PROGRESS NOTES
Lifestyle    Physical activity     Days per week: Not on file     Minutes per session: Not on file    Stress: Not on file   Relationships    Social connections     Talks on phone: Not on file     Gets together: Not on file     Attends Denominational service: Not on file     Active member of club or organization: Not on file     Attends meetings of clubs or organizations: Not on file     Relationship status: Not on file    Intimate partner violence     Fear of current or ex partner: Not on file     Emotionally abused: Not on file     Physically abused: Not on file     Forced sexual activity: Not on file   Other Topics Concern    Not on file   Social History Narrative    Not on file           ROS:  [x] All negative/unchanged except if checked.  Explain positive(checked items) below:  [] Constitutional  [] Eyes  [] Ear/Nose/Mouth/Throat  [] Respiratory  [] CV  [] GI  []   [] Musculoskeletal  [] Skin/Breast  [] Neurological  [] Endocrine  [] Heme/Lymph  [] Allergic/Immunologic    Explanation:     MEDICATIONS:    Current Facility-Administered Medications:     diphenhydrAMINE (BENADRYL) tablet 25 mg, 25 mg, Oral, Q6H PRN, Rosanna Brown MD, 25 mg at 03/15/20 0942    docusate sodium (COLACE) capsule 100 mg, 100 mg, Oral, BID, JULIO C Vasquez, 100 mg at 03/15/20 0906    promethazine (PHENERGAN) tablet 12.5 mg, 12.5 mg, Oral, Q6H PRN **OR** ondansetron (ZOFRAN) injection 4 mg, 4 mg, Intravenous, Q6H PRN, Rosanna Brown MD    venlafaxine (EFFEXOR XR) extended release capsule 75 mg, 75 mg, Oral, Daily with breakfast, Rosanna Brown MD, 75 mg at 03/15/20 0906    guaiFENesin (MUCINEX) extended release tablet 600 mg, 600 mg, Oral, BID PRN, Rosanna Brown MD, 600 mg at 03/12/20 1345    lidocaine-prilocaine (EMLA) cream, , Topical, PRN, Rosanna Brown MD    mupirocin (BACTROBAN) 2 % ointment, , Topical, BID, Rosanna Brown MD    miconazole (MICOTIN) 2 % powder, , Topical, BID, Constanza Velazquez pending  CD evaluation  Encourage patient to attend group and other milieu activities.   Discharge planning discussed with the patient and treatment team.    PSYCHOTHERAPY/COUNSELING:  [x] Therapeutic interview  [x] Supportive  [] CBT  [] Ongoing  [] Other    [x] Patient continues to need, on a daily basis, active treatment furnished directly by or requiring the supervision of inpatient psychiatric personnel      Anticipated Length of stay:            Electronically signed by Rachael Garay MD on 3/15/2020 at 4:55 PM

## 2020-03-15 NOTE — PROGRESS NOTES
Pt calm and cooperative with care, pt reports feeling tired. Pt smiling approprietly, hopeful  ECT working. Pt denies SI,HI,AVH, depression and anxiety remain.

## 2020-03-15 NOTE — PROGRESS NOTES
Patient did not attend the 2100 Wrap Up Group and Guided Meditation despite staff encouragement.  Electronically signed by Coty Rachel on 3/14/2020 at 9:37 PM

## 2020-03-15 NOTE — PROGRESS NOTES
Patient wanting percocet for #9  Lower back pain. 3741   Benadryl given for itching . 9167. Patient in the shower at the present time. Bed linens changed.

## 2020-03-15 NOTE — PROGRESS NOTES
Patient showered herself. micotin powder applied to right breast and olayinka skin folds. Right breast redness almost gone. Olayinka fold look a lot better,  less red since shower. bactroban applied to multiple  Scabbed areas . Abdomen sore covered with 2 bandaides and 2 on lower back.   Patient out in dining room doing a puzzle

## 2020-03-16 NOTE — PROGRESS NOTES
Pt reminded of NPO status after midnight for ECT tx in AM. Pt verbalizes an understanding. Denies needs/ concerns r/t ECT. Pt declines to be woken up for snack, states she ate a late dinner.

## 2020-03-16 NOTE — FLOWSHEET NOTE
Pt having ECT this day. Pt stated that is feeling better at this time, pt stated that she would be continuing dialysis when she goes home. Pt educated on the importance of the making all dialysis appts and verbally stated understanding. Pt educated on hand washing and using universal precautions in patient and upon discharge to minimize illness, pt verbally stated understanding. Pt educated on lice and how to prevent infestation, pt verbally stated understanding. No signs of lice at this time. Fistula is positive for bruit and thrill at this time.

## 2020-03-16 NOTE — FLOWSHEET NOTE
Pt discharge paperwork explained and signed by pt. Pt given medication from pharmacy and personal belongings.

## 2020-03-16 NOTE — FLOWSHEET NOTE
Pt returned from dialysis and gag returned at this time. Pt is calm and cooperative.  Pt give ice chips per request.

## 2020-03-16 NOTE — PROGRESS NOTES
Patient request Percocet 1 tablet by mouth, voiced lower back pain, 7/10, medicated per patient request, will continue to monitor. Patient request Benadryl, refused Trazodone.

## 2020-03-16 NOTE — GROUP NOTE
Group Therapy Note    Date: 3/16/2020    Group Start Time: 1425  Group End Time: 1500  Group Topic: Cognitive Skills    [facility]    WOODY Billings        Group Therapy Note    Attendees: 8         Patient's Goal:  To participate in mood management group. Notes:  Patient learned to crete obtainable goals. Status After Intervention:  Improved    Participation Level: Active Listener    Participation Quality: Appropriate      Speech:  normal      Thought Process/Content: Logical      Affective Functioning: Congruent      Mood: elevated      Level of consciousness:  Alert      Response to Learning: Able to verbalize current knowledge/experience      Endings: None Reported    Modes of Intervention: Education      Discipline Responsible: /Counselor      Signature:   WOODY Billings

## 2020-03-16 NOTE — ANESTHESIA PRE PROCEDURE
nightly    Historical Provider, MD   cloNIDine (CATAPRES) 0.1 MG tablet Take 0.1 mg by mouth nightly    Historical Provider, MD   cyclobenzaprine (FLEXERIL) 5 MG tablet Take 5 mg by mouth 3 times daily as needed for Muscle spasms    Historical Provider, MD   oxyCODONE-acetaminophen (PERCOCET) 5-325 MG per tablet Take 1 tablet by mouth every 4 hours as needed for Pain. Hay Saucedo     Historical Provider, MD   insulin lispro (HUMALOG) 100 UNIT/ML injection vial Inject into the skin 3 times daily (before meals) Indications: does varies per needs     Historical Provider, MD   insulin glargine (LANTUS) 100 UNIT/ML injection vial Inject into the skin nightly Indications: varies per needs 10 to 15 units     Historical Provider, MD       Current medications:    Current Facility-Administered Medications   Medication Dose Route Frequency Provider Last Rate Last Dose    0.9 % sodium chloride infusion   Intravenous Continuous Ruba Alvarez  mL/hr at 03/16/20 1123      sodium chloride flush 0.9 % injection 10 mL  10 mL Intravenous 2 times per day Ruba Alvarez MD        sodium chloride flush 0.9 % injection 10 mL  10 mL Intravenous PRN Ruba Alvarez MD        lidocaine PF 1 % injection 1 mL  1 mL Intradermal Once PRN Ruba Alvarez MD        diphenhydrAMINE (BENADRYL) tablet 25 mg  25 mg Oral Q6H PRN Jose Jain MD   25 mg at 03/15/20 2059    docusate sodium (COLACE) capsule 100 mg  100 mg Oral BID Unice Joes Dojamari, PA   100 mg at 03/15/20 2059    promethazine (PHENERGAN) tablet 12.5 mg  12.5 mg Oral Q6H PRN Jose Jain MD        Or    ondansetron (ZOFRAN) injection 4 mg  4 mg Intravenous Q6H PRN Jose Jain MD        venlafaxine (EFFEXOR XR) extended release capsule 75 mg  75 mg Oral Daily with breakfast Jose Jain MD   75 mg at 03/15/20 0906    guaiFENesin (MUCINEX) extended release tablet 600 mg  600 mg Oral BID PRN Jose Jain MD   600 mg at 03/12/20 injection 60 mcg  60 mcg Subcutaneous Weekly Martha Patten MD   60 mcg at 20 1015    oxyCODONE-acetaminophen (PERCOCET) 5-325 MG per tablet 1 tablet  1 tablet Oral Q4H PRN Martha Patten MD   1 tablet at 03/15/20 2059       Allergies:     Allergies   Allergen Reactions    Hydrocodone-Acetaminophen Hives, Itching and Nausea Only     Other reaction(s): Nausea      Naproxen Hives and Nausea Only     Other reaction(s): Nausea      Hydrocodone Hives    Vicodin [Hydrocodone-Acetaminophen] Hives       Problem List:    Patient Active Problem List   Diagnosis Code    Angina pectoris, unspecified ([facility]) I20.9    CHF (congestive heart failure) ([facility]) I50.9    NSTEMI (non-ST elevated myocardial infarction) ([facility]) I21.4    Coronary artery disease of native artery of native heart with stable angina pectoris ([facility]) I25.118    ESRD (end stage renal disease) ([facility]) N18.6    Essential hypertension I10    Hx of CABG Z95.1    Paroxysmal atrial fibrillation ([facility]) I48.0    CHF (congestive heart failure), NYHA class I, acute on chronic, combined ([facility]) I50.43    Uremia N19    Goals of care, counseling/discussion Z71.89    Anemia due to chronic kidney disease, on chronic dialysis ([facility]) N18.6, D63.1, Z99.2    MDD (major depressive disorder), recurrent severe, without psychosis ([address] F33.2       Past Medical History:        Diagnosis Date    Atrial fibrillation ([address]     Cancer ([address]     Breast    CHF (congestive heart failure) ([facility])     Chronic kidney disease     Diabetes mellitus ([address]     ESRD (end stage renal disease) ([address] 2020    Essential hypertension 2020    Heart murmur     Hepatitis C     Hx of CABG 2020    Paroxysmal atrial fibrillation ([address] 2020       Past Surgical History:        Procedure Laterality Date    BREAST SURGERY       SECTION      CHOLECYSTECTOMY      CORONARY ARTERY BYPASS GRAFT         Social History:    Social History     Tobacco Use    Smoking HCG (If Applicable): No results found for: PREGTESTUR, PREGSERUM, HCG, HCGQUANT     ABGs: No results found for: PHART, PO2ART, URS4UWK, ROI6RRJ, BEART, W9BQIKCO     Type & Screen (If Applicable):  No results found for: LABABO, [address]    Anesthesia Evaluation  Patient summary reviewed and Nursing notes reviewed no history of anesthetic complications:   Airway: Mallampati: II  TM distance: >3 FB   Neck ROM: full  Mouth opening: > = 3 FB Dental: normal exam         Pulmonary:Negative Pulmonary ROS and normal exam  breath sounds clear to auscultation                             Cardiovascular:  Exercise tolerance: good (>4 METS),   (+) hypertension:, angina:, past MI:, CAD:, CABG/stent:, CHF:, hyperlipidemia      ECG reviewed  Rhythm: regular  Rate: normal           Beta Blocker:  Dose within 24 Hrs      ROS comment: Normal sinus rhythm  Possible Left atrial enlargement  Nonspecific T wave abnormality  Prolonged QT  Abnormal ECG  When compared with ECG of 17-NOV-2019 22:42,  Nonspecific T wave abnormality, improved in Anterolateral leads  QT has shortened     Neuro/Psych:   (+) psychiatric history:            GI/Hepatic/Renal:   (+) hepatitis: C, liver disease:,           Endo/Other:    (+) Diabetesusing insulin, blood dyscrasia: anticoagulation therapy and anemia:., electrolyte abnormalities, . Pt had PAT visit. Abdominal:           Vascular: negative vascular ROS. Anesthesia Plan      general     ASA 3     (Mask)  Induction: intravenous. MIPS: Postoperative opioids intended and Prophylactic antiemetics administered. Anesthetic plan and risks discussed with patient. Plan discussed with CRNA.     Attending anesthesiologist reviewed and agrees with Pierce Etienne DO   3/16/2020

## 2020-03-16 NOTE — FLOWSHEET NOTE
Pt discharge with sister. Pt denies SI, HI, and AVH. Pt acknowledges improved depression. Denies anxiety at this time. Reinforced education on hand hygiene upon discharge.

## 2020-03-16 NOTE — OP NOTE
Department of Psychiatry  Electroconvulsive Therapy Treatment Note        3/16/2020    PURPOSE FOR ECT:  Therapeutic NUMBER: 4    DIAGNOSIS:   Principal Problem:    MDD (major depressive disorder), recurrent severe, without psychosis ([facility]  Resolved Problems:    * No resolved hospital problems.  *      MEDICATIONS:    Current Facility-Administered Medications:     0.9 % sodium chloride infusion, , Intravenous, Continuous, Nathaniel Tam MD, Last Rate: 125 mL/hr at 03/16/20 1123    sodium chloride flush 0.9 % injection 10 mL, 10 mL, Intravenous, 2 times per day, Nathaniel Tam MD    sodium chloride flush 0.9 % injection 10 mL, 10 mL, Intravenous, PRN, Nathaniel Tam MD    lidocaine PF 1 % injection 1 mL, 1 mL, Intradermal, Once PRN, Nathaniel Tam MD    ondansetron [facility]) injection 4 mg, 4 mg, Intravenous, Once PRN, Garth Burch DO    diphenhydrAMINE (BENADRYL) tablet 25 mg, 25 mg, Oral, Q6H PRN, Patti Beltre MD, 25 mg at 03/15/20 2059    docusate sodium (COLACE) capsule 100 mg, 100 mg, Oral, BID, JULIO C Allan, 100 mg at 03/15/20 2059    promethazine (PHENERGAN) tablet 12.5 mg, 12.5 mg, Oral, Q6H PRN **OR** ondansetron (ZOFRAN) injection 4 mg, 4 mg, Intravenous, Q6H PRN, Patti Beltre MD    venlafaxine (EFFEXOR XR) extended release capsule 75 mg, 75 mg, Oral, Daily with breakfast, Patti Beltre MD, 75 mg at 03/15/20 0906    guaiFENesin (MUCINEX) extended release tablet 600 mg, 600 mg, Oral, BID PRN, Patti Beltre MD, 600 mg at 03/12/20 1345    lidocaine-prilocaine (EMLA) cream, , Topical, PRN, Patti Beltre MD    mupirocin (BACTROBAN) 2 % ointment, , Topical, BID, Patti Beltre MD    miconazole (MICOTIN) 2 % powder, , Topical, BID, Patti Beltre MD    albumin human 25 % IV solution 25 g, 25 g, Intravenous, Daily PRN, Patti Beltre MD    carvedilol (COREG) tablet 25 mg, 25 mg, Oral, BID, Patti Beltre MD, 25 mg at 03/15/20 2059    hydrALAZINE (APRESOLINE) tablet 25 mg, 25 mg, Oral, Q8H PRN, Rossi Dacosta MD, 25 mg at 03/07/20 2008    traZODone (DESYREL) tablet 100 mg, 100 mg, Oral, Nightly, Rossi Dacosta MD, 100 mg at 03/14/20 2103    aluminum & magnesium hydroxide-simethicone (MAALOX) 200-200-20 MG/5ML suspension 30 mL, 30 mL, Oral, PRN, Rossi Dacosta MD    benztropine mesylate (COGENTIN) injection 2 mg, 2 mg, Intramuscular, BID PRN, Rossi Dacosta MD    magnesium hydroxide (MILK OF MAGNESIA) 400 MG/5ML suspension 30 mL, 30 mL, Oral, Daily PRN, Rossi Dacosta MD    dextrose 5 % solution, 100 mL/hr, Intravenous, PRN, Rossi Dacosta MD    dextrose 50 % IV solution, 12.5 g, Intravenous, PRN, Rossi Dacosta MD    glucagon (rDNA) injection 1 mg, 1 mg, Intramuscular, PRN, Rossi Dacosta MD    glucose (GLUTOSE) 40 % oral gel 15 g, 15 g, Oral, PRN, Rossi Dacosta MD    insulin lispro (HUMALOG) injection vial 0-3 Units, 0-3 Units, Subcutaneous, Nightly, Rossi Dacosta MD, Stopped at 03/13/20 2029    insulin lispro (HUMALOG) injection vial 0-6 Units, 0-6 Units, Subcutaneous, TID WC, Rossi Dacosta MD, Stopped at 03/12/20 1340    aspirin chewable tablet 81 mg, 81 mg, Oral, Daily, Rossi Dacosta MD, 81 mg at 03/15/20 0906    atorvastatin (LIPITOR) tablet 40 mg, 40 mg, Oral, Nightly, Rossi Dacosta MD, 40 mg at 03/15/20 2059    calcium acetate (PHOSLO) capsule 2,001 mg, 2,001 mg, Oral, TID WC, Rossi Dacosta MD, 2,001 mg at 03/15/20 1714    darbepoetin sarina-polysorbate (ARANESP) injection 60 mcg, 60 mcg, Subcutaneous, Weekly, Rossi Dacosta MD, 60 mcg at 03/13/20 1015    oxyCODONE-acetaminophen (PERCOCET) 5-325 MG per tablet 1 tablet, 1 tablet, Oral, Q4H PRN, Rossi Dacosta MD, 1 tablet at 03/15/20 2059    Facility-Administered Medications Ordered in Other Encounters:     0.9 % sodium chloride infusion, , , Continuous PRN, Rodríguez Lantigua, DO    CHANGE IN PSYCHIATRY STATUS SINCE LAST ECT:   No change in mood, still depressed    INFORMED CONSENT OBTAINED : YES    PRE ECT MEDICATION ADMINISTERED:   YES    NPO STATUS: Confirmed    ELECTRODE PLACEMENT : RUL    TIME OUT :  TEAM CONFIRMS THE CORRECT PATIENT, CORRECT PROCEDURE AND CORRECT SITE.     DEVICE PARAMETERS:   Charge- 230  PW - 0.3  Freq- 60  Duration- 8  EEG- artifact  Motor-15    VITALS:   Vitals:    03/16/20 1108   BP: (!) 199/84   Pulse: 65   Resp: 16   Temp: 97.3 °F (36.3 °C)   SpO2: 905%         COMPLICATIONS: no      RECOMMENDATIONS FOR NEXT TREATMENT:  HOLD OFF ECT after today ECT until [place] restriction is off        PSYCHIATRIST:  Shirlene New

## 2020-03-16 NOTE — DISCHARGE SUMMARY
fibrillation ([facility] 2/14/2020       FAMILY/SOCIAL HISTORY:  History reviewed. No pertinent family history.   Social History     Socioeconomic History    Marital status: Single     Spouse name: Not on file    Number of children: Not on file    Years of education: Not on file    Highest education level: Not on file   Occupational History    Not on file   Social Needs    Financial resource strain: Not on file    Food insecurity     Worry: Not on file     Inability: Not on file    Transportation needs     Medical: Not on file     Non-medical: Not on file   Tobacco Use    Smoking status: Never Smoker    Smokeless tobacco: Never Used   Substance and Sexual Activity    Alcohol use: Never     Frequency: Never    Drug use: Never    Sexual activity: Not Currently   Lifestyle    Physical activity     Days per week: Not on file     Minutes per session: Not on file    Stress: Not on file   Relationships    Social connections     Talks on phone: Not on file     Gets together: Not on file     Attends Faith service: Not on file     Active member of club or organization: Not on file     Attends meetings of clubs or organizations: Not on file     Relationship status: Not on file    Intimate partner violence     Fear of current or ex partner: Not on file     Emotionally abused: Not on file     Physically abused: Not on file     Forced sexual activity: Not on file   Other Topics Concern    Not on file   Social History Narrative    Not on file       MEDICATIONS:    Current Facility-Administered Medications:     diphenhydrAMINE (BENADRYL) tablet 25 mg, 25 mg, Oral, Q6H PRN, Susana Eaton MD, 25 mg at 03/15/20 2059    docusate sodium (COLACE) capsule 100 mg, 100 mg, Oral, BID, JULIO C Shafer, 100 mg at 03/15/20 2059    promethazine (PHENERGAN) tablet 12.5 mg, 12.5 mg, Oral, Q6H PRN **OR** ondansetron (ZOFRAN) injection 4 mg, 4 mg, Intravenous, Q6H PRN, Susana Eaton MD    [facility] XR) extended release capsule 75 mg, 75 mg, Oral, Daily with breakfast, Reji Bob MD, 75 mg at 03/15/20 0906    guaiFENesin [facility]) extended release tablet 600 mg, 600 mg, Oral, BID PRN, Reji Bob MD, 600 mg at 03/12/20 1345    lidocaine-prilocaine (EMLA) cream, , Topical, PRN, Reji Bob MD    mupirocin (BACTROBAN) 2 % ointment, , Topical, BID, Reji Bob MD    miconazole (MICOTIN) 2 % powder, , Topical, BID, Reji Bob MD    albumin human 25 % IV solution 25 g, 25 g, Intravenous, Daily PRN, Reji Bob MD    carvedilol (COREG) tablet 25 mg, 25 mg, Oral, BID, Reji Bob MD, 25 mg at 03/15/20 2059    hydrALAZINE (APRESOLINE) tablet 25 mg, 25 mg, Oral, Q8H PRN, Reji Bob MD, 25 mg at 03/07/20 2008    traZODone (DESYREL) tablet 100 mg, 100 mg, Oral, Nightly, Reji Bob MD, 100 mg at 03/14/20 2103    aluminum & magnesium hydroxide-simethicone (MAALOX) 200-200-20 MG/5ML suspension 30 mL, 30 mL, Oral, PRN, Reji Bob MD    benztropine mesylate (COGENTIN) injection 2 mg, 2 mg, Intramuscular, BID PRN, Reji Bob MD    magnesium hydroxide (MILK OF MAGNESIA) 400 MG/5ML suspension 30 mL, 30 mL, Oral, Daily PRN, Reji Bob MD    dextrose 5 % solution, 100 mL/hr, Intravenous, PRN, Reji Bob MD    dextrose 50 % IV solution, 12.5 g, Intravenous, PRN, Reji Bob MD    glucagon (rDNA) injection 1 mg, 1 mg, Intramuscular, PRN, Reji Bob MD    glucose (GLUTOSE) 40 % oral gel 15 g, 15 g, Oral, PRN, Reji Bob MD    insulin lispro (HUMALOG) injection vial 0-3 Units, 0-3 Units, Subcutaneous, Nightly, Reji Bob MD, Stopped at 03/13/20 2029    insulin lispro (HUMALOG) injection vial 0-6 Units, 0-6 Units, Subcutaneous, TID WC, Reji Bob MD, Stopped at 03/12/20 1340    aspirin chewable tablet 81 mg, 81 mg, Oral, Daily, Reji Bob MD, 81 mg at 03/15/20 0906    atorvastatin (LIPITOR) tablet 40 mg, 40 mg, Oral, Nightly, Kayla Cavazos MD, 40 mg at 03/15/20 2059    calcium acetate (PHOSLO) capsule 2,001 mg, 2,001 mg, Oral, TID WC, Kayla Cavazos MD, 2,001 mg at 03/15/20 1714    darbepoetin sarina-polysorbate (ARANESP) injection 60 mcg, 60 mcg, Subcutaneous, Weekly, Kayla Cavazos MD, 60 mcg at 03/13/20 1015    oxyCODONE-acetaminophen (PERCOCET) 5-325 MG per tablet 1 tablet, 1 tablet, Oral, Q4H PRN, Kayla Cavazos MD, 1 tablet at 03/15/20 2059    Examination:  BP (!) 148/65   Pulse 76   Temp 97 °F (36.1 °C)   Resp 22   Ht 5' 3\" (1.6 m)   Wt 201 lb (91.2 kg)   SpO2 95%   BMI 35.61 kg/m²   Gait - steady    HOSPITAL COURSE[de-identified]  Following admission to the hospital, patient had a complete physical exam and blood work up  Patient was monitored closely with suicide precaution  Patient was started on medication as listed below  Pt also was started on ECT treatment  Was encouraged to participate in group and other milieu activity  Patient started to feel better with this combination of treatment. Significant progress in the symptoms since admission. Mood better, with the score of 2/10 - bad  noAVH or paranoid thoughts  No Hopeless or worthless feeling  No active SI/HI  Appetite:  [x] Normal  [] Increased  [] Decreased    Sleep:       [x] Normal  [] Fair       [] Poor            Energy:    [x] Normal  [] Increased  [] Decreased     SI [] Present  [x] Absent  HI  []Present  [x] Absent   Aggression:  [] yes  [] no  Patient is [x] able  [] unable to CONTRACT FOR SAFETY   Medication side effects(SE):  [x] None(Psych.  Meds.) [] Other      Mental Status Examination on discharge:    Level of consciousness:  within normal limits   Appearance:  well-appearing  Behavior/Motor:  no abnormalities noted  Attitude toward examiner:  attentive and good eye contact  Speech:  spontaneous, normal rate and normal volume   Mood: less depressed  Affect:  mood congruent  Thought processes:  linear   Thought content:  Suicidal Ideation:  denies suicidal ideation  Cognition:  oriented to person, place, and time   Concentration intact  Memory intact  Insight good   Judgement fair   Fund of Knowledge adequate      ASSESSMENT:  Patient symptoms are:  [] Well controlled  [x] Improving  [] Worsening  [] No change      Diagnosis:  Principal Problem:    MDD (major depressive disorder), recurrent severe, without psychosis ([facility]  Resolved Problems:    * No resolved hospital problems. *      LABS:    No results for input(s): WBC, HGB, PLT in the last 72 hours. Recent Labs     03/13/20  1145 03/14/20  1830   NA  --  135   K 5.3* 3.8   CL  --  93*   CO2  --  31   BUN  --  12   CREATININE  --  2.65*   GLUCOSE  --  101*     No results for input(s): BILITOT, ALKPHOS, AST, ALT in the last 72 hours. No results found for: Winnie Roberto, LABBENZ, CANNAB, COCAINESCRN, LABMETH, OPIATESCREENURINE, PHENCYCLIDINESCREENURINE, PPXUR, ETOH  Lab Results   Component Value Date    TSH 0.847 10/13/2019     No results found for: LITHIUM  No results found for: VALPROATE, CBMZ    RISK ASSESSMENT AT DISCHARGE: Low risk for suicide and homicide. Treatment Plan:  Reviewed current Medications with the patient. Education provided on the complaince with treatment. t received 4 ECT as inpatient  Recommended further treatment as OP at the req of patient  Risks, benefits, side effects, drug-to-drug interactions and alternatives to treatment were discussed. Encourage patient to attend outpatient follow up appointment and therapy. Patient was advised to call the outpatient provider, visit the nearest ED or call 911 if symptoms are not manageable.      Patient's family member was contacted prior to the discharge.         Medication List      START taking these medications    traZODone 100 MG tablet  Commonly known as:  DESYREL  Take 1 tablet by mouth nightly     venlafaxine 75 MG extended release capsule  Commonly known as:  EFFEXOR XR  Take 1

## 2020-03-16 NOTE — PROGRESS NOTES
Patient did not attend the 2100 Wrap Up and Relaxation Group despite staff encouragement.  Electronically signed by Dayton Kirk on 3/15/2020 at 9:55 PM

## 2020-03-16 NOTE — DISCHARGE INSTR - DIET

## 2020-03-16 NOTE — PROGRESS NOTES
CREATININE 2.65*   GLUCOSE 101*   CALCIUM 9.2   LABGLOM 18.9*     Troponin:   No results for input(s): TROPONINI in the last 72 hours. BNP: No results for input(s): BNP in the last 72 hours. INR:   No results for input(s): INR in the last 72 hours. Lipids:   No results for input(s): CHOL, LDLDIRECT, TRIG, HDL, AMYLASE, LIPASE in the last 72 hours. Liver:   No results for input(s): AST, ALT, ALKPHOS, PROT, LABALBU, BILITOT in the last 72 hours. Invalid input(s): BILDIR  Iron:  No results for input(s): IRONS, FERRITIN in the last 72 hours. Invalid input(s): LABIRONS  Urinalysis: No results for input(s): UA in the last 72 hours. Objective:   Vitals: BP (!) 199/84   Pulse 65   Temp 97.3 °F (36.3 °C) (Temporal)   Resp 16   Ht 5' 3\" (1.6 m)   Wt 201 lb (91.2 kg)   SpO2 100%   BMI 35.61 kg/m²    Wt Readings from Last 3 Encounters:   03/16/20 201 lb (91.2 kg)   03/03/20 218 lb 11.1 oz (99.2 kg)   02/22/20 210 lb (95.3 kg)      24HR INTAKE/OUTPUT:    No intake or output data in the 24 hours ending 03/16/20 1206    Constitutional:  Alert, awake, no apparent distress   Skin:normal, no rash  HEENT:sclera anicteric.   Head atraumatic normocephalic  Neck:supple with no thyromegally  Cardiovascular:  S1, S2 without m/r/g   Respiratory:  CTA B without w/r/r   Abdomen: +bs, soft, nt  Ext: trace LE edema  Musculoskeletal:Intact  Neuro:Alert and oriented with no deficit      Electronically signed by Abdulaziz Ellis MD on 3/16/2020 at 12:06 PM

## 2020-04-04 PROBLEM — I48.91 A-FIB (HCC): Status: ACTIVE | Noted: 2020-01-01

## 2020-04-04 NOTE — ED TRIAGE NOTES
Pt from dialysis due to Afib w/RVR. Pt is A&Ox3, drowsy and fatigued, stimulated at voice. C/o chronic back pain 9/10. Pt did not take any medication due to going to her dialysis appointment. Pt receives dialysis on M,W,F. Pt was not receiving dialysis for one week due to virus. Pt has sores throughout her body, states cause if from phosphorous levels. Breaths are equal but states \"hard to breath\".

## 2020-04-04 NOTE — ED PROVIDER NOTES
[address] [facility] ED  eMERGENCY dEPARTMENT eNCOUnter      Pt Name: Riddhi Bajwa  MRN: [id number]  [place] 1965  Date of evaluation: 4/4/2020  Provider: Jaye Lesches, MD    CHIEF COMPLAINT       Chief Complaint   Patient presents with    Atrial Fibrillation     RVR         HISTORY OF PRESENT ILLNESS   (Location/Symptom, Timing/Onset,Context/Setting, Quality, Duration, Modifying Factors, Severity)  Note limiting factors. Riddhi Bajwa is a 48 y.o. female who presents to the emergency department rapid heart rate     48years old with known end-stage renal disease paroxysmal A. fib history of CABG coronary artery disease patient admits to being noncompliant with her medication in the last couple of weeks including Coumadin on blood pressure medicine also have not been to dialysis in 2-week the day was feeling short of breath she went to dialysis patient was able to get half of her dialysis done but after which she developed rapid heart rate in the 160 and increased shortness of breath was found to be in A. fib so she sent to the ER for evaluation    The history is provided by the patient. NursingNotes were reviewed. REVIEW OF SYSTEMS    (2-9 systems for level 4, 10 or more for level 5)     Review of Systems   Constitutional: Negative. HENT: Negative. Eyes: Negative. Respiratory: Positive for chest tightness and shortness of breath. Cardiovascular: Positive for palpitations. Negative for chest pain and leg swelling. Gastrointestinal: Negative. Endocrine: Negative. Genitourinary: Negative. Musculoskeletal: Negative. Allergic/Immunologic: Negative. Neurological: Negative. Hematological: Negative. Psychiatric/Behavioral: Negative. All other systems reviewed and are negative. Except as noted above the remainder of the review of systems was reviewed and negative.        PAST MEDICAL HISTORY     Past Medical History:   Diagnosis Date    Atrial fibrillation ([facility]     Cancer ([facility]     Breast    CHF (congestive heart failure) (HCC)     Chronic kidney disease     Diabetes mellitus ([facility]     ESRD (end stage renal disease) ([facility] 2020    Essential hypertension 2020    Heart murmur     Hepatitis C     Hx of CABG 2020    Paroxysmal atrial fibrillation ([facility] 2020         SURGICALHISTORY       Past Surgical History:   Procedure Laterality Date    BREAST SURGERY       SECTION      CHOLECYSTECTOMY      CORONARY ARTERY BYPASS GRAFT           CURRENT MEDICATIONS       Previous Medications    ASPIRIN 81 MG CHEWABLE TABLET    Take 1 tablet by mouth daily    ATORVASTATIN (LIPITOR) 40 MG TABLET    Take 40 mg by mouth nightly    B COMPLEX-C-FOLIC ACID (NEPHROCAPS) 1 MG CAPSULE    Take 1 capsule by mouth daily    CALCIUM ACETATE (PHOSLO) 667 MG CAPSULE    Take by mouth 3 times daily (with meals)    CARVEDILOL (COREG) 12.5 MG TABLET    Take 12.5 mg by mouth 2 times daily    CYCLOBENZAPRINE (FLEXERIL) 5 MG TABLET    Take 5 mg by mouth 3 times daily as needed for Muscle spasms    DILTIAZEM (DILACOR XR) 240 MG EXTENDED RELEASE CAPSULE    Take 240 mg by mouth daily Indications: PT TAKES CARDIZEM CD    DIPHENHYDRAMINE (BENADRYL) 25 MG CAPSULE    Take 25 mg by mouth every 6 hours as needed for Itching    INSULIN GLARGINE (LANTUS) 100 UNIT/ML INJECTION VIAL    Inject into the skin nightly Indications: varies per needs 10 to 15 units     INSULIN LISPRO (HUMALOG) 100 UNIT/ML INJECTION VIAL    Inject into the skin 3 times daily (before meals) Indications: does varies per needs     NITROGLYCERIN (NITROSTAT) 0.4 MG SL TABLET    up to max of 3 total doses. If no relief after 1 dose, call 911. NYSTATIN (MYCOSTATIN) POWD POWDER    Apply topically 2 times daily    OXYCODONE-ACETAMINOPHEN (PERCOCET) 5-325 MG PER TABLET    Take 1 tablet by mouth every 4 hours as needed for Pain. .    TRAZODONE (DESYREL) 100 MG TABLET    Take 1 tablet by mouth nightly VENLAFAXINE (EFFEXOR XR) 75 MG EXTENDED RELEASE CAPSULE    Take 1 capsule by mouth daily (with breakfast)    WARFARIN (COUMADIN) 5 MG TABLET    Take 5 mg by mouth       ALLERGIES     Hydrocodone-acetaminophen; Naproxen; Hydrocodone; and Vicodin [hydrocodone-acetaminophen]    FAMILY HISTORY     No family history on file.        SOCIAL HISTORY       Social History     Socioeconomic History    Marital status: Single     Spouse name: Not on file    Number of children: Not on file    Years of education: Not on file    Highest education level: Not on file   Occupational History    Not on file   Social Needs    Financial resource strain: Not on file    Food insecurity     Worry: Not on file     Inability: Not on file    Transportation needs     Medical: Not on file     Non-medical: Not on file   Tobacco Use    Smoking status: Never Smoker    Smokeless tobacco: Never Used   Substance and Sexual Activity    Alcohol use: Never     Frequency: Never    Drug use: Never    Sexual activity: Not Currently   Lifestyle    Physical activity     Days per week: Not on file     Minutes per session: Not on file    Stress: Not on file   Relationships    Social connections     Talks on phone: Not on file     Gets together: Not on file     Attends Anabaptism service: Not on file     Active member of club or organization: Not on file     Attends meetings of clubs or organizations: Not on file     Relationship status: Not on file    Intimate partner violence     Fear of current or ex partner: Not on file     Emotionally abused: Not on file     Physically abused: Not on file     Forced sexual activity: Not on file   Other Topics Concern    Not on file   Social History Narrative    Not on file       SCREENINGS    Caleb Coma Scale  Eye Opening: Spontaneous  Best Verbal Response: Oriented  Best Motor Response: Obeys commands  [place] Coma Scale Score: 15 @FLOW([id number])@      PHYSICAL EXAM    (up to 7 for level 4, 8 or more within normal limits   BRAIN NATRIURETIC PEPTIDE    Narrative:     CALL  [facility]  [facility] tel. [phone],  BUN, CREAT results called to and read back by Paul BUNCH, 04/04/2020  16:14, by Reji Huber results called to and read back by Paul BUNCH, 04/04/2020 16:14, by  [facility]       All other labs were within normal range or not returned as of this dictation. EMERGENCY DEPARTMENT COURSE and DIFFERENTIAL DIAGNOSIS/MDM:   Vitals:    Vitals:    04/04/20 1715 04/04/20 1730 04/04/20 1735 04/04/20 1745   BP:  (!) 142/95     Pulse: 139 104 138 139   Resp: 19 20  18   Temp:       TempSrc:       SpO2: 97% 97%  100%   Weight:       Height:            MDM  Number of Diagnoses or Management Options  Atrial fibrillation with RVR ([facility]): new and requires workup  Stage 5 chronic kidney disease not on chronic dialysis [facility]):   Diagnosis management comments: 48years old patient with known history of end-stage renal disease currently on dialysis 3 days a week also have history of CABG coronary artery disease paroxysmal A. fib presented to the ER with A. fib with RVR that started in the middle of her dialysis today patient admits to being noncompliant in the last few weeks due to coronavirus pandemic.   In the ER patient was given IV Cardizem bolus and drip was initiated we were able to control her heart rate down to the 110s patient maintain her blood pressure and she remained hemodynamically stable with pulse ox 100% on room air she would be admitted under medicine service for A. fib with RVR with a nephrology consult for dialysis to be performed in the next 24 hours       Amount and/or Complexity of Data Reviewed  Clinical lab tests: reviewed and ordered  Tests in the radiology section of CPT®: ordered and reviewed        CONSULTS:  IP CONSULT TO PHARMACY  IP CONSULT TO CARDIOLOGY  IP CONSULT TO NEPHROLOGY  IP CONSULT TO CRITICAL CARE    PROCEDURES:  Unless otherwise noted below, none     Procedures    FINAL IMPRESSION      1. Atrial fibrillation with RVR (HCC)    2. Stage 5 chronic kidney disease not on chronic dialysis [facility])          DISPOSITION/PLAN   DISPOSITION Admitted 04/04/2020 05:21:15 PM      PATIENT REFERRED TO:  Lauren Langley MD  [address] [place] [address] [phone]            DISCHARGE MEDICATIONS:  New Prescriptions    No medications on file          (Please note thatportions of this note were completed with a voice recognition program.  Efforts were made to edit the dictations but occasionally words are mis-transcribed.)    Jaye Lesches, MD (electronically signed)  Attending Emergency Physician         Indy Solomon MD  04/04/20 [phone]

## 2020-04-04 NOTE — FLOWSHEET NOTE
Patient arrived to TCU via cart from the E.R. Atrial fib on tele rate 140. BP is 119/74, Room air pulse ox is 98%the patient denies pain. Dr. Thakkar Olden in to examine the patient.

## 2020-04-04 NOTE — ED NOTES
Bed: 21  Expected date:   Expected time:   Means of arrival:   Comments:  54F SOB, general weakness, 160 afib rvr, 135/92, 100ra, 25     Lori Hernandez RN  04/04/20 [id number]

## 2020-04-04 NOTE — H&P
Department of Internal Medicine   History and Physical        HISTORY OF PRESENT ILLNESS:      The patient is a 48 y.o. female with significant past medical history of end-stage renal disease on any hemodialysis, paroxysmal atrial fibrillation, CHF who presents with A. fib RVR. Due to the current epidemic patient states she has not wanted to leave her home and has not had hemodialysis for the last 2 weeks. She went to dialysis center today to attempt to receive dialysis became nauseated and found to be in A. fib RVR and sent to the emergency department. She admits to not taking her medications including Coumadin. Patient was started on Cardizem drip in the emergency department for RVR with rates in the 150s. She denies cough, fever, chills, abdominal pain, diarrhea. Denies chest pain. she admits to chronic pruritus. Past Medical History:        Diagnosis Date    Atrial fibrillation ([address]     Cancer (HCC)     Breast    CHF (congestive heart failure) (HCC)     Chronic kidney disease     Diabetes mellitus ([address]     ESRD (end stage renal disease) ([facility] 2020    Essential hypertension 2020    Heart murmur     Hepatitis C     Hx of CABG 2020    Paroxysmal atrial fibrillation ([facility] 2020     Past Surgical History:        Procedure Laterality Date    BREAST SURGERY       SECTION      CHOLECYSTECTOMY      CORONARY ARTERY BYPASS GRAFT         Medications Prior to Admission:    Medications Prior to Admission: traZODone (DESYREL) 100 MG tablet, Take 1 tablet by mouth nightly  venlafaxine (EFFEXOR XR) 75 MG extended release capsule, Take 1 capsule by mouth daily (with breakfast)  diphenhydrAMINE (BENADRYL) 25 MG capsule, Take 25 mg by mouth every 6 hours as needed for Itching  warfarin (COUMADIN) 5 MG tablet, Take 5 mg by mouth  aspirin 81 MG chewable tablet, Take 1 tablet by mouth daily  nitroGLYCERIN (NITROSTAT) 0.4 MG SL tablet, up to max of 3 total doses.  If no relief after 1 dose, call 911.  nystatin (MYCOSTATIN) POWD powder, Apply topically 2 times daily  carvedilol (COREG) 12.5 MG tablet, Take 12.5 mg by mouth 2 times daily  diltiazem (DILACOR XR) 240 MG extended release capsule, Take 240 mg by mouth daily Indications: PT TAKES CARDIZEM CD  b complex-C-folic acid (NEPHROCAPS) 1 MG capsule, Take 1 capsule by mouth daily  calcium acetate (PHOSLO) 667 MG capsule, Take by mouth 3 times daily (with meals)  atorvastatin (LIPITOR) 40 MG tablet, Take 40 mg by mouth nightly  cyclobenzaprine (FLEXERIL) 5 MG tablet, Take 5 mg by mouth 3 times daily as needed for Muscle spasms  oxyCODONE-acetaminophen (PERCOCET) 5-325 MG per tablet, Take 1 tablet by mouth every 4 hours as needed for Pain. .  insulin lispro (HUMALOG) 100 UNIT/ML injection vial, Inject into the skin 3 times daily (before meals) Indications: does varies per needs   insulin glargine (LANTUS) 100 UNIT/ML injection vial, Inject into the skin nightly Indications: varies per needs 10 to 15 units     Allergies:  Hydrocodone-acetaminophen; Naproxen; Hydrocodone; and Vicodin [hydrocodone-acetaminophen]    Social History:   Denies tobacco, alcohol, illicit drug use    Family History:   No family history on file. REVIEW OF SYSTEMS:  Review of systems was reviewed and negative unless noted above  PHYSICAL EXAM:    Vitals:  BP (!) 142/95   Pulse 139   Temp 98.5 °F (36.9 °C) (Oral)   Resp 18   Ht 5' 3\" (1.6 m)   Wt 210 lb (95.3 kg)   SpO2 100%   BMI 37.20 kg/m²     GEN: Alert and oriented, no acute distress  HEENT: Normocephalic, atraumatic. TRISH, Neck supple. Resp: Diminished bilaterally, no wheezing, no respiratory distress  Cardio: A. fib rate and 140s  Abd: Soft, nondistended. NTTP.  BS present  Ext: Trace lower extremity edema LE NTTP  Skin: Multiple areas of excoriation on the upper extremities and head in multiple stages of healing        DATA:  CBC:   Lab Results   Component Value Date    WBC 6.1 04/04/2020    RBC 2.64 04/04/2020

## 2020-04-05 NOTE — CONSULTS
[address]                      [address], [address] [facility]                                  CONSULTATION    PATIENT NAME: Tevin Rose               :        1965  MED REC NO:   [id number]                            ROOM:       TC06  ACCOUNT NO:   [de-identified]                           ADMIT DATE: 2020  PROVIDER:     Pam العراقي DO    CONSULT DATE:  2020    HISTORY OF PRESENT ILLNESS:  This is an obese 61-year-old   female admitted to the hospital having missed multiple dialysis  treatments in a row due to fear of Coronavirus at the hospital.  She  does have a history of organic heart disease, previous non-STEMI MI with  known congestive heart failure. She has had previous coronary artery  bypass grafting. She has a major depression disorder. The patient  appears to be in no distress, lying flat in her ICU bed. The patient's potassium was 5.2 mEq on admission with GFR of 4 and a  serum creatinine of 9.5. The patient had elevated proBNP levels. The  patient is not really willing to give much of a history, but is fearful  of COVID-19. ALLERGIES:  NAPROXEN and HYDROCODONE. REVIEW OF SYSTEMS:  Unremarkable. FAMILY HISTORY:  Noncontributory. HABITS:  No smoking. No alcohol. No opioids. PAST SURGICAL HISTORY:  Coronary artery bypass grafting,  cholecystectomy,  section and breast surgery. MEDICINES AT THE TIME OF HER ADMISSION TO THE HOSPITAL:  _____, Lantus,  Humalog coverage, Lipitor, PhosLo, Percocet, Nephrocaps, Coumadin,  Zyrtec, Lopressor, Catapres and Topamax. PHYSICAL EXAMINATION:  VITAL SIGNS:  The patient is 220 pounds, blood pressure is 130/60, heart  rate 60 and regular, respirations 14 and afebrile. HEENT:  Normocephalic. Pupils equal and react to light. Extraocular  muscles intact. NECK:  Supple. No JVD or adenopathy. LUNGS:  Relatively clear. No accessory muscle use.   No wheezing. HEART:  Distant with a 1/6 systolic murmur. ABDOMEN:  Obese. No guarding or rigidity. No pain. EXTREMITIES:  Show trace edema of the ankles. SKIN:  Warm and dry. No cyanosis. IMPRESSION:  1. End-stage renal disease on hemodialysis support three times a week,  recent noncompliance due to fear of Coronavirus. 2.  Diabetes mellitus type 2.  3.  Hypertension. 4.  Hyperlipidemia. 5.  Organic heart disease, status post coronary artery bypass grafting. PLAN:  Dialysis today. Try to reinforce the need to continue dialysis  support even in light of the COVID-19 pandemic. Thank you very much.         Kel Click, DO    D: 04/05/2020 11:20:42       T: 04/05/2020 11:23:45     [id number]  Job#: [id number]     Doc#: [id number]    CC:

## 2020-04-05 NOTE — PROGRESS NOTES
HD today for 3 hours 36 minutes. Tolerated tx well until had Nausea/vomiting. /87. PTx terminate per pt request and Dr. Carolyn Alves aware and gave new order for tx time. LAVF Homeostaisis achieved and dressing dry and intact.

## 2020-04-05 NOTE — PROGRESS NOTES
Component Value Date     04/04/2020    K 5.2 04/04/2020    K 4.1 03/04/2020    CL 93 04/04/2020    CO2 19 04/04/2020    BUN 80 04/04/2020    LABALBU 3.3 04/04/2020    CREATININE 9.38 04/04/2020    CALCIUM 8.6 04/04/2020    GFRAA 5.3 04/04/2020    LABGLOM 4.4 04/04/2020    GLUCOSE 112 04/04/2020       ASSESSMENT AND PLAN      PAF: Now NSR. Cardiology consulted     Subtherapeutic INR/Medication noncompliance:  Coumadin per INR, pharmacy consulted     ESRD on HD: Missed dialysis and fluid overload.  Nephrology consulted for dialysis.      Acute CHF exacerbation: fluid management per dialysis     T2DM: SS, Accuchecks, hypoglycemic protocol     Anxiety/depression: Continue current medications.      Patient is followed as an outpatient by palliative medicine.      DVT: SQH  FULL CODE

## 2020-04-05 NOTE — CONSULTS
Critical Care Medicine  Consult Note      Reason for consultation: Cardiac arrhythmias and respiratory failure    HISTORY OF PRESENT ILLNESS:    This is a 56-year-old obese white female with history of congestive heart failure atrial fibrillation, end-stage kidney disease on hemodialysis, hepatitis C, severe coronary artery disease status post CABG last year who apparently has been skipping hemodialysis sessions due to her fear of getting infected with coronavirus, finally went to her dialysis center and was found to be in atrial fibrillation with a rapid ventricular response. Patient had some increased shortness of breath recently as well as edema. Her heart rate was in the 150s on presentation, did not have any fever chills reports of abdominal pain nausea vomiting diarrhea but she has had chronic pruritus with generalized erythematous rash noted. Her BNP was over 70,000, troponins have been mildly elevated but that is probably associated with her chronic kidney disease. She is somnolent now she did have her dialysis which she tolerated well, her heart rate is markedly improved now she is oxygenating well on room air but has a cannula on while asleep per nursing          Past Medical History:        Diagnosis Date    Atrial fibrillation ([address]     Cancer ([address]     Breast    CHF (congestive heart failure) ([address]     Chronic kidney disease     Diabetes mellitus ([address]     ESRD (end stage renal disease) ([address] 2020    Essential hypertension 2020    Heart murmur     Hepatitis C     Hx of CABG 2020    Paroxysmal atrial fibrillation ([address] 2020       Past Surgical History:        Procedure Laterality Date    BREAST SURGERY       SECTION      CHOLECYSTECTOMY      CORONARY ARTERY BYPASS GRAFT         Social History:     reports that she has never smoked. She has never used smokeless tobacco. She reports that she does not drink alcohol or use drugs.     Family History:   History reviewed. No pertinent family history. Allergies:  Hydrocodone-acetaminophen; Naproxen; Hydrocodone; and Vicodin [hydrocodone-acetaminophen]        MEDICATIONS during current hospitalization:    Continuous Infusions:   dilTIAZem (CARDIZEM) 125 mg in dextrose 5% 125 mL infusion Stopped (04/05/20 0630)    dextrose         Scheduled Meds:   heparin (porcine)  4,000 Units Intercatheter Once    darbepoetin sarina-polysorbate  40 mcg Subcutaneous Once in dialysis    warfarin  5 mg Oral Once    heparin (porcine)  5,000 Units Subcutaneous 3 times per day    aspirin  81 mg Oral Daily    atorvastatin  40 mg Oral Nightly    b complex-C-folic acid  1 capsule Oral Daily    calcium acetate  667 mg Oral TID WC    carvedilol  12.5 mg Oral BID    miconazole   Topical BID    traZODone  100 mg Oral Nightly    venlafaxine  75 mg Oral Daily with breakfast    sodium chloride flush  10 mL Intravenous 2 times per day    famotidine (PEPCID) injection  20 mg Intravenous Daily    insulin glargine  5 Units Subcutaneous Nightly    insulin lispro  0-12 Units Subcutaneous TID WC    insulin lispro  0-6 Units Subcutaneous Nightly    warfarin (COUMADIN) daily dosing (placeholder)   Other RX Placeholder       PRN Meds:diphenhydrAMINE, nitroGLYCERIN, cyclobenzaprine, sodium chloride flush, acetaminophen **OR** acetaminophen, polyethylene glycol, promethazine **OR** ondansetron, potassium chloride, magnesium sulfate, glucose, dextrose, glucagon (rDNA), dextrose, metoprolol, oxyCODONE-acetaminophen        REVIEW OF SYSTEMS:  As in history of present illness  Other 14 point review of system is negative.      PHYSICAL EXAM:    Vitals:  BP (!) 159/70   Pulse 71   Temp 97.7 °F (36.5 °C)   Resp 13   Ht 5' 3\" (1.6 m)   Wt 222 lb 10.6 oz (101 kg)   LMP  (Within Years)   SpO2 100%   Breastfeeding No   BMI 39.44 kg/m²   General: Patient is lethargic but arousable to deep stimuli otherwise appeared comfortable in bed, No disease and noncompliance with hemodialysis, improved  2. End-stage kidney disease on hemodialysis status post emergent session on presentation last night  3. Chronic anticoagulation therapy presenting with subtherapeutic INR, currently managed by pharmacy  Patient is doing well since presentation especially after dialysis session and with control of her heart rhythm.   She can be moved out of ICU to the floor with telemetry        Electronically signed by Raul Back MD, FCCP on 4/5/2020 at 1:24 PM

## 2020-04-05 NOTE — CONSULTS
Consults    Patient Name: Ada Fontana Date: 2020  3:19 PM  MR #: [id number]  : 1965    Attending Physician: Nichole Ricketts DO  Reason for consult: AF    History of Presenting Illness:      Amy Nelson is a 48 y.o. female on hospital day 1 with a history of . History Obtained From:  patient, electronic medical record    Admitted due to feeling very weak and ill. Out of fears of getting COVID-19 infection, missed HD for ~ 2 weeks. Noted Rapid uncontrolled AF at the HD unit and therfore transferred to ER with admit to ICU. She is currently receiving HD and tolerating well. She is little tired. Goal is to remove 3L today. AF was treated with Cardizem gtt and subsequently converted to SR. She has long h/o PAF and has been on warfarin. Unfortunately presenting INR 1.1    She has CAD with no angina. History:      EKG:AF  Past Medical History:   Diagnosis Date    Atrial fibrillation ([address]     Cancer (HCC)     Breast    CHF (congestive heart failure) (HCC)     Chronic kidney disease     Diabetes mellitus ([address]     ESRD (end stage renal disease) [facility] [address] [facility] 2020    Essential hypertension 2020    Heart murmur     Hepatitis C     Hx of CABG 2020    Paroxysmal atrial fibrillation ([address] 2020     Past Surgical History:   Procedure Laterality Date    BREAST SURGERY       SECTION      CHOLECYSTECTOMY      CORONARY ARTERY BYPASS GRAFT         Family History  History reviewed. No pertinent family history.   [] Unable to obtain due to ventilated and/ or neurologic status    Social History     Socioeconomic History    Marital status: Single     Spouse name: Not on file    Number of children: Not on file    Years of education: Not on file    Highest education level: Not on file   Occupational History    Not on file   Social Needs    Financial resource strain: Not on file    Food insecurity     Worry: Not on file     Inability: Not on file   Ernesto Mejia (COUMADIN) daily dosing (placeholder)   Other RX Placeholder     Continuous Infusions:   dilTIAZem (CARDIZEM) 125 mg in dextrose 5% 125 mL infusion Stopped (04/05/20 0630)    dextrose       PRN Meds:.diphenhydrAMINE, nitroGLYCERIN, cyclobenzaprine, sodium chloride flush, acetaminophen **OR** acetaminophen, polyethylene glycol, promethazine **OR** ondansetron, potassium chloride, magnesium sulfate, glucose, dextrose, glucagon (rDNA), dextrose, metoprolol, oxyCODONE-acetaminophen  .  dilTIAZem (CARDIZEM) 125 mg in dextrose 5% 125 mL infusion Stopped (04/05/20 0630)    dextrose          Allergies: Allergies   Allergen Reactions    Hydrocodone-Acetaminophen Hives, Itching and Nausea Only     Other reaction(s): Nausea      Naproxen Hives and Nausea Only     Other reaction(s): Nausea      Hydrocodone Hives    Vicodin [Hydrocodone-Acetaminophen] Hives        Review of Systems:       Review of Systems   Constitutional: Positive for fatigue. Negative for diaphoresis. HENT: Negative. Eyes: Negative. Respiratory: Negative. Negative for cough, chest tightness, shortness of breath, wheezing and stridor. Cardiovascular: Negative. Negative for chest pain, palpitations and leg swelling. Gastrointestinal: Negative. Negative for blood in stool and nausea. Genitourinary: Negative. Musculoskeletal: Negative. Skin: Negative. Neurological: Positive for weakness. Negative for dizziness, syncope and light-headedness. Hematological: Negative. Psychiatric/Behavioral: Negative. Objective Findings:     Vitals:BP (!) 159/70   Pulse 71   Temp 97.7 °F (36.5 °C)   Resp 13   Ht 5' 3\" (1.6 m)   Wt 222 lb 10.6 oz (101 kg)   LMP  (Within Years)   SpO2 100%   Breastfeeding No   BMI 39.44 kg/m²      Physical Examination:    Physical Exam   Constitutional: No distress. She appears acutely ill. HENT:   Normal cephalic and Atraumatic   Eyes: Pupils are equal, round, and reactive to light.

## 2020-04-05 NOTE — SIGNIFICANT EVENT
Contacted by RN to reorder patient's home Percocet. Patient complains of \"usual\" headache, states plain acetaminophen is not effective. Percocet is on patient's allergy list from 2012, but patient tolerates and currently takes at home without issue.

## 2020-04-06 NOTE — PROGRESS NOTES
Labs     04/05/20  0516 04/06/20  0509   WBC 4.3* 5.2   HGB 7.9* 8.3*    225     CMP:    Recent Labs     04/04/20  1530 04/06/20  0509   * 136   K 5.2* 4.3   CL 93* 93*   CO2 19* 25   BUN 80* 39*   CREATININE 9.38* 5.70*   GLUCOSE 112* 83   CALCIUM 8.6 8.5   LABGLOM 4.4* 7.8*     Troponin:   Recent Labs     04/04/20  2323   TROPONINI 0.187*     BNP: No results for input(s): BNP in the last 72 hours. INR:   Recent Labs     04/06/20  0509   INR 1.1     Lipids: No results for input(s): CHOL, LDLDIRECT, TRIG, HDL, AMYLASE, LIPASE in the last 72 hours. Liver:   Recent Labs     04/04/20  1530   AST 32   ALT 19   ALKPHOS 62   PROT 7.7   LABALBU 3.3*   BILITOT 0.3     Iron:  No results for input(s): IRONS, FERRITIN in the last 72 hours. Invalid input(s): LABIRONS  Urinalysis: No results for input(s): UA in the last 72 hours.     Objective:  Vitals: BP (!) 157/96   Pulse 76   Temp 98.5 °F (36.9 °C) (Oral)   Resp 16   Ht 5' 3\" (1.6 m)   Wt 213 lb 3 oz (96.7 kg)   LMP  (Within Years)   SpO2 96%   Breastfeeding No   BMI 37.76 kg/m²    Wt Readings from Last 3 Encounters:   04/06/20 213 lb 3 oz (96.7 kg)   03/03/20 218 lb 11.1 oz (99.2 kg)   02/22/20 210 lb (95.3 kg)      24HR INTAKE/OUTPUT:      Intake/Output Summary (Last 24 hours) at 4/6/2020 1030  Last data filed at 4/6/2020 6243  Gross per 24 hour   Intake 450 ml   Output 3188 ml   Net -2738 ml       General: alert, in no apparent distress  HEENT: normocephalic, atraumatic, anicteric  Neck: supple, no mass  Lungs: non-labored respirations, clear to auscultation bilaterally  Heart: regular rate and rhythm, 1/6 systolic murmurs or rubs  Abdomen: soft, non-tender, non-distended obese  Ext: no cyanosis, no peripheral edema  Access with bruits  Neuro: alert and oriented, no gross abnormalities  Psych: normal mood and affect  Skin: no rash      Electronically signed by Mateo Manuel MD

## 2020-04-06 NOTE — PROGRESS NOTES
Pulmonary & Critical Care Medicine ICU Progress Note  Chief complaint : A. fib with RVR    Subjunctive/24 hour events :   Patient seen and examined during multidisciplinary rounds with RN, charge nurse, RT, pharmacy, dietitian, and social service. Patient feels better, no shortness of breath, no chest pain, no fever overnight, no nausea no vomiting she has improved compared to yesterday, currently heart rate is controlled. Social History     Tobacco Use    Smoking status: Never Smoker    Smokeless tobacco: Never Used   Substance Use Topics    Alcohol use: Never     Frequency: Never     History reviewed. No pertinent family history. No results for input(s): PHART, FYB1SCY, PO2ART in the last 72 hours. MV Settings:     / / /            IV:   dilTIAZem (CARDIZEM) 125 mg in dextrose 5% 125 mL infusion Stopped (04/05/20 0630)    dextrose         Vitals:  BP (!) 157/96   Pulse 76   Temp 98.5 °F (36.9 °C) (Oral)   Resp 16   Ht 5' 3\" (1.6 m)   Wt 213 lb 3 oz (96.7 kg)   LMP  (Within Years)   SpO2 96%   Breastfeeding No   BMI 37.76 kg/m²    Tmax:        Intake/Output Summary (Last 24 hours) at 4/6/2020 1038  Last data filed at 4/6/2020 2724  Gross per 24 hour   Intake 450 ml   Output 3188 ml   Net -2738 ml       EXAM:  General: alert, cooperative, no distress  Head: normocephalic, atraumatic  Eyes:No gross abnormalities. , PERRL and Sclera nonicteric  ENT:  MMM no lesions  Neck:  supple and no masses  Chest : clear to auscultation bilaterally- no wheezes, rales or rhonchi, normal air movement, no respiratory distress  Heart[de-identified] Heart sounds are normal.  Regular rate and rhythm without murmur, gallop or rub. ABD:  symmetric, soft, non-tender  Musculoskeletal : no cyanosis, no clubbing and trace + edema-  bilateral lower extremity  Neuro:  Grossly normal  Skin: No rashes or nodules noted.   Lymph node:  no cervical nodes  Urology: No Covarrubias   Psychiatric: appropriate    Medications:  Scheduled Meds:  

## 2020-04-06 NOTE — CONSULTS
Negative  Neurologic:Negative for dizziness, lightheadedness  Psychiatric/ Behavioral: Positive for depression. Noncompliant with treatment plan    Physical Examination:     Constitutional:       Appearance: Normal appearance. She is well-developed. She is obese. HENT:      Head: Normocephalic and atraumatic. Nose: No congestion. Mouth/Throat:      Mouth: Mucous membranes are moist.   Eyes:      General: No scleral icterus. Pupils: Pupils are equal, round, and reactive to light. Neck:      Musculoskeletal: Normal range of motion. Cardiovascular:      Rate and Rhythm: Normal rate. Heart sounds: No murmur. Pulmonary:      Effort: Pulmonary effort is normal.      Breath sounds: Decreased breath sounds present. Abdominal:      General: Bowel sounds are normal.   Skin:     General: Skin is warm and dry. Capillary Refill: Capillary refill takes less than 2 seconds. Comments: Multiple open areas over generalized body area. Neurological:      Mental Status: She is alert and oriented to person, place, and time. Coordination: Coordination abnormal.   Psychiatric:         Mood and Affect: Mood is depressed.        BP (!) 157/96   Pulse 76   Temp 98.5 °F (36.9 °C) (Oral)   Resp 16   Ht 5' 3\" (1.6 m)   Wt 213 lb 3 oz (96.7 kg)   LMP  (Within Years)   SpO2 96%   Breastfeeding No   BMI 37.76 kg/m²    Physical Exam  Constitutional:       Appearance: Normal appearance. She is well-developed. She is obese. HENT:      Head: Normocephalic and atraumatic. Nose: No congestion. Mouth/Throat:      Mouth: Mucous membranes are moist.   Eyes:      General: No scleral icterus. Pupils: Pupils are equal, round, and reactive to light. Neck:      Musculoskeletal: Normal range of motion. Cardiovascular:      Rate and Rhythm: Normal rate. Heart sounds: No murmur.    Pulmonary:      Effort: Pulmonary effort is normal.      Breath sounds: Normal  Abdominal:      General: Bowel sounds are normal.   Skin:     General: Skin is warm and dry. Capillary Refill: Capillary refill takes less than 2 seconds. Comments: Multiple open areas/scratches on lower back. Neurological:      Mental Status: She is alert and oriented to person, place, and time. Coordination: Gait abnormality  Psychiatric:         Mood and Affect: Non compliant with treatment. Depressed.      Allergies:       Allergies   Allergen Reactions    Hydrocodone-Acetaminophen Hives, Itching and Nausea Only     Other reaction(s): Nausea      Naproxen Hives and Nausea Only     Other reaction(s): Nausea      Hydrocodone Hives    Vicodin [Hydrocodone-Acetaminophen] Hives       Medications:      Current Facility-Administered Medications   Medication Dose Route Frequency Provider Last Rate Last Dose    heparin (porcine) injection 2,000 Units  2,000 Units Intravenous Once Rupinder Joshua DO   Stopped at 04/06/20 1111    warfarin (COUMADIN) tablet 10 mg  10 mg Oral Once Ana Goldberg MD        sodium chloride 0.9 % infusion        Stopped at 04/06/20 1231    heparin (porcine) injection 4,000 Units  4,000 Units Intercatheter Once Rupinder Joshua DO        heparin (porcine) injection 5,000 Units  5,000 Units Subcutaneous 3 times per day Bashir Dang, DO   5,000 Units at 04/06/20 0547    hydrALAZINE (APRESOLINE) injection 10 mg  10 mg Intravenous Q6H PRN Bashir Dang, DO   10 mg at 04/05/20 1630    metoprolol (LOPRESSOR) injection 10 mg  10 mg Intravenous Q6H PRN Bashir Dang, DO        dilTIAZem 125 mg in dextrose 5 % 125 mL infusion  5 mg/hr Intravenous Continuous Bashir Dang DO   Stopped at 04/05/20 0630    aspirin chewable tablet 81 mg  81 mg Oral Daily Salome Pineda DO   81 mg at 04/06/20 0824    atorvastatin (LIPITOR) tablet 40 mg  40 mg Oral Nightly Salome Pineda DO   40 mg at 04/05/20 2101    b complex-C-folic acid (NEPHROCAPS) capsule 1 mg  1 capsule Oral Daily Bashir Dang DO   1 mg at 04/06/20 5411    Date     04/06/2020    K 4.3 04/06/2020    CL 93 04/06/2020    CO2 25 04/06/2020    BUN 39 04/06/2020    LABALBU 3.3 04/04/2020    CREATININE 5.70 04/06/2020    CALCIUM 8.5 04/06/2020    GFRAA 9.4 04/06/2020    LABGLOM 7.8 04/06/2020    GLUCOSE 83 04/06/2020     TSH:   Lab Results   Component Value Date    TSH 0.847 10/13/2019     Vitamin B12 and Folate: No components found for: FOLIC,  Z96  Urinalysis: No results found for: [place] [address], [address], BACTERIA, RBCUA, BLOODU, SPECGRAV, GLUCOSEU        FUNCTIONAL ADL´S:     Independent: [ x ] Eating, [  x ] Dressing, [ x  ] Transferring, [ x  ] Toileting, [ x  ] Bathing, [x   ] Continence  Dependent   : [  ] Eating, [   ] Dressing, [   ] Transferring, [   ] Sushil Coaster, [   ] Bathing, [   ] Continence  W. assistant : [  ] Eating, [   ] Dressing, [   ] Transferring, [   ] Sushil Coaster, [   ] Britni Crape, [   ] Continence    Radiology: Reviewed      Xr Chest Portable    Result Date: 4/4/2020  XR CHEST PORTABLE Exam Date/Time:  4/4/2020 3:30 PM Clinical History:   sob    Other . Drowsy. Recent dialysis. Comparison:  2/27/2020  RESULT: Lines, tubes, and devices:  Median sternotomy. Interval removal of the right-sided dialysis catheter. Vascular stent left axillary region. Lungs and pleura:  No consolidation. No pleural effusion. No pneumothorax. Normal pulmonary vascular pattern. Cardiomediastinal silhouette:  Stable mild to moderately enlarged. Other:  No acute osseous findings. No acute radiographic abnormality. Assessment and plan:  1. A-fib  -consulted by cardiology  -will follow cardiology treatment plan  2. ESRD-on hemodialysis  -continue with HD as scheduled  -encouraged adherence  3. Non-compliance with medications/treatments  -encouraged patient to continue with HD as scheduled to avoid further hospitalizations  4. Palliative Care Encounter  -discussed with patient Palliative care concept and philosophy including symptom management, goals of care discussion,

## 2020-04-06 NOTE — PROGRESS NOTES
(Within Years)   SpO2 96%   Breastfeeding No   BMI 37.76 kg/m²      Physical Examination:    Physical Exam   Constitutional: No distress. She appears acutely ill. HENT:   Normal cephalic and Atraumatic   Eyes: Pupils are equal, round, and reactive to light. Neck: Normal range of motion and thyroid normal. Neck supple. No JVD present. No neck adenopathy. No thyromegaly present. Cardiovascular: Normal rate, regular rhythm, intact distal pulses and normal pulses. Murmur heard. Pulmonary/Chest: Effort normal and breath sounds normal. She has no wheezes. She has no rales. She exhibits no tenderness. Abdominal: Soft. Bowel sounds are normal. There is no abdominal tenderness. Musculoskeletal: Normal range of motion. General: No tenderness or edema. Neurological: She is alert and oriented to person, place, and time. Skin: Skin is warm. No cyanosis. Nails show no clubbing.          Results/ Medications reviewed 4/6/2020, 1:14 PM     Laboratory, Microbiology, Pathology, Radiology, Cardiology, Medications and Transcriptions reviewed  Scheduled Meds:   heparin (porcine)  2,000 Units Intravenous Once    warfarin  10 mg Oral Once    heparin (porcine)  4,000 Units Intercatheter Once    heparin (porcine)  5,000 Units Subcutaneous 3 times per day    aspirin  81 mg Oral Daily    atorvastatin  40 mg Oral Nightly    b complex-C-folic acid  1 capsule Oral Daily    calcium acetate  667 mg Oral TID WC    carvedilol  12.5 mg Oral BID    miconazole   Topical BID    traZODone  100 mg Oral Nightly    venlafaxine  75 mg Oral Daily with breakfast    sodium chloride flush  10 mL Intravenous 2 times per day    famotidine (PEPCID) injection  20 mg Intravenous Daily    insulin glargine  5 Units Subcutaneous Nightly    insulin lispro  0-12 Units Subcutaneous TID     insulin lispro  0-6 Units Subcutaneous Nightly    warfarin (COUMADIN) daily dosing (placeholder)   Other RX Placeholder     Continuous Infusions:   sodium chloride Stopped (04/06/20 1231)    dilTIAZem (CARDIZEM) 125 mg in dextrose 5% 125 mL infusion Stopped (04/05/20 0630)    dextrose         Recent Labs     04/04/20  1530 04/05/20  0516 04/06/20  0509   WBC 6.1 4.3* 5.2   HGB 8.0* 7.9* 8.3*   HCT 24.2* 24.5* 25.0*   MCV 91.6 93.3 91.8    189 225     Recent Labs     04/04/20  1530 04/06/20  0509   * 136   K 5.2* 4.3   CL 93* 93*   CO2 19* 25   BUN 80* 39*   CREATININE 9.38* 5.70*     Recent Labs     04/04/20  1530   AST 32   ALT 19   BILITOT 0.3   ALKPHOS 62     No results for input(s): LIPASE, AMYLASE in the last 72 hours. Recent Labs     04/04/20  1530 04/05/20  0516 04/06/20  0509   PROT 7.7  --   --    INR 1.1 1.2 1.1     Xr Chest Portable    Result Date: 4/4/2020  XR CHEST PORTABLE Exam Date/Time:  4/4/2020 3:30 PM Clinical History:   sob    Other . Drowsy. Recent dialysis. Comparison:  2/27/2020  RESULT: Lines, tubes, and devices:  Median sternotomy. Interval removal of the right-sided dialysis catheter. Vascular stent left axillary region. Lungs and pleura:  No consolidation. No pleural effusion. No pneumothorax. Normal pulmonary vascular pattern. Cardiomediastinal silhouette:  Stable mild to moderately enlarged. Other:  No acute osseous findings. No acute radiographic abnormality. Active Hospital Problems    Diagnosis Date Noted   [facility]) [I48.91] 04/04/2020     Priority: Low         Impression/Plan:   1. Uremia- resume HD for ESRD  2. PAF -now back in SR. Resume home dose Warfarin now. 3. CAD CABG x3- no angna  4. HTN- madjust meds. 5. LVEF 70%     Thank you for allowing us to participate in the care of this patient. Will continue to follow. Please call if questions or concerns arise.     Electronically signed by German Tapia MD on 4/6/2020 at 1:14 PM

## 2020-04-07 NOTE — DISCHARGE SUMMARY
abnormality. Discharge Medications:       Cheryl Mendez June   Home Medication Instructions OBA:[id number]    Printed on:04/07/20 5814   Medication Information                      aspirin 81 MG chewable tablet  Take 1 tablet by mouth daily             atorvastatin (LIPITOR) 40 MG tablet  Take 40 mg by mouth nightly             b complex-C-folic acid (NEPHROCAPS) 1 MG capsule  Take 1 capsule by mouth daily             calcium acetate (PHOSLO) 667 MG capsule  Take by mouth 3 times daily (with meals)             cetirizine (ZYRTEC) 10 MG tablet  Take 10 mg by mouth 2 times daily as needed for Allergies             cloNIDine (CATAPRES) 0.1 MG tablet  Take 0.1 mg by mouth daily             cyclobenzaprine (FLEXERIL) 5 MG tablet  Take 5 mg by mouth 3 times daily as needed for Muscle spasms             diphenhydrAMINE (BENADRYL) 25 MG capsule  Take 25 mg by mouth every 6 hours as needed for Itching             insulin glargine (LANTUS) 100 UNIT/ML injection vial  Inject into the skin nightly Indications: varies per needs 10 to 15 units              insulin lispro (HUMALOG) 100 UNIT/ML injection vial  Inject into the skin 3 times daily (before meals) Indications: does varies per needs              metoprolol tartrate (LOPRESSOR) 50 MG tablet  Take 50 mg by mouth 2 times daily             nystatin (MYCOSTATIN) POWD powder  Apply topically 2 times daily             oxyCODONE-acetaminophen (PERCOCET) 5-325 MG per tablet  Take 1 tablet by mouth every 4 hours as needed for Pain. .             sennosides-docusate sodium (SENOKOT-S) 8.6-50 MG tablet  Take 1 tablet by mouth 2 times daily             topiramate (TOPAMAX SPRINKLE) 25 MG capsule  Take 25 mg by mouth 2 times daily             warfarin (COUMADIN) 5 MG tablet  Take 5 mg by mouth                 Disposition:   If discharged to Home, Any [place] [address] needs that were indicated and/or required as been addressed and set up by Social Work.      Condition at discharge: Pt was

## 2020-04-07 NOTE — CARE COORDINATION
COVID-19 Screening Initial Follow-up Note    Patient contacted regarding [id number]. CTN left VM for initial outreach. Requested call back to P: [phone]. Patient has following risk factors of: Recent hospital admission, ESRD on HD, CHF, NESTOR, CA history, DM, Obesity. Current Palliative Care.

## 2020-04-12 PROBLEM — N19 RENAL FAILURE: Status: ACTIVE | Noted: 2020-01-01

## 2020-04-12 NOTE — ED TRIAGE NOTES
Patient arrived from home with complaint of SOB x2 days. Patient A&ox3. Skin pink, warm, and dry. Lungs clear in all lobes. BLE swelling noted non-pitting. Patient is a Monday, wed, Friday dialysis which she missed Friday. Patient also has complaint of a headache.

## 2020-04-12 NOTE — CARE COORDINATION
[facility] Case Management Initial Discharge Assessment    Met with Patient to discuss discharge plan. PCP: Nazario Stuart MD                                Date of Last Visit: a month    If no PCP, list provided? N/A    Discharge Planning    Living Arrangements: independently at home    Who do you live with? sister    Who helps you with your care:  self    If lives at home:     Do you have any barriers navigating in your home? no    Patient can perform ADL? Yes    Current Services (outpatient and in home) :  None    Dialysis: No    Is transportation available to get to your appointments? Yes  This is a problem sometimes. Pt is on hemodialysis. DME Equipment:  yes - walker    Respiratory equipment: None    Respiratory provider:  no     Pharmacy:  yes - [facility]. Consult with Medication Assistance Program? Pt is concerned about whether her medications will be covered, she stated that her insurance was changed from caresource to medicaid. Does Patient Have a High-Risk for Readmission Diagnosis (CHF, PN, MI, COPD)? H/o chf, now dx with acute pulmonary edema    Initial Discharge Plan? (Note: please see concurrent daily documentation for any updates after initial note). CM to follow for further d/c needs and referrals. Pt had skipped her dialysis Fri. Please check transportation situation.     Electronically signed by Jennifer Hurt on 4/12/2020 at 8:14 PM

## 2020-04-12 NOTE — ED PROVIDER NOTES
[facility] ED  eMERGENCY dEPARTMENT eNCOUnter      Pt Name: Francis Liao  MRN: [id number]  [place] 1965  Date of evaluation: 4/12/2020  Provider: Yfn Munson MD    CHIEF COMPLAINT       Chief Complaint   Patient presents with    Shortness of Breath     since being discharge from hospital.          HISTORY OF PRESENT ILLNESS   (Location/Symptom, Timing/Onset,Context/Setting, Quality, Duration, Modifying Factors, Severity)  Note limiting factors. Francis Liao is a 48 y.o. female who presents to the emergency department SOB     48years old with end-stage renal disease on chronic dialysis 3 days ago also have a history of coronary artery disease and A. fib presented to the ER with increased shortness of breath with minimal exertion exertion patient admits to missing her dialysis on Friday. Today had gotten significantly short of breath after she ate came to the ER for evaluation denies any fever chills body ache in the last few days but have been having significantly increasing shortness of breath since she missed her dialysis on Friday          NursingNotes were reviewed. REVIEW OF SYSTEMS    (2-9 systems for level 4, 10 or more for level 5)     Review of Systems   Constitutional: Negative. HENT: Negative. Eyes: Negative. Respiratory: Positive for chest tightness and shortness of breath. Cardiovascular: Negative. Gastrointestinal: Negative. Endocrine: Negative. Genitourinary: Negative. Skin: Negative. Allergic/Immunologic: Negative. Neurological: Negative. Hematological: Negative. Psychiatric/Behavioral: Negative. Except as noted above the remainder of the review of systems was reviewed and negative.        PAST MEDICAL HISTORY     Past Medical History:   Diagnosis Date    Atrial fibrillation ([facility]     Cancer ([facility]     Breast    CHF (congestive heart failure) (HCC)     Chronic kidney disease     [facility]     ESRD membranes are moist.   Eyes:      Extraocular Movements: Extraocular movements intact. Pupils: Pupils are equal, round, and reactive to light. Neck:      Musculoskeletal: Normal range of motion and neck supple. Cardiovascular:      Rate and Rhythm: Normal rate and regular rhythm. Pulses: Normal pulses. Heart sounds: Normal heart sounds. Pulmonary:      Effort: Pulmonary effort is normal. Tachypnea present. No respiratory distress. Breath sounds: No decreased air movement. Examination of the right-lower field reveals rhonchi. Examination of the left-lower field reveals rhonchi. Rhonchi present. No decreased breath sounds, wheezing or rales. Chest:      Chest wall: No tenderness. Abdominal:      General: Abdomen is flat. Bowel sounds are normal.      Palpations: Abdomen is soft. Musculoskeletal: Normal range of motion. Skin:     General: Skin is warm and dry. Neurological:      General: No focal deficit present. Mental Status: She is alert and oriented to person, place, and time. Cranial Nerves: No cranial nerve deficit. Sensory: No sensory deficit. Motor: No weakness or abnormal muscle tone. Coordination: Coordination normal.      Gait: Gait normal.      Deep Tendon Reflexes: Reflexes normal.   Psychiatric:         Mood and Affect: Mood normal.         Behavior: Behavior normal.         Thought Content: Thought content normal.         Judgment: Judgment normal.         DIAGNOSTIC RESULTS     EKG: All EKG's are interpreted by the Emergency Department Physician who either signs or Co-signsthis chart in the absence of a cardiologist.    EKG: Sinus rhythm with first degree AV block nonspecific ST changes rate 81.       RADIOLOGY:   Non-plain filmimages such as CT, Ultrasound and MRI are read by the radiologist. Plain radiographic images are visualized and preliminarily interpreted by the emergency physician with the below findings:        Interpretation per the Radiologist below, if available at the time ofthis note:    [address] organ? LIVER   Final Result   Impression:     1. Hepatomegaly. 2. Previous cholecystectomy. 3. Intrahepatic and extrahepatic biliary tree ductal dilatation likely reflective of reservoir effect, although incompletely evaluated, in this patient status post previous cholecystectomy. XR CHEST PORTABLE   Final Result   Impression:     1. Stable, enlarged cardiomediastinal silhouette with underlying mild pulmonary edema. 2. Suspected bibasilar infiltrate/pneumonia and bilateral pleural effusions, right greater than left.                ED BEDSIDE ULTRASOUND:   Performed by ED Physician - none    LABS:  Labs Reviewed   COMPREHENSIVE METABOLIC PANEL - Abnormal; Notable for the following components:       Result Value    Sodium 134 (*)     Potassium 5.6 (*)     Anion Gap 17 (*)     Glucose 116 (*)     BUN 69 (*)     CREATININE 10.42 (*)     GFR Non- 3.9 (*)     GFR  4.7 (*)     Calcium 8.3 (*)     Alb 3.3 (*)     Alkaline Phosphatase 187 (*)      (*)      (*)     Globulin 4.5 (*)     All other components within normal limits    Narrative:     CALL  [facility] tel. [phone],  CREAT results called to and read back by Raphael Hyatt RN, 04/12/2020 17:07, by Candi Cruz results called to and read back by Raphael Hyatt RN, 04/12/2020 17:07, by Braeden Clifton   CBC WITH AUTO DIFFERENTIAL - Abnormal; Notable for the following components:    WBC 2.9 (*)     RBC 2.58 (*)     Hemoglobin 7.9 (*)     Hematocrit 23.9 (*)     RDW 16.0 (*)     Lymphocytes Absolute 0.6 (*)     All other components within normal limits   TROPONIN - Abnormal; Notable for the following components:    Troponin 0.188 (*)     All other components within normal limits    Narrative:     Maryjo Koroma tel. [phone],  TROP results called to and read back by Raphael Hyatt RN, 04/12/2020 17:07, by Thea Gutierres    Narrative: CALL  [facility] tel. [phone],  CREAT results called to and read back by [facility], 04/12/2020 17:07, by Chepe Emma results called to and read back by Kojo Clark RN, 04/12/2020 17:07, by Sourav [id number]       All other labs were within normal range or not returned as of this dictation. EMERGENCY DEPARTMENT COURSE and DIFFERENTIAL DIAGNOSIS/MDM:   Vitals:    Vitals:    04/12/20 1608 04/12/20 1649 04/12/20 1744   BP: (!) 174/92 (!) 178/88 (!) 162/72   Pulse: 84 78 89   Resp: 20 18 20   Temp: 98.4 °F (36.9 °C)     TempSrc: Oral     SpO2: 100% 99% 100%   Weight: 210 lb (95.3 kg)     Height: 5' 3\" (1.6 m)                  MDM  Number of Diagnoses or Management Options  Acute pulmonary edema (HCC):   Dyspnea on exertion:   Pneumonia due to organism:   Stage 5 chronic kidney disease not on chronic dialysis [facility]):   Diagnosis management comments: 48years old presented to the ER with worsening shortness of breath after she missed her dialysis Friday. Patient was just released from the hospital on the sixth for A. fib with RVR states was missed dialysis last Friday and became significantly short of breath since lot worse today 3 with pulmonary edema suspect bilateral by lateral infiltrates. Today have elevated potassium elevated liver function tests patient will be admitted under nephrology to initiate dialysis and further management also the patient with high risk readmission due to increased shortness of breath worsening pulmonary edema today she will need to be admitted for emergency dialysis       Amount and/or Complexity of Data Reviewed  Clinical lab tests: ordered and reviewed  Tests in the radiology section of CPT®: ordered and reviewed      None    PROCEDURES:  Unless otherwise noted below, none     Procedures    FINAL IMPRESSION      1. Dyspnea on exertion    2. Stage 5 chronic kidney disease not on chronic dialysis ([address]    3. Acute pulmonary edema (HCC)    4.  Pneumonia due to organism DISPOSITION/PLAN   DISPOSITION Decision To Admit 04/12/2020 06:41:31 PM      PATIENT REFERRED TO:  No follow-up provider specified.     DISCHARGE MEDICATIONS:  New Prescriptions    No medications on file          (Please note thatportions of this note were completed with a voice recognition program.  Efforts were made to edit the dictations but occasionally words are mis-transcribed.)    Bettye Trujillo MD (electronically signed)  Attending Emergency Physician          Dieter Scales MD  04/12/20 7012

## 2020-04-13 NOTE — H&P
Department of Internal Medicine  Nephrology  [facility]  Attending History and Physical      CHIEF COMPLAINT:  Shortness of breath , headache    Reason for Admission:  Missed hd, headache, fluid overload    History Obtained From:  patient    HISTORY OF PRESENT ILLNESS:    48y.o. year old female with history s/f ESRD, DM, CAD s/p CABG with depression. Has been on HD for about 2-3 years. Extremely non compliant with hd. Was admitted to psych and started ECT with some improvement. Had to be stopped short due to coronavirus pandemic. Since discharge has only been to dialysis once on . Comes in with fluid overload high bp and headache. Pt seen on hd. Crit line curve c/w fluid overload. 2k bath.       Past Medical History:        Diagnosis Date    Atrial fibrillation (HCC)     Cancer (HCC)     Breast    CHF (congestive heart failure) (HCC)     Chronic kidney disease     [facility] mellitus ([facility]     ESRD (end stage renal disease) ([facility] 2020    Essential hypertension 2020    Heart murmur     Hepatitis C     Hx of CABG 2020    Paroxysmal atrial fibrillation ([facility] 2020       Past Surgical History:        Procedure Laterality Date    BREAST SURGERY       SECTION      CHOLECYSTECTOMY      CORONARY ARTERY BYPASS GRAFT         Medications Prior to Admission:    Medications Prior to Admission: topiramate (TOPAMAX SPRINKLE) 25 MG capsule, Take 25 mg by mouth 2 times daily  cloNIDine (CATAPRES) 0.1 MG tablet, Take 0.1 mg by mouth daily  metoprolol tartrate (LOPRESSOR) 50 MG tablet, Take 50 mg by mouth 2 times daily  sennosides-docusate sodium (SENOKOT-S) 8.6-50 MG tablet, Take 1 tablet by mouth 2 times daily  diphenhydrAMINE (BENADRYL) 25 MG capsule, Take 25 mg by mouth every 6 hours as needed for Itching  warfarin (COUMADIN) 5 MG tablet, Take 5 mg by mouth  aspirin 81 MG chewable tablet, Take 1 tablet by mouth daily  nystatin (MYCOSTATIN) POWD powder, Apply topically 2 times daily  b complex-C-folic acid (NEPHROCAPS) 1 MG capsule, Take 1 capsule by mouth daily  calcium acetate (PHOSLO) 667 MG capsule, Take by mouth 3 times daily (with meals)  atorvastatin (LIPITOR) 40 MG tablet, Take 40 mg by mouth nightly  cyclobenzaprine (FLEXERIL) 5 MG tablet, Take 5 mg by mouth 3 times daily as needed for Muscle spasms  oxyCODONE-acetaminophen (PERCOCET) 5-325 MG per tablet, Take 1 tablet by mouth every 4 hours as needed for Pain. .  cetirizine (ZYRTEC) 10 MG tablet, Take 10 mg by mouth 2 times daily as needed for Allergies  insulin lispro (HUMALOG) 100 UNIT/ML injection vial, Inject into the skin 3 times daily (before meals) Indications: does varies per needs   insulin glargine (LANTUS) 100 UNIT/ML injection vial, Inject into the skin nightly Indications: varies per needs 10 to 15 units     Allergies:  Aripiprazole    Social History:   Lives at home. Not     Family History:   History reviewed. No pertinent family history.     REVIEW OF SYSTEMS:  Positives in bold  Constitutional: fever, chills, fatigue, malaise, headache  HENT:  rhinorrhea, sinus pain, sore throat, epistaxis    Eyes:  photophobia, visual disturbance, eye redness  Respiratory: shortness of breath, cough, hemoptysis    Cardiovascular: chest pain, palpitations, orthopnea, leg swelling   Gastrointestinal: abdominal pain, nausea, vomiting, diarrhea, constipation   Endocrine: polydipsia, polyphagia, cold intolerance, heat intolerance  Genitourinary: dysuria, flank pain, frequency, urgency   Hematologic: easy bruising, easy bleeding  Musculoskeletal: back pain, neck pain, myalgias, arthalgias  Neurological: syncope, lightheadedness, dizziness, seizures, tremors, weakness  Psychiatric/Behavioral: anxiety, depression, hallucinations  Skin: rash, itching    PHYSICAL EXAM:  Vitals:  BP (!) 157/76   Pulse 72   Temp 97.6 °F (36.4 °C)   Resp 18   Ht 5' 3\" (1.6 m)   Wt 235 lb 3.7 oz (106.7 kg)   SpO2 94%   BMI 41.67 kg/m² General: alert, in no apparent distress  HEENT: normocephalic, atraumatic, anicteric  Neck: supple, no mass  Lungs: non-labored respirations, clear to auscultation bilaterally  Heart: regular rate and rhythm, no murmurs or rubs  Abdomen: soft, non-tender, non-distended  MSK: no joint swelling or tenderness  Ext: 1+ edema  Neuro: alert and oriented, no gross abnormalities  Psych: normal mood and affect  Skin: no rash  Vascular access: left av fistula    DATA:  CBC with Differential:    Lab Results   Component Value Date    WBC 4.5 04/13/2020    RBC 2.54 04/13/2020    RBC 3.51 08/12/2018    HGB 7.7 04/13/2020    HCT 23.8 04/13/2020     04/13/2020    MCV 93.9 04/13/2020    MCH 30.3 04/13/2020    MCHC 32.3 04/13/2020    RDW 15.9 04/13/2020    LYMPHOPCT 28.0 04/13/2020    LYMPHOPCT 20.7 08/12/2018    MONOPCT 10.4 04/13/2020    BASOPCT 0.9 04/13/2020    MONOSABS 0.5 04/13/2020    LYMPHSABS 1.3 04/13/2020    EOSABS 0.4 04/13/2020    BASOSABS 0.0 04/13/2020     CMP:    Lab Results   Component Value Date     04/13/2020    K 6.0 04/13/2020    K 4.3 04/06/2020    CL 95 04/13/2020    CO2 18 04/13/2020    BUN 75 04/13/2020    CREATININE 10.93 04/13/2020    GFRAA 4.5 04/13/2020    LABGLOM 3.7 04/13/2020    GLUCOSE 96 04/13/2020    PROT 7.8 04/12/2020    LABALBU 3.3 04/12/2020    CALCIUM 8.5 04/13/2020    BILITOT 0.4 04/12/2020    ALKPHOS 187 04/12/2020     04/12/2020     04/12/2020       ASSESSMENT AND PLAN:    47 yo lady with DM, HTN, CAD s/p CABG with ESRD. Missed hd. Depression. Has anemia, headache, high BP, fluid overload and hyperkalemia. Fluid overload would be considered to be acute diastolic heart failure due to missed dialysis.     - HD today and tomorrow  - psych consult  - aranesp for anemia  - check Colton Iyer MD

## 2020-04-13 NOTE — BH NOTE
Pt not in the floor.  IN dialysis  Will attempt to see her later today or tomorrow AM    Electronically signed by Winsome Sanchez MD on 4/13/2020 at 11:24 AM

## 2020-04-13 NOTE — CARE COORDINATION
PT WAS JUST DISCHARGED 4/6. PALL CARE WAS ORDERED AND PLANNED TO FOLLOW PT OUTPT. READMISSION SCORE 46%. NOTE LEFT FOR PALL CARE TO SEE WHILE IN HOSPITAL. PSYCHIATRY WAS CONSULTED. THE FOLLOWING CMI IS FROM 4/4-4/6 ADMIT, PT DOES FOLLOW [facility]. PCP: Ian Jalloh MD                                Date of Last Visit:  LAST MONTH     If no PCP, list provided? N/A     Discharge Planning     Living Arrangements: independently at home     Who do you live with? SISTER, SISTER'S KID, PT'S SON AND GRANDDAUGHTER     Who helps you with your care:  self or family     If lives at home:                Do you have any barriers navigating in your home? no     Patient can perform ADL? Yes     Current Services (outpatient and in home) :  None     Dialysis: Yes, Location [facility], Chair Time M/W/F 2:55PM     Is transportation available to get to your appointments? Yes- SISTER     DME Equipment:  yes - [facility]     Respiratory equipment: None     Respiratory provider:  no     Pharmacy:  yes - [facility]     Consult with Medication Assistance Program?  No       Patient agreeable to [facility]? Declined     Patient agreeable to SNF/Rehab? N/A     Other discharge needs identified? Palliative Care     Freedom of choice list provided with basic dialogue that supports the patient's individualized plan of care/goals and shares the quality data associated with the providers.                Yes

## 2020-04-14 NOTE — PROGRESS NOTES
Dialysis note    3500mLs removed during treatment, tolerated well. Blood returned post HD, needles pulled and sites held until hemostasis was achieved. Report called to [facility].

## 2020-04-14 NOTE — CONSULTS
[facility], Department of Psychiatry  [address]    Patient Location:   [address] [place] [address]      Provider Location (City/State):   [place]     This virtual visit was conducted via interactive/real-time audio/video. REASON FOR CONSULT: depression     CONSULTING PHYSICIAN: Dr Haroon Franco    History obtained from: Patient    HISTORY OF PRESENT ILLNESS:      The patient is a 48 y.o. female with significant past psychiatric history of MDD  Pt was getting better with ECT treatment until it was stopped sec to [facility] [place] [facility]\"" pandemic when ECT procedure was stopped  Pt was due to have 3-4 more treatment  Pt was taking Effexor   Pt report that she was having recurrent fall with effexor and so she quit taking it  Since then she did not fell  Pt also has been gradually noticing her mood symptoms getting worse  Has been staying at home most part  Pt denies any active SI  Has been having dialysis  Want to try antidepressant meds. Feel Prozac was helping her in the past and she want to complete ECT        The patient is not currently receiving care for the above psychiatric illness.       Psychiatric Review of Systems       Depression: yes     Zamzam or Hypomania:  no     Panic Attacks:  no     Phobias:  no     Obsessions and Compulsions:  no     PTSD : no     Hallucinations:  no     Delusions:  no      Substance Abuse History:  ETOH: no  Marijuana: no  Opiates: no  Other Drugs: no      Past Psychiatric History:  Prior Diagnosis: MDD recurrent  Psychiatrist: Yamilka Garza  Therapist:no  Hospitalization: yes  Hx of Suicidal Attempts: yes  Hx of violence:  no  ECT: no    Past Medical History:        Diagnosis Date    Atrial [facility] ([facility]     Cancer ([facility]     Breast    CHF (congestive heart failure) ([facility]     Chronic kidney disease     Diabetes mellitus ([facility]     ESRD (end stage renal disease) ([facility] 2/14/2020    Essential hypertension 2/14/2020    Heart murmur History    Marital status: Single     Spouse name: Not on file    Number of children: Not on file    Years of education: Not on file    Highest education level: Not on file   Occupational History    Not on file   Social Needs    Financial resource strain: Not on file    Food insecurity     Worry: Not on file     Inability: Not on file    Transportation needs     Medical: Not on file     Non-medical: Not on file   Tobacco Use    Smoking status: Never Smoker    Smokeless tobacco: Never Used   Substance and Sexual Activity    Alcohol use: Never     Frequency: Never    Drug use: Never    Sexual activity: Not Currently   Lifestyle    Physical activity     Days per week: Not on file     Minutes per session: Not on file    Stress: Not on file   Relationships    Social connections     Talks on phone: Not on file     Gets together: Not on file     Attends Anabaptist service: Not on file     Active member of club or organization: Not on file     Attends meetings of clubs or organizations: Not on file     Relationship status: Not on file    Intimate partner violence     Fear of current or ex partner: Not on file     Emotionally abused: Not on file     Physically abused: Not on file     Forced sexual activity: Not on file   Other Topics Concern    Not on file   Social History Narrative    Not on file       REVIEW OF SYSTEMS    Constitutional: [] fever  [] chills  [] weight loss  []weakness [] Other:  Eyes:  [] photophobia  [] discharge [] acuity change   [] Diplopia   [] Other:  HENT:  [] sore throat  [] ear pain [] Tinnitus   [] Other  Respiratory:  [] Cough  [] Shortness of breath   [] Sputum   [] Other:   Cardiac: []Chest pain   []Palpitations []Edema  []PND  [] Other:  GI:  []Abdominal pain   []Nausea  []Vomiting  []Diarrhea  [] Other:  :  [] Dysuria   []Frequency  []Hematuria  []Discharge  [] Other:  Possible Pregnancy: []Yes   []No   LMP:   Musculoskeletal:  []Back pain  []Neck pain  []Recent Injury Skin:  []Rash  [] Itching  [] Other:  Neurologic:  [] Headache  [] Focal weakness  [] Sensory changes []Other:  Endocrine:  [] Polyuria  [] Polydipsia  [] Hair Loss  [] Other:  Lymphatic:   [] Swollen glands   Psychiatric:  As per HPI      All other systems negative except as marked or mentioned/indicated in the HPI. Kiko Chinduc      PHYSICAL EXAM:  Vitals:  BP (!) 184/74   Pulse 66   Temp 98.2 °F (36.8 °C) (Oral)   Resp 16   Ht 5' 3\" (1.6 m)   Wt 225 lb 12 oz (102.4 kg)   SpO2 95%   BMI 39.99 kg/m²      Neuro Exam:   Muscle Strength & Tone: full ROM  Gait: normal gait   Involuntary Movements: No    Mental Status Examination:    Level of consciousness:  within normal limits   Appearance:  ill-appearing  Behavior/Motor:  psychomotor retardation  Attitude toward examiner:  withdrawn  Speech:  spontaneous   Mood: anxious and depressed- 5/10  Affect:  mood congruent  Thought processes:  goal directed   Thought content:  Suicidal Ideation:  denies suicidal ideation  Cognition:  oriented to person, place, and time   Concentration distractible  Memory intact  Mini Mental Status 30/30  Insight fair   Judgement fair   Fund of Knowledge adequate      DIAGNOSIS:     MDD recurrent moderate        RISK ASSESSMENT:        LABS: REVIEWED TODAY:  Recent Labs     04/12/20  1615 04/13/20  0607 04/14/20  0556   WBC 2.9* 4.5* 4.3*   HGB 7.9* 7.7* 7.6*    203 211     Recent Labs     04/12/20  1615 04/13/20  0607 04/14/20  0556   * 135 134*   K 5.6* 6.0* 4.4   CL 95 95 93*   CO2 22 18* 25   BUN 69* 75* 35*   CREATININE 10.42* 10.93* 6.19*   GLUCOSE 116* 96 120*     Recent Labs     04/12/20  1615   BILITOT 0.4   ALKPHOS 187*   *   *     No results found for: LABAMPH, BARBSCNU, LABBENZ, CANNAB, COCAINESCRN, LABMETH, OPIATESCREENURINE, PHENCYCLIDINESCREENURINE, PPXUR, ETOH  Lab Results   Component Value Date    TSH 0.847 10/13/2019     No results found for: LITHIUM  No results found for: VALPROATE, CBMZ  No results found for: LITHIUM, VALPROATE    FURTHER LABS ORDERED :      Radiology   Ct Head Wo Contrast    Result Date: 2020  CT Brain Contrast medium:  Not utilized. History:  Headache. Left vision blurring. Comparison:  None Findings: Extra-axial spaces:  Normal. Intracranial hemorrhage:  None. Ventricular system:  Normal for age. Basal Cisterns:  Normal. Cerebral Parenchyma:  Normal. Midline Shift:  None. Cerebellum:  Normal. Paranasal sinuses and mastoid air cells: Mucosal thickening, right maxillary sinus. Visualized Orbits: Ocular globes, retrobulbar fat, extraocular muscles, optic nerves intact. Impression: Right maxillary sinusitis. All CT scans at this facility use dose modulation, iterative reconstruction, and/or weight based dosing when appropriate to reduce radiation dose to as low as reasonably achievable. Xr Chest Portable    Result Date: 2020  Patient MRN: [id number] : 1965 Age:  48 years Gender: Female Order Date: 2020 4:15 PM. Exam: XR CHEST PORTABLE Number of Views: 1 Indication:  Shortness of breath x2 days with bilateral leg swelling and headache. Comparison: Chest x-ray 2020 Findings: Stable, enlarged cardiomediastinal silhouette with mild underlying pulmonary edema, bibasilar airspace disease (right greater left), and bilateral pleural effusions also right greater left. No pneumothorax. Median sternotomy wires. Impression:  1. Stable, enlarged cardiomediastinal silhouette with underlying mild pulmonary edema. 2. Suspected bibasilar infiltrate/pneumonia and bilateral pleural effusions, right greater than left. Xr Chest Portable    Result Date: 2020  XR CHEST PORTABLE Exam Date/Time:  2020 3:30 PM Clinical History:   sob    Other . Drowsy. Recent dialysis. Comparison:  2020  RESULT: Lines, tubes, and devices:  Median sternotomy. Interval removal of the right-sided dialysis catheter. Vascular stent left axillary region.  Lungs and pleura:  No consolidation. No pleural effusion. No pneumothorax. Normal pulmonary vascular pattern. Cardiomediastinal silhouette:  Stable mild to moderately enlarged. Other:  No acute osseous findings. No acute radiographic abnormality. Us Abdomen Limited Specify Organ? Liver    Result Date: 2020  Patient MRN: [id number] : 1965 Age:  48 years Gender: Female Order Date: 2020 5:30 PM. Exam: US ABDOMEN LIMITED Number of Views: 46 Indication:  Elevated LFTs. Previous cholecystectomy. Abdominal pain. Shortness of breath. Comparison: None. Findings: No definitive intrahepatic mass is identified. Dilatation of intrahepatic biliary tree. Hepatomegaly. Main portal vein, right portal vein and left portal vein demonstrate vascular flow on color evaluation. Previous cholecystectomy. Pancreas nondiagnostic secondary to lack of good visualization. Extrahepatic common bile that measures approximately 0.95 cm. No choledocholithiasis. Impression:  1. Hepatomegaly. 2. Previous cholecystectomy. 3. Intrahepatic and extrahepatic biliary tree ductal dilatation likely reflective of reservoir effect, although incompletely evaluated, in this patient status post previous cholecystectomy. EKG: TRACING REVIEWED    RECOMMENDATIONS    Risk Management:  routine:  no special precautions necessary    Medications:  Prozac 20 mg started  Will complete ECT treatment once we reopen ECT procedure. [facility] ECT nurse will contact patient  Discussed with the treating [phone] team about the patient and treatment plan  Reviewed the chart    Discussed with the patient risk, benefit, alternative and common side effects for the  proposed medication treatment. Patient is consenting to the treatment. Thanks for the consult. Please call me if needed.     Electronically signed by Umer Hansen MD on 2020 at 12:39 PM

## 2020-04-14 NOTE — PROGRESS NOTES
Department of Internal Medicine  Nephrology  Attending Progress Note      SUBJECTIVE  Got HD yesterday, awaiting psych consult, BP still elevated, COVID neg    OBJECTIVE    Medications    Scheduled Meds:   FLUoxetine  20 mg Oral Daily    darbepoetin sarina-polysorbate  40 mcg Subcutaneous Weekly    sodium chloride flush  10 mL Intravenous 2 times per day    ondansetron  4 mg Oral Once    miconazole   Topical BID    b complex-C-folic acid  1 capsule Oral Daily    atorvastatin  40 mg Oral Nightly    aspirin  81 mg Oral Daily    topiramate  25 mg Oral BID    cloNIDine  0.1 mg Oral Daily    metoprolol tartrate  50 mg Oral BID    sennosides-docusate sodium  1 tablet Oral BID    calcium acetate  667 mg Oral TID WC    insulin glargine  10 Units Subcutaneous Nightly    insulin lispro  0-12 Units Subcutaneous TID WC    insulin lispro  0-6 Units Subcutaneous Nightly     Continuous Infusions:   dextrose         Physical    BP (!) 184/74   Pulse 66   Temp 98.2 °F (36.8 °C) (Oral)   Resp 16   Ht 5' 3\" (1.6 m)   Wt 225 lb 12 oz (102.4 kg)   SpO2 95%   BMI 39.99 kg/m²    Wt Readings from Last 3 Encounters:   04/13/20 225 lb 12 oz (102.4 kg)   04/06/20 213 lb 3 oz (96.7 kg)   03/16/20 201 lb (91.2 kg)      24HR INTAKE/OUTPUT:  No intake or output data in the 24 hours ending 04/14/20 1428    General: alert, in no apparent distress  HEENT: normocephalic, atraumatic, anicteric  Neck: supple, no mass  Lungs: non-labored respirations, clear to auscultation bilaterally  Heart: regular rate and rhythm, no murmurs or rubs  Abdomen: soft, non-tender, non-distended  Ext: no cyanosis, 1+ BLE peripheral edema  Neuro: alert and oriented, no gross abnormalities  Psych: normal mood and affect  Skin: no rash    Data    CBC:   Recent Labs     04/13/20  0607 04/14/20  0556   WBC 4.5* 4.3*   HGB 7.7* 7.6*    211     CMP:    Recent Labs     04/12/20  1615 04/13/20  0607 04/14/20  0556   * 135 134*   K 5.6* 6.0* 4.4 CL 95 95 93*   CO2 22 18* 25   BUN 69* 75* 35*   CREATININE 10.42* 10.93* 6.19*   GLUCOSE 116* 96 120*   CALCIUM 8.3* 8.5 8.6   LABGLOM 3.9* 3.7* 7.1*     Troponin:   Recent Labs     04/12/20  1615   TROPONINI 0.188*     BNP: No results for input(s): BNP in the last 72 hours. INR:   Recent Labs     04/14/20  0556   INR 1.2     Lipids: No results for input(s): CHOL, LDLDIRECT, TRIG, HDL, AMYLASE, LIPASE in the last 72 hours. Liver:   Recent Labs     04/12/20  1615   *   *   ALKPHOS 187*   PROT 7.8   LABALBU 3.3*   BILITOT 0.4     Iron:  No results for input(s): IRONS, FERRITIN in the last 72 hours. Invalid input(s): LABIRONS  Urinalysis: No results for input(s): UA in the last 72 hours. ASSESSMENT AND PLAN    47 yo lady with DM, HTN, CAD s/p CABG with ESRD. Missed hd. Depression. Has anemia, headache, high BP, fluid overload and hyperkalemia. Fluid overload would be considered to be acute diastolic heart failure due to missed dialysis.  Has hyperphosphatemia     - HD today  - awaiting psych consult  - aranesp for anemia  - if no improvement in BP w/ HD and clonidine will add antihypertensive   - continue binders       Leigh Perez MD

## 2020-04-14 NOTE — FLOWSHEET NOTE
PM assessment completed. Patient denies complaints of chest pain or shortness of breath. Patient medicated with percocet for pain. Patient resting in bed. Voices no other needs.

## 2020-04-14 NOTE — FLOWSHEET NOTE
Pt back from dialysis. Pt asking nurse if she was going home tonight. Nurse informed the pt that there are no dc orders at this time. This nurse was informed by the dialysis nurse during report that the pt was hypertensive during treatment and according to the dialysis nurse they do not call the physician with these issues. This nurse will pass on info for the dr to address.

## 2020-04-15 NOTE — DISCHARGE SUMMARY
Physician Discharge Summary     Patient ID:  Javon Eid  [id number]  48 y.o.  1965    Admit date: 4/12/2020    Discharge date and time: 4/15/2020    Admitting Physician: Manisha Hatfield MD     Discharge Physician: Chano Pollock MD    Admission Diagnoses: Renal failure [N19]    Discharge Diagnoses: ESRD, hypertensive urgency, fluid overload    Admission Condition: fair    Discharged Condition: stable    Indication for Admission: hypertensive urgency, fluid overload, missed HD sessions    Hospital Course: patient was dialyzed twice w/ improvement in symptoms, pt had not been taking her medications, also missed doses here, refilled her medications, COVID neg     Consults: none    Significant Diagnostic Studies: COVID neg    Treatments: dialysis    Discharge Exam:  General: alert, in no apparent distress  HEENT: normocephalic, atraumatic, anicteric  Neck: supple, no mass  Lungs: non-labored respirations, clear to auscultation bilaterally  Heart: regular rate and rhythm, no murmurs or rubs  Abdomen: soft, non-tender, non-distended  Ext: no cyanosis, no peripheral edema  Neuro: alert and oriented, no gross abnormalities    Disposition: home    In process/preliminary results:  Outstanding Order Results     No orders found from 3/14/2020 to 4/13/2020.           Patient Instructions:   Current Discharge Medication List      CONTINUE these medications which have NOT CHANGED    Details   topiramate (TOPAMAX SPRINKLE) 25 MG capsule Take 25 mg by mouth 2 times daily      cloNIDine (CATAPRES) 0.1 MG tablet Take 0.1 mg by mouth daily      metoprolol tartrate (LOPRESSOR) 50 MG tablet Take 50 mg by mouth 2 times daily      sennosides-docusate sodium (SENOKOT-S) 8.6-50 MG tablet Take 1 tablet by mouth 2 times daily      diphenhydrAMINE (BENADRYL) 25 MG capsule Take 25 mg by mouth every 6 hours as needed for Itching      warfarin (COUMADIN) 5 MG tablet Take 5 mg by mouth      aspirin 81 MG chewable tablet Take 1 tablet by mouth daily  Qty: 30 tablet, Refills: 3      nystatin (MYCOSTATIN) POWD powder Apply topically 2 times daily      b complex-C-folic acid (NEPHROCAPS) 1 MG capsule Take 1 capsule by mouth daily      calcium acetate (PHOSLO) 667 MG capsule Take by mouth 3 times daily (with meals)      atorvastatin (LIPITOR) 40 MG tablet Take 40 mg by mouth nightly      cyclobenzaprine (FLEXERIL) 5 MG tablet Take 5 mg by mouth 3 times daily as needed for Muscle spasms      oxyCODONE-acetaminophen (PERCOCET) 5-325 MG per tablet Take 1 tablet by mouth every 4 hours as needed for Pain. .      cetirizine (ZYRTEC) 10 MG tablet Take 10 mg by mouth 2 times daily as needed for Allergies      insulin lispro (HUMALOG) 100 UNIT/ML injection vial Inject into the skin 3 times daily (before meals) Indications: does varies per needs       insulin glargine (LANTUS) 100 UNIT/ML injection vial Inject into the skin nightly Indications: varies per needs 10 to 15 units              Follow up in dialysis on Friday     Signed:  Kristin Meadows MD    4/15/2020  12:20 PM

## 2020-04-16 NOTE — CARE COORDINATION
4/16/2020: Care Transition Nurse mailed CHF booklet and zones sheet to patient's home. A follow up call will be provided for education.

## 2020-05-14 PROBLEM — E87.5 HYPERKALEMIA: Status: ACTIVE | Noted: 2020-01-01

## 2020-05-14 NOTE — ED TRIAGE NOTES
Pt reports loose stool x5 days with vomiting pt alert nurse assisted pt in the ER restroom where she had loose light brown stool  Pt alert x4 skin wnl

## 2020-05-14 NOTE — ED NOTES
Lab called to give critical labs on pt (Bun 80, creatinine 11.23, and potassium 6.1). Lab states he potassium wasn't hemolyzed. KATELYN Carroll was notified. KATELYN Carroll ordered EKG with the potassium level being high.      Zachary Avelar RN  05/14/20 [id number]

## 2020-05-15 NOTE — H&P
Department of Internal Medicine  Nephrology  [facility]. Nephrology  Attending History and Physical      CHIEF COMPLAINT:  malaise    Reason for Admission:  hyperkalemia    History Obtained From:  patient, electronic medical record    HISTORY OF PRESENT ILLNESS:    48y.o. year old female with history s/f ESRD on IHD on MWF, HTN, A.fib, CHF, T2DM, HCV, CAD s/p CABG who presented for malaise, cough and NVD intermittently for ~ 4 days. However most of those symptoms have resolved though still remained w/ malaise. Labs showed hyperkalemia up to 6.1 (now 6.6). Pt had missed her past 2 HD sessions. Does have history of medication and dialysis non-compliance. Hypertensive to the 200s as well. C/f covid however results negative. Initially didn't want to stay but decided to stay to get dialysis. States missing dialysis due to multiple issues occurring at home.  Seen on dialysis, hypertensive, tolerating    Past Medical History:        Diagnosis Date    Atrial fibrillation ([address]     Cancer (HCC)     Breast    CHF (congestive heart failure) (HCC)     Chronic kidney disease     Diabetes mellitus ([address]     ESRD (end stage [facility] disease) ([facility] 2020    Essential hypertension 2020    Heart murmur     Hepatitis C     Hx of CABG 2020    Paroxysmal atrial fibrillation ([facility] 2020       Past Surgical History:        Procedure Laterality Date    BREAST SURGERY       SECTION      CHOLECYSTECTOMY      CORONARY ARTERY BYPASS GRAFT         Medications Prior to Admission:    Medications Prior to Admission: B Complex-C-Folic Acid (RENAL) 1 MG CAPS, Take 1 capsule by mouth daily  FLUoxetine (PROZAC) 10 MG capsule, Take 20 mg by mouth daily  metoprolol tartrate (LOPRESSOR) 50 MG tablet, Take 1 tablet by mouth 2 times daily  FLUoxetine (PROZAC) 20 MG capsule, Take 1 capsule by mouth daily  topiramate (TOPAMAX SPRINKLE) 25 MG capsule, Take 25 mg by mouth 2 times daily  cloNIDine (CATAPRES) 0.1 MG tablet, Take 0.1 mg by mouth daily  sennosides-docusate sodium (SENOKOT-S) 8.6-50 MG tablet, Take 1 tablet by mouth 2 times daily  cetirizine (ZYRTEC) 10 MG tablet, Take 10 mg by mouth 2 times daily as needed for Allergies  diphenhydrAMINE (BENADRYL) 25 MG capsule, Take 25 mg by mouth every 6 hours as needed for Itching  warfarin (COUMADIN) 5 MG tablet, Take 5 mg by mouth  aspirin 81 MG chewable tablet, Take 1 tablet by mouth daily  nystatin (MYCOSTATIN) POWD powder, Apply topically 2 times daily  b complex-C-folic acid (NEPHROCAPS) 1 MG capsule, Take 1 capsule by mouth daily  calcium acetate (PHOSLO) 667 MG capsule, Take by mouth 3 times daily (with meals)  atorvastatin (LIPITOR) 40 MG tablet, Take 40 mg by mouth nightly  cyclobenzaprine (FLEXERIL) 5 MG tablet, Take 5 mg by mouth 3 times daily as needed for Muscle spasms  oxyCODONE-acetaminophen (PERCOCET) 5-325 MG per tablet, Take 1 tablet by mouth every 4 hours as needed for Pain. .    Allergies:  Aripiprazole    Social History:   Relationships   Social connections    Talks on phone: Not on file    Gets together: Not on file    Attends [employer] service: Not on file    Active member of club or organization: Not on file    Attends meetings of clubs or organizations: Not on file    Relationship status: Not on file         Family History:   History reviewed. No pertinent family history.     REVIEW OF SYSTEMS:  Positives in bold  Constitutional: fever, chills, fatigue, malaise   HENT:  rhinorrhea, sinus pain, sore throat, epistaxis    Eyes:  photophobia, visual disturbance, eye redness  Respiratory: shortness of breath, cough, hemoptysis    Cardiovascular: chest pain, palpitations, orthopnea, leg swelling   Gastrointestinal: abdominal pain, nausea, vomiting, diarrhea, constipation   Endocrine: polydipsia, polyphagia, cold intolerance, heat intolerance  Genitourinary: dysuria, flank pain, frequency, urgency   Hematologic: easy bruising, easy bleeding  Musculoskeletal: h/o non-compliance w/ HD sessions and medications  2. Hypertensive urgency  3. Hyperkalemia  4. Metabolic acidoisis  5. Renal anemia: on mircera and iron as outpatient, if remains inpatient will give FADI  6. Secondary renal hyperparathyroidism: on binders  7.  T2DM: SSI    Dispo: ok for discharge after HD, to go back to unit on Monday     Cris Villagran MD

## 2020-05-15 NOTE — DISCHARGE INSTR - COC
Continuity of Care Form    Patient Name: Oly Alvarado   :  1965  MRN:  [id number]    Admit date:  2020  Discharge date:  5/15/20    Code Status Order: Full Code   Advance Directives:   [facility] Documentation     Date/Time Healthcare Directive Type of Healthcare Directive Copy in [facility] [address] Agent's Name Healthcare Agent's Phone Number    20 2212  No, patient does not have an advance directive for healthcare treatment -- -- -- -- --          Admitting Physician:  Kirsten Gutierrez MD  PCP: Luther Ragland MD    Discharging Nurse: [facility] Unit/Room#: X133/O737-16  Discharging Unit Phone Number: [phone]    Emergency Contact:   Extended Emergency Contact Information  Primary Emergency Contact: [facility] Phone: [phone]  Relation: Brother/Sister    Past Surgical History:  Past Surgical History:   Procedure Laterality Date    BREAST SURGERY       SECTION      CHOLECYSTECTOMY      CORONARY ARTERY BYPASS GRAFT         Immunization History: There is no immunization history on file for this patient.     Active Problems:  Patient Active Problem List   Diagnosis Code    Angina pectoris, unspecified ([facility] I20.9    CHF (congestive heart failure) ([facility]) I50.9    NSTEMI (non-ST elevated myocardial infarction) ([facility] I21.4    Coronary artery disease of native artery of native heart with stable angina pectoris ([facility] I25.118    ESRD (end stage renal disease) ([facility] N18.6    Essential hypertension I10    Hx of CABG Z95.1    Paroxysmal atrial fibrillation ([facility]) I48.0    CHF (congestive heart failure), NYHA class I, acute on chronic, combined ([facility]) I50.43    Uremia N19    Goals of care, counseling/discussion Z71.89    Anemia due to chronic kidney disease, on chronic dialysis ([facility]) N18.6, D63.1, Z99.2    MDD (major depressive disorder), recurrent severe, without psychosis ([facility] F33.2    A-fib ([facility] No ventilator support    Rehab Therapies: ***  Weight Bearing Status/Restrictions: No weight bearing restirctions  Other Medical Equipment (for information only, NOT a DME order):  walker  Other Treatments: ***    Patient's personal belongings (please select all that are sent with patient):  ***    RN SIGNATURE:  Electronically signed by Geo Schilling RN on 5/15/20 at 2:40 PM EDT    CASE MANAGEMENT/SOCIAL WORK SECTION    Inpatient Status Date: ***    Readmission Risk Assessment Score:  Readmission Risk              Risk of Unplanned Readmission:        52           Discharging to Facility/ Agency   · Name:   · Address:  · Phone:  · Fax:    Dialysis Facility (if applicable)   · Name:  · Address:  · Dialysis Schedule:  · Phone:  · Fax:    / signature: {Esignature:[id number]}    PHYSICIAN SECTION    Prognosis: {Prognosis:5247917228}    Condition at Discharge: Gala Lopez Dean Patient Condition:515308638}    Rehab Potential (if transferring to Rehab): {Prognosis:5293720639}    Recommended Labs or Other Treatments After Discharge: ***    Physician Certification: I certify the above information and transfer of Shanae Care  is necessary for the continuing treatment of the diagnosis listed and that she requires {Admit to Appropriate Level of Care:16543} for {GREATER/LESS:158228527} 30 days.      Update Admission H&P: {CHP DME Changes in XAQNW:565245703}    PHYSICIAN SIGNATURE:  {Esignature:[id number]}

## 2020-05-16 NOTE — CARE COORDINATION
changed medications related to discharge diagnosis     Patient/family/caregiver given information for [facility] and agrees to enroll no  Patient's preferred e-mail: new phone- does not have email set up yet    Patient's preferred phone number: na   Based on Loop alert triggers, patient will be contacted by nurse care manager for worsening symptoms. Plan for follow-up call in 3-5 days based on severity of symptoms and risk factors.       Facility: [facility]     Non-face-to-face services provided:  Scheduled appointment with PCP-5/18 (before dialysis)   Obtained and reviewed discharge summary and/or continuity of care documents  Assessment and support for treatment adherence and medication management-reviewed discontinued meds    Care Transitions 24 Hour Call    Do you have any ongoing symptoms?:  No  Do you have a copy of your discharge instructions?:  Yes  Do you have all of your prescriptions and are they filled?:  Yes  Have you been contacted by a Sophia Win?:  No  Have you scheduled your follow up appointment?:  Yes  How are you going to get to your appointment?:  Car - family or friend to transport  Were you discharged with any Home Care or Post Acute Services:  Yes  Post Acute Services:  Home Health (Comment: [address])  Do you feel like you have everything you need to keep you well at home?:  Yes  Care Transitions Interventions         Follow Up  Future Appointments   Date Time Provider Nitza Loving   8/14/2020 11:30 AM DO MARCIO Reese [address] [place] [address] [place]y, [address] [place] [address]

## 2020-05-21 NOTE — TELEPHONE ENCOUNTER
[facility] called the patient would like to have [facility] draw her PT/INR for now and than when she is discharged she will need to be enrolled in coumadin clinic but she will need someone to follow her PT/INR while she is certified with home health care. Will Dr. Alvin Rich follow her for this period of time? Can you discuss with him and call the Francheska Whatley at [phone]?  Thanks

## 2020-06-21 PROBLEM — R06.09 DYSPNEA ON EXERTION: Status: ACTIVE | Noted: 2020-01-01

## 2020-06-21 NOTE — ED NOTES
91% RA while ambulating mild increase in shortness of breath with ambulation. Hitesh BUNCH aware.      Zainab Whelan RN  06/21/20 MARY Saleh  06/21/20 0127

## 2020-06-21 NOTE — ED PROVIDER NOTES
[address] [facility] ED  eMERGENCY dEPARTMENT eNCOUnter      Pt Name: Luis Multani  MRN: [id number]  [place] 1965  Date of evaluation: 6/20/2020  Provider: JULIO C Stephen      HISTORY OF PRESENT ILLNESS    Luis Multani is a 48 y.o. female with PMHx of ESRD (M,W,F), HTN, CABG, paroxysmal A. Fib, breast cancer, CHF, diabetes, hepatitis C,depression, NSTEMI presents to the emergency department with shortness of breath. Patient states she normally only goes to dialysis M,W a week. She states earlier this week she started to feel short of breath and went to dialysis Monday, Wednesday, Friday as scheduled. It did not help with her symptoms. She states dialysis normally does help with her shortness of breath. Shortness of breath is worse with exertion and orthopnea. Relieved with prolonged rest.  She has her chronic minimal bilateral lower extremity leg swelling. She denies fevers, has chronic cough, denies chest pain. She states last week she had left-sided abdominal pain, and more recently that has subsided and now she has right-sided abdominal pain. She admits to chronic diarrhea and denies constipation. She denies dysuria. Chronic nausea, no vomiting. She does admit to thirst and feels dehydrated. Carmen Leyva is her nephrologist.     HPI    Nursing Notes were reviewed. REVIEW OF SYSTEMS       Review of Systems   Constitutional: Negative for appetite change, chills and fever. HENT: Negative for congestion, rhinorrhea and sore throat. Respiratory: Positive for shortness of breath. Negative for cough and choking. Cardiovascular: Negative for chest pain. Gastrointestinal: Negative for abdominal pain, blood in stool, diarrhea, nausea and vomiting. Genitourinary: Negative for difficulty urinating. Musculoskeletal: Negative for neck stiffness. Skin: Negative for color change and rash.    Neurological: Negative for dizziness, syncope, weakness, light-headedness, numbness and headaches. All other systems reviewed and are negative. PAST MEDICAL HISTORY     Past Medical History:   Diagnosis Date    Atrial fibrillation ([facility]     Cancer ([facility]     Breast    CHF (congestive heart failure) (HCC)     Chronic kidney disease     Diabetes mellitus ([facility]     ESRD (end stage renal disease) ([facility] 2020    Essential hypertension 2020    Heart murmur     Hepatitis C     Hx of CABG 2020    Paroxysmal atrial fibrillation ([facility] 2020         SURGICAL HISTORY       Past Surgical History:   Procedure Laterality Date    BREAST SURGERY       SECTION      CHOLECYSTECTOMY      CORONARY ARTERY BYPASS GRAFT           CURRENT MEDICATIONS       Previous Medications    ASPIRIN 81 MG CHEWABLE TABLET    Take 1 tablet by mouth daily    ATORVASTATIN (LIPITOR) 40 MG TABLET    Take 40 mg by mouth nightly    B COMPLEX-C-FOLIC ACID (NEPHROCAPS) 1 MG CAPSULE    Take 1 capsule by mouth daily    B COMPLEX-C-FOLIC ACID (RENAL) 1 MG CAPS    Take 1 capsule by mouth daily    CALCIUM ACETATE (PHOSLO) 667 MG CAPSULE    Take by mouth 3 times daily (with meals)    CETIRIZINE (ZYRTEC) 10 MG TABLET    Take 10 mg by mouth 2 times daily as needed for Allergies    CLONIDINE (CATAPRES) 0.1 MG TABLET    Take 0.1 mg by mouth daily    CYCLOBENZAPRINE (FLEXERIL) 5 MG TABLET    Take 5 mg by mouth 3 times daily as needed for Muscle spasms    DIPHENHYDRAMINE (BENADRYL) 25 MG CAPSULE    Take 25 mg by mouth every 6 hours as needed for Itching    FLUOXETINE (PROZAC) 10 MG CAPSULE    Take 20 mg by mouth daily    FLUOXETINE (PROZAC) 20 MG CAPSULE    Take 1 capsule by mouth daily    METOPROLOL TARTRATE (LOPRESSOR) 50 MG TABLET    Take 1 tablet by mouth 2 times daily    NYSTATIN (MYCOSTATIN) POWD POWDER    Apply topically 2 times daily    OXYCODONE-ACETAMINOPHEN (PERCOCET) 5-325 MG PER TABLET    Take 1 tablet by mouth every 4 hours as needed for Pain. Yuri Fletcher SODIUM (SENOKOT-S) 8.6-50 MG TABLET    Take 1 tablet by mouth 2 times daily    TOPIRAMATE (TOPAMAX SPRINKLE) 25 MG CAPSULE    Take 25 mg by mouth 2 times daily    WARFARIN (COUMADIN) 5 MG TABLET    Take 5 mg by mouth       ALLERGIES     Hydrocodone-acetaminophen; Naproxen; Hydrocodone; and Vicodin [hydrocodone-acetaminophen]    FAMILY HISTORY     History reviewed. No pertinent family history. SOCIAL HISTORY       Social History     Socioeconomic History    Marital status: Single     Spouse name: None    Number of children: None    Years of education: None    Highest education level: None   Occupational History    None   Social Needs    Financial resource strain: None    Food insecurity     Worry: None     Inability: None    Transportation needs     Medical: None     Non-medical: None   Tobacco Use    Smoking status: Former Smoker    Smokeless tobacco: Never Used   Substance and Sexual Activity    Alcohol use: Never     Frequency: Never    Drug use: Never    Sexual activity: Not Currently   Lifestyle    Physical activity     Days per week: None     Minutes per session: None    Stress: None   Relationships    Social connections     Talks on phone: None     Gets together: None     Attends [employer] service: None     Active member of club or organization: None     Attends meetings of clubs or organizations: None     Relationship status: None    Intimate partner violence     Fear of current or ex partner: None     Emotionally abused: None     Physically abused: None     Forced sexual activity: None   Other Topics Concern    None   Social History Narrative    None         PHYSICAL EXAM         ED Triage Vitals [06/20/20 2043]   BP Temp Temp Source Pulse Resp SpO2 Height Weight   (!) 175/77 98.2 °F (36.8 °C) Oral 84 22 98 % 5' 3\" (1.6 m) 230 lb (104.3 kg)       Physical Exam  Constitutional:       Appearance: She is well-developed. HENT:      Head: Normocephalic and atraumatic.    Eyes:      Conjunctiva/sclera: Conjunctivae normal.      Pupils: Pupils are equal, round, and reactive to light. Neck:      Musculoskeletal: Normal range of motion and neck supple. Trachea: No tracheal deviation. Cardiovascular:      Heart sounds: Normal heart sounds. Comments: +2 pitting edema in right lower extremity, +1 pitting edema left lower extremity  Pulmonary:      Effort: Pulmonary effort is normal. No respiratory distress. Breath sounds: Normal breath sounds. No stridor. Comments: Mildly labored respirations, appears slightly anxious in room, lungs clear to auscultation  Abdominal:      General: Bowel sounds are normal. There is no distension. Palpations: Abdomen is soft. There is no mass. Tenderness: There is no abdominal tenderness. There is no guarding or rebound. Comments: No abdominal tenderness palpation, abdomen soft nondistended, no rebound rigidity, pulsatile mass or bruit, no ecchymosis   Musculoskeletal: Normal range of motion. Skin:     General: Skin is warm and dry. Capillary Refill: Capillary refill takes less than 2 seconds. Findings: No rash. Neurological:      Mental Status: She is alert and oriented to person, place, and time. Deep Tendon Reflexes: Reflexes are normal and symmetric. Psychiatric:         Behavior: Behavior normal.         Thought Content:  Thought content normal.         Judgment: Judgment normal.         DIAGNOSTIC RESULTS     EKG:All EKG's are interpreted by the Emergency Department Physician who either signs or Co-signs this chart in the absence of a cardiologist.    EKG shows normal sinus rhythm, rate 83, normal intervals, no ST segment changes    RADIOLOGY:   Non-plain film images such as CT, Ultrasound and MRI are read by theradiologist. Plain radiographic images are visualized and preliminarily interpreted by the emergency physician with the below findings:    Interpretation per theRadiologist below, if available at the time of this note:    XR CHEST PORTABLE    (Results Pending)   XR ABDOMEN (KUB) (SINGLE AP VIEW)    (Results Pending)   CTA Chest W WO  (PE study)    (Results Pending)           LABS:  Labs Reviewed   COMPREHENSIVE METABOLIC PANEL - Abnormal; Notable for the following components:       Result Value    Sodium 127 (*)     Chloride 91 (*)     Glucose 165 (*)     CREATININE 4.54 (*)     GFR Non- 10.1 (*)     GFR  12.3 (*)     Calcium 8.4 (*)     Alb 3.4 (*)     Globulin 4.1 (*)     All other components within normal limits   CBC WITH AUTO DIFFERENTIAL - Abnormal; Notable for the following components:    WBC 4.6 (*)     RBC 2.75 (*)     Hemoglobin 8.6 (*)     Hematocrit 26.6 (*)     MCH 31.4 (*)     MCHC 32.4 (*)     RDW 16.7 (*)     Lymphocytes Absolute 0.8 (*)     All other components within normal limits   TROPONIN - Abnormal; Notable for the following components:    Troponin 0.126 (*)     All other components within normal limits    Narrative:     CALL  [facility]  [facility] tel. [phone],  Troponin results called to and read back by Love Taylor, 06/20/2020 21:56,  by [facility]   TROPONIN       All other labs were within normal range or not returned as of this dictation. EMERGENCY DEPARTMENT COURSE and DIFFERENTIAL DIAGNOSIS/MDM:   Vitals:    Vitals:    06/20/20 2043 06/20/20 2220 06/20/20 2247 06/21/20 0030   BP: (!) 175/77 (!) 171/79 (!) 157/74 (!) 147/68   Pulse: 84 83 85 85   Resp: 22 20 18 20   Temp: 98.2 °F (36.8 °C)      TempSrc: Oral      SpO2: 98% 97% 97% 95%   Weight: 230 lb (104.3 kg)      Height: 5' 3\" (1.6 m)            MDM    Chest x-ray shows no significant pulmonary vascular congestion, abdominal x-ray does show stool throughout ascending and descending colon which may be contributing to her abdominal pain. Blood reveals sodium 127, chloride 91, creatinine 4.54, troponin 0.126, chronic anemia present. INR 1.0.   Patient was given morphine for her abdominal pain. She denies SOB with rest.  Patient was ambulated and was 91% on room air with ambulation, appeared short of breath with labored respirations. She will be admitted at this time. CTA added to r/o PE.         CRITICAL CARE TIME   Total Critical Caretime was 0 minutes, excluding separately reportable procedures. There was a high probability of clinically significant/life threatening deterioration in the patient's condition which required my urgent intervention. Procedures    FINAL IMPRESSION      1. Hyponatremia    2. Subtherapeutic international normalized ratio (INR)    3. Dyspnea on exertion    4. Hypoxia          DISPOSITION/PLAN   DISPOSITION Decision To Admit 06/20/2020 11:23:16 PM      PATIENT REFERRED TO:  No follow-up provider specified. DISCHARGE MEDICATIONS:  New Prescriptions    No medications on file          (Please notethat portions of this note were completed with a voice recognition program.  Efforts were made to edit the dictations but occasionally words are mis-transcribed. )    Noni Goltz, PA (electronically signed)  Emergency Physician Assistant         Samuel [place], [place]  06/21/20 [address] [place], [place]  06/21/20 8665

## 2020-06-21 NOTE — CONSULTS
[facility]  [facility]ult [facility]           Reason for Consult:  ESRD management   Requesting Physician:  Dr. Barrera Coppola     Chief Complaint:  SOB  History Obtained From:  patient, electronic medical record    History of Present Ilness:    48y.o. year old female with history s/f ESRD on IHD on MWF, HTN, A.fib, CHF, T2DM, HCV, CAD s/p CABG who presented for SOB. Apparently only goes to HD on M and W, however has gone recently w/ no improvement in symptoms. Usually high above dry weight. Denies fevers, chest pain. Also c/o abdominal pain w/ chronic diarrhea (was here in  for diarrhea). Last IHD on Friday w/ 4.7 liters removed. CTPA neg for PE. LT lingula w/ patchy groundglass opacities. Not febrile or tachycardic but has lymphopenia     Past Medical History:        Diagnosis Date    Atrial fibrillation ([address]     Cancer (HCC)     Breast    CHF (congestive heart failure) (HCC)     Chronic kidney disease     Diabetes mellitus ([address]     ESRD (end stage renal disease) ([address] 2020    Essential hypertension 2020    Heart murmur     Hepatitis C     Hx of CABG 2020    Paroxysmal atrial fibrillation ([address] 2020       Past Surgical History:        Procedure Laterality Date    BREAST SURGERY       SECTION      CHOLECYSTECTOMY      CORONARY ARTERY BYPASS GRAFT         Home Medications:    No current facility-administered medications on file prior to encounter.       Current Outpatient Medications on File Prior to Encounter   Medication Sig Dispense Refill    metoprolol tartrate (LOPRESSOR) 50 MG tablet Take 1 tablet by mouth 2 times daily 60 tablet 3    FLUoxetine (PROZAC) 20 MG capsule Take 1 capsule by mouth daily 30 capsule 1    topiramate (TOPAMAX SPRINKLE) 25 MG capsule Take 25 mg by mouth 2 times daily      cloNIDine (CATAPRES) 0.1 MG tablet Take 0.1 mg by mouth daily      sennosides-docusate sodium (SENOKOT-S) 8.6-50 MG tablet Take 1 tablet by mouth 2 times daily      diphenhydrAMINE (BENADRYL) 25 MG capsule Take 25 mg by mouth every 6 hours as needed for Itching      warfarin (COUMADIN) 5 MG tablet Take 5 mg by mouth      aspirin 81 MG chewable tablet Take 1 tablet by mouth daily 30 tablet 3    b complex-C-folic acid (NEPHROCAPS) 1 MG capsule Take 1 capsule by mouth daily      calcium acetate (PHOSLO) 667 MG capsule Take by mouth 3 times daily (with meals)      atorvastatin (LIPITOR) 40 MG tablet Take 40 mg by mouth nightly      cyclobenzaprine (FLEXERIL) 5 MG tablet Take 5 mg by mouth 3 times daily as needed for Muscle spasms      oxyCODONE-acetaminophen (PERCOCET) 5-325 MG per tablet Take 1 tablet by mouth every 4 hours as needed for Pain. Rosevelt Ruben FLUoxetine (PROZAC) 10 MG capsule Take 20 mg by mouth daily      B Complex-C-Folic Acid (RENAL) 1 MG CAPS Take 1 capsule by mouth daily      cetirizine (ZYRTEC) 10 MG tablet Take 10 mg by mouth 2 times daily as needed for Allergies      nystatin (MYCOSTATIN) POWD powder Apply topically 2 times daily         Allergies:  Hydrocodone-acetaminophen; Naproxen; Hydrocodone; and Vicodin [hydrocodone-acetaminophen]    Social History:    Social History     Socioeconomic History    Marital status: Single     Spouse name: Not on file    Number of children: Not on file    Years of education: Not on file    Highest education level: Not on file   Occupational History    Not on file   Social Needs    Financial resource strain: Not on file    Food insecurity     Worry: Not on file     Inability: Not on file   [employer] needs     Medical: Not on file     Non-medical: Not on file   Tobacco Use    Smoking status: Former Smoker    Smokeless tobacco: Never Used   Substance and Sexual Activity    Alcohol use: Never     Frequency: Never    Drug use: Never    Sexual activity: Not Currently   Lifestyle    Physical activity     Days per week: Not on file     Minutes per session: Not on file    Stress: Not on file   Relationships  Social connections     Talks on phone: Not on file     Gets together: Not on file     Attends [employer] service: Not on file     Active member of club or organization: Not on file     Attends meetings of clubs or organizations: Not on file     Relationship status: Not on file    Intimate partner violence     Fear of current or ex partner: Not on file     Emotionally abused: Not on file     Physically abused: Not on file     Forced sexual activity: Not on file   Other Topics Concern    Not on file   Social History Narrative    Not on file       Family History:   History reviewed. No pertinent family history.     Review of Systems:   Positives in bold  Constitutional: fever, chills, fatigue, malaise   HENT:  rhinorrhea, sinus pain, sore throat, epistaxis    Eyes:  photophobia, visual disturbance, eye redness  Respiratory: shortness of breath, cough, hemoptysis    Cardiovascular: chest pain, palpitations, orthopnea, leg swelling   Gastrointestinal: abdominal pain, nausea, vomiting, diarrhea, constipation   Endocrine: polydipsia, polyphagia, cold intolerance, heat intolerance  Genitourinary: dysuria, flank pain, frequency, urgency   Hematologic: easy bruising, easy bleeding  Musculoskeletal: back pain, neck pain, myalgias, arthalgias  Neurological: syncope, lightheadedness, dizziness, seizures, tremors, weakness  Psychiatric/Behavioral: anxiety, depression, hallucinations  Skin: rash, itching    Physical exam:   Constitutional:  VITALS:  BP (!) 176/66   Pulse 79   Temp 98 °F (36.7 °C) (Axillary)   Resp 19   Ht 5' 3\" (1.6 m)   Wt 239 lb 4.8 oz (108.5 kg)   SpO2 97%   BMI 42.39 kg/m²     General: alert, in no apparent distress  HEENT: normocephalic, atraumatic, anicteric  Neck: supple, no mass  Lungs: non-labored respirations, clear to auscultation bilaterally  Heart: regular rate and rhythm, no murmurs or rubs  Abdomen: soft, non-tender, non-distended  MSK: no joint swelling or tenderness  Ext: no cyanosis, + peripheral edema  Neuro: alert and oriented, no gross abnormalities  Psych: normal mood and affect    Data/  CBC:   Lab Results   Component Value Date    WBC 4.6 06/20/2020    RBC 2.75 06/20/2020    RBC 3.51 08/12/2018    HGB 8.6 06/20/2020    HCT 26.6 06/20/2020    MCV 96.7 06/20/2020    MCH 31.4 06/20/2020    MCHC 32.4 06/20/2020    RDW 16.7 06/20/2020     06/20/2020    MPV 7.1 08/12/2018     BMP:    Lab Results   Component Value Date     06/20/2020    K 4.4 06/20/2020    K 4.3 04/06/2020    CL 91 06/20/2020    CO2 26 06/20/2020    BUN 19 06/20/2020    LABALBU 3.4 06/20/2020    CREATININE 4.54 06/20/2020    CALCIUM 8.4 06/20/2020    GFRAA 12.3 06/20/2020    LABGLOM 10.1 06/20/2020    GLUCOSE 165 06/20/2020     Cta Chest W Wo  (pe Study)    Result Date: 6/21/2020  EXAMINATION: CTA CHEST W WO CONTRAST  DATE AND TIME:6/20/2020 11:30 PM CLINICAL HISTORY: Acute chest pain. Shortness of breath  pe  COMPARISON: None available. TECHNIQUE: Helical axial CTA of the chest was performed following the intravenous administration of 100 mL Optiray 320 intravenous contrast.  2D images were reconstructed in the axial and coronal planes. 3-D MIP images were generated in the coronal plane. Images were reviewed on the PACS workstation. All images including the 3D MIPS were permanently archived. All CT scans at this facility use dose modulation, iterative reconstruction, and/or weight based dosing when appropriate to reduce radiation dose to as low as reasonably achievable. FINDINGS: Pulmonary arteries: There is good opacification of the main, lobar, segmental and proximal subsegmental pulmonary artery branches. No sign of pulmonary embolus in the central pulmonary arterial tree. No evidence of thoracic aortic aneurysm or dissection. Lungs: Lung fields show a pattern of mosaic attenuation with more focal patchy groundglass opacities in the left lingula. These are nonspecific.  Right base atelectasis tiny right pleural reaction. No pleural effusions. Pericardial effusion more prominent on the right side of the heart measuring approximately 2.2 cm in thickness. Mediastinum: Cardiomegaly. No other significant abnormality. No lymphadenopathy. No pericardial effusion. Trachea:Negative                            Vessels:Thoracic aorta is intact. Visualized abdomen: The visualized portions of the upper abdomen are unremarkable. Bones: No acute bone pathology     NO EVIDENCE OF PULMONARY EMBOLISM. PATCHY GROUNDGLASS OPACITIES IN THE LEFT LINGULA     Xr Chest Portable    Result Date: 6/21/2020  EXAMINATION: XR CHEST PORTABLE. DATE AND TIME:6/20/2020 9:15 PM CLINICAL HISTORY: Shortness of breath   sob  COMPARISONS: May 14, 2020  FINDINGS: Cardiomegaly. Minimal patchy opacity left lingula. Remaining lung fields clear. Pleural angles smooth. No pneumothorax. Small patchy left lingula infiltrate       Assessment:  48y.o. year old female with history s/f ESRD on IHD on MWF, HTN, A.fib, CHF, T2DM, HCV, CAD s/p CABG who presented for SOB. 1. ESRD on IHD MWF via [facility], pt of Dr. Thomas Rebolledo at [facility]: h/o non-compliance w/ HD sessions and medications  2. SOB: ? Volume overload vs. PNA, covid being ruled out  3. Hyponatremia  4. Secondary renal hyperparathyroidism: on binders as outpatient   5. Renal anemia: on mircera and iron as outpatient    Plan:  - continue IHD MWF  - agree w/ r/o COVID-19  - continue binders  - will start her on ESAs    Thank you for the consultation. Will continue to follow  Please do not hesitate to call with questions.     Rossana Simpson MD

## 2020-06-21 NOTE — ACP (ADVANCE CARE PLANNING)
Advance Care Planning     Advance Care Planning Activator (Inpatient)  Conversation Note      Date of ACP Conversation: 6/20/2020    Conversation Conducted with: Patient with Decision Making Capacity    ACP Activator: [address] makes decisions on behalf of the incapacitated patient: Decision Maker is asked to consider and make decisions based on patient values, known preferences, or best interests. Health Care Decision Maker:     Current Designated Health Care Decision Maker:   Primary Decision Maker: Denny García - Brother/Sister - [phone]    Secondary Decision Maker: Tucker Duran - Brother/Sister - [phone]  (If there is a valid [place] named in the [address] [facility] Makers\" box in the ACP activity, but it is not visible above, be sure to open that field and then select the health care decision maker relationship (ie \"primary\") in the blank space to the right of the name.) Validate  this information as still accurate & up-to-date; edit [place] field as needed.)    Note: Assess and validate information in current ACP documents, as indicated. If no Decision Maker listed above or available through scanned documents, then:    If no Authorized Decision Maker has previously been identified, then patient chooses [place]:  \"Who would you like to name as your primary health care decision-maker? \"               Name: Gaby Macias        Relationship: SISTER      Phone number: [phone]  [place] Mary Ann this person be reached easily? \" Yes  \"Who would you like to name as your back-up decision maker? \"   Name:  Jahaira Monsivais    Relationship: BROTHER     Phone number: [phone]  [place] this person be reached easily? \" Yes    Note: If the relationship of these Decision-Makers to the patient does NOT follow your state's Next of Kin hierarchy, recommend that patient complete ACP document that meets state-specific requirements to allow them to act on the patient's behalf when appropriate. Care Preferences    Ventilation: \"If you were in your present state of health and suddenly became very ill and were unable to breathe on your own, what would your preference be about the use of a ventilator (breathing machine) if it were available to you? \"      Would the patient desire the use of ventilator (breathing machine)?: yes    \"If your health worsens and it becomes clear that your chance of recovery is unlikely, what would your preference be about the use of a ventilator (breathing machine) if it were available to you? \"     Would the patient desire the use of ventilator (breathing machine)?: Yes      Resuscitation  \"CPR works best to restart the heart when there is a sudden event, like a heart attack, in someone who is otherwise healthy. Unfortunately, CPR does not typically restart the heart for people who have serious health conditions or who are very sick. \"    \"In the event your heart stopped as a result of an underlying serious health condition, would you want attempts to be made to restart your heart (answer \"yes\" for attempt to resuscitate) or would you prefer a natural death (answer \"no\" for do not attempt to resuscitate)? \" yes      NOTE: If the patient has a valid advance directive AND now provides care preference(s) that are inconsistent with that prior directive, advise the patient to consider either: creating a new advance directive that complies with state-specific requirements; or, if that is not possible, orally revoking that prior directive in accordance with state-specific requirements, which must be documented in the EHR. [] Yes   [x] No   Educated Patient / Stevens Hurst regarding differences between Advance Directives and portable DNR orders.     Length of ACP Conversation in minutes:      Conversation Outcomes:  [x] ACP discussion completed  [] Existing advance directive reviewed with patient; no changes to patient's previously recorded wishes  [] New Advance Directive completed  [] Portable Do Not Rescitate prepared for Provider review and signature  [] POLST/POST/MOLST/MOST prepared for Provider review and signature      Follow-up plan:    [] Schedule follow-up conversation to continue planning  [] Referred individual to Provider for additional questions/concerns   [] Advised patient/agent/surrogate to review completed ACP document and update if needed with changes in condition, patient preferences or care setting    [x] This note routed to one or more involved healthcare providers

## 2020-06-21 NOTE — PROGRESS NOTES
Renal Adjustment Per Protocol:     Levofloxacin 250 mg every 24 hours changed to   Levofloxacin 250 mg every 48 hours  based on    Recent Labs     06/20/20 2113   CREATININE 4.54*    Estimated Creatinine Clearance: 17 mL/min (A) (based on SCr of 4.54 mg/dL (H)). Pharmacy will continue to monitor.     Jennifer Goncalves, PharmD  6/21/2020 11:30 AM

## 2020-06-21 NOTE — PROGRESS NOTES
Clinical Pharmacy Note    Bran Martinez is a 48 y.o. female for whom pharmacy has been asked to manage warfarin therapy. Reason for Admission: hyponatremia    Consulting Physician: Dr. Jose Morel  Warfarin dose prior to admission: 5mg daily (non-compliance)  Warfarin Indication: paroxysmal a.fib  Target INR range: 2-3  Order for concomitant anticoagulant therapy: verbal from dr. Jose Morel to change to bridging lovenox at 1mg/kg. Lovenox 30mg daily changed to lovenox 100mg daily. Outpatient warfarin provider: Dr. Gerald Ramirez     Past Medical History:   Diagnosis Date    Atrial fibrillation ([facility]     Cancer [facility])     Breast    [facility]est[facility] failure) [facility]     Chronic kidney disease     Diabetes mellitus ([facility]     ESRD (end stage renal disease) ([facility] 2/14/2020    Essential hypertension 2/14/2020    Heart murmur     Hepatitis C     Hx of CABG 2/14/2020    Paroxysmal atrial fibrillation ([facility] 2/14/2020               Recent Labs     06/20/20  2114   INR 1.0     Recent Labs     06/20/20 2114   HGB 8.6*   HCT 26.6*          Current warfarin drug-drug interactions: levaquin, lovenox, asa, fluoxetine, percocet    Date INR Warfarin Dose   6-21-20 1.0 5 mg                                   Notes:  Pt INR subtherapeutic for goal 2-3. Per pt notes pt hx of non-compliance and not currently managed by [facility] coumadin clinic. Will dose normal home dose of warfarin 5mg x today then reassess. Patient bridging with lovenox. Daily PT/INR until stable within therapeutic range. Thank you for the consult. Will continue to follow.    Gerardo HernandezD

## 2020-06-21 NOTE — FLOWSHEET NOTE
Pt moved to room 166 for covid rule out. Monitor changed. Dr Keyur Alicea notified of troponin of 0.132.

## 2020-06-21 NOTE — CONSULTS
[facility] CONSULT NOTE    Patient: Nica Gavin  Unit/Bed: N723/I192-51  YOB: 1965  MRN: [id number]  Acct: [de-identified]   Admitting Diagnosis: Dyspnea on exertion [R06.09]  Admit Date:  2020  Hospital Day: 0      Chief Complaint: dyspnea    History of Present Illness: 48year old female with history of PAF, diastolic CHF, HTN, HPL, CAD, CABG and ESRD on dialysis. Recent admissions with fluid overload from missed dialysis last few months. Now back in with complaint of shortness of breath. We are consulted for elevated troponin.       PMHx:  Past Medical History:   Diagnosis Date    At[facility] ([facility] [facility]     Cancer ([facility] [facility]     Breast    CHF (congestive heart failure) (HCC)     Chronic kidney disease     Diabetes mellitus ([facility] [facility]     ESRD (end stage renal disease) ([facility] [address] [facility] 2020    Essential hypertension 2020    Heart murmur     Hepatitis C     Hx of CABG 2020    Paroxysmal atrial fibrillation ([facility] [address] 2020       PSHx:  Past Surgical History:   Procedure Laterality Date    BREAST SURGERY       SECTION      CHOLECYSTECTOMY      CORONARY ARTERY BYPASS GRAFT         Social Hx:  Social History     Socioeconomic History    Marital status: Single     Spouse name: None    Number of children: None    Years of education: None    Highest education level: None   Occupational History    None   Social Needs    Financial resource strain: None    Food insecurity     Worry: None     Inability: None    Transportation needs     Medical: None     Non-medical: None   Tobacco Use    Smoking status: Former Smoker    Smokeless tobacco: Never Used   Substance and Sexual Activity    Alcohol use: Never     Frequency: Never    Drug use: Never    Sexual activity: Not Currently   Lifestyle    Physical activity     Days per week: None     Minutes per session: None    Stress: None   Relationships    Social connections     Talks on phone: None     Gets together: None     Attends Mandaeism service: None     Active member of club or organization: None     Attends meetings of clubs or organizations: None     Relationship status: None    Intimate partner violence     Fear of current or ex partner: None     Emotionally abused: None     Physically abused: None     Forced sexual activity: None   Other Topics Concern    None   Social History Narrative    None       Family Hx:  History reviewed. No pertinent family history. Review of Systems:   Review of Systems   Constitutional: Positive for fatigue. Negative for activity change, appetite change, chills and fever. HENT: Negative for congestion. Respiratory: Positive for shortness of breath. Negative for apnea, cough, choking, chest tightness and wheezing. Cardiovascular: Negative for chest pain. Gastrointestinal: Negative for abdominal distention and abdominal pain. Endocrine: Negative for cold intolerance and heat intolerance. Genitourinary: Negative for dysuria and enuresis. Musculoskeletal: Negative for arthralgias and back pain. Skin: Negative for color change. Neurological: Negative for dizziness, seizures, syncope and light-headedness. Psychiatric/Behavioral: Negative for agitation, behavioral problems and confusion. Allergies:   Allergies   Allergen Reactions    Hydrocodone-Acetaminophen Hives, Itching and Nausea Only     Other reaction(s): Nausea      Naproxen Hives and Nausea Only     Other reaction(s): Nausea      Hydrocodone Hives    Vicodin [Hydrocodone-Acetaminophen] Hives       Current Medications:    Current Facility-Administered Medications:     sodium chloride flush 0.9 % injection 10 mL, 10 mL, Intravenous, 2 times per day, JULIO C Clements    sodium chloride flush 0.9 % injection 10 mL, 10 mL, Intravenous, PRN, JULIO C Clements    levoFLOXacin (LEVAQUIN) 250 MG/50ML infusion 250 mg, 250 mg, Intravenous, Q48H, Ada Rose MD    warfarin (COUMADIN) tablet 5 mg, 5 mg, Oral, Once, Vance Galvan MD    aspirin chewable tablet 81 mg, 81 mg, Oral, Daily, Vance Galvan MD    atorvastatin (LIPITOR) tablet 40 mg, 40 mg, Oral, Nightly, Vance Galvan MD    cloNIDine (CATAPRES) tablet 0.1 mg, 0.1 mg, Oral, Daily, Vance Galvan MD    cyclobenzaprine (FLEXERIL) tablet 5 mg, 5 mg, Oral, TID PRN, Vance Galvan MD    FLUoxetine (PROZAC) capsule 20 mg, 20 mg, Oral, Daily, Vance Galvan MD    oxyCODONE-acetaminophen (PERCOCET) 5-325 MG per tablet 1 tablet, 1 tablet, Oral, Q4H PRN, Vance Galvan MD    topiramate (TOPAMAX) tablet 25 mg, 25 mg, Oral, BID, Vance Galvan MD    metoprolol tartrate (LOPRESSOR) tablet 50 mg, 50 mg, Oral, BID, Vance Galvan MD    hydrALAZINE (APRESOLINE) injection 10 mg, 10 mg, Intravenous, Q6H PRN, Vance Galvan MD    warfarin (COUMADIN) daily dosing (placeholder), , Other, RX Placeholder, MD Shital Calvo  [START ON 6/22/2020] enoxaparin (LOVENOX) injection 110 mg, 1 mg/kg, Subcutaneous, Daily, Vance Galvan MD    enoxaparin (LOVENOX) injection 80 mg, 80 mg, Subcutaneous, Once, Vance Galvan MD    insulin lispro (HUMALOG) injection vial 0-12 Units, 0-12 Units, Subcutaneous, TID WC, JULIO C Muñiz    insulin lispro (HUMALOG) injection vial 0-6 Units, 0-6 Units, Subcutaneous, Nightly, JULIO C Elliott    glucose (GLUTOSE) 40 % oral gel 15 g, 15 g, Oral, PRN, JULIO C Elliott    dextrose 50 % IV solution, 12.5 g, Intravenous, PRN, JULIO C Muñiz    glucagon (rDNA) injection 1 mg, 1 mg, Intramuscular, PRN, JULIO C Elliott    dextrose 5 % solution, 100 mL/hr, Intravenous, PRN, JULIO C Muñiz    Physical Examination:    BP (!) 176/66   Pulse 79   Temp 98 °F (36.7 °C) (Axillary)   Resp 19   Ht 5' 3\" (1.6 m)   Wt 239 lb 4.8 oz (108.5 kg)   SpO2 97%   BMI 42.39 kg/m²    Physical Exam  Constitutional:       Appearance: She is well-developed. HENT:      Head: Normocephalic and atraumatic. Eyes:      Pupils: Pupils are equal, round, and reactive to light. Neck:      Musculoskeletal: Normal range of motion and neck supple. Cardiovascular:      Rate and Rhythm: Normal rate and regular rhythm. Heart sounds: No murmur. No friction rub. No gallop. Pulmonary:      Effort: Pulmonary effort is normal. No respiratory distress. Breath sounds: Normal breath sounds. No wheezing or rales. Chest:      Chest wall: No tenderness. Abdominal:      General: Bowel sounds are normal. There is no distension. Palpations: Abdomen is soft. Tenderness: There is no abdominal tenderness. Musculoskeletal: Normal range of motion. Skin:     General: Skin is warm and dry. Neurological:      Mental Status: She is alert and oriented to person, place, and time. Cranial Nerves: No cranial nerve deficit.       Coordination: Coordination normal.         LABS:  CBC:   Lab Results   Component Value Date    WBC 4.6 06/20/2020    RBC 2.75 06/20/2020    RBC 3.51 08/12/2018    HGB 8.6 06/20/2020    HCT 26.6 06/20/2020    MCV 96.7 06/20/2020    MCH 31.4 06/20/2020    MCHC 32.4 06/20/2020    RDW 16.7 06/20/2020     06/20/2020    MPV 7.1 08/12/2018     CBC with Differential:   Lab Results   Component Value Date    WBC 4.6 06/20/2020    RBC 2.75 06/20/2020    RBC 3.51 08/12/2018    HGB 8.6 06/20/2020    HCT 26.6 06/20/2020     06/20/2020    MCV 96.7 06/20/2020    MCH 31.4 06/20/2020    MCHC 32.4 06/20/2020    RDW 16.7 06/20/2020    LYMPHOPCT 18.3 06/20/2020    LYMPHOPCT 20.7 08/12/2018    MONOPCT 12.8 06/20/2020    BASOPCT 1.1 06/20/2020    MONOSABS 0.6 06/20/2020    LYMPHSABS 0.8 06/20/2020    EOSABS 0.3 06/20/2020    BASOSABS 0.1 06/20/2020     CMP:    Lab Results   Component Value Date     06/20/2020    K 4.4 06/20/2020    K 4.3 04/06/2020    CL 91 06/20/2020    CO2 26 06/20/2020    BUN 19 06/20/2020    CREATININE 4.54 06/20/2020    GFRAA 12.3 06/20/2020    LABGLOM 10.1 06/20/2020 GLUCOSE 165 06/20/2020    PROT 7.5 06/20/2020    LABALBU 3.4 06/20/2020    CALCIUM 8.4 06/20/2020    BILITOT 0.3 06/20/2020    ALKPHOS 68 06/20/2020    AST 22 06/20/2020    ALT 17 06/20/2020     BMP:    Lab Results   Component Value Date     06/20/2020    K 4.4 06/20/2020    K 4.3 04/06/2020    CL 91 06/20/2020    CO2 26 06/20/2020    BUN 19 06/20/2020    LABALBU 3.4 06/20/2020    CREATININE 4.54 06/20/2020    CALCIUM 8.4 06/20/2020    GFRAA 12.3 06/20/2020    LABGLOM 10.1 06/20/2020    GLUCOSE 165 06/20/2020     Magnesium:    Lab Results   Component Value Date    MG 1.9 06/20/2020     Troponin:    Lab Results   Component Value Date    TROPONINI 0.117 06/21/2020        EKG: EKG: normal sinus rhythm, nonspecific ST and T waves changes. ASSESSMENT/PLAN:         Active Hospital Problems    Diagnosis Date Noted    Dyspnea on exertion [R06.09] 06/21/2020        Dypsnea of unknown etiology. Most likely culprit is probably related to fluid overload but there was infiltrate in lingula on CXR. Although there is an elevated troponin no indication of ACS, seems likely related to ESRD. Will control BP and HR, await results with dialysis and follow with you. Electronically signed by Nara Galvin.  Baldomero Arzola MD [facility] Director of Cardiology Services and CardiacCatheterization Laboratory  [facility], [place]   on 6/21/2020 at 12:18 PM

## 2020-06-21 NOTE — H&P
Hospital Medicine History & Physical      PCP: Doni Morel MD    Date of Admission: 2020    Date of Service: 20      Chief Complaint:  Shortness of breath      History Of Present Illness:  48 y.o. female who presented to [facility] ED for complaints of progressively worsening shortness of breath, especially with exertion. The patient has a known history of paroxysmal A-fib, CHF, and ESRD on HD. The patient receives dialysis on , , and . She states that she became short of breath last Monday. Dialysis did not help on any of the days. She states that her shortness of breath is worse with any exertion and is relieved with rest. She admits to chronic abdominal pain with diarrhea. She has 2+ bilateral lower extremity edema. Lung sounds clear. Past Medical History:          Diagnosis Date    Atrial fibrillation ([address]     Cancer (HCC)     Breast    CHF (congestive heart failure) (HCC)     Chronic kidney disease     Diabetes mellitus ([facility]     ESRD (end stage renal disease) ([facility] [address] 2020    Essential hypertension 2020    Heart murmur     Hepatitis C     Hx of CABG 2020    Paroxysmal atrial fibrillation ([facility] [address] 2020       Past Surgical History:          Procedure Laterality Date    BREAST SURGERY       SECTION      CHOLECYSTECTOMY      CORONARY ARTERY BYPASS GRAFT         Medications Prior to Admission:      Prior to Admission medications    Medication Sig Start Date End Date Taking?  Authorizing Provider   metoprolol tartrate (LOPRESSOR) 50 MG tablet Take 1 tablet by mouth 2 times daily 4/15/20  Yes Anuel Hodgson MD   FLUoxetine (PROZAC) 20 MG capsule Take 1 capsule by mouth daily 20  Yes Richad Spurling, MD   topiramate (TOPAMAX SPRINKLE) 25 MG capsule Take 25 mg by mouth 2 times daily   Yes Historical Provider, MD   cloNIDine (CATAPRES) 0.1 MG tablet Take 0.1 mg by mouth daily   Yes Historical Provider, MD   sennosides-docusate sodium (SENOKOT-S) 8.6-50 MG tablet Take 1 tablet by mouth 2 times daily   Yes Historical Provider, MD   diphenhydrAMINE (BENADRYL) 25 MG capsule Take 25 mg by mouth every 6 hours as needed for Itching   Yes Historical Provider, MD   warfarin (COUMADIN) 5 MG tablet Take 5 mg by mouth   Yes Historical Provider, MD   aspirin 81 MG chewable tablet Take 1 tablet by mouth daily 10/15/19  Yes Mina Mcwilliams, DO clay complex-C-folic acid (NEPHROCAPS) 1 MG capsule Take 1 capsule by mouth daily   Yes Historical Provider, MD   calcium acetate (PHOSLO) 667 MG capsule Take by mouth 3 times daily (with meals)   Yes Historical Provider, MD   atorvastatin (LIPITOR) 40 MG tablet Take 40 mg by mouth nightly   Yes Historical Provider, MD   cyclobenzaprine (FLEXERIL) 5 MG tablet Take 5 mg by mouth 3 times daily as needed for Muscle spasms   Yes Historical Provider, MD   oxyCODONE-acetaminophen (PERCOCET) 5-325 MG per tablet Take 1 tablet by mouth every 4 hours as needed for Pain. .   Yes Historical Provider, MD   FLUoxetine (PROZAC) 10 MG capsule Take 20 mg by mouth daily    Historical Provider, MD CLAY Complex-C-Folic Acid (RENAL) 1 MG CAPS Take 1 capsule by mouth daily 4/22/20 7/21/20  Historical Provider, MD   cetirizine (ZYRTEC) 10 MG tablet Take 10 mg by mouth 2 times daily as needed for Allergies    Historical Provider, MD   nystatin (MYCOSTATIN) POWD powder Apply topically 2 times daily    Historical Provider, MD       Allergies:  Hydrocodone-acetaminophen; Naproxen; Hydrocodone; and Vicodin [hydrocodone-acetaminophen]    Social History:      The patient currently lives home    TOBACCO:   reports that she has quit smoking. She has never used smokeless tobacco.  ETOH:   reports no history of alcohol use. Family History:       Positive as follows: htn        REVIEW OF SYSTEMS:   Pertinent positives as noted in the HPI. All other systems reviewed and negative.     PHYSICAL EXAM:    BP (!) 176/66   Pulse 79   Temp 98 °F (36.7 °C) (Axillary)   Resp 19   Ht 5' 3\" (1.6 m)   Wt 239 lb 4.8 oz (108.5 kg)   SpO2 97%   BMI 42.39 kg/m²     General appearance:  No apparent distress, appears stated age and cooperative. HEENT:  Normal cephalic, atraumatic without obvious deformity. Pupils equal, round, and reactive to light. Extra ocular muscles intact. Conjunctivae/corneas clear. Neck: Supple, with full range of motion. No jugular venous distention. Trachea midline. Respiratory:  Normal respiratory effort. Clear to auscultation, bilaterally without Rales/Wheezes/Rhonchi. Cardiovascular:  Regular rate and rhythm with normal S1/S2 without murmurs, rubs or gallops. Abdomen: Soft, non-tender, non-distended with normal bowel sounds. Musculoskeletal:  Positive lower extremity edema bilaterally. Full range of motion without deformity. Skin: Skin color, texture, turgor normal.  No rashes or lesions. Neurologic:  Neurovascularly intact without any focal sensory/motor deficits. Cranial nerves: II-XII intact, grossly non-focal.  Psychiatric:  Alert and oriented, thought content appropriate, normal insight  Capillary Refill: Brisk,< 3 seconds   Peripheral Pulses: +2 palpable, equal bilaterally       Labs:     Recent Labs     06/20/20 2114   WBC 4.6*   HGB 8.6*   HCT 26.6*        Recent Labs     06/20/20 2113   *   K 4.4   CL 91*   CO2 26   BUN 19   CREATININE 4.54*   CALCIUM 8.4*   PHOS 2.4     Recent Labs     06/20/20 2113   AST 22   ALT 17   BILITOT 0.3   ALKPHOS 68     Recent Labs     06/20/20 2114   INR 1.0     Recent Labs     06/20/20 2113 06/21/20  0044   TROPONINI 0.126* 0.117*       Urinalysis:      Lab Results   Component Value Date    NITRU Negative 05/14/2020    WBCUA 3-5 05/14/2020    BACTERIA RARE 05/14/2020    RBCUA 6-10 05/14/2020    BLOODU TRACE 05/14/2020    SPECGRAV 1.016 05/14/2020    GLUCOSEU 250 05/14/2020       Radiology:     CXR: I have reviewed the CXR with the following interpretation: pending  EKG:   I have reviewed the EKG with the following interpretation: pending    CTA Chest W WO  (PE study)   Final Result   NO EVIDENCE OF PULMONARY EMBOLISM. PATCHY GROUNDGLASS OPACITIES IN THE LEFT LINGULA               XR CHEST PORTABLE   Final Result   Small patchy left lingula infiltrate                  XR ABDOMEN (KUB) (SINGLE AP VIEW)    (Results Pending)       ASSESSMENT:    Active Hospital Problems    Diagnosis Date Noted    Dyspnea on exertion [R06.09] 06/21/2020       PLAN:        DVT Prophylaxis: lovenox  Diet: DIET RENAL;  Code Status: Full Code    PT/OT Eval Status: eval and tx    1. Hyponatremia-will admit and consult nephrology  2. Dyspnea on exertion-will consult cardiology and obtain a 2D Echo  3. Hypoxia  4. Subtherapeutic INR-will bridge with lovenox,pt takes coumadin for afib  5. Elevated troponin-will obtain serial cardiac enzymes  6. Possible COVID-19 infection-will isolate and repeat test  7. R/o PNA  C/w levaquin  MDI  I saw and evaluated the pt. I personally obtained the key and critical portions of the history and physical exam. I reviewed the mild level documentation and agree with assessment and plan that we come up together  Electronically signed by Karen Tolliver MD on 6/21/20 at 12:42 PM EDT      I saw and evaluated the pt. I personally obtained the key and critical portions of the history and physical exam. I reviewed the mild level documentation and agree with assessment and plan that we come up together  Electronically signed by Karen Tolliver MD on 6/21/20 at 12:41 PM EDT         JULIO C Courtney    Thank you Farnaz Priest MD for the opportunity to be involved in this patient's care. If you have any questions or concerns please feel free to contact me.

## 2020-06-21 NOTE — ED TRIAGE NOTES
Patient arrived to ED with c/o shortness of breath x 1 week. Patient states she had dialysis yesterday and her shortness of breath has worsend since then. Patient states she made it through her whole appointment. States she has hx. Of asthma and CHF. +2 edema observed in bilateral lower extremities. Patient is A/Ox4. Patient able to speak full sentences. Color is WNL. RR are equal and labored. No retractions, nasal flairing, or use of accessory muscles observed. RR is 24. SPO2 97% RA. Lungs are clear anteriorly and posteriorly throughout.

## 2020-06-22 NOTE — PROGRESS NOTES
Progress Note  Patient: Fifi Mock  Unit/Bed: F071/P734-59  YOB: 1965  MRN: [id number]  Acct: [de-identified]   Admitting Diagnosis: Dyspnea on exertion [R06.09]  Date:  6/20/2020  Hospital Day: 1    Chief Complaint:  SOB    Subjective      6/22/20: Resting quietly in bed receiving dialysis at this time. Appears drowsy and per dialysis nurse patient just received pain medication. Potassium elevated this morning at 6.1 and hyponatremic with sodium of 126. Plan for 3.5 L of fluid removal with dialysis today. Patient voicing no complaints at this time. Denies chest pain. Shortness of breath improving. On telemetry, sinus rhythm with heart rate in the 60s. As outpatient, patient on Coumadin for history of paroxysmal atrial fibrillation and states she has been taking Coumadin however INR on admission was subtherapeutic. Current INR is 1.1 and she is being bridged with full dose Lovenox. Hemoglobin stable at 9.4. Blood pressure elevated this afternoon most recently 180/68. Will add hydralazine 25 mg p.o. 3 times daily for improved blood pressure management. COVID testing negative x1 yesterday. 1021/20: 48year old female with history of PAF, diastolic CHF, HTN, HPL, CAD, CABG and ESRD on dialysis. Recent admissions with fluid overload from missed dialysis last few months. Now back in with complaint of shortness of breath. We are consulted for elevated troponin. Review of Systems:   Review of Systems   Constitutional: Negative for activity change. HENT: Negative for congestion. Respiratory: Negative for chest tightness and shortness of breath. Cardiovascular: Positive for leg swelling. Negative for chest pain and palpitations. Gastrointestinal: Negative for nausea and vomiting. Musculoskeletal: Negative for arthralgias. Skin: Negative for color change. Neurological: Negative for dizziness and syncope. Psychiatric/Behavioral: Negative for agitation. Physical Examination:    BP (!) 161/66   Pulse 62   Temp 97.5 °F (36.4 °C)   Resp 16   Ht 5' 3\" (1.6 m)   Wt 244 lb 7.8 oz (110.9 kg)   SpO2 100%   BMI 43.31 kg/m²    Physical Exam  Constitutional:       General: She is not in acute distress. Appearance: She is obese. Comments: Drowsy but arousable   HENT:      Head: Normocephalic and atraumatic. Neck:      Musculoskeletal: Normal range of motion and neck supple. Cardiovascular:      Rate and Rhythm: Normal rate. Heart sounds: No murmur. Pulmonary:      Effort: Pulmonary effort is normal. No respiratory distress. Breath sounds: No wheezing, rhonchi or rales. Abdominal:      General: There is no distension. Palpations: Abdomen is soft. Comments: Morbidly obese   Musculoskeletal: Normal range of motion. Right lower leg: Edema (1+) present. Left lower leg: Edema (1+) present. Skin:     General: Skin is warm and dry. Coloration: Skin is pale. Neurological:      General: No focal deficit present. Mental Status: She is oriented to person, place, and time.    Psychiatric:         Mood and Affect: Mood normal.         Behavior: Behavior normal.         LABS:  CBC:   Lab Results   Component Value Date    WBC 5.1 06/22/2020    RBC 2.99 06/22/2020    RBC 3.51 08/12/2018    HGB 9.4 06/22/2020    HCT 29.8 06/22/2020    MCV 99.5 06/22/2020    MCH 31.2 06/22/2020    MCHC 31.4 06/22/2020    RDW 17.0 06/22/2020     06/22/2020    MPV 7.1 08/12/2018     CBC with Differential:   Lab Results   Component Value Date    WBC 5.1 06/22/2020    RBC 2.99 06/22/2020    RBC 3.51 08/12/2018    HGB 9.4 06/22/2020    HCT 29.8 06/22/2020     06/22/2020    MCV 99.5 06/22/2020    MCH 31.2 06/22/2020    MCHC 31.4 06/22/2020    RDW 17.0 06/22/2020    LYMPHOPCT 16.3 06/22/2020    LYMPHOPCT 20.7 08/12/2018    MONOPCT 10.7 06/22/2020    BASOPCT 1.0 06/22/2020    MONOSABS 0.5 06/22/2020    LYMPHSABS 0.8 06/22/2020 EOSABS 0.4 06/22/2020    BASOSABS 0.1 06/22/2020     CMP:    Lab Results   Component Value Date     06/22/2020    K 6.1 06/22/2020    CL 90 06/22/2020    CO2 24 06/22/2020    BUN 29 06/22/2020    CREATININE 5.96 06/22/2020    GFRAA 9.0 06/22/2020    LABGLOM 7.4 06/22/2020    GLUCOSE 115 06/22/2020    PROT 7.4 06/22/2020    LABALBU 3.5 06/22/2020    CALCIUM 8.7 06/22/2020    BILITOT 0.5 06/22/2020    ALKPHOS 71 06/22/2020    AST 29 06/22/2020    ALT 18 06/22/2020     BMP:    Lab Results   Component Value Date     06/22/2020    K 6.1 06/22/2020    CL 90 06/22/2020    CO2 24 06/22/2020    BUN 29 06/22/2020    LABALBU 3.5 06/22/2020    CREATININE 5.96 06/22/2020    CALCIUM 8.7 06/22/2020    GFRAA 9.0 06/22/2020    LABGLOM 7.4 06/22/2020    GLUCOSE 115 06/22/2020     Magnesium:    Lab Results   Component Value Date    MG 1.9 06/20/2020     Troponin:    Lab Results   Component Value Date    TROPONINI 0.132 06/21/2020       Radiology:  Xr Abdomen (kub) (single Ap View)    Result Date: 6/21/2020  EXAMINATION: XR ABDOMEN (KUB) (SINGLE AP VIEW) DATE AND TIME:6/20/2020 9:15 PM CLINICAL HISTORY: Epigastric pain   abd pain  COMPARISON: None  FINDINGS  There are no renal or ureteral stones identified on these films. Nonspecific nonobstructive bowel gas pattern. Unremarkable abdominal x-ray. Cta Chest W Wo  (pe Study)    Result Date: 6/21/2020  EXAMINATION: CTA CHEST W WO CONTRAST  DATE AND TIME:6/20/2020 11:30 PM CLINICAL HISTORY: Acute chest pain. Shortness of breath  pe  COMPARISON: None available. TECHNIQUE: Helical axial CTA of the chest was performed following the intravenous administration of 100 mL Optiray 320 intravenous contrast.  2D images were reconstructed in the axial and coronal planes. 3-D MIP images were generated in the coronal plane. Images were reviewed on the PACS workstation. All images including the 3D MIPS were permanently archived.   All CT scans at this facility use dose modulation, iterative reconstruction, and/or weight based dosing when appropriate to reduce radiation dose to as low as reasonably achievable. FINDINGS: Pulmonary arteries: There is good opacification of the main, lobar, segmental and proximal subsegmental pulmonary artery branches. No sign of pulmonary embolus in the central pulmonary arterial tree. No evidence of thoracic aortic aneurysm or dissection. Lungs: Lung fields show a pattern of mosaic attenuation with more focal patchy groundglass opacities in the left lingula. These are nonspecific. Right base atelectasis tiny right pleural reaction. No pleural effusions. Pericardial effusion more prominent on the right side of the heart measuring approximately 2.2 cm in thickness. Mediastinum: Cardiomegaly. No other significant abnormality. No lymphadenopathy. No pericardial effusion. Trachea:Negative                            Vessels:Thoracic aorta is intact. Visualized abdomen: The visualized portions of the upper abdomen are unremarkable. Bones: No acute bone pathology     NO EVIDENCE OF PULMONARY EMBOLISM. PATCHY GROUNDGLASS OPACITIES IN THE LEFT LINGULA     Xr Chest Portable    Result Date: 6/21/2020  EXAMINATION: XR CHEST PORTABLE. DATE AND TIME:6/20/2020 9:15 PM CLINICAL HISTORY: Shortness of breath   sob  COMPARISONS: May 14, 2020  FINDINGS: Cardiomegaly. Minimal patchy opacity left lingula. Remaining lung fields clear. Pleural angles smooth. No pneumothorax. Small patchy left lingula infiltrate        [facility] 10/14/19:  Procedure Summary  1- severe proximal LAD and CX disease  2- normal LVF  Recommendations  transfer to [facility] for CABG or high risk PCI. max med rx  rfm   Angiographic Findings   Cardiac Arteries and Lesion Findings  LMCA: large caliber, distal 30-40%  LAD: mod caliber vessel, calcified, 95% proximal stenosis. otherwise  moderate diffuse disease. D1 is a small to mod caliber branch, 99% ostial stenosis. LCx: large caliber, 80-90% ostial stenosis, otherwise mild disease. RCA: large caliber, mild to mod diffuse disease. Dominance: Right  LV function assessed as:Normal.   VA  Ventriculography Findings  No gradient across the aortic valve. Normal LV systolic function. Normal LVEDP. Echocardiogram at [facility] 11/8/19:  CONCLUSIONS:  - Technically difficult exam due to suboptimal positioning.  - Exam indication: Chest Pain  - The left ventricle is normal in size. There is mild left ventricular   hypertrophy. Left ventricular systolic function is normal. EF = 70 ± 5% (2D 4-ch.)   Definity contrast used for endocardial border detection.  - The right ventricle is normal in size. Right ventricular systolic function is   low normal.  - There is a prominent septal bounce. Marked respiratory variation of the MV and   TV inflows. Medial and lateral MV TDI are not optimally sampled but appear equal.   Respirophasic septal shift (clip 46,49). IVC appears normal in size 1.7cm,   collapses with sniff. Findings suggestive of pericardial constriction - well   diuresed. - There are no significant valvular abnormalities. - Exam was compared with the prior  echocardiographic exam performed on   10/26/2019. Features of constriction were present. Electronically signed by Marsha Nicholas MD on 11/8/2019 at 10:13:20 AM      CABG Report [facility] 10/24/19:  Ceci Bender     10/24/2019  3:57 PM  CARDIAC SURGERY OP REPORT  PROCEDURE:   · Coronary Artery Bypass Graft    PREOPERATIVE DIAGNOSIS: Complex vessel coronary artery disease  POSTOPERATIVE DIAGNOSIS: Same as preop    FINDINGS:     Free LIMA graft taken off pearce of vein graft on aorta.     · CONDUIT QUALITY: Normal caliber with excellent flow     · AORTA QUALITY: Normal     · CORONARY ARTERY QUALITY:  Diffuse atherosclerotic disease with heavy   calcification of Diag and LAD      DESCRIPTION OF PROCEDURE:  Operative Approach: Full Conventional Sternotomy  PUMP:  ON PUMP  Arterial Cannulation: Ascending Aorta   Venous Cannulation: Bicaval     CARDIOPLEGIA:  Buckberg  Cardioplegia Delivery:  Antegrade and Retrograde    Proximal Technique:  Single cross clampCABG:  GRAFTS:  3  · LIMA T-graft off SVG end to side mid LAD  · Vein graft ascending aorta end to side diagonal 1  · Vein graft ascending aorta end to side obtuse marginal 2    EKG 6/20/20: SR 83, low voltage QRS, poor R wave progression V2-v6, QTc 460ms    Telemetry 6/22/20: SR 60s      Assessment:    Active Hospital Problems    Diagnosis Date Noted    Dyspnea on exertion [R06.09] 06/21/2020     1. Elevated troponin  2. Dyspnea  3. ESRD-HDD  4. Hyponatremia  5. Hyperkalemia  6. Chronic anemia  7. Hx CAD s/p CABG x 3 on 10/24/19 at [facility]  8. Normal LVF EF 70% per echo 11/8/19 at [facility]  9. HTN  10. Dyslipidemia  11. DM  12. Hep C  13. Hx PA-fib--on OAC with coumadin and subtherapeutic INR on admission      Plan:  1. Maximize medical therapy-aspirin 81 mg p.o. daily, Lopressor 50 mg p.o. twice daily, Lipitor 40 mg tablet p.o. daily  at bedtime, clonidine 0.1 mg p.o. twice daily, insulin as directed, IV antibiotics per internal medicine, add hydralazine 25mg PO TID  2. anticoagulation with Coumadin and Lovenox bridge until INR within the therapeutic range  3. Cardiac/diabetic/less than 2 g sodium diet recommended  4. Check echocardiogram  5. Monitor on telemetry for any tachycardia or bradycardia arrhythmias  6. Maintain potassium greater than 4, magnesium greater than 2  7. GI/DVT prophylaxis  8. Nephrology recommendation regarding fluid management/end-stage renal disease for which she is hemodialysis dependent--plan for 3.5L fluid removal with dialysis today  9. Internal medicine recommendations-on IV antibiotics for possible pneumonia  10. Coronary evaluation per Dr. Dariel Esparza.   Elevated troponin likely related to end-stage renal disease. 11. Will follow      Electronically signed by JULIO C Howell on 6/22/2020 at 11:50 AM    Attending Supervising Physician's Attestation Statement  I performed a history and physical examination on the patient and discussed the management with the nurse practicioner. I reviewed and agree with the findings and plan as documented in his note . History element: 48year old female with diastolic CHF, ESRD, elevated troponin. Feeling good today. Physical element:  NAD, RRR, CTA b, soft, no edema, nonfocal  Plan element: Ok for discharge from cardiac perspective. David Monroy MD [facility] Director of Cardiology Services and Cardiac Catheterization Laboratory  [facility], [place]

## 2020-06-22 NOTE — PROGRESS NOTES
HD today for 4 hours via LAVF. Tolerated tx well. Medicated for pain during tx. Net UF 3.5 liters. Homeostasis of LAVF achieved and dressing dry and intact. Please see HD flowsheet for full details

## 2020-06-22 NOTE — PROGRESS NOTES
[id number] Notified Dr. Bernardo [place] of K+ 6 via perfect serve message. Noted that patient is scheduled to have hemodialysis today. 1000 Patient having hemodialysis in room. [phone] lab to inquire about the covid test I collected at   1500 and they made me aware that the hospital is out of re-agent to run the test. Made patient aware. [phone] Dr. Bernardo [place] ok with patient not having IV access. She will most likely be discharged tomrorow.

## 2020-06-22 NOTE — PROGRESS NOTES
Clinical Pharmacy Note    Warfarin consult follow-up    Recent Labs     06/22/20  0728   INR 1.1     Recent Labs     06/20/20  2114 06/22/20  0728   HGB 8.6* 9.4*   HCT 26.6* 29.8*    267       Significant drug:drug interactions:  APAP, aspirin, Enoxaparin, levofloxacin, Percocet, fluoxetine    Notes:  Date INR Warfarin Dose   6-21-20 1.0 5 mg    06/22/20 1.1   7.5 mg                                             INR remains subtherapeutic at 1.1. Will boost with 7.5 mg warfarin today to get to INR goal range of 2-3. Daily PT/INR until stable within therapeutic range.     Tyler Mcdaniel, PharmD  6/22/2020 10:03 AM

## 2020-06-22 NOTE — CARE COORDINATION
CALLED AND CONFIRMED WITH [facility] PATIENT IS NOT CURRENT WITH EITHER. PT CAME IN WITH INR 1.  AWAITING CALL BACK FROM [facility].

## 2020-06-22 NOTE — PROGRESS NOTES
Physician Progress Note    2020   9:36 AM    Name:  Sheldon Barrow  MRN:    [id number]      Day: 1     Admit Date: 2020  8:47 PM  PCP: Buck Longoria MD    Code Status:  Full Code      Assessment and Plan: Active Problems/ diagnosis:     Dyspnea on exertion due to volume overload in the setting of end-stage renal disease and noncompliance with dialysis  End-stage renal disease  Possible pneumonia  Medical noncompliance  Diabetes  Hypertension  CHF  Paroxysmal A. Fib  Hyperkalemia      2020  -Her potassium is 6.1 this morning. She is going for dialysis. She continues to have dyspnea. -She was educated in the importance of compliance with her medical therapy including dialysis. -Nephrology is consulted  -She was seen by Dr. Justine Jones yesterday from cardiology.  -Resume Levaquin. DVT PPx     Subjective:      no new events. Continues to have dyspnea on exertion. No chest pain. No cough. No chills or fever. Physical Examination:      Vitals:  BP (!) 176/75   Pulse 63   Temp 98.2 °F (36.8 °C) (Oral)   Resp 16   Ht 5' 3\" (1.6 m)   Wt 239 lb 4.8 oz (108.5 kg)   SpO2 100%   BMI 42.39 kg/m²   Temp (24hrs), Av.2 °F (36.8 °C), Min:98.2 °F (36.8 °C), Max:98.2 °F (36.8 °C)      General appearance: alert, cooperative and no distress  Mental Status: oriented to person, place and time and normal affect  Lungs: Diminished bilaterally.     Heart: regular rate and rhythm, no murmur  Abdomen: soft, nontender, nondistended, bowel sounds present, no masses  Extremities: 1-2+ pitting bilateral lower extremity edema   Skin: no gross lesions, rashes    Data:     Labs:  Recent Labs     20  0728   WBC 4.6* 5.1   HGB 8.6* 9.4*    267     Recent Labs     20  0728   * 126*   K 4.4 6.1*   CL 91* 90*   CO2 26 24   BUN 19 29*   CREATININE 4.54* 5.96*   GLUCOSE 165* 115*     Recent Labs     20/20  0728   AST 22 29 injection 4 mg  4 mg Intravenous Q6H PRN [place], [place]        enoxaparin (LOVENOX) injection 110 mg  1 mg/kg Subcutaneous Daily Jv Eugene MD   110 mg at 06/22/20 0832    insulin lispro (HUMALOG) injection vial 0-12 Units  0-12 Units Subcutaneous TID [facility] Fitting [place], [place]        insulin lispro (HUMALOG) injection vial 0-6 Units  0-6 Units Subcutaneous Nightly Varun Fitting JULIO C Boyer        glucose (GLUTOSE) 40 % oral gel 15 g  15 g Oral PRN Varun Fitting JULIO C Boyer        dextrose 50 % IV solution  12.5 g Intravenous PRN Varun Fitting JULIO C Boyer        glucagon (rDNA) injection 1 mg  1 mg Intramuscular PRN Varun Fitting JULIO C Boyer        dextrose 5 % solution  100 mL/hr Intravenous PRN JULIO C Ham        cloNIDine (CATAPRES) tablet 0.1 mg  0.1 mg Oral BID Adela Fitzgerald MD   0.1 mg at 06/22/20 8654    darbepoetin sarina-polysorbate (ARANESP) injection 40 mcg  40 mcg Intravenous Weekly Joel Coreas MD           Additional work up or/and treatment plan may be added today or then after based on clinical progression. I am managing a portion of pt care. Some medical issues are handled by other specialists. Additional work up and treatment should be done in out pt setting by pt PCP and other out pt providers. In addition to examining and evaluating pt, I spent additional time explaining care, normaland abnormal findings, and treatment plan. All of pt questions were answered. Counseling, diet and education were provided. Case will be discussed with nursing staff when appropriate. Family will be updated if and when appropriate.        Electronically signed by Talha Shah DO on 6/22/2020 at 9:36 AM

## 2020-06-23 NOTE — CARE COORDINATION
MET WITH PATIENT, PLAN FOR DC HOME TODAY. PT DECLINES HHC. PT IS AGREEABLE TO [facility], EDUCATED ON THE PROCESS, FORM SIGNED. CAYDEN HERNANDEZ NOTIFYING DR Rosa Adhikari TO SIGN. FORM WILL NEED FAXED AND APPT SET UP. CAYDEN HERNANDEZ AWARE, COULD CALL PHARMACY FOR APPT. SECOND IMM REVIEWED/SIGNED, PT VERBALIZED UNDERSTANDING.

## 2020-06-23 NOTE — PROGRESS NOTES
Spiritual Care Services     Summary of Visit:  Attempted to provide care, however, patient was being discharged and not interested in any spiritual care at this time. Spiritual Assessment/Intervention/Outcomes:    Encounter Summary  Services provided to[de-identified] Patient  Referral/Consult From[de-identified] Rounding  Support System: Family members  Place of Yazidi: None  Continue Visiting: No  Complexity of Encounter: Low  Length of Encounter: 15 minutes  Spiritual Assessment Completed: Yes  Routine  Type: Initial  Assessment: Approachable, Passive, Coping  Intervention: Explored feelings, thoughts, concerns, Nurtured hope, Sustaining presence/ Ministry of presence, Discussed belief system/Orthodox practices/katy  Outcome: Engaged in conversation, Receptive                          Values / Beliefs  Do you have any ethnic, cultural, sacramental, or spiritual Orthodox needs you would like us to be aware of while you are in the hospital?: No    Care Plan:    Patient being discharged today. No further follow up needed. Spiritual Care Services   Electronically signed by Marisela Lin on 6/23/20 at 4:22 PM EDT     To reach a  for emotional and spiritual support, place an [facility] consult request.   If a  is needed immediately, dial 0 and ask to page the on-call .

## 2020-06-23 NOTE — CARE COORDINATION
SPOKE WITH SEAN AT [facility], CONFIRMED PT WAS NOT GETTING INR CHECKED AT HD.  SEAN STATES PT IS VERY NON COMPLIANT WITH HD.   [facility] FORM PLACED ON CHART FOR CARDIOLOGY TO SIGN.

## 2020-06-23 NOTE — PROGRESS NOTES
Clinical Pharmacy Note    Warfarin consult follow-up    Recent Labs     06/23/20  0545   INR 1.2     Recent Labs     06/20/20  2114 06/22/20  0728 06/23/20  0545   HGB 8.6* 9.4* 8.8*   HCT 26.6* 29.8* 26.9*    267 231       Significant drug:drug interactions:  APAP, aspirin, Enoxaparin 110 mg, fluoxetine, Levofloxacin, Percocet    Notes:  Date INR Warfarin Dose   6-21-20 1.0 5 mg    06/22/20 1.1   7.5 mg    06/23/20 1.2   7.5 mg                                     INR remains subtherapeutic at 1.2. Give 7.5 mg warfarin again today as a booster to raise INR to goal range of 2-3. Daily PT/INR until stable within therapeutic range.     Ramiro Schumacher, PharmD  6/23/2020 10:18 AM

## 2020-06-23 NOTE — FLOWSHEET NOTE
2nd covid testing noted to be negative. Pt removed from droplet plus isolation precautions. Message sent to Dr. Nettie Reyes re: discharge and coumadin clinic set up as outpatient.  Electronically signed by Michelle Maher RN on 6/23/2020 at 2:43 PM

## 2020-06-23 NOTE — DISCHARGE SUMMARY
Hospital Medicine Discharge Summary    Juan Jones  :  1965  MRN:  [id number]    Admit date:  2020  Discharge date:  2020    Admitting Physician:  Ana Bland MD  Primary Care Physician:  Peri Choi MD      Discharge Diagnoses:      Dyspnea on exertion due to volume overload in the setting of end-stage renal disease and noncompliance with dialysis  End-stage renal disease  Possible pneumonia  Medical noncompliance  Diabetes  Hypertension  CHF  Paroxysmal A. Fib  Hyperkalemia       Chief Complaint   Patient presents with    Shortness of Breath     x 2 days. Had all 3 dialysis treatments this week, last one being yesterday     Hospital Course: This is a 65-year-old female with a past medical history of paroxysmal A. fib on Coumadin, CHF, end-stage renal disease on dialysis  presented to hospital with dyspnea. 2 days. Patient was found to have volume overload due to end-stage renal disease. She was also treated for possible pneumonia with IV Levaquin. She had coronavirus testing and was negative on the first test.  The second test was pending at the time of discharge. She improved after fluid removal with dialysis. Her labs normalized to her baseline. Her INR was subtherapeutic. She was initially treated with Lovenox therapeutic along with Coumadin. She was discharged on Coumadin alone. She will need to have her INR checked routinely as outpatient to make sure her level becomes therapeutic. No need for bridging as outpatient. Cardiology saw the patient during the stay. She will need to follow-up with cardiology and PCP as outpatient. She was educated regarding self quarantine until the second result of COVID-19 test comes back.     Exam on discharge:   BP (!) 134/50   Pulse 60   Temp 98.6 °F (37 °C) (Oral)   Resp 16   Ht 5' 3\" (1.6 m)   Wt 236 lb 12.4 oz (107.4 kg)   SpO2 93%   BMI 41.94 kg/m²   General appearance: No apparent distress, appears stated age and cooperative. HEENT: Pupils equal, round, and reactive to light. Conjunctivae/corneas clear. Neck: Supple, with full range of motion. No jugular venous distention. Trachea midline. Respiratory:  Normal respiratory effort. Clear to auscultation, bilaterally without Rales/Wheezes/Rhonchi. Cardiovascular: Regular rate and rhythm with normal S1/S2 without murmurs, rubs or gallops. Abdomen: Soft, non-tender, non-distended with normal bowel sounds. Musculoskeletal: No clubbing, cyanosis or edema bilaterally. Full range of motion without deformity. Skin: Skin color, texture, turgor normal.  No rashes or lesions. Neuro: Non Focal. Symetrical motor and tone. Nl Comprehension, Alert,awake and oriented. NL CN. Symetrical tone and reflexes. Psychiatric: Alert and oriented, thought content appropriate, normal insight  Capillary Refill: Brisk,< 3 seconds   Peripheral Pulses: +2 palpable, equal bilaterally     Patient was seen by the following consultants while admitted to [facility]:   Consults:  [id number]  IP CONSULT TO NEPHROLOGY  IP CONSULT TO PHARMACY  IP CONSULT TO NEPHROLOGY    Significant Diagnostic Studies:    Refer to chart     Please refer to chart if no studies are shown here    Xr Abdomen (kub) (single Ap View)    Result Date: 6/21/2020  EXAMINATION: XR ABDOMEN (KUB) (SINGLE AP VIEW) DATE AND TIME:6/20/2020 9:15 PM CLINICAL HISTORY: Epigastric pain   abd pain  COMPARISON: None  FINDINGS  There are no renal or ureteral stones identified on these films. Nonspecific nonobstructive bowel gas pattern. Unremarkable abdominal x-ray. Cta Chest W Wo  (pe Study)    Result Date: 6/21/2020  EXAMINATION: CTA CHEST W WO CONTRAST  DATE AND TIME:6/20/2020 11:30 PM CLINICAL HISTORY: Acute chest pain. Shortness of breath  pe  COMPARISON: None available.  TECHNIQUE: Helical axial CTA of the chest was performed following the intravenous administration of 100 mL Optiray 320 intravenous contrast.  2D images were reconstructed in the axial and coronal planes. 3-D MIP images were generated in the coronal plane. Images were reviewed on the PACS workstation. All images including the 3D MIPS were permanently archived. All CT scans at this facility use dose modulation, iterative reconstruction, and/or weight based dosing when appropriate to reduce radiation dose to as low as reasonably achievable. FINDINGS: Pulmonary arteries: There is good opacification of the main, lobar, segmental and proximal subsegmental pulmonary artery branches. No sign of pulmonary embolus in the central pulmonary arterial tree. No evidence of thoracic aortic aneurysm or dissection. Lungs: Lung fields show a pattern of mosaic attenuation with more focal patchy groundglass opacities in the left lingula. These are nonspecific. Right base atelectasis tiny right pleural reaction. No pleural effusions. Pericardial effusion more prominent on the right side of the heart measuring approximately 2.2 cm in thickness. Mediastinum: Cardiomegaly. No other significant abnormality. No lymphadenopathy. No pericardial effusion. Trachea:Negative                            Vessels:Thoracic aorta is intact. Visualized abdomen: The visualized portions of the upper abdomen are unremarkable. Bones: No acute bone pathology     NO EVIDENCE OF PULMONARY EMBOLISM. PATCHY GROUNDGLASS OPACITIES IN THE LEFT LINGULA     Xr Chest Portable    Result Date: 6/21/2020  EXAMINATION: XR CHEST PORTABLE. DATE AND TIME:6/20/2020 9:15 PM CLINICAL HISTORY: Shortness of breath   sob  COMPARISONS: May 14, 2020  FINDINGS: Cardiomegaly. Minimal patchy opacity left lingula. Remaining lung fields clear. Pleural angles smooth. No pneumothorax.      Small patchy left lingula tolerated    Total time taken for discharging this patient: 40 minutes. Greater than 70% of time was spent focused exclusively on this patient. Time was taken to review chart, discuss plans with consultants, reconciling medications, discussing plan answering questions with patient.      Natasha Bustamante  6/23/2020, 9:35 AM  ----------------------------------------------------------------------------------------------------------------------    Oren Young

## 2020-06-24 NOTE — CARE COORDINATION
[facility] Transitions Initial Follow Up Call    Call within 2 business days of discharge: Yes    Patient: Ryan Garcia Patient : 1965   MRN: [id number]  Reason for Admission: -2020 [address] IP Volume Overload  Discharge Date: 20 RARS: Readmission Risk Score: 53    Patient contacted regarding [id number] initial screening. Request call back to P: [phone]. Advised to schedule appts.

## 2020-07-15 NOTE — ED PROVIDER NOTES
[address] ED  [facility]      Pt Name: Juan Jones  MRN: [id number]  [place] 1965  Date ofevaluation: 7/14/2020  Provider: Kay Gibbons MD    CHIEF COMPLAINT       Chief Complaint   Patient presents with    Abdominal Pain     LUQ pain since saturday         HISTORY OF PRESENT ILLNESS   (Location/Symptom, Timing/Onset,Context/Setting, Quality, Duration, Modifying Factors, Severity)  Note limiting factors. Juan Jones is a 48 y.o. female who presents to the emergency department patient presenting for evaluation secondary to abdominal pain. Patient states that she has been dealing with left upper quadrant abdominal pain since the weekend. She reports that it is a continuous type pain that is worse with palpation and movement. Patient denies associated signs and symptoms such as fever, nausea or vomiting, diarrhea. Patient is on dialysis, typically gets her sessions on Monday Wednesday and Friday. She had her session yesterday, and it went on without complication. Patient does report that she is status post cholecystectomy. She denies that she has been having constipation. She denies any history of bowel obstructions. Review of systems otherwise negative. HPI    NursingNotes were reviewed. REVIEW OF SYSTEMS    (2-9 systems for level 4, 10 or more for level 5)     Review of Systems   Constitutional: Negative for fever. HENT: Negative for ear pain and sore throat. Respiratory: Negative for cough and shortness of breath. Cardiovascular: Negative for chest pain. Gastrointestinal: Positive for abdominal pain. Negative for diarrhea, nausea and vomiting. Genitourinary: Negative for dysuria. Musculoskeletal: Negative for arthralgias. Skin: Negative for rash. Neurological: Negative for weakness and numbness. Except as noted above the remainder of the review of systems was reviewed and negative.        PAST MEDICAL HISTORY Past Medical History:   Diagnosis Date    Atrial fibrillation ([facility]     Cancer ([facility]     Breast    CHF (congestive heart failure) (HCC)     Chronic kidney disease     Diabetes mellitus ([facility]     ESRD (end stage renal disease) ([facility] 2020    Essential hypertension 2020    Heart murmur     Hepatitis C     Hx of CABG 2020    Paroxysmal atrial fibrillation ([facility] 2020         SURGICALHISTORY       Past Surgical History:   Procedure Laterality Date    BREAST SURGERY       SECTION      CHOLECYSTECTOMY      CORONARY ARTERY BYPASS GRAFT           CURRENT MEDICATIONS       Previous Medications    AMLODIPINE (NORVASC) 10 MG TABLET    Take 10 mg by mouth nightly     ASPIRIN 81 MG CHEWABLE TABLET    Take 1 tablet by mouth daily    ATORVASTATIN (LIPITOR) 40 MG TABLET    Take 40 mg by mouth nightly    B COMPLEX-C-FOLIC ACID (NEPHROCAPS) 1 MG CAPSULE    Take 1 capsule by mouth daily    CALCIUM ACETATE (PHOSLO) 667 MG CAPSULE    Take by mouth 3 times daily (with meals)    CETIRIZINE (ZYRTEC) 10 MG TABLET    Take 10 mg by mouth 2 times daily as needed for Allergies    CLONIDINE (CATAPRES) 0.1 MG TABLET    Take 0.1 mg by mouth daily    CYCLOBENZAPRINE (FLEXERIL) 5 MG TABLET    Take 5 mg by mouth 3 times daily as needed for Muscle spasms    DIPHENHYDRAMINE (BENADRYL) 25 MG CAPSULE    Take 25 mg by mouth every 6 hours as needed for Itching    FLUOXETINE (PROZAC) 20 MG CAPSULE    Take 1 capsule by mouth daily    HYDRALAZINE (APRESOLINE) 25 MG TABLET    Take 1 tablet by mouth every 8 hours    METOPROLOL TARTRATE (LOPRESSOR) 50 MG TABLET    Take 1 tablet by mouth 2 times daily    NYSTATIN (MYCOSTATIN) POWD POWDER    Apply topically 2 times daily    OXYCODONE-ACETAMINOPHEN (PERCOCET) 5-325 MG PER TABLET    Take 1 tablet by mouth every 4 hours as needed for Pain. [employer] SODIUM (SENOKOT-S) 8.6-50 MG TABLET    Take 1 tablet by mouth 2 times daily    TOPIRAMATE (TOPAMAX SPRINKLE) 25 MG CAPSULE    Take 25 mg by mouth 2 times daily    WARFARIN (COUMADIN) 5 MG TABLET    Take 1 tablet by mouth daily       ALLERGIES     Hydrocodone-acetaminophen; Naproxen; Hydrocodone; and Vicodin [hydrocodone-acetaminophen]    FAMILY HISTORY     History reviewed. No pertinent family history. SOCIAL HISTORY       Social History     Socioeconomic History    Marital status: Single     Spouse name: None    Number of children: None    Years of education: None    Highest education level: None   Occupational History    None   Social Needs    Financial resource strain: None    Food insecurity     Worry: None     Inability: None    Transportation needs     Medical: None     Non-medical: None   Tobacco Use    Smoking status: Former Smoker    Smokeless tobacco: Never Used   Substance and Sexual Activity    Alcohol use: Never     Frequency: Never    Drug use: Never    Sexual activity: Not Currently   Lifestyle    Physical activity     Days per week: None     Minutes per session: None    Stress: None   Relationships    Social connections     Talks on phone: None     Gets together: None     Attends [employer] service: None     Active member of club or organization: None     Attends meetings of clubs or organizations: None     Relationship status: None    Intimate partner violence     Fear of current or ex partner: None     Emotionally abused: None     Physically abused: None     Forced sexual activity: None   Other Topics Concern    None   Social History Narrative    None       SCREENINGS      @FLOW([id number])@      PHYSICAL EXAM    (up to 7 for level 4, 8 or more for level 5)     ED Triage Vitals [07/14/20 2035]   BP Temp Temp Source Pulse Resp SpO2 Height Weight   (!) 171/87 97.7 °F (36.5 °C) Oral 65 19 100 % 5' 3\" (1.6 m) 230 lb (104.3 kg)       Physical Exam  Vitals signs and nursing note reviewed. Constitutional:       General: She is not in acute distress. Appearance: She is well-developed.  She is obese. HENT:      Head: Normocephalic and atraumatic. Eyes:      General:         Right eye: No discharge. Left eye: No discharge. Conjunctiva/sclera: Conjunctivae normal.   Neck:      Musculoskeletal: Normal range of motion and neck supple. Cardiovascular:      Rate and Rhythm: Normal rate and regular rhythm. Heart sounds: Normal heart sounds. Pulmonary:      Effort: Pulmonary effort is normal. No respiratory distress. Breath sounds: Normal breath sounds. Abdominal:      General: There is no distension. Palpations: Abdomen is soft. Tenderness: There is abdominal tenderness (  Left upper quadrant without guarding or rebound). There is no guarding or rebound. Musculoskeletal: Normal range of motion. General: No tenderness. Comments: Left arm fistula with palpable thrill   Lymphadenopathy:      Cervical: No cervical adenopathy. Skin:     General: Skin is warm and dry. Findings: No rash. Neurological:      Mental Status: She is alert and oriented to person, place, and time.          DIAGNOSTIC RESULTS       Interpretation per the Radiologist below, if available at the time ofthis note:    CT ABDOMEN PELVIS WO CONTRAST Additional Contrast? None    (Results Pending)         ED BEDSIDE ULTRASOUND:   Performed by ED Physician - none    LABS:  Labs Reviewed   COMPREHENSIVE METABOLIC PANEL - Abnormal; Notable for the following components:       Result Value    Sodium 126 (*)     Potassium 5.6 (*)     Chloride 88 (*)     Glucose 128 (*)     BUN 30 (*)     CREATININE 5.68 (*)     GFR Non- 7.8 (*)     GFR  9.5 (*)     Calcium 8.0 (*)     Total Protein 8.3 (*)     AST 61 (*)     Globulin 4.4 (*)     All other components within normal limits   CBC WITH AUTO DIFFERENTIAL - Abnormal; Notable for the following components:    WBC 4.1 (*)     RBC 3.54 (*)     Hemoglobin 11.1 (*)     Hematocrit 33.3 (*)     RDW 15.8 (*)     Lymphocytes Absolute 0.7 (*)     All other components within normal limits   LIPASE   URINALYSIS       All other labs were within normal range or not returned as of this dictation. EMERGENCY DEPARTMENT COURSE and DIFFERENTIAL DIAGNOSIS/MDM:   Vitals:    Vitals:    07/14/20 2035 07/14/20 2100 07/14/20 2200 07/14/20 2257   BP: (!) 171/87 (!) 164/80 (!) 161/82 (!) 143/75   Pulse: 65   64   Resp: 19  18 18   Temp: 97.7 °F (36.5 °C)      TempSrc: Oral      SpO2: 100%  95% 95%   Weight: 230 lb (104.3 kg)      Height: 5' 3\" (1.6 m)          Patient presented secondary to abdominal pain. She was given morphine and Zofran for treatment of symptoms. IV was status laboratory studies were obtained. Patient's white count was 4.1, hemoglobin was 11.1. Lipase was normal.  Patient was somewhat hyponatremic at 126, potassium was hemolyzed but was 5.6 creatinine was elevated at 5.68 consistent with the patient's renal insufficiency. CT abdomen and pelvis shows anasarca, does not show any evidence of acute inflammatory process or any need for surgical intervention. Patient had a nonsurgical abdomen on repeat evaluation at 2300, I do not feel that she requires admission or further observation. Patient is hyponatremic, but she has dialysis scheduled tomorrow. She was instructed to keep that appointment. Patient was discharged in improved condition. MDM      CONSULTS:  None    PROCEDURES:  Unless otherwise noted below, none     Procedures    FINAL IMPRESSION      1.  Left upper quadrant pain          DISPOSITION/PLAN   DISPOSITION Decision To Discharge 07/14/2020 11:05:55 PM      PATIENT REFERREDTO:  Manuel Sauer MD  [address] [phone]    In 2 days  if not improving      DISCHARGEMEDICATIONS:  New Prescriptions    No medications on file          (Please note that portions of this note were completed with a voice recognition program.  Efforts were made to edit the dictations but occasionally words are mis-transcribed. )    Shabana Torrez MD (electronically signed)  Attending Emergency Physician          Shabana Torrez MD  07/14/20 5507

## 2020-07-15 NOTE — ED NOTES
Patient given discharge instructions and verbalized understanding. Vital signs stable. Resp even and unlabored. Skin warm, dry and intact. Patient is alert and oriented. IV removed. Patient doesn't have any questions at this time.       Tess Vega RN  07/14/20 2574

## 2020-07-21 NOTE — PROGRESS NOTES
Oral Anticoagulation Patient Education    Counseling provided to: Danielrochelle Alfonzosachin  Anticoagulant: Warfarin   Handouts provided to patient include: medication, medications that increase risk of bleeding, nosebleeds    Counseling points included:  1. Indication for anticoagulation: A-Fib  2. Explanation of medication  3. Education on A. Fib and stroke; PE/DVT  4. Missed doses and timing of medication (contact healthcare provider if missed multiple doses)  5. Side effects: bruising and bleeding with emphasis on bleeding  6. Food or drug interactions that can increase risk of bleeding  7. OTC pain relief options: Tylenol vs NSAIDs  8. Contact provider if:   A. Changes made to medications   B. Uncontrolled bleeding/ Signs and symptoms of recurrent VTE   C. Procedures   D. Trauma and/or fall    E.  Pregnancy (for women of child bearing age)       INR  2 is therapetuic for this patient (goal range 2-3) and is reflective of 35 mg TWD  Patient verifies current dosing regimen, patient able to verbally recall dose  Patient reports no  missed doses in the last week   Patient denies s/sx clotting and/or stroke  Patient denies hematuria, epistaxis, rectal bleeding  Patient denies changes in diet, alcohol, or tobacco use  Reviewed medication list and drug allergies with patient, updated any medication additions or modifications accordingly  Patient also denies any pending medical or dental procedures scheduled at this time  Patient is on kidney dialysis 3 times a week  Patient was instructed to continue 35mg TWD and RTC in 2 weeks

## 2020-07-26 NOTE — ED NOTES
Pt assisted into bed. Pt given warm blanket and call bell.   Report to [address], RN  07/26/20 7188

## 2020-07-26 NOTE — ED PROVIDER NOTES
[address] [facility] ED  eMERGENCY dEPARTMENT eNCOUnter      Pt Name: Michelle Mccarthy  MRN: [id number]  [place] 1965  Date of evaluation: 7/26/2020  Provider: JULIO C Todd      HISTORY OF PRESENT ILLNESS    Michelle Mccarthy is a 48 y.o. female with PMHx of end-stage renal disease with dialysis, hypertension, CABG, A. fib, breast cancer, CHF, diabetes, hepatitis C presents to the emergency department with fall. 5 days ago patient went to turn around at home and lost her footing, landing on her right side. She has had persistent right hip and right knee pain since. She also has mild left knee pain and right toe pain. She is taking Percocet at home without relief. It is painful to ambulate. She does not think she hit her head, she does not think she lost consciousness. She is on Coumadin, last checked last week. She does have chronic migraines. She denies change in her vision, neck pain, back pain, chest pain, shortness of breath, weakness, fatigue, abdominal pain. HPI    Nursing Notes were reviewed. REVIEW OF SYSTEMS       Review of Systems   Constitutional: Negative for appetite change, chills and fever. HENT: Negative for congestion, rhinorrhea and sore throat. Respiratory: Negative for cough and shortness of breath. Cardiovascular: Negative for chest pain. Gastrointestinal: Negative for abdominal pain, blood in stool, diarrhea, nausea and vomiting. Genitourinary: Negative for difficulty urinating. Musculoskeletal: Positive for arthralgias. Negative for neck stiffness. Skin: Positive for wound. Negative for color change and rash. Neurological: Negative for dizziness, syncope, weakness, light-headedness, numbness and headaches. All other systems reviewed and are negative.             PAST MEDICAL HISTORY     Past Medical History:   Diagnosis Date    [facility] ([facility]     Cancer ([facility]     Breast    CHF (congestive heart failure) (HCC)     Chronic kidney disease     Diabetes mellitus ([facility]     ESRD (end stage renal disease) ([facility] 2020    Essential hypertension 2020    Heart murmur     Hepatitis C     Hx of CABG 2020    Paroxysmal atrial fibrillation ([facility] 2020         SURGICAL HISTORY       Past Surgical History:   Procedure Laterality Date    BREAST SURGERY       SECTION      CHOLECYSTECTOMY      CORONARY ARTERY BYPASS GRAFT           CURRENT MEDICATIONS       Previous Medications    AMLODIPINE (NORVASC) 10 MG TABLET    Take 10 mg by mouth nightly     ASPIRIN 81 MG CHEWABLE TABLET    Take 1 tablet by mouth daily    ATORVASTATIN (LIPITOR) 40 MG TABLET    Take 40 mg by mouth nightly    B COMPLEX-C-FOLIC ACID (NEPHROCAPS) 1 MG CAPSULE    Take 1 capsule by mouth daily    CALCIUM ACETATE (PHOSLO) 667 MG CAPSULE    Take by mouth 3 times daily (with meals)    CETIRIZINE (ZYRTEC) 10 MG TABLET    Take 10 mg by mouth 2 times daily as needed for Allergies    CLONIDINE (CATAPRES) 0.1 MG TABLET    Take 0.1 mg by mouth daily    CYCLOBENZAPRINE (FLEXERIL) 5 MG TABLET    Take 5 mg by mouth 3 times daily as needed for Muscle spasms    DIPHENHYDRAMINE (BENADRYL) 25 MG CAPSULE    Take 25 mg by mouth every 6 hours as needed for Itching    FLUOXETINE (PROZAC) 20 MG CAPSULE    Take 1 capsule by mouth daily    HYDRALAZINE (APRESOLINE) 25 MG TABLET    Take 1 tablet by mouth every 8 hours    METOPROLOL TARTRATE (LOPRESSOR) 50 MG TABLET    Take 1 tablet by mouth 2 times daily    NYSTATIN (MYCOSTATIN) POWD POWDER    Apply topically 2 times daily    OXYCODONE-ACETAMINOPHEN (PERCOCET) 5-325 MG PER TABLET    Take 1 tablet by mouth every 4 hours as needed for Pain. Scotty Emmer SODIUM (SENOKOT-S) 8.6-50 MG TABLET    Take 1 tablet by mouth 2 times daily    TOPIRAMATE (TOPAMAX SPRINKLE) 25 MG CAPSULE    Take 25 mg by mouth 2 times daily    WARFARIN (COUMADIN) 5 MG TABLET    Take 1 tablet by mouth daily       ALLERGIES Hydrocodone-acetaminophen; Naproxen; Hydrocodone; and Vicodin [hydrocodone-acetaminophen]    FAMILY HISTORY     History reviewed. No pertinent family history. SOCIAL HISTORY       Social History     Socioeconomic History    Marital status: Single     Spouse name: None    Number of children: None    Years of education: None    Highest education level: None   Occupational History    None   Social Needs    Financial resource strain: None    Food insecurity     Worry: None     Inability: None    Transportation needs     Medical: None     Non-medical: None   Tobacco Use    Smoking status: Former Smoker    Smokeless tobacco: Never Used   Substance and Sexual Activity    Alcohol use: Never     Frequency: Never    Drug use: Never    Sexual activity: Not Currently   Lifestyle    Physical activity     Days per week: None     Minutes per session: None    Stress: None   Relationships    Social connections     Talks on phone: None     Gets together: None     Attends [employer] service: None     Active member of club or organization: None     Attends meetings of clubs or organizations: None     Relationship status: None    Intimate partner violence     Fear of current or ex partner: None     Emotionally abused: None     Physically abused: None     Forced sexual activity: None   Other Topics Concern    None   Social History Narrative    None         PHYSICAL EXAM         ED Triage Vitals [07/26/20 0035]   BP Temp Temp Source Pulse Resp SpO2 Height Weight   (!) 170/78 97.4 °F (36.3 °C) Temporal 76 18 99 % 5' 3\" (1.6 m) 220 lb (99.8 kg)       Physical Exam  Constitutional:       Appearance: She is well-developed. HENT:      Head: Normocephalic and atraumatic. Eyes:      Conjunctiva/sclera: Conjunctivae normal.      Pupils: Pupils are equal, round, and reactive to light. Neck:      Musculoskeletal: Normal range of motion and neck supple. Trachea: No tracheal deviation.    Cardiovascular:      Heart

## 2020-07-30 NOTE — PROGRESS NOTES
Subjective:      Patient ID: Seferino Galvin is a 48 y.o. female who presents today for:  Chief Complaint   Patient presents with   [facility] ED Follow-up     ED f/u right hip and bilateral knee pain, pt says that she fell on , seen in ED on ; xray and CT completed ; rates pain of hips 8-9/10; rates pain of both knees 8/10       HPI  Warn her she fell when she turned the corner. She was able to get up and stand following this. Pain is primarily the right hip on the lateral side has been a week and is just not gone away.   She denies any numbness and tingling she also has chronic bilateral knee pain particular left    Past Medical History:   Diagnosis Date    Atrial fibrillation (HCC)     Cancer (HCC)     Breast    CHF (congestive heart failure) (HCC)     Chronic kidney disease     Diabetes mellitus ([facility]     ESRD (end stage renal disease) ([facility] 2020    Essential hypertension 2020    Heart murmur     Hepatitis C     Hx of CABG 2020    Paroxysmal atrial fibrillation ([facility] 2020     Past Surgical History:   Procedure Laterality Date    BREAST SURGERY       SECTION      CHOLECYSTECTOMY      CORONARY ARTERY BYPASS GRAFT       Social History     Socioeconomic History    Marital status: Single     Spouse name: Not on file    Number of children: Not on file    Years of education: Not on file    Highest education level: Not on file   Occupational History    Not on file   Social Needs    Financial resource strain: Not on file    Food insecurity     Worry: Not on file     Inability: Not on file    Transportation needs     Medical: Not on file     Non-medical: Not on file   Tobacco Use    Smoking status: Former Smoker    Smokeless tobacco: Never Used   Substance and Sexual Activity    Alcohol use: Never     Frequency: Never    Drug use: Never    Sexual activity: Not Currently   Lifestyle    Physical activity     Days per week: Not on file     Minutes per session: Not on file    Stress: Not on file   Relationships    Social connections     Talks on phone: Not on file     Gets together: Not on file     Attends Orthodoxy service: Not on file     Active member of club or organization: Not on file     Attends meetings of clubs or organizations: Not on file     Relationship status: Not on file    Intimate partner violence     Fear of current or ex partner: Not on file     Emotionally abused: Not on file     Physically abused: Not on file     Forced sexual activity: Not on file   Other Topics Concern    Not on file   Social History Narrative    Not on file     Allergies   Allergen Reactions    Hydrocodone-Acetaminophen Hives, Itching and Nausea Only     Other reaction(s): Nausea      Naproxen Hives and Nausea Only     Other reaction(s): Nausea      Hydrocodone Hives    Quetiapine Other (See Comments)     Restless leg     Vicodin [Hydrocodone-Acetaminophen] Hives     Current Outpatient Medications on File Prior to Visit   Medication Sig Dispense Refill    lisinopril (PRINIVIL;ZESTRIL) 20 MG tablet Take 20 mg by mouth daily      amLODIPine (NORVASC) 10 MG tablet Take 10 mg by mouth nightly       warfarin (COUMADIN) 5 MG tablet Take 1 tablet by mouth daily 90 tablet 1    hydrALAZINE (APRESOLINE) 25 MG tablet Take 1 tablet by mouth every 8 hours 90 tablet 3    metoprolol tartrate (LOPRESSOR) 50 MG tablet Take 1 tablet by mouth 2 times daily 60 tablet 3    FLUoxetine (PROZAC) 20 MG capsule Take 1 capsule by mouth daily 30 capsule 1    topiramate (TOPAMAX SPRINKLE) 25 MG capsule Take 25 mg by mouth 2 times daily      cloNIDine (CATAPRES) 0.1 MG tablet Take 0.1 mg by mouth daily      sennosides-docusate sodium (SENOKOT-S) 8.6-50 MG tablet Take 1 tablet by mouth 2 times daily      diphenhydrAMINE (BENADRYL) 25 MG capsule Take 25 mg by mouth every 6 hours as needed for Itching      aspirin 81 MG chewable tablet Take 1 tablet by mouth daily 30 tablet 3    nystatin (MYCOSTATIN) POWD powder Apply topically 2 times daily      b complex-C-folic acid (NEPHROCAPS) 1 MG capsule Take 1 capsule by mouth daily      calcium acetate (PHOSLO) 667 MG capsule Take by mouth 3 times daily (with meals)      atorvastatin (LIPITOR) 40 MG tablet Take 40 mg by mouth nightly      cyclobenzaprine (FLEXERIL) 5 MG tablet Take 5 mg by mouth 3 times daily as needed for Muscle spasms      oxyCODONE-acetaminophen (PERCOCET) 5-325 MG per tablet Take 1 tablet by mouth every 4 hours as needed for Pain. Cassandra Star QUEtiapine (SEROQUEL) 50 MG tablet TAKE 1 TABLET BY MOUTH ONCE DAILY AT BEDTIME       No current facility-administered medications on file prior to visit. Review of Systems    Objective:   Pulse 64   Temp 97.2 °F (36.2 °C) (Temporal)   Resp 14   Ht 5' 3\" (1.6 m)   Wt 220 lb (99.8 kg)   SpO2 96%   BMI 38.97 kg/m²   Ortho Exam   Examination of right hip demonstrates painless flexion she has no pain with internal X rotation but she has tenderness to palpation over the right lateral soft tissues including the greater trochanter. Radiographs and Laboratory Studies:     Diagnostic Imaging Studies:    Reviewed radiographs of her pelvis which demonstrates no obvious sign of fracture dislocation involving the right hip there is some osteopenia but fortunately no sign of fracture    Assessment:       Diagnosis Orders   1. Contusion of right hip, initial encounter           Plan: We will treat this with ice, protected weightbearing, she is already taking Percocets for other reasons. She is to keep an eye on this we will observe this but I feel this time no further treatment options are needed at this time. Left knee must be treated conservatively as well being that she has osteoarthritis involving his left knee and she cannot have arthroplasty because of her dialysis     No orders of the defined types were placed in this encounter.     No orders of the defined types were placed in this encounter. No follow-ups on file.       Sonu Jaquez MD

## 2020-08-04 NOTE — PROGRESS NOTES
Ms. David Flanagan is a 48 y.o. y/o female with history of Afib who presents today for anticoagulation monitoring and adjustment.   INR 1.7 is subtherapeutic for this patient (goal range 2-3) and is reflective of 30 mg TWD   Patient verifies current dosing regimen, patient able to verbally recall dose  Patient reports 1  missed doses since last INR   Patient denies s/sx clotting and/or stroke  Patient denies hematuria, epistaxis, rectal bleeding  Patient denies changes in diet, alcohol, or tobacco use  Reviewed medication list and drug allergies with patient, updated any medication additions or modifications accordingly   Patient also denies any pending medical or dental procedures scheduled at this time  Patient was instructed to take 10 mg today then continue to take 35 TWD and RTC 2 weeks    CLINICAL PHARMACY CONSULT: MED RECONCILIATION/REVIEW [address]: No  Total # of Interventions Recommended: 2  - Increased Dose #: 1  - Maintenance Safety Lab Monitoring #: 1  Total Interventions Accepted: 2  Time Spent (min): 30    Tirge Jernigan PharmD, BCPS  Staff Pharmacist  8/4/2020 1:35 PM

## 2020-08-16 NOTE — ED PROVIDER NOTES
mouth 2 times daily    WARFARIN (COUMADIN) 5 MG TABLET    Take 1 tablet by mouth daily       ALLERGIES     Hydrocodone-acetaminophen; Naproxen; Hydrocodone; Quetiapine; and Vicodin [hydrocodone-acetaminophen]    FAMILY HISTORY     History reviewed. No pertinent family history. SOCIAL HISTORY       Social History     Socioeconomic History    Marital status: Single     Spouse name: None    Number of children: None    Years of education: None    Highest education level: None   Occupational History    None   Social Needs    Financial resource strain: None    Food insecurity     Worry: None     Inability: None    Transportation needs     Medical: None     Non-medical: None   Tobacco Use    Smoking status: Former Smoker    Smokeless tobacco: Never Used   Substance and Sexual Activity    Alcohol use: Never     Frequency: Never    Drug use: Never    Sexual activity: Not Currently   Lifestyle    Physical activity     Days per week: None     Minutes per session: None    Stress: None   Relationships    Social connections     Talks on phone: None     Gets together: None     Attends Taoist service: None     Active member of club or organization: None     Attends meetings of clubs or organizations: None     Relationship status: None    Intimate partner violence     Fear of current or ex partner: None     Emotionally abused: None     Physically abused: None     Forced sexual activity: None   Other Topics Concern    None   Social History Narrative    None       SCREENINGS           PHYSICAL EXAM    (up to 7 forlevel 4, 8 or more for level 5)     ED Triage Vitals [08/16/20 1759]   BP Temp Temp Source Pulse Resp SpO2 Height Weight   (!) 191/88 97.4 °F (36.3 °C) Temporal 67 16 96 % 5' 3\" (1.6 m) 215 lb (97.5 kg)       Physical Exam  Vitals signs and nursing note reviewed. Constitutional:       General: She is not in acute distress. Appearance: She is well-developed. She is not diaphoretic.    HENT: Head: Normocephalic and atraumatic. Mouth/Throat:      Pharynx: No oropharyngeal exudate. Eyes:      General: No scleral icterus. Conjunctiva/sclera: Conjunctivae normal.      Pupils: Pupils are equal, round, and reactive to light. Neck:      Musculoskeletal: Normal range of motion and neck supple. Trachea: No tracheal deviation. Cardiovascular:      Rate and Rhythm: Normal rate. Heart sounds: Normal heart sounds. Pulmonary:      Effort: Pulmonary effort is normal. No respiratory distress. Breath sounds: Normal breath sounds. Abdominal:      General: Bowel sounds are normal. There is no distension. Palpations: Abdomen is soft. Musculoskeletal: Normal range of motion. Skin:     General: Skin is warm and dry. Findings: No erythema or rash. Neurological:      Mental Status: She is alert and oriented to person, place, and time. Cranial Nerves: No cranial nerve deficit. Motor: No abnormal muscle tone. Psychiatric:         Behavior: Behavior normal.         Thought Content: Thought content normal.         Judgment: Judgment normal.           DIAGNOSTIC RESULTS     RADIOLOGY:   Non-plain film images such as CT, Ultrasound and MRI are read by the radiologist. Plain radiographic images are visualized and preliminarilyinterpreted by Margarita Rebolledo PA-C with the below findings:        Interpretation per the Radiologist below, if available at the time of this note:    No orders to display       LABS:  Labs Reviewed - No data to display    All other labs were within normal range or not returnedas of this dictation.     EMERGENCYDEPARTMENT COURSE and DIFFERENTIAL DIAGNOSIS/MDM:   Vitals:    Vitals:    08/16/20 1759   BP: (!) 191/88   Pulse: 67   Resp: 16   Temp: 97.4 °F (36.3 °C)   TempSrc: Temporal   SpO2: 96%   Weight: 215 lb (97.5 kg)   Height: 5' 3\" (1.6 m)       REASSESSMENT      Patient presents emergency department following an insect sting approximately 2 hours prior to arrival.  Patient has no signs of systemic allergic reaction and reaction she has surrounding the sting site is very minimal.  Patient will be discharged home with supportive therapy and PCP follow-up    MDM    PROCEDURES:    Procedures      FINAL IMPRESSION      1.  Wasp sting, accidental or unintentional, initial encounter          DISPOSITION/PLAN   DISPOSITION Decision To Discharge 08/16/2020 06:39:59 PM      PATIENT REFERRED TO:  Zakiya Rodriguez MD  [place] [id number] [facility] [phone]    Call in 2 days        DISCHARGE MEDICATIONS:  New Prescriptions    No medications on file       (Please note that portions of this note were completed with a voice recognition program.  Efforts were made to edit the dictations but occasionally words are mis-transcribed.)    KWAN Duffy PA-C  08/16/20 4012

## 2020-08-16 NOTE — ED TRIAGE NOTES
Red dot below elbow on left arm. A-V fistula above left elbow with palpable pulse. Pt has palpable left radial pulse. Fingers cool to touch with cap refill less than 3 seconds.    Warm blanket to fingers

## 2020-08-18 NOTE — PROGRESS NOTES
Ms. Fifi Chambers is a 48 y.o. y/o female with history of Afib who presents today for anticoagulation monitoring and adjustment. INR 2.2 is therapeutic for this patient (goal range 2-3) and is reflective of 35 mg TWD  Patient verifies current dosing regimen, patient able to verbally recall dose  Patient reports 1  missed doses since last INR   *Patient missed 1 dose, but took the missed dose the next morning, then took her regularly scheduled dose at night*  Patient keeps a calendar to record her doses taken.    Patient denies s/sx clotting and/or stroke  Patient denies hematuria, epistaxis, rectal bleeding  Patient denies changes in diet, alcohol, or tobacco use  Reviewed medication list and drug allergies with patient, updated any medication additions or modifications accordingly  Patient also denies any pending medical or dental procedures scheduled at this time  Patient was instructed to continue current regimen of 35 mg TWD and RTC 2 weeks    CLINICAL PHARMACY CONSULT: MED RECONCILIATION/REVIEW [id number]: No  Total # of Interventions Recommended: 1  - Maintenance Safety Lab Monitoring #: 1  Total Interventions Accepted: 1  Time Spent (min): 30

## 2020-08-19 NOTE — ED TRIAGE NOTES
Bilateral ear pain for the last 2 months that comes and goes. Rates pain at 8/10.  Took percocet at [facility]

## 2020-08-19 NOTE — ED PROVIDER NOTES
capsule by mouth daily    CALCIUM ACETATE (PHOSLO) 667 MG CAPSULE    Take by mouth 3 times daily (with meals)    CLONIDINE (CATAPRES) 0.1 MG TABLET    Take 0.1 mg by mouth daily    CYCLOBENZAPRINE (FLEXERIL) 5 MG TABLET    Take 5 mg by mouth 3 times daily as needed for Muscle spasms    DIPHENHYDRAMINE (BENADRYL) 25 MG CAPSULE    Take 25 mg by mouth every 6 hours as needed for Itching    FLUOXETINE (PROZAC) 20 MG CAPSULE    Take 1 capsule by mouth daily    HYDRALAZINE (APRESOLINE) 25 MG TABLET    Take 1 tablet by mouth every 8 hours    METOPROLOL TARTRATE (LOPRESSOR) 50 MG TABLET    Take 1 tablet by mouth 2 times daily    NYSTATIN (MYCOSTATIN) POWD POWDER    Apply topically 2 times daily    OXYCODONE-ACETAMINOPHEN (PERCOCET) 5-325 MG PER TABLET    Take 1 tablet by mouth every 4 hours as needed for Pain. .    QUETIAPINE (SEROQUEL) 50 MG TABLET    TAKE 1 TABLET BY MOUTH ONCE DAILY AT BEDTIME    SENNOSIDES-DOCUSATE SODIUM (SENOKOT-S) 8.6-50 MG TABLET    Take 1 tablet by mouth 2 times daily    TOPIRAMATE (TOPAMAX SPRINKLE) 25 MG CAPSULE    Take 25 mg by mouth 2 times daily    WARFARIN (COUMADIN) 5 MG TABLET    Take 1 tablet by mouth daily       ALLERGIES     Hydrocodone-acetaminophen; Naproxen; Hydrocodone; Quetiapine; and Vicodin [hydrocodone-acetaminophen]    FAMILY HISTORY     History reviewed. No pertinent family history.        SOCIAL HISTORY       Social History     Socioeconomic History    Marital status: Single     Spouse name: None    Number of children: None    Years of education: None    Highest education level: None   Occupational History    None   Social Needs    Financial resource strain: None    Food insecurity     Worry: None     Inability: None    Transportation needs     Medical: None     Non-medical: None   Tobacco Use    Smoking status: Former Smoker    Smokeless tobacco: Never Used   Substance and Sexual Activity    Alcohol use: Never     Frequency: Never    Drug use: Never    Sexual activity: Not Currently   Lifestyle    Physical activity     Days per week: None     Minutes per session: None    Stress: None   Relationships    Social connections     Talks on phone: None     Gets together: None     Attends Nondenominational service: None     Active member of club or organization: None     Attends meetings of clubs or organizations: None     Relationship status: None    Intimate partner violence     Fear of current or ex partner: None     Emotionally abused: None     Physically abused: None     Forced sexual activity: None   Other Topics Concern    None   Social History Narrative    None       SCREENINGS      @FLOW([id number])@      PHYSICAL EXAM    (up to 7 for level 4, 8 or more for level 5)     ED Triage Vitals [08/19/20 1748]   BP Temp Temp Source Pulse Resp SpO2 Height Weight   110/71 97.8 °F (36.6 °C) Temporal 71 18 97 % 5' 3\" (1.6 m) 210 lb (95.3 kg)       Physical Exam  Vitals signs and nursing note reviewed. Constitutional:       Appearance: She is well-developed. HENT:      Head: Normocephalic and atraumatic. Right Ear: Hearing, ear canal and external ear normal. Tympanic membrane is erythematous and bulging. Left Ear: Hearing, tympanic membrane, ear canal and external ear normal.      Nose: Congestion present. Mouth/Throat:      Lips: Pink. Mouth: Mucous membranes are moist.      Pharynx: Oropharynx is clear. Uvula midline. Eyes:      Conjunctiva/sclera: Conjunctivae normal.      Pupils: Pupils are equal, round, and reactive to light. Neck:      Musculoskeletal: Normal range of motion and neck supple. Cardiovascular:      Rate and Rhythm: Normal rate and regular rhythm. Heart sounds: Normal heart sounds. Pulmonary:      Effort: Pulmonary effort is normal. No accessory muscle usage or respiratory distress. Breath sounds: Normal breath sounds. No decreased air movement. No decreased breath sounds or wheezing.    Abdominal:      General: Bowel sounds are normal. There is no distension. Palpations: Abdomen is soft. Tenderness: There is no abdominal tenderness. Musculoskeletal: Normal range of motion. Skin:     General: Skin is warm and dry. Neurological:      Mental Status: She is alert and oriented to person, place, and time. Deep Tendon Reflexes: Reflexes are normal and symmetric. Psychiatric:         Judgment: Judgment normal.           All other labs were within normal range or not returned as of this dictation. EMERGENCY DEPARTMENT COURSE and DIFFERENTIALDIAGNOSIS/MDM:   Vitals:    Vitals:    08/19/20 1748   BP: 110/71   Pulse: 71   Resp: 18   Temp: 97.8 °F (36.6 °C)   TempSrc: Temporal   SpO2: 97%   Weight: 210 lb (95.3 kg)   Height: 5' 3\" (1.6 m)            48 yr old female with R otitis media. Prescription for Amoxicillin was given to the patient. F/U With PCP in 2 days. Patient verbalizes understanding. PROCEDURES:  Unless otherwise noted below, none     Procedures      FINAL IMPRESSION      1.  Acute otitis media, unspecified otitis media type          DISPOSITION/PLAN   DISPOSITION Decision To Discharge 08/19/2020 06:41:21 PM          FRANCHESKA Ray CNP (electronically signed)  Attending Emergency Physician     FRANCHESKA Ray CNP  08/19/20 1525

## 2020-09-03 NOTE — PROGRESS NOTES
Ms. Nica Gavin is a 48 y.o. y/o female with history of Afib who presents today for anticoagulation monitoring and adjustment. INR 1.9 is subtherapeutic for this patient (goal range 2.0-3.0) and is reflective of 35 mg TWD  Patient verifies current dosing regimen, patient able to verbally recall dose  Patient reports 0 missed doses since last INR   Patient denies s/sx clotting and/or stroke  Patient denies hematuria, epistaxis, rectal bleeding  Patient denies changes in diet, alcohol, or tobacco use  Reviewed medication list and drug allergies with patient, updated any medication additions or modifications accordingly  Patient also denies any pending medical or dental procedures scheduled at this time    INR slightly subtherapeutic, has been on low end of goal or under on previous visits.  Patient was instructed to increase maintenance to 7.5mg on Sunday, 5mg on all other days (7.1% increase, TWD now 37.5mg) and RTC 2 weeks      CLINICAL PHARMACY CONSULT: MED RECONCILIATION/REVIEW ADDENDUM  For Pharmacy Admin Tracking Only  PHSO: No  Total # of Interventions Recommended: 2  - Increased Dose #: 1  - Maintenance Safety Lab Monitoring #: 1  Total Interventions Accepted: 2  Time Spent (min): Josse Florian, PharmD.  9/3/2020

## 2020-09-08 NOTE — ED NOTES
Duoderm applied to right breast/right underarm area. Wound care discussed with patient.       Rubi Benitez RN  09/07/20 7293

## 2020-09-08 NOTE — ED PROVIDER NOTES
[facility] ED  eMERGENCY dEPARTMENT eNCOUnter      Pt Name: Catrina Orellana  MRN: [id number]  [place] 1965  Date of evaluation: 9/7/2020  Provider: JULIO C Villagran      HISTORY OF PRESENT ILLNESS    Sue Seymour Renae Guzman is a 48 y.o. female with PMHx of end-stage renal disease with dialysis Monday, Wednesday, Friday, hypertension, CABG, A. fib, breast cancer with right lumpectomy, CHF, diabetes, hepatitis C presents to the emergency department with right breast lump. Patient states 4 to 5 days ago she noticed a wound on the right side of her breast which she thought was from her bra rubbing. 3 days ago she noticed a painful lump above her right nipple. She has been in remission from breast cancer for 11-12 years. She denies fevers, chills, nausea, vomiting, weight loss, night sweats. HPI    Nursing Notes were reviewed. REVIEW OF SYSTEMS       Review of Systems   Constitutional: Negative for appetite change, chills and fever. HENT: Negative for congestion, rhinorrhea and sore throat. Respiratory: Negative for cough and shortness of breath. Cardiovascular: Negative for chest pain. Gastrointestinal: Negative for abdominal pain, blood in stool, diarrhea, nausea and vomiting. Genitourinary: Negative for difficulty urinating. Musculoskeletal: Negative for neck stiffness. Skin: Positive for wound. Negative for color change and rash. Neurological: Negative for dizziness, syncope, weakness, light-headedness, numbness and headaches. All other systems reviewed and are negative.             PAST MEDICAL HISTORY     Past Medical History:   Diagnosis Date    Atrial fibrillation ([address]     Cancer ([address]     Breast    CHF (congestive heart failure) (HCC)     Chronic kidney disease     Diabetes mellitus ([address]     ESRD (end stage renal disease) [facility] [address] 2/14/2020    Essential hypertension 2/14/2020    Heart murmur     Hepatitis C     Hx of CABG 2/14/2020    Paroxysmal atrial fibrillation ([facility] [address] 2020         SURGICAL HISTORY       Past Surgical History:   Procedure Laterality Date    BREAST SURGERY       SECTION      CHOLECYSTECTOMY      CORONARY ARTERY BYPASS GRAFT           CURRENT MEDICATIONS       Previous Medications    ASPIRIN 81 MG CHEWABLE TABLET    Take 1 tablet by mouth daily    ATORVASTATIN (LIPITOR) 40 MG TABLET    Take 40 mg by mouth nightly    B COMPLEX-C-FOLIC ACID (NEPHROCAPS) 1 MG CAPSULE    Take 1 capsule by mouth daily    CALCIUM ACETATE (PHOSLO) 667 MG CAPSULE    Take by mouth 3 times daily (with meals)    CLONIDINE (CATAPRES) 0.1 MG TABLET    Take 0.1 mg by mouth daily    CYCLOBENZAPRINE (FLEXERIL) 5 MG TABLET    Take 5 mg by mouth 3 times daily as needed for Muscle spasms    DIPHENHYDRAMINE (BENADRYL) 25 MG CAPSULE    Take 25 mg by mouth every 6 hours as needed for Itching    FLUOXETINE (PROZAC) 20 MG CAPSULE    Take 1 capsule by mouth daily    HYDRALAZINE (APRESOLINE) 25 MG TABLET    Take 1 tablet by mouth every 8 hours    METOPROLOL TARTRATE (LOPRESSOR) 50 MG TABLET    Take 1 tablet by mouth 2 times daily    NYSTATIN (MYCOSTATIN) POWD POWDER    Apply topically 2 times daily    OXYCODONE-ACETAMINOPHEN (PERCOCET) 5-325 MG PER TABLET    Take 1 tablet by mouth every 4 hours as needed for Pain. .    QUETIAPINE (SEROQUEL) 50 MG TABLET    TAKE 1 TABLET BY MOUTH ONCE DAILY AT BEDTIME    SENNOSIDES-DOCUSATE SODIUM (SENOKOT-S) 8.6-50 MG TABLET    Take 1 tablet by mouth 2 times daily    TOPIRAMATE (TOPAMAX SPRINKLE) 25 MG CAPSULE    Take 25 mg by mouth 2 times daily    WARFARIN (COUMADIN) 5 MG TABLET    Take 1 tablet by mouth daily       ALLERGIES     Hydrocodone-acetaminophen; Naproxen; Hydrocodone; Quetiapine; and Vicodin [hydrocodone-acetaminophen]    FAMILY HISTORY     History reviewed. No pertinent family history.        SOCIAL HISTORY       Social History     Socioeconomic History    Marital status: Single     Spouse name: None    Number of is no mass. Tenderness: There is no abdominal tenderness. There is no guarding or rebound. Musculoskeletal: Normal range of motion. Skin:     General: Skin is warm and dry. Capillary Refill: Capillary refill takes less than 2 seconds. Findings: No rash. Neurological:      Mental Status: She is alert and oriented to person, place, and time. Deep Tendon Reflexes: Reflexes are normal and symmetric. Psychiatric:         Behavior: Behavior normal.         Thought Content: Thought content normal.         Judgment: Judgment normal.         DIAGNOSTIC RESULTS     EKG:All EKG's are interpreted by the Emergency Department Physician who either signs or Co-signs this chart in the absence of a cardiologist.        RADIOLOGY:   Non-plain film images such as CT, Ultrasound and MRI are read by theradiologist. Plain radiographic images are visualized and preliminarily interpreted by the emergency physician with the below findings:    Interpretation per theRadiologist below, if available at the time of this note:    RINKU BREAST SPECIMEN    (Results Pending)           LABS:  Labs Reviewed   CBC WITH AUTO DIFFERENTIAL - Abnormal; Notable for the following components:       Result Value    RBC 3.79 (*)     Hematocrit 35.2 (*)     MCH 31.8 (*)     RDW 16.0 (*)     All other components within normal limits   COMPREHENSIVE METABOLIC PANEL - Abnormal; Notable for the following components:    Sodium 126 (*)     Chloride 88 (*)     Glucose 165 (*)     BUN 69 (*)     CREATININE 8.70 (*)     GFR Non- 4.8 (*)     GFR  5.8 (*)     Calcium 8.0 (*)     ALT 64 (*)     AST 65 (*)     Globulin 4.0 (*)     All other components within normal limits    Narrative:     CALL  [facility] tel. [phone],  CREAT results called to and read back by Clif BUNCH, 09/07/2020 23:04, by  [facility]       All other labs were within normal range or not returned as of this dictation.     EMERGENCY DEPARTMENT COURSE and DIFFERENTIAL DIAGNOSIS/MDM:   Vitals:    Vitals:    09/07/20 2204 09/07/20 2315   BP: (!) 156/76 133/63   Pulse: 70 69   Resp: 18 20   Temp: 97.8 °F (36.6 °C)    TempSrc: Oral    SpO2: 100% 97%   Weight: 210 lb (95.3 kg)    Height: 5' 2\" (1.575 m)          MDM    Sodium 126, chloride 88, BUN 69, creatinine 8.7, ALT 64, AST 65. Patient be treated with Abx for right breast wound. Ultrasound is not able to be performed in the middle of the night for mammogram.  Patient is she does have a mammogram later this month. She will be provided with the outpatient ultrasound to get obtained prior to that date or aware to call her family doctor who prescribed her mammogram to inform them of a new lump that she found. Patient does get dialysis today but states that she is going Wednesday. Standard anticipatory guidance given to patient upon discharge. Have given them a specific time frame in which to follow-up and who to follow-up with. I have also advised them that they should return to the emergency department if they get worse, or not getting better or develop any new or concerning symptoms. Patient demonstrates understanding. CRITICAL CARE TIME   Total Critical Caretime was 0 minutes, excluding separately reportable procedures. There was a high probability of clinically significant/life threatening deterioration in the patient's condition which required my urgent intervention. Procedures    FINAL IMPRESSION      1. Breast wound, right, initial encounter    2.  Breast lump on right side at 1 o'clock position          DISPOSITION/PLAN   DISPOSITION        PATIENT REFERRED TO:  Pierce Vega [id number] [phone]          DISCHARGE MEDICATIONS:  New Prescriptions    CLINDAMYCIN (CLEOCIN) 150 MG CAPSULE    Take 2 capsules by mouth 3 times daily for 7 days          (Please notethat portions of this note were completed with a voice recognition program.  Efforts were made to edit the dictations but occasionally words are mis-transcribed. )    JULIO C Her (electronically signed)  Emergency Physician Assistant          Erich Myersma  09/07/20 8669

## 2020-09-10 NOTE — CARE COORDINATION
Spoke to Lorenzo at the [facility]. She does not have any availability for this pt today. She is albe to get her in at 10:00am which is 30 minutes earlier than her regular visit time tomorrow. Pt said that she has not gone to dialysis because she is very depressed, PA is aware.

## 2020-09-10 NOTE — ED NOTES
Pt states that her pain level is now at a 7 but is still in her chest     Tim Patrick, MARY  09/10/20 0912

## 2020-09-10 NOTE — ED TRIAGE NOTES
Pt comes to the ED today with chest pain that started yesterday. Pt states at pain of 10 on a scale 1-10   Pt vital stable pt resting on ER cot. Pt states that she has not gone to dialysis since last Friday, due to feeling depressed.

## 2020-09-10 NOTE — ED PROVIDER NOTES
[address] [facility] ED  eMERGENCY dEPARTMENT eNCOUnter      Pt Name: David Flanagan  MRN: [id number]  [place] 1965  Date of evaluation: 9/10/2020  Provider: Shahid Mahoney PA-C    CHIEF COMPLAINT       Chief Complaint   Patient presents with    Chest Pain     CP since yesterday          HISTORY OF PRESENT ILLNESS   (Location/Symptom, Timing/Onset,Context/Setting, Quality, Duration, Modifying Factors, Severity)  Note limiting factors. David Flanagan is a 48 y.o. female who presents to the emergency department with a complaint of left-sided chest pain, shortness of breath, and nausea which patient states started at 10 PM last evening is been persistent all night long. She does have cardiac history, with bypass surgery in October 2019. She is followed by Dr. Ruth Hernandez. Patient states that her current pain is a 10 out of 10, she is not taking anything at home for pain control. She states she is also nauseated, she has a cough, and shortness of breath, she is nondiaphoretic. Past medical history significant for chronic renal failure on dialysis, coronary artery disease, atrial fibrillation, cancer, congestive heart failure, diabetes, hepatitis C. Patient has not attended her dialysis in the last 1 week. HPI    NursingNotes were reviewed. REVIEW OF SYSTEMS    (2-9 systems for level 4, 10 or more for level 5)     Review of Systems   Constitutional: Negative for activity change and appetite change. HENT: Negative for congestion, ear discharge, ear pain, nosebleeds, rhinorrhea and sore throat. Eyes: Negative for discharge. Respiratory: Positive for shortness of breath. Negative for wheezing and stridor. Cardiovascular: Positive for chest pain. Negative for palpitations and leg swelling. Gastrointestinal: Positive for nausea. Negative for abdominal distention, abdominal pain, constipation, diarrhea and vomiting.    Genitourinary: Negative for difficulty urinating, dysuria and flank pain. Musculoskeletal: Negative for arthralgias. Skin: Negative for color change, pallor, rash and wound. Neurological: Negative for dizziness, tremors, syncope, weakness, numbness and headaches. Psychiatric/Behavioral: Negative for agitation and confusion. Except as noted above the remainder of the review of systems was reviewed and negative.        PAST MEDICAL HISTORY     Past Medical History:   Diagnosis Date    Atrial fibrillation ([facility]     Cancer ([facility]     Breast    CHF (congestive heart failure) (HCC)     Chronic kidney disease     [facility]     ESRD (end stage renal disease) ([facility] 2020    Essential hypertension 2020    Heart murmur     Hepatitis C     Hx of CABG 2020    Paroxysmal atrial fibrillation ([facility] 2020         SURGICALHISTORY       Past Surgical History:   Procedure Laterality Date    BREAST SURGERY       SECTION      CHOLECYSTECTOMY      CORONARY ARTERY BYPASS GRAFT           CURRENT MEDICATIONS       Discharge Medication List as of 9/10/2020  4:12 PM      CONTINUE these medications which have NOT CHANGED    Details   clindamycin (CLEOCIN) 150 MG capsule Take 2 capsules by mouth 3 times daily for 7 days, Disp-42 capsule,R-0Print      QUEtiapine (SEROQUEL) 50 MG tablet TAKE 1 TABLET BY MOUTH ONCE DAILY AT BEDTIMEHistorical Med      warfarin (COUMADIN) 5 MG tablet Take 1 tablet by mouth daily, Disp-90 tablet,R-1Normal      hydrALAZINE (APRESOLINE) 25 MG tablet Take 1 tablet by mouth every 8 hours, Disp-90 tablet, R-3Normal      metoprolol tartrate (LOPRESSOR) 50 MG tablet Take 1 tablet by mouth 2 times daily, Disp-60 tablet, R-3Normal      FLUoxetine (PROZAC) 20 MG capsule Take 1 capsule by mouth daily, Disp-30 capsule, R-1Normal      topiramate (TOPAMAX SPRINKLE) 25 MG capsule Take 25 mg by mouth 2 times dailyHistorical Med      cloNIDine (CATAPRES) 0.1 MG tablet Take 0.1 mg by mouth dailyHistorical Med sennosides-docusate sodium (SENOKOT-S) 8.6-50 MG tablet Take 1 tablet by mouth 2 times dailyHistorical Med      diphenhydrAMINE (BENADRYL) 25 MG capsule Take 25 mg by mouth every 6 hours as needed for ItchingHistorical Med      aspirin 81 MG chewable tablet Take 1 tablet by mouth daily, Disp-30 tablet, R-3DC to SNF      nystatin (MYCOSTATIN) POWD powder Apply topically 2 times dailyHistorical Med      b complex-C-folic acid (NEPHROCAPS) 1 MG capsule Take 1 capsule by mouth dailyHistorical Med      calcium acetate (PHOSLO) 667 MG capsule Take by mouth 3 times daily (with meals)Historical Med      atorvastatin (LIPITOR) 40 MG tablet Take 40 mg by mouth nightlyHistorical Med      cyclobenzaprine (FLEXERIL) 5 MG tablet Take 5 mg by mouth 3 times daily as needed for Muscle spasmsHistorical Med      oxyCODONE-acetaminophen (PERCOCET) 5-325 MG per tablet Take 1 tablet by mouth every 4 hours as needed for Pain. Tommas Baptise Historical Med             ALLERGIES     Hydrocodone-acetaminophen; Naproxen; Hydrocodone; Quetiapine; and Vicodin [hydrocodone-acetaminophen]    FAMILY HISTORY     History reviewed. No pertinent family history.        SOCIAL HISTORY       Social History     Socioeconomic History    Marital status: Single     Spouse name: None    Number of children: None    Years of education: None    Highest education level: None   Occupational History    None   Social Needs    Financial resource strain: None    Food insecurity     Worry: None     Inability: None    Transportation needs     Medical: None     Non-medical: None   Tobacco Use    Smoking status: Former Smoker    Smokeless tobacco: Never Used   Substance and Sexual Activity    Alcohol use: Never     Frequency: Never    Drug use: Never    Sexual activity: Not Currently   Lifestyle    Physical activity     Days per week: None     Minutes per session: None    Stress: None   Relationships    Social connections     Talks on phone: None     Gets together: None Attends [employer] service: None     Active member of club or organization: None     Attends meetings of clubs or organizations: None     Relationship status: None    Intimate partner violence     Fear of current or ex partner: None     Emotionally abused: None     Physically abused: None     Forced sexual activity: None   Other Topics Concern    None   Social History Narrative    None       SCREENINGS      @FLOW([id number])@      PHYSICAL EXAM    (up to 7 for level 4, 8 or more for level 5)     ED Triage Vitals   BP Temp Temp Source Pulse Resp SpO2 Height Weight   09/10/20 1259 09/10/20 1257 09/10/20 1257 09/10/20 1257 09/10/20 1257 09/10/20 1257 09/10/20 1257 09/10/20 1257   (!) 193/77 98 °F (36.7 °C) Temporal 87 19 94 % 5' 3\" (1.6 m) 210 lb (95.3 kg)       Physical Exam  Vitals signs and nursing note reviewed. Constitutional:       General: She is not in acute distress. Appearance: Normal appearance. She is well-developed. She is not ill-appearing, toxic-appearing or diaphoretic. HENT:      Head: Normocephalic. Right Ear: Tympanic membrane normal.      Left Ear: Tympanic membrane normal.      Nose: No congestion. Mouth/Throat:      Mouth: Mucous membranes are moist.      Pharynx: No oropharyngeal exudate or posterior oropharyngeal erythema. Eyes:      Extraocular Movements: Extraocular movements intact. Conjunctiva/sclera: Conjunctivae normal.      Pupils: Pupils are equal, round, and reactive to light. Neck:      Musculoskeletal: Normal range of motion and neck supple. No neck rigidity. Vascular: No JVD. Trachea: No tracheal deviation. Cardiovascular:      Rate and Rhythm: Normal rate. Pulses: Normal pulses. Heart sounds: Normal heart sounds. No murmur. No friction rub. No gallop. Pulmonary:      Effort: Pulmonary effort is normal. No tachypnea, accessory muscle usage, respiratory distress or retractions. Breath sounds: No stridor.  No wheezing, rhonchi or rales. Comments: Lung sounds are clear in all fields, no wheeze rales or rhonchi, no accessory muscle use, no retractions. Room air saturations are 94%  Chest:      Chest wall: No tenderness. Abdominal:      General: Abdomen is flat. Bowel sounds are normal. There is no distension or abdominal bruit. Palpations: There is no shifting dullness, fluid wave, hepatomegaly, splenomegaly, mass or pulsatile mass. Tenderness: There is no abdominal tenderness. There is no right CVA tenderness, left CVA tenderness, guarding or rebound. Negative signs include Joshi's sign, Rovsing's sign and McBurney's sign. Comments: Abdomen soft nondistended nontender no guarding mass or rebound, no CVA tenderness. Musculoskeletal:         General: No deformity. Right lower leg: No edema. Left lower leg: No edema. Comments: No peripheral edema noted to bilateral lower extremities   Skin:     General: Skin is warm and dry. Capillary Refill: Capillary refill takes less than 2 seconds. Coloration: Skin is not jaundiced. Neurological:      General: No focal deficit present. Mental Status: She is alert and oriented to person, place, and time. Mental status is at baseline. Cranial Nerves: No cranial nerve deficit. Sensory: No sensory deficit. Motor: No weakness.       Coordination: Coordination normal.   Psychiatric:         Mood and Affect: Mood normal.         DIAGNOSTIC RESULTS     EKG: All EKG's are interpreted by the Emergency Department Physician who either signs or Co-signsthis chart in the absence of a cardiologist.    EKG shows normal sinus rhythm at 87 bpm there is T wave inversions in leads III no other acute ST segment abnormality no ventricular ectopy QTC is 464 ms    RADIOLOGY:   Non-plain filmimages such as CT, Ultrasound and MRI are read by the radiologist. Plain radiographic images are visualized and preliminarily interpreted by the emergency physician with the below findings:    Chest x-ray shows cardiomegaly, and pulmonary vascular congestion. Interpretation per the Radiologist below, if available at the time ofthis note:    XR CHEST PORTABLE   Final Result      Mild interstitial pulmonary edema. ED BEDSIDE ULTRASOUND:   Performed by ED Physician - none    LABS:  Labs Reviewed   CBC WITH AUTO DIFFERENTIAL - Abnormal; Notable for the following components:       Result Value    RBC 3.76 (*)     Hemoglobin 11.8 (*)     Hematocrit 34.5 (*)     MCH 31.5 (*)     RDW 15.9 (*)     Lymphocytes Absolute 0.5 (*)     All other components within normal limits   TROPONIN - Abnormal; Notable for the following components:    Troponin 0.159 (*)     All other components within normal limits    Narrative:     CALL  [facility]  [facility] tel. [phone],  Trop results called to and read back by Kayla Gaitan, 09/10/2020 14:15, by  JUANITA   COMPREHENSIVE METABOLIC PANEL - Abnormal; Notable for the following components:    Sodium 127 (*)     Potassium 5.8 (*)     Chloride 90 (*)     CO2 16 (*)     Anion Gap 21 (*)     Glucose 100 (*)     BUN 93 (*)     CREATININE 11.31 (*)     GFR Non- 3.5 (*)     GFR  4.3 (*)     Calcium 8.2 (*)     Alb 3.3 (*)     ALT 51 (*)     AST 44 (*)     Globulin 4.4 (*)     All other components within normal limits    Narrative:     redraw  Shyla French  ED tel. [phone],  BUN and creatinine results called to and read back by Kayla Gaitan,  09/10/2020 15:21, by [facility]   [place]    Narrative:     redraw  Shyla French  ED tel. [phone],  BUN and creatinine results called to and read back by Kayla Gaitan,  09/10/2020 15:21, by [facility] RT REFLEX TO CULTURE       All other labs were within normal range or not returned as of this dictation.     EMERGENCY DEPARTMENT COURSE and DIFFERENTIAL DIAGNOSIS/MDM:   Vitals:    Vitals:    09/10/20 1257 09/10/20 1259 09/10/20 1524   BP: [facility] [address]  [place] [id number]  [phone]    In 1 day        DISCHARGE MEDICATIONS:  Discharge Medication List as of 9/10/2020  4:12 PM             (Please note that portions of this note were completed with a voice recognition program.  Efforts were made to edit the dictations but occasionally words are mis-transcribed.)    Autumn Tan PA-C (electronically signed)  Attending Emergency Physician         Autumn Tan PA-C  09/10/20 [address] Kenya Carrasco PA-C  09/15/20 0572

## 2020-09-24 NOTE — TELEPHONE ENCOUNTER
2nd missed appt call  Patient answered but was not able to make appt on the phone, said she will call back. Has not in a few hours.  Try back tomorrow

## 2020-09-25 NOTE — TELEPHONE ENCOUNTER
Called patient back today, no answer. Left message to call us back and schedule an appointment.     Moved INR tracker to next time patient should be contacted if they do not respond

## 2020-09-30 PROBLEM — S20.419A ABRASION OF BACK: Status: ACTIVE | Noted: 2020-01-01

## 2020-09-30 NOTE — PROGRESS NOTES
Faina Weinberg [address]                                                   Progress Note and Procedure Note      [facility] KAYDEN RECORD NUMBER:  [id number]  AGE: 48 y.o. GENDER: female  : 1965  EPISODE DATE:  2020    Subjective:     Chief Complaint   Patient presents with    Wound Check     breast,abdomen wounds new         HISTORY of PRESENT ILLNESS NEELA Shipley is a 48 y.o. female who presents today for wound/ulcer evaluation. History of Wound Context: Patient with multiple abrasion on her back from scratching. Patient is on chronic hd. She was told by her nephrologist this is due to her phosphate levels. Wound/Ulcer Pain Timing/Severity: none  Quality of pain: N/A  Severity:  0 / 10   Modifying Factors: None  Associated Signs/Symptoms: none    Ulcer Identification:  Ulcer Type: traumatic and skin tear  Contributing Factors: obesity    Wound: Abrasion        PAST MEDICAL HISTORY        Diagnosis Date    Atrial fibrillation (HCC)     Cancer (HCC)     Breast    CHF (congestive heart failure) (HCC)     Chronic kidney disease     Diabetes mellitus ([address]     [facility] 2020    Essential hypertension 2020    Heart murmur     Hepatitis C     Hx of CABG 2020    Paroxysmal atrial fibrillation ([address] 2020       PAST SURGICAL HISTORY    Past Surgical History:   Procedure Laterality Date    BREAST SURGERY       SECTION      CHOLECYSTECTOMY      CORONARY ARTERY BYPASS GRAFT         FAMILY HISTORY    History reviewed. No pertinent family history.     SOCIAL HISTORY    Social History     Tobacco Use    Smoking status: Former Smoker    Smokeless tobacco: Never Used   Substance Use Topics    Alcohol use: Never     Frequency: Never    Drug use: Never       ALLERGIES    Allergies   Allergen Reactions    Hydrocodone-Acetaminophen Hives, Itching and Nausea Only     Other reaction(s): Nausea      Naproxen Hives and Nausea Only     Other reaction(s): Nausea      Hydrocodone Hives    Quetiapine Other (See Comments)     Restless leg     Vicodin [Hydrocodone-Acetaminophen] Hives       MEDICATIONS    Current Outpatient Medications on File Prior to Encounter   Medication Sig Dispense Refill    ondansetron (ZOFRAN ODT) 4 MG disintegrating tablet Take 1 tablet by mouth every 8 hours as needed for Nausea 20 tablet 0    warfarin (COUMADIN) 5 MG tablet Take 1 tablet by mouth daily 90 tablet 1    hydrALAZINE (APRESOLINE) 25 MG tablet Take 1 tablet by mouth every 8 hours 90 tablet 3    metoprolol tartrate (LOPRESSOR) 50 MG tablet Take 1 tablet by mouth 2 times daily 60 tablet 3    FLUoxetine (PROZAC) 20 MG capsule Take 1 capsule by mouth daily 30 capsule 1    topiramate (TOPAMAX SPRINKLE) 25 MG capsule Take 25 mg by mouth 2 times daily      cloNIDine (CATAPRES) 0.1 MG tablet Take 0.1 mg by mouth daily      diphenhydrAMINE (BENADRYL) 25 MG capsule Take 25 mg by mouth every 6 hours as needed for Itching      aspirin 81 MG chewable tablet Take 1 tablet by mouth daily 30 tablet 3    nystatin (MYCOSTATIN) POWD powder Apply topically 2 times daily      atorvastatin (LIPITOR) 40 MG tablet Take 40 mg by mouth nightly      cyclobenzaprine (FLEXERIL) 5 MG tablet Take 5 mg by mouth 3 times daily as needed for Muscle spasms      oxyCODONE-acetaminophen (PERCOCET) 5-325 MG per tablet Take 1 tablet by mouth every 4 hours as needed for Pain. Latisha Guerrero QUEtiapine (SEROQUEL) 50 MG tablet TAKE 1 TABLET BY MOUTH ONCE DAILY AT BEDTIME      b complex-C-folic acid (NEPHROCAPS) 1 MG capsule Take 1 capsule by mouth daily      calcium acetate (PHOSLO) 667 MG capsule Take by mouth 3 times daily (with meals)       No current facility-administered medications on file prior to encounter.         REVIEW OF SYSTEMS    Constitutional: negative  Respiratory: negative  Cardiovascular: negative  Allergic/Immunologic: negative    Objective:      BP (!) 09/30/20 1032   Wound Cleansed Rinsed/Irrigated with saline 09/30/20 1007   Wound Length (cm) 0.6 cm 09/30/20 1007   Wound Width (cm) 8 cm 09/30/20 1007   Wound Depth (cm) 0.1 cm 09/30/20 1007   Wound Surface Area (cm^2) 4.8 cm^2 09/30/20 1007   Wound Volume (cm^3) 0.48 cm^3 09/30/20 1007   Wound Assessment [place] 09/30/20 1007   Drainage Amount None 09/30/20 1007   Margins Defined edges 09/30/20 1007   Nadia-wound Assessment Red 09/30/20 1007   Non-staged Wound Description Partial thickness 09/30/20 1007   Number of days: 0          Plan:   Apply hydrocortisone to areas. F/u 3 weeks. Treatment Note please see attached Discharge Instructions    Written patient dismissal instructions given to patient and signed by patient or POA. Discharge [address] and [facility]   Physician Orders and Discharge Instructions  [address]  Telephone: [phone]      NAME:  Kay Horton  YOB: 1965  MEDICAL RECORD NUMBER:  [id number]    Home Care/Facility:None    Wound Location:Upper and lower Back    Dressing orders:  1. Cleanse with soap and water  2. Apply thin layer of hydrocortisone to open areas  3. Daily    Compression: None    Offloading Device:    Other Instructions:     Keep all dressings clean, dry and intact. Keep pressure off the wound(s) at all times. Follow up visit  3  Weeks October 21, 2020 @ 10:00    Please give 24 hour notice if unable to keep appointment. [phone]    If you experience any of the following, please call the Wound Care Service at  [phone] or go to the nearest emergency room.    *Increase in pain *Temperature over 101 *Increase in drainage from your wound or a foul odor  *Uncontrolled swelling *Need for compression bandage changes due to slippage, breakthrough drainage       PLEASE NOTE: IF YOU ARE UNABLE TO OBTAIN WOUND SUPPLIES, CONTINUE TO USE THE SUPPLIES YOU HAVE AVAILABLE UNTIL YOU ARE ABLE TO REACH US.  IT IS MOST IMPORTANT TO KEEP THE WOUND COVERED AT ALL TIMES    Electronically signed by Francisco Reid MD on 9/30/2020 at 10:33 AM                  Electronically signed by Francisco Reid MD on 9/30/2020 at 10:33 AM

## 2020-10-02 NOTE — TELEPHONE ENCOUNTER
First call attempting to reschedule missed appointment on 9/25. Left voicemail for patient to call us back.

## 2020-10-28 NOTE — TELEPHONE ENCOUNTER
Left VM reminding patient to call and set up appointment prior to the beginning of discharge process. (2 overdue letters followed by discharge letter). Reset tracker for 1 week to date to send out 1st overdue letter.

## 2020-11-04 NOTE — TELEPHONE ENCOUNTER
Last seen in [facility]: 09/03/20  Cancelled 09/17/20 appointment (hospitalized)     09/24/20: patient did not wish to schedule appointment at this time   09/25/20: voicemail left for patient to reschedule but no show for appointment 09/25/20    10/02/20: first attempt to reach patient for no call/no show 09/25/20    Rescheduled for 10/13/20, no call/no show   10/14/20: FIRST attempt to reach patient for no call/no show for 10/13/20 appointment   10/28/20: SECOND attempt to reach patient for no/call no show 10/13/20    TODAY: FIRST overdue letter sent and voicemail left for patient to call back   Updated POC INR tracker for 2 weeks from today to check patient status and send second overdue letter if needed     Mylene Peterson, PharmD   11/4/2020 1:09 PM

## 2020-11-24 PROBLEM — I48.91 ATRIAL FIBRILLATION WITH RAPID VENTRICULAR RESPONSE (HCC): Status: ACTIVE | Noted: 2020-01-01

## 2020-11-24 NOTE — ED TRIAGE NOTES
Pt to bed 20. Went into Afib/RVR at dialysis. Rec'd 3.25 hours of 4 hour treatment. EMS uncertain how much fluid was taken off. Pt currently nauseated, dry heaving. C/o headache. Pale. Able to ambulate to bed, steady gait, tolerated well. Had 325 mg ASA and 1 dose nitro en route.

## 2020-11-24 NOTE — CONSULTS
Consults    Patient Name: Javier Goddard Date: 2020  4:12 PM  MR #: [id number]  : 1965    Attending Physician: Tiffanie Parmar DO  Reason for consult: AF    History of Presenting Illness:      Sam Joshua is a 48 y.o. female on hospital day 0 with a history of . History Obtained From:  patient, electronic medical record    Pt was at HD and noted palpitations and fast HR and therfore sent to ER. Her normal HD is MWF but this week moved up due to [place]. She has no CP nor SOB. She feels weak and LH and can tell AF. She has been on Warfarin. Labs are pending. Her AF rate is currently 130. History:      EKG: RBBB  Past Medical History:   Diagnosis Date    Atrial fibrillation ([address]     Cancer (HCC)     Breast    CHF (congestive heart failure) (HCC)     Chronic kidney disease     Diabetes mellitus [facility]     ESRD (end stage renal disease) ([facility] 2020    Essential hypertension 2020    Heart murmur     Hepatitis C     Hx of CABG 2020    Paroxysmal atrial fibrillation ([facility] 2020     Past Surgical History:   Procedure Laterality Date    BREAST SURGERY       SECTION      CHOLECYSTECTOMY      CORONARY ARTERY BYPASS GRAFT         Family History  History reviewed. No pertinent family history.   [] Unable to obtain due to ventilated and/ or neurologic status    Social History     Socioeconomic History    Marital status: Single     Spouse name: Not on file    Number of children: Not on file    Years of education: Not on file    Highest education level: Not on file   Occupational History    Not on file   Social Needs    Financial resource strain: Not on file    Food insecurity     Worry: Not on file     Inability: Not on file    Transportation needs     Medical: Not on file     Non-medical: Not on file   Tobacco Use    Smoking status: Former Smoker    Smokeless tobacco: Never Used   Substance and Sexual Activity    Alcohol use: Negative. Neurological: Positive for weakness and light-headedness. Negative for dizziness and syncope. Hematological: Negative. Psychiatric/Behavioral: Negative. Objective Findings:     Vitals:BP (!) 120/104   Pulse 147   Temp 97.6 °F (36.4 °C) (Oral)   Resp 24   Ht 5' 3\" (1.6 m)   Wt 215 lb (97.5 kg)   SpO2 96%   BMI 38.09 kg/m²      Physical Examination:    Physical Exam   Constitutional: She appears healthy. No distress. HENT:   Normal cephalic and Atraumatic   Eyes: Pupils are equal, round, and reactive to light. Neck: Normal range of motion and thyroid normal. Neck supple. No JVD present. No neck adenopathy. No thyromegaly present. Cardiovascular: Normal heart sounds, intact distal pulses and normal pulses. An irregularly irregular rhythm present. Tachycardia present. Pulmonary/Chest: Effort normal and breath sounds normal. She has no wheezes. She has no rales. She exhibits no tenderness. Abdominal: Soft. Bowel sounds are normal. There is no abdominal tenderness. Musculoskeletal: Normal range of motion. General: No tenderness or edema. Neurological: She is alert and oriented to person, place, and time. Skin: Skin is warm. No cyanosis. Nails show no clubbing. Results/ Medications reviewed 11/24/2020, 4:59 PM     Laboratory, Microbiology, Pathology, Radiology, Cardiology, Medications and Transcriptions reviewed  Scheduled Meds:  Continuous Infusions:   dilTIAZem (CARDIZEM) 125 mg in dextrose 5% 125 mL infusion         No results for input(s): WBC, HGB, HCT, MCV, PLT in the last 72 hours. No results for input(s): NA, K, CL, CO2, PHOS, BUN, CREATININE in the last 72 hours. Invalid input(s): CA  No results for input(s): AST, ALT, ALB, BILIDIR, BILITOT, ALKPHOS in the last 72 hours. No results for input(s): LIPASE, AMYLASE in the last 72 hours. No results for input(s): PROT, INR in the last 72 hours. No results found.     There are no active hospital problems to display for this patient. Impression/Plan:   1. Rapid AF- CCB gtt. Rate control Keep INR 2-3   2. CABGx3 BB and ASA Statin  3. ESRD - on HD  4. LVEF 60%     Thank you for allowing us to participate in the care of this patient. Will continue to follow. Please call if questions or concerns arise.     Electronically signed by Aide Christine MD on 11/24/2020 at 4:59 PM

## 2020-11-25 NOTE — DISCHARGE SUMMARY
Physician Discharge Summary     Patient ID:  Suzie Isbell  [id number]  50 y.o.  1965    Admit date: 11/24/2020    Discharge date and time: 11/25/20 1:33 PM     Admitting Physician: Kristin Meadows MD     Discharge Physician: Kristin Meadows MD    Admission Diagnoses: Atrial fibrillation with rapid ventricular response ([address] [I48.91]    Discharge Diagnoses: A.fib w/ RVR    Admission Condition: fair    Discharged Condition: fair    Indication for Admission: A.fib w/ RVR    Hospital Course: sent in from HD unit for tachycardia found to be in A.fib w/ RVR. Started on cardizem gtt, A.fib converted to NSR this AM, cardiology ok w/ discharge. INR also fojnd to be subtherapeutic, 1.1. Pt was placed transiently on heparin gtt, was discharged but told to increase to 10 mg for one night then continue 5 mg and call her coumadin clinic. Consults: cardiology    Significant Diagnostic Studies: checked troponins     Treatments: cardizem gtt     Discharge Exam:  General: alert, in no apparent distress  HEENT: normocephalic, atraumatic, anicteric  Neck: supple, no mass  Lungs: non-labored respirations, clear to auscultation bilaterally  Heart: regular rate and rhythm, no murmurs or rubs  Abdomen: soft, non-tender, non-distended  MSK: no joint swelling or tenderness  Ext: no cyanosis, no peripheral edema  Neuro: alert and oriented, no gross abnormalities  Psych: normal mood and affect  Skin: no rash      Disposition: home    In process/preliminary results:  Outstanding Order Results     No orders found for last 30 day(s).           Patient Instructions:   Current Discharge Medication List      CONTINUE these medications which have NOT CHANGED    Details   Ferric Citrate 1  MG(Fe) TABS Take 210 mg by mouth 3 times daily Pt unsure of dose      warfarin (COUMADIN) 5 MG tablet Take 1 tablet by mouth daily  Qty: 90 tablet, Refills: 1    Associated Diagnoses: Paroxysmal atrial fibrillation (HCC)      hydrALAZINE (APRESOLINE) 25 MG tablet Take 1 tablet by mouth every 8 hours  Qty: 90 tablet, Refills: 3      metoprolol tartrate (LOPRESSOR) 50 MG tablet Take 1 tablet by mouth 2 times daily  Qty: 60 tablet, Refills: 3      FLUoxetine (PROZAC) 20 MG capsule Take 1 capsule by mouth daily  Qty: 30 capsule, Refills: 1      topiramate (TOPAMAX SPRINKLE) 25 MG capsule Take 25 mg by mouth 2 times daily      cloNIDine (CATAPRES) 0.1 MG tablet Take 0.1 mg by mouth daily      diphenhydrAMINE (BENADRYL) 25 MG capsule Take 25 mg by mouth every 6 hours as needed for Itching      aspirin 81 MG chewable tablet Take 1 tablet by mouth daily  Qty: 30 tablet, Refills: 3      nystatin (MYCOSTATIN) POWD powder Apply topically 2 times daily      b complex-C-folic acid (NEPHROCAPS) 1 MG capsule Take 1 capsule by mouth daily      atorvastatin (LIPITOR) 40 MG tablet Take 40 mg by mouth nightly      cyclobenzaprine (FLEXERIL) 5 MG tablet Take 5 mg by mouth 3 times daily as needed for Muscle spasms      oxyCODONE-acetaminophen (PERCOCET) 5-325 MG per tablet Take 1 tablet by mouth every 4 hours as needed for Pain. .      ondansetron (ZOFRAN ODT) 4 MG disintegrating tablet Take 1 tablet by mouth every 8 hours as needed for Nausea  Qty: 20 tablet, Refills: 0         STOP taking these medications       QUEtiapine (SEROQUEL) 50 MG tablet Comments:   Reason for Stopping:         calcium acetate (PHOSLO) 667 MG capsule Comments:   Reason for Stopping:                Follow-up with Dr. Beverly Kahn in 2 weeks  Follow up in dialysis unit     Signed:  Eduarda Perez MD  11/25/2020  1:33 PM

## 2020-11-25 NOTE — FLOWSHEET NOTE
2120 Pt arrived to floor via cart. VSS. 2130 Admission and assessment completed. Dr. Rudolfo Sicard notified via perfect serve. [address] access obtained #[phone] in right wrist. Administered heparin bolus and heparin infusion started at 18 units/kg/hr. Also administered coumadin 5mg for subtherapeutic INR. Pt denies further needs at this time. 0035 Pt is SR with HR in 70's-80's. Cardizem gtt titrated to 2.5mg/hr.

## 2020-11-25 NOTE — ED PROVIDER NOTES
[facility] ED  eMERGENCY dEPARTMENT eNCOUnter      Pt Name: Dominguez Shipley  MRN: [id number]  [place] 1965  Date of evaluation: 11/24/2020  Provider: Tomas Larios, [address] [place] [address]       Chief Complaint   Patient presents with    Tachycardia         HISTORY OF PRESENT ILLNESS   (Location/Symptom, Timing/Onset,Context/Setting, Quality, Duration, Modifying Factors, Severity)  Note limiting factors. Dominguez Shipley is a 48 y.o. female who presents to the emergency department with complaints of rapid heart rate. Patient states she had gotten through about three fourths of her dialysis treatment today. Patient denies any fever, chills, cough. Patient states she can feel her heart beating against her chest causing her discomfort. On the monitor patient has A. fib with RVR. Heart rates 165 bpm.    Patient states that she knows she has A. fib and she takes Coumadin. Symptoms started while the patient was at dialysis. The history is provided by the patient and the EMS personnel. NursingNotes were reviewed. REVIEW OF SYSTEMS    (2-9 systems for level 4, 10 or more for level 5)     Review of Systems   Constitutional: Negative for activity change, appetite change, chills, fever and unexpected weight change. HENT: Negative for drooling, ear pain, nosebleeds, sinus pressure and trouble swallowing. Respiratory: Positive for chest tightness. Negative for cough and shortness of breath. Cardiovascular: Positive for palpitations. Negative for chest pain and leg swelling. Gastrointestinal: Negative for abdominal pain, diarrhea and vomiting. Endocrine: Negative for polydipsia and polyphagia. Genitourinary: Negative for dysuria, flank pain and frequency. Musculoskeletal: Negative for back pain and myalgias. Skin: Negative for pallor and rash. Neurological: Negative for syncope, weakness and headaches. Hematological: Does not bruise/bleed easily.    All other systems reviewed and are negative. Except as noted above the remainder of the review of systems was reviewed and negative. PAST MEDICAL HISTORY     Past Medical History:   Diagnosis Date    [facility]     Cancer ([facility]     Breast    CHF (congestive heart failure) (HCC)     Chronic kidney disease     Diabetes mellitus ([facility]     ESRD (end stage renal disease) ([facility] 2020    Essential hypertension 2020    Heart murmur     Hepatitis C     Hx of CABG 2020    Paroxysmal atrial fibrillation ([facility] 2020         SURGICALHISTORY       Past Surgical History:   Procedure Laterality Date    BREAST SURGERY       SECTION      CHOLECYSTECTOMY      CORONARY ARTERY BYPASS GRAFT           CURRENT MEDICATIONS       Previous Medications    ASPIRIN 81 MG CHEWABLE TABLET    Take 1 tablet by mouth daily    ATORVASTATIN (LIPITOR) 40 MG TABLET    Take 40 mg by mouth nightly    B COMPLEX-C-FOLIC ACID (NEPHROCAPS) 1 MG CAPSULE    Take 1 capsule by mouth daily    CALCIUM ACETATE (PHOSLO) 667 MG CAPSULE    Take by mouth 3 times daily (with meals)    CLONIDINE (CATAPRES) 0.1 MG TABLET    Take 0.1 mg by mouth daily    CYCLOBENZAPRINE (FLEXERIL) 5 MG TABLET    Take 5 mg by mouth 3 times daily as needed for Muscle spasms    DIPHENHYDRAMINE (BENADRYL) 25 MG CAPSULE    Take 25 mg by mouth every 6 hours as needed for Itching    FLUOXETINE (PROZAC) 20 MG CAPSULE    Take 1 capsule by mouth daily    HYDRALAZINE (APRESOLINE) 25 MG TABLET    Take 1 tablet by mouth every 8 hours    METOPROLOL TARTRATE (LOPRESSOR) 50 MG TABLET    Take 1 tablet by mouth 2 times daily    NYSTATIN (MYCOSTATIN) POWD POWDER    Apply topically 2 times daily    ONDANSETRON (ZOFRAN ODT) 4 MG DISINTEGRATING TABLET    Take 1 tablet by mouth every 8 hours as needed for Nausea    OXYCODONE-ACETAMINOPHEN (PERCOCET) 5-325 MG PER TABLET    Take 1 tablet by mouth every 4 hours as needed for Pain. .    QUETIAPINE reviewed. Constitutional:       General: She is awake. She is in acute distress. Appearance: Normal appearance. She is well-developed and normal weight. She is not ill-appearing, toxic-appearing or diaphoretic. Comments: No photophobia. No phonophobia. HENT:      Head: Normocephalic and atraumatic. No Coughlin's sign. Right Ear: External ear normal.      Left Ear: External ear normal.      Nose: Nose normal. No congestion or rhinorrhea. Mouth/Throat:      Mouth: Mucous membranes are moist.      Pharynx: Oropharynx is clear. No oropharyngeal exudate or posterior oropharyngeal erythema. Eyes:      General: No scleral icterus. Right eye: No foreign body or discharge. Left eye: No discharge. Extraocular Movements: Extraocular movements intact. Conjunctiva/sclera: Conjunctivae normal.      Left eye: No exudate. Pupils: Pupils are equal, round, and reactive to light. Neck:      Musculoskeletal: Normal range of motion and neck supple. No neck rigidity. Vascular: No JVD. Trachea: No tracheal deviation. Comments: No meningismus. Cardiovascular:      Rate and Rhythm: Tachycardia present. Rhythm irregularly irregular. Occasional extrasystoles are present. Pulses: Normal pulses. Radial pulses are 2+ on the right side and 2+ on the left side. Heart sounds: Normal heart sounds, S1 normal and S2 normal. Heart sounds not distant. No murmur. No systolic murmur. No diastolic murmur. No friction rub. No gallop. No S3 or S4 sounds. Pulmonary:      Effort: Pulmonary effort is normal. No respiratory distress. Breath sounds: Normal breath sounds. No stridor. No wheezing, rhonchi or rales. Chest:      Chest wall: No tenderness. Abdominal:      General: Abdomen is flat. Bowel sounds are normal. There is no distension. Palpations: Abdomen is soft. There is no mass. Tenderness: There is no abdominal tenderness.  There is no right CVA tenderness, left CVA tenderness, guarding or rebound. Hernia: No hernia is present. Musculoskeletal: Normal range of motion. General: No swelling, tenderness, deformity or signs of injury. Right lower leg: No edema. Left lower leg: No edema. Lymphadenopathy:      Head:      Right side of head: No submental adenopathy. Left side of head: No submental adenopathy. Skin:     General: Skin is warm and dry. Capillary Refill: Capillary refill takes less than 2 seconds. Coloration: Skin is not jaundiced or pale. Findings: No bruising, erythema, lesion or rash. Neurological:      General: No focal deficit present. Mental Status: She is alert and oriented to person, place, and time. Mental status is at baseline. Cranial Nerves: No cranial nerve deficit. Sensory: No sensory deficit. Motor: No weakness. Coordination: Coordination normal.      Deep Tendon Reflexes: Reflexes are normal and symmetric. Psychiatric:         Mood and Affect: Mood normal.         Behavior: Behavior normal. Behavior is cooperative. Thought Content: Thought content normal.         Judgment: Judgment normal.         DIAGNOSTIC RESULTS     EKG: All EKG's are interpreted by the Emergency Department Physician who either signs or Co-signsthis chart in the absence of a cardiologist.    EKG: Atrial fibrillation with rapid ventricular response at 165 bpm.  Right bundle branch block. Normal axis. QT intervals 318 ms. QTC is 526 ms. RADIOLOGY:   Non-plain filmimages such as CT, Ultrasound and MRI are read by the radiologist. Plain radiographic images are visualized and preliminarily interpreted by the emergency physician with the below findings:        Interpretation per the Radiologist below, if available at the time ofthis note:    XR CHEST PORTABLE   Final Result   STABLE CARDIOMEGALY.             ED BEDSIDE ULTRASOUND:   Performed by ED Physician - none    LABS:  Labs Reviewed   CBC WITH AUTO DIFFERENTIAL - Abnormal; Notable for the following components:       Result Value    WBC 3.4 (*)     RBC 3.06 (*)     Hemoglobin 9.8 (*)     Hematocrit 29.4 (*)     MCH 32.1 (*)     RDW 15.0 (*)     Lymphocytes Absolute 0.9 (*)     All other components within normal limits   COMPREHENSIVE METABOLIC PANEL - Abnormal; Notable for the following components:    Chloride 94 (*)     Anion Gap 17 (*)     Glucose 114 (*)     BUN 25 (*)     CREATININE 6.22 (*)     GFR Non- 7.0 (*)     GFR  8.5 (*)     Calcium 8.4 (*)     Globulin 4.3 (*)     All other components within normal limits    Narrative:     KATALINA Wong  [facility] tel. [phone],  crea results called to and read back by latanya mares, 11/24/2020 17:31, by  Amanuel Alcazar   HIGH SENSITIVITY CRP - Abnormal; Notable for the following components:    CRP High Sensitivity 15.4 (*)     All other components within normal limits   TROPONIN - Abnormal; Notable for the following components:    Troponin 0.215 (*)     All other components within normal limits    Narrative:     Enrique Hurt tel. [phone],  trop results called to and read back by dr. Kimmie Serrano [facility], 11/24/2020 18:11, by  Amanuel Alcazar  crea results called to and read back by latanya bridget, 11/24/2020 17:31, by  Amanuel Alcazar   APTT   BRAIN NATRIURETIC PEPTIDE    Narrative:     Robert Jeter  ED tel. [phone],  crea results called to and read back by latanya bridget, 11/24/2020 17:31, by  Amanuel Alcazar   CK    Narrative:     Robert Jeter  ED tel. [phone],  crea results called to and read back by latanya bridget, 11/24/2020 17:31, by  CAM ALEMAN    Narrative:     Robert Jeter  ED tel. [phone],  crea results called to and read back by latanya bridget, 11/24/2020 17:31, by  Amanuel Alcazar   PROTIME-INR   TSH WITHOUT REFLEX    Narrative:     Robert Jeter  ED tel. [phone],  crea results called to and read back by latanya mares, 11/24/2020 17:31, by  Amanuel 29       All other labs were within normal range or not returned as of this dictation. EMERGENCY DEPARTMENT COURSE and DIFFERENTIAL DIAGNOSIS/MDM:   Vitals:    Vitals:    11/24/20 1830 11/24/20 1900 11/24/20 1930 11/24/20 1945   BP: 119/71 103/65 120/70    Pulse: 94 94 86 86   Resp: 17 18 27 16   Temp:       TempSrc:       SpO2: 93%      Weight:       Height:               MDM  Dr. Antonella Chi was consulted and he came down to the ER and saw the patient. He has to be on consult because of the patient's dialysis needs. Patient sees Dr. Don Figueroa. ER physician spoke with his partner Dr. Mykel Fiore. Patient is subtherapeutic INR will be started on heparin. Clinical pharmacy consult with Essence Nolan was obtained to do this. Troponin is elevated however the patient has a longstanding history of elevated troponins. CRITICAL CARE TIME   Total Critical Care time was38 minutes, excluding separately reportableprocedures. There was a high probability of clinicallysignificant/life threatening deterioration in the patient's condition which required my urgent intervention. CONSULTS:  IP CONSULT TO PHARMACY    PROCEDURES:  Unless otherwise noted below, none     Procedures    FINAL IMPRESSION      1. Atrial fibrillation with rapid ventricular response ([address]    2. Chronic renal failure, stage 5 (HCC)    3. Elevated troponin    4. Subtherapeutic international normalized ratio (INR)          DISPOSITION/PLAN   DISPOSITION Decision To Admit 11/24/2020 07:10:56 PM      PATIENT REFERRED TO:  No follow-up provider specified.     DISCHARGE MEDICATIONS:  New Prescriptions    No medications on file          (Please note that portions of this note were completed with a voice recognition program.  Efforts were made to edit the dictations but occasionally words are mis-transcribed.)    Enrique Beebe DO (electronically signed)  Attending Emergency Physician          Enrique Beebe DO  11/24/20 2002

## 2020-11-25 NOTE — PROGRESS NOTES
Progress Note  Patient: Lyla Walters  Unit/Bed: J989/S133-05  YOB: 1965  MRN: [id number]  Acct: [de-identified]   Admitting Diagnosis: Atrial fibrillation with rapid ventricular response ([facility] [I48.91]  Admit Date:  2020  Hospital Day: 1    Chief Complaint: AF    Histories:  Past Medical History:   Diagnosis Date    Atrial fibrillation ([facility]     Cancer ([facility]     Breast    CHF (congestive heart failure) ([facility]     Chronic kidney disease     Diabetes mellitus ([facility]     ESRD (end stage renal disease) ([facility] 2020    Essential hypertension 2020    Heart murmur     Hepatitis C     Hx of CABG 2020    Paroxysmal atrial fibrillation ([facility] 2020     Past Surgical History:   Procedure Laterality Date    BREAST SURGERY       SECTION      CHOLECYSTECTOMY      CORONARY ARTERY BYPASS GRAFT       History reviewed. No pertinent family history.   Social History     Socioeconomic History    Marital status: Single     Spouse name: None    Number of children: None    Years of education: None    Highest education level: None   Occupational History    None   Social Needs    Financial resource strain: None    Food insecurity     Worry: None     Inability: None    Transportation needs     Medical: None     Non-medical: None   Tobacco Use    Smoking status: Former Smoker    Smokeless tobacco: Never Used   Substance and Sexual Activity    Alcohol use: Never     Frequency: Never    Drug use: Never    Sexual activity: Not Currently   Lifestyle    Physical activity     Days per week: None     Minutes per session: None    Stress: None   Relationships    Social connections     Talks on phone: None     Gets together: None     Attends Orthod[employer] service: None     Active member of club or organization: None     Attends meetings of clubs or organizations: None     Relationship status: None    Intimate partner violence     Fear of current or ex partner: None     Emotionally NOAC. 2. CABGx3 BB and ASA Statin  3. ESRD - on HD  4. LVEF 60%  5.  OK for DC f/u Dr. Mikki Leal 2 weeks       Electronically signed by Grant Hart MD on 11/25/2020 at 8:41 AM

## 2020-11-25 NOTE — H&P
Department of Internal Medicine  Nephrology  [facility]  Attending History and Physical      CHIEF COMPLAINT:  AF    Reason for Admission:  A.fib    History Obtained From:  patient, electronic medical record    HISTORY OF PRESENT ILLNESS:    50y.o. year old female with history s/f ESRD on IHD MWF who presented for palpitations. Had tachycardia at HD unit so sent in. On presentation found to have have A.fib w/ RVR and was started on cardizem gtt here. Seen by cardiology. INR also 1.1 so started on heparin gtt as well as coumadin. As off this AM back in NSR.  Cardiology feel patient can go home and be seen as outpatient    Past Medical History:        Diagnosis Date    Atrial fibrillation ([address]     Cancer ([facility]     Breast    CHF (congestive heart failure) (HCC)     Chronic kidney disease     Diabetes mellitus ([address]     ESRD (end stage renal disease) ([facility] 2020    Essential hypertension 2020    Heart murmur     Hepatitis C     Hx of CABG 2020    Paroxysmal atrial fibrillation ([facility] 2020       Past Surgical History:        Procedure Laterality Date    BREAST SURGERY       SECTION      CHOLECYSTECTOMY      CORONARY ARTERY BYPASS GRAFT         Medications Prior to Admission:    Medications Prior to Admission: Ferric Citrate 1  MG(Fe) TABS, Take 210 mg by mouth 3 times daily Pt unsure of dose  warfarin (COUMADIN) 5 MG tablet, Take 1 tablet by mouth daily  hydrALAZINE (APRESOLINE) 25 MG tablet, Take 1 tablet by mouth every 8 hours (Patient taking differently: Take 25 mg by mouth 2 times daily )  metoprolol tartrate (LOPRESSOR) 50 MG tablet, Take 1 tablet by mouth 2 times daily  FLUoxetine (PROZAC) 20 MG capsule, Take 1 capsule by mouth daily (Patient taking differently: Take 40 mg by mouth daily )  topiramate (TOPAMAX SPRINKLE) 25 MG capsule, Take 25 mg by mouth 2 times daily  cloNIDine (CATAPRES) 0.1 MG tablet, Take 0.1 mg by mouth daily  diphenhydrAMINE (BENADRYL) 25 MG capsule, Take 25 mg by mouth every 6 hours as needed for Itching  aspirin 81 MG chewable tablet, Take 1 tablet by mouth daily  nystatin (MYCOSTATIN) POWD powder, Apply topically 2 times daily  b complex-C-folic acid (NEPHROCAPS) 1 MG capsule, Take 1 capsule by mouth daily  atorvastatin (LIPITOR) 40 MG tablet, Take 40 mg by mouth nightly  cyclobenzaprine (FLEXERIL) 5 MG tablet, Take 5 mg by mouth 3 times daily as needed for Muscle spasms  oxyCODONE-acetaminophen (PERCOCET) 5-325 MG per tablet, Take 1 tablet by mouth every 4 hours as needed for Pain. .  ondansetron (ZOFRAN ODT) 4 MG disintegrating tablet, Take 1 tablet by mouth every 8 hours as needed for Nausea  [DISCONTINUED] QUEtiapine (SEROQUEL) 50 MG tablet, TAKE 1 TABLET BY MOUTH ONCE DAILY AT BEDTIME  [DISCONTINUED] calcium acetate (PHOSLO) 667 MG capsule, Take by mouth 3 times daily (with meals)    Allergies:  Aripiprazole    Social History:   Social History     Tobacco History     Smoking Status  Former Smoker    Smokeless Tobacco Use  Never Used          Alcohol History     Alcohol Use Status  Never          Drug Use     Drug Use Status  Never          Sexual Activity     Sexually Active  Not Currently                  Family History:   History reviewed. No pertinent family history.     REVIEW OF SYSTEMS:  Positives in bold  Constitutional: fever, chills, fatigue, malaise   HENT:  rhinorrhea, sinus pain, sore throat, epistaxis    Eyes:  photophobia, visual disturbance, eye redness  Respiratory: shortness of breath, cough, hemoptysis    Cardiovascular: chest pain, palpitations, orthopnea, leg swelling   Gastrointestinal: abdominal pain, nausea, vomiting, diarrhea, constipation   Endocrine: polydipsia, polyphagia, cold intolerance, heat intolerance  Genitourinary: dysuria, flank pain, frequency, urgency   Hematologic: easy bruising, easy bleeding  Musculoskeletal: back pain, neck pain, myalgias, arthalgias  Neurological: syncope, lightheadedness, dizziness, seizures, tremors, weakness  Psychiatric/Behavioral: anxiety, depression, hallucinations  Skin: rash, itching    PHYSICAL EXAM:  Vitals:  BP (!) 148/75   Pulse 88   Temp 98.8 °F (37.1 °C) (Oral)   Resp 18   Ht 5' 3\" (1.6 m)   Wt 215 lb (97.5 kg)   SpO2 100%   BMI 38.09 kg/m²     General: alert, in no apparent distress  HEENT: normocephalic, atraumatic, anicteric  Neck: supple, no mass  Lungs: non-labored respirations, clear to auscultation bilaterally  Heart: regular rate and rhythm, no murmurs or rubs  Abdomen: soft, non-tender, non-distended  MSK: no joint swelling or tenderness  Ext: no cyanosis, no peripheral edema  Neuro: alert and oriented, no gross abnormalities  Psych: normal mood and affect  Skin: no rash    DATA:  CBC with Differential:    Lab Results   Component Value Date    WBC 3.7 11/25/2020    RBC 2.59 11/25/2020    RBC 3.51 08/12/2018    HGB 8.2 11/25/2020    HCT 24.7 11/25/2020     11/25/2020    MCV 95.5 11/25/2020    MCH 31.8 11/25/2020    MCHC 33.3 11/25/2020    RDW 14.8 11/25/2020    LYMPHOPCT 25.2 11/24/2020    LYMPHOPCT 20.7 08/12/2018    MONOPCT 10.0 11/24/2020    BASOPCT 0.9 11/24/2020    MONOSABS 0.3 11/24/2020    LYMPHSABS 0.9 11/24/2020    EOSABS 0.1 11/24/2020    BASOSABS 0.0 11/24/2020     BMP:    Lab Results   Component Value Date     11/24/2020    K 3.7 11/24/2020    K 4.6 06/23/2020    CL 94 11/24/2020    CO2 24 11/24/2020    BUN 25 11/24/2020    LABALBU 3.5 11/24/2020    CREATININE 6.22 11/24/2020    CALCIUM 8.4 11/24/2020    GFRAA 8.5 11/24/2020    LABGLOM 7.0 11/24/2020    GLUCOSE 114 11/24/2020       ASSESSMENT AND PLAN:    50y.o. year old female with history s/f ESRD on IHD MWF who presented for palpitations. Had tachycardia at HD unit so sent in. On presentation found to have have A.fib w/ RVR    1. A.fib w/ RVR: now converted back to NSR, was on cardizem gtt, plan by cardiology for pt to f/u w/ Dr. French Carlin in 2 weeks   2.  ESRD on IHD MWF: continue IHD on Friday as outpatient  3.  Subtherapeutic INR: non-compliant w/ coumadin, has missed coumadin clinic appt as well, was on heparin gtt here, as pt would like to go will discharge, pt to take 10 mg tonight hen continue her 5 mg and call coumadin clinic to f/u RICK Nagel MD

## 2020-11-25 NOTE — CARE COORDINATION
[facility] AT [place] Case Management Initial Discharge Assessment    Met with Patient to discuss discharge plan. PCP: Sonny Renteria MD                                Date of Last Visit: RECENT, DIDN'T KNOW DATE    If no PCP, list provided? N/A    Discharge Planning    Living Arrangements: independently at home    Who do you live with? SISTER AND 2 SONS ON THE WEEKEND    Who helps you with your care:  self    If lives at home:     Do you have any barriers navigating in your home? yes - [address]    Patient can perform ADL? Yes    Current Services (outpatient and in home) :  None    Dialysis: MON WED Friday 11AM    Is transportation available to get to your appointments? Yes    DME Equipment:  yes - [employer] FOR WHENEVER NEEDED    Respiratory equipment: None    Respiratory provider:  no     Pharmacy:  yes - [address] with Medication Assistance Program?  Yes      Patient agreeable to [facility]? No    Patient agreeable to SNF/Rehab? No    Other discharge needs identified? N/A    Freedom of choice list provided with basic dialogue that supports the patient's individualized plan of care/goals and shares the quality data associated with the providers. N/A    Does Patient Have a High-Risk for Readmission Diagnosis (CHF, PN, MI, COPD)? Yes, see care coordinator assessment    If Yes,     Consult with pulmonologist? [address] No with cardiologist? Yes   Cardiac Rehab referral if EF <35%? N/A   Consult with Pharmacy for medication assessment prior to discharge? N/A   Consult with Behavioral health to aid in depression, anxiety, or coping issues? N/A   Palliative Care Consult? N/A   Pulmonary Rehab order for COPD, PN, and CHF (if EF > 35%)? N/A    Does patient have a reliable scale and know how to read it (for CHF)? N/A   Nutrition consult for CHF?  N/A   Respiratory therapy consult that includes bedside instruction on administration of nebulizers and/or inhalers, and assessment of oxygen and equipment needs in the home? N/A    The plan for Transition of Care is related to the following treatment goals:PATIENT [facility] DISCHARG[facility] AND [facility]    Initial Discharge Plan? (Note: please see concurrent daily documentation for any updates after initial note).     HOME WITH FAMILY SUPPORT    The Patient and/or patient representative: PATIENT was provided with choice of any post-acute providers for care and equipment and agrees with discharge plan  N/A  PATIENT WOULD [facility], ADVISED PATIENT TO FOLLOW UP WITH HER PCP AND ASK FOR ASSISTANCE OBTAINING IT.      Electronically signed by Avis Montiel RN on 11/25/2020 at 2:51 PM

## 2020-12-16 NOTE — TELEPHONE ENCOUNTER
Patient did not respond to multiple phone calls:  9/17/20  9/24/20  9/25/20  10/2/20  10/14/20  10//28/20    Letters:  11/04/20 11/18/20    Last seen by our service 9/3/20. Patient has [facility] visit No Show listed in chart. Will send discharge letter from [address] [place] [address]. Resolve Episode of care. Notify     GREG Hogue Ph.  12/16/2020  8:58 AM

## 2020-12-16 NOTE — LETTER
[facility]  Anticoagulation Management Service  [address] [facility], [facility] [place], [address] [facility]  [phone]    12/16/2020  [facility]  [address]    Dear Vicky Hercules,    Re: Warfarin (Coumadin®) Therapy     We regret to advise you that we will no longer share with you in the responsibility of managing your warfarin therapy. This is notification that you have been discharged from the Anticoagulation Management Service at [facility]. Your physician is also receiving notification. If your physician elects to continue your warfarin therapy, please realize warfarin can be a dangerous medication if taken incorrectly. We feel that your warfarin therapy requires further care and monitoring and, accordingly, suggest that you take steps to place yourself under the care of another practitioner. In the event that an emergency arises before you have had the opportunity to engage a new physician or anticoagulation clinic, we will be available to you. After thirty (30) days following the date of this letter, we shall expect that you will have placed yourself under the care of another physician or anticoagulation clinic. Upon written request and instructions, we would be happy to provide your new physician with the summary of care and treatment provided to you, together with copies of any records your physician may require. If you received this letter following physician orders to stop your warfarin therapy or because you have transferred your care to another physician, the [address] [place] [address] staff thanks you for you patronage and wishes you good health. If you are in need of our service in the future, we would be happy to contact your physician for a new referral in order to facilitate your care.      Sincerely,      Anticoagulation Management Service Staff    CC: Dr. Suzan Villarreal, PharmD

## 2021-01-01 ENCOUNTER — APPOINTMENT (OUTPATIENT)
Dept: GENERAL RADIOLOGY | Age: 56
DRG: 871 | End: 2021-01-01
Payer: MEDICARE

## 2021-01-01 ENCOUNTER — APPOINTMENT (OUTPATIENT)
Dept: CT IMAGING | Age: 56
DRG: 871 | End: 2021-01-01
Payer: MEDICARE

## 2021-01-01 ENCOUNTER — HOSPITAL ENCOUNTER (EMERGENCY)
Age: 56
Discharge: HOME | DRG: 871 | End: 2021-02-10
Attending: EMERGENCY MEDICINE
Payer: MEDICARE

## 2021-01-01 ENCOUNTER — APPOINTMENT (OUTPATIENT)
Dept: ULTRASOUND IMAGING | Age: 56
DRG: 871 | End: 2021-01-01
Payer: MEDICARE

## 2021-01-01 ENCOUNTER — APPOINTMENT (OUTPATIENT)
Dept: MRI IMAGING | Age: 56
DRG: 871 | End: 2021-01-01
Payer: MEDICARE

## 2021-01-01 ENCOUNTER — HOSPITAL ENCOUNTER (INPATIENT)
Age: 56
LOS: 4 days | DRG: 871 | End: 2021-02-16
Attending: INTERNAL MEDICINE | Admitting: INTERNAL MEDICINE
Payer: MEDICARE

## 2021-01-01 ENCOUNTER — APPOINTMENT (OUTPATIENT)
Dept: CT IMAGING | Age: 56
End: 2021-01-01
Payer: MEDICARE

## 2021-01-01 ENCOUNTER — HOSPITAL ENCOUNTER (EMERGENCY)
Age: 56
Discharge: HOME | End: 2021-02-07
Payer: MEDICARE

## 2021-01-01 ENCOUNTER — APPOINTMENT (OUTPATIENT)
Dept: GENERAL RADIOLOGY | Age: 56
End: 2021-01-01
Payer: MEDICARE

## 2021-01-01 VITALS
HEART RATE: 96 BPM | TEMPERATURE: 97.7 F | RESPIRATION RATE: 13 BRPM | DIASTOLIC BLOOD PRESSURE: 76 MMHG | SYSTOLIC BLOOD PRESSURE: 132 MMHG | BODY MASS INDEX: 42.52 KG/M2 | OXYGEN SATURATION: 100 % | HEIGHT: 63 IN | WEIGHT: 240 LBS

## 2021-01-01 VITALS
TEMPERATURE: 91 F | SYSTOLIC BLOOD PRESSURE: 29 MMHG | RESPIRATION RATE: 29 BRPM | HEIGHT: 63 IN | DIASTOLIC BLOOD PRESSURE: 10 MMHG | WEIGHT: 254.63 LBS | BODY MASS INDEX: 45.12 KG/M2 | OXYGEN SATURATION: 99 %

## 2021-01-01 VITALS
HEIGHT: 63 IN | HEART RATE: 91 BPM | WEIGHT: 240 LBS | OXYGEN SATURATION: 99 % | SYSTOLIC BLOOD PRESSURE: 106 MMHG | DIASTOLIC BLOOD PRESSURE: 76 MMHG | BODY MASS INDEX: 42.52 KG/M2 | RESPIRATION RATE: 16 BRPM

## 2021-01-01 DIAGNOSIS — N18.5 CHRONIC RENAL FAILURE, STAGE 5 (HCC): ICD-10-CM

## 2021-01-01 DIAGNOSIS — R53.1 EPISODE OF GENERALIZED WEAKNESS: Primary | ICD-10-CM

## 2021-01-01 DIAGNOSIS — G89.29 OTHER CHRONIC PAIN: ICD-10-CM

## 2021-01-01 DIAGNOSIS — L08.9 INFECTED ULCER OF SKIN, UNSPECIFIED ULCER STAGE (HCC): Primary | ICD-10-CM

## 2021-01-01 DIAGNOSIS — L98.499 INFECTED ULCER OF SKIN, UNSPECIFIED ULCER STAGE (HCC): Primary | ICD-10-CM

## 2021-01-01 DIAGNOSIS — N18.6 ESRD (END STAGE RENAL DISEASE) (HCC): ICD-10-CM

## 2021-01-01 DIAGNOSIS — M54.50 CHRONIC LOW BACK PAIN WITHOUT SCIATICA, UNSPECIFIED BACK PAIN LATERALITY: ICD-10-CM

## 2021-01-01 DIAGNOSIS — L03.90 CELLULITIS, UNSPECIFIED CELLULITIS SITE: ICD-10-CM

## 2021-01-01 DIAGNOSIS — J96.00 ACUTE RESPIRATORY FAILURE, UNSPECIFIED WHETHER WITH HYPOXIA OR HYPERCAPNIA (HCC): ICD-10-CM

## 2021-01-01 DIAGNOSIS — R41.0 DISORIENTATION: ICD-10-CM

## 2021-01-01 DIAGNOSIS — I46.9 CARDIAC ARREST (HCC): ICD-10-CM

## 2021-01-01 DIAGNOSIS — E65 PANNICULUS ADIPOSUS: Primary | ICD-10-CM

## 2021-01-01 DIAGNOSIS — G89.29 CHRONIC LOW BACK PAIN WITHOUT SCIATICA, UNSPECIFIED BACK PAIN LATERALITY: ICD-10-CM

## 2021-01-01 LAB
ABO/RH: NORMAL
ALBUMIN SERPL-MCNC: 1.1 G/DL (ref 3.5–4.6)
ALBUMIN SERPL-MCNC: 1.1 G/DL (ref 3.5–4.6)
ALBUMIN SERPL-MCNC: 1.4 G/DL (ref 3.5–4.6)
ALBUMIN SERPL-MCNC: 1.6 G/DL (ref 3.5–4.6)
ALBUMIN SERPL-MCNC: 1.6 G/DL (ref 3.5–4.6)
ALBUMIN SERPL-MCNC: 1.9 G/DL (ref 3.5–4.6)
ALBUMIN SERPL-MCNC: 2 G/DL (ref 3.5–4.6)
ALBUMIN SERPL-MCNC: 2.1 G/DL (ref 3.5–4.6)
ALBUMIN SERPL-MCNC: 2.3 G/DL (ref 3.5–4.6)
ALBUMIN SERPL-MCNC: 2.7 G/DL (ref 3.5–4.6)
ALBUMIN SERPL-MCNC: 2.7 G/DL (ref 3.5–4.6)
ALP BLD-CCNC: 100 U/L (ref 40–130)
ALP BLD-CCNC: 105 U/L (ref 40–130)
ALP BLD-CCNC: 105 U/L (ref 40–130)
ALP BLD-CCNC: 83 U/L (ref 40–130)
ALP BLD-CCNC: 84 U/L (ref 40–130)
ALP BLD-CCNC: 84 U/L (ref 40–130)
ALP BLD-CCNC: 89 U/L (ref 40–130)
ALP BLD-CCNC: 93 U/L (ref 40–130)
ALP BLD-CCNC: 95 U/L (ref 40–130)
ALP BLD-CCNC: 95 U/L (ref 40–130)
ALP BLD-CCNC: 99 U/L (ref 40–130)
ALT SERPL-CCNC: 27 U/L (ref 0–33)
ALT SERPL-CCNC: 45 U/L (ref 0–33)
ALT SERPL-CCNC: 68 U/L (ref 0–33)
ALT SERPL-CCNC: 69 U/L (ref 0–33)
ALT SERPL-CCNC: 73 U/L (ref 0–33)
ALT SERPL-CCNC: 73 U/L (ref 0–33)
ALT SERPL-CCNC: 75 U/L (ref 0–33)
ALT SERPL-CCNC: 76 U/L (ref 0–33)
ALT SERPL-CCNC: 76 U/L (ref 0–33)
ALT SERPL-CCNC: 78 U/L (ref 0–33)
ALT SERPL-CCNC: 80 U/L (ref 0–33)
AMMONIA: 29 UMOL/L (ref 11–51)
ANAEROBIC CULTURE: ABNORMAL
ANION GAP SERPL CALCULATED.3IONS-SCNC: 22 MEQ/L (ref 9–15)
ANION GAP SERPL CALCULATED.3IONS-SCNC: 23 MEQ/L (ref 9–15)
ANION GAP SERPL CALCULATED.3IONS-SCNC: 24 MEQ/L (ref 9–15)
ANION GAP SERPL CALCULATED.3IONS-SCNC: 24 MEQ/L (ref 9–15)
ANION GAP SERPL CALCULATED.3IONS-SCNC: 25 MEQ/L (ref 9–15)
ANION GAP SERPL CALCULATED.3IONS-SCNC: 26 MEQ/L (ref 9–15)
ANION GAP SERPL CALCULATED.3IONS-SCNC: 26 MEQ/L (ref 9–15)
ANION GAP SERPL CALCULATED.3IONS-SCNC: 27 MEQ/L (ref 9–15)
ANION GAP SERPL CALCULATED.3IONS-SCNC: 27 MEQ/L (ref 9–15)
ANION GAP SERPL CALCULATED.3IONS-SCNC: 28 MEQ/L (ref 9–15)
ANION GAP SERPL CALCULATED.3IONS-SCNC: 29 MEQ/L (ref 9–15)
ANION GAP SERPL CALCULATED.3IONS-SCNC: 33 MEQ/L (ref 9–15)
ANION GAP SERPL CALCULATED.3IONS-SCNC: 33 MEQ/L (ref 9–15)
ANION GAP SERPL CALCULATED.3IONS-SCNC: 36 MEQ/L (ref 9–15)
ANION GAP SERPL CALCULATED.3IONS-SCNC: 42 MEQ/L (ref 9–15)
ANISOCYTOSIS: ABNORMAL
ANISOCYTOSIS: ABNORMAL
ANTIBODY SCREEN: NORMAL
APTT: 43.9 SEC (ref 24.4–36.8)
APTT: 48.5 SEC (ref 24.4–36.8)
AST SERPL-CCNC: 103 U/L (ref 0–35)
AST SERPL-CCNC: 117 U/L (ref 0–35)
AST SERPL-CCNC: 134 U/L (ref 0–35)
AST SERPL-CCNC: 139 U/L (ref 0–35)
AST SERPL-CCNC: 142 U/L (ref 0–35)
AST SERPL-CCNC: 155 U/L (ref 0–35)
AST SERPL-CCNC: 162 U/L (ref 0–35)
AST SERPL-CCNC: 43 U/L (ref 0–35)
AST SERPL-CCNC: 60 U/L (ref 0–35)
AST SERPL-CCNC: 75 U/L (ref 0–35)
AST SERPL-CCNC: 98 U/L (ref 0–35)
BASE EXCESS ARTERIAL: -11 (ref -3–3)
BASE EXCESS ARTERIAL: -12 (ref -3–3)
BASE EXCESS ARTERIAL: -14 (ref -3–3)
BASE EXCESS ARTERIAL: -14 (ref -3–3)
BASE EXCESS ARTERIAL: -15 (ref -3–3)
BASE EXCESS ARTERIAL: -18 (ref -3–3)
BASE EXCESS ARTERIAL: -20 (ref -3–3)
BASE EXCESS ARTERIAL: -6 (ref -3–3)
BASE EXCESS ARTERIAL: -7 (ref -3–3)
BASE EXCESS ARTERIAL: -7 (ref -3–3)
BASOPHILIC STIPPLING: 1
BASOPHILS ABSOLUTE: 0 K/UL (ref 0–0.2)
BASOPHILS ABSOLUTE: 0.1 K/UL (ref 0–0.2)
BASOPHILS RELATIVE PERCENT: 0.1 %
BASOPHILS RELATIVE PERCENT: 0.1 %
BASOPHILS RELATIVE PERCENT: 0.2 %
BASOPHILS RELATIVE PERCENT: 0.3 %
BASOPHILS RELATIVE PERCENT: 0.4 %
BILIRUB SERPL-MCNC: 0.6 MG/DL (ref 0.2–0.7)
BILIRUB SERPL-MCNC: 0.6 MG/DL (ref 0.2–0.7)
BILIRUB SERPL-MCNC: 0.7 MG/DL (ref 0.2–0.7)
BILIRUB SERPL-MCNC: 0.9 MG/DL (ref 0.2–0.7)
BILIRUB SERPL-MCNC: 1 MG/DL (ref 0.2–0.7)
BILIRUB SERPL-MCNC: 1.1 MG/DL (ref 0.2–0.7)
BILIRUB SERPL-MCNC: 1.5 MG/DL (ref 0.2–0.7)
BILIRUB SERPL-MCNC: 1.6 MG/DL (ref 0.2–0.7)
BILIRUB SERPL-MCNC: 1.7 MG/DL (ref 0.2–0.7)
BILIRUBIN DIRECT: 0.9 MG/DL (ref 0–0.4)
BILIRUBIN DIRECT: 1.5 MG/DL (ref 0–0.4)
BILIRUBIN, INDIRECT: 0.1 MG/DL (ref 0–0.6)
BILIRUBIN, INDIRECT: 0.2 MG/DL (ref 0–0.6)
BLOOD BANK DISPENSE STATUS: NORMAL
BLOOD BANK PRODUCT CODE: NORMAL
BPU ID: NORMAL
BUN BLDV-MCNC: 29 MG/DL (ref 6–20)
BUN BLDV-MCNC: 30 MG/DL (ref 6–20)
BUN BLDV-MCNC: 40 MG/DL (ref 6–20)
BUN BLDV-MCNC: 40 MG/DL (ref 6–20)
BUN BLDV-MCNC: 47 MG/DL (ref 6–20)
BUN BLDV-MCNC: 53 MG/DL (ref 6–20)
BUN BLDV-MCNC: 62 MG/DL (ref 6–20)
BUN BLDV-MCNC: 62 MG/DL (ref 6–20)
BUN BLDV-MCNC: 66 MG/DL (ref 6–20)
BUN BLDV-MCNC: 67 MG/DL (ref 6–20)
BUN BLDV-MCNC: 69 MG/DL (ref 6–20)
BUN BLDV-MCNC: 71 MG/DL (ref 6–20)
BUN BLDV-MCNC: 77 MG/DL (ref 6–20)
BUN BLDV-MCNC: 78 MG/DL (ref 6–20)
BUN BLDV-MCNC: 88 MG/DL (ref 6–20)
BURR CELLS: ABNORMAL
CALCIUM IONIZED: 0.69 MMOL/L (ref 1.12–1.32)
CALCIUM IONIZED: 0.74 MMOL/L (ref 1.12–1.32)
CALCIUM IONIZED: 0.74 MMOL/L (ref 1.12–1.32)
CALCIUM IONIZED: 0.79 MMOL/L (ref 1.12–1.32)
CALCIUM IONIZED: 0.8 MMOL/L (ref 1.12–1.32)
CALCIUM IONIZED: 0.81 MMOL/L (ref 1.12–1.32)
CALCIUM IONIZED: 0.82 MMOL/L (ref 1.12–1.32)
CALCIUM IONIZED: 0.82 MMOL/L (ref 1.12–1.32)
CALCIUM IONIZED: 0.83 MMOL/L (ref 1.12–1.32)
CALCIUM IONIZED: 0.92 MMOL/L (ref 1.12–1.32)
CALCIUM SERPL-MCNC: 5.9 MG/DL (ref 8.5–9.9)
CALCIUM SERPL-MCNC: 6.2 MG/DL (ref 8.5–9.9)
CALCIUM SERPL-MCNC: 6.2 MG/DL (ref 8.5–9.9)
CALCIUM SERPL-MCNC: 6.5 MG/DL (ref 8.5–9.9)
CALCIUM SERPL-MCNC: 6.7 MG/DL (ref 8.5–9.9)
CALCIUM SERPL-MCNC: 6.8 MG/DL (ref 8.5–9.9)
CALCIUM SERPL-MCNC: 6.8 MG/DL (ref 8.5–9.9)
CALCIUM SERPL-MCNC: 6.9 MG/DL (ref 8.5–9.9)
CALCIUM SERPL-MCNC: 6.9 MG/DL (ref 8.5–9.9)
CALCIUM SERPL-MCNC: 7 MG/DL (ref 8.5–9.9)
CALCIUM SERPL-MCNC: 7 MG/DL (ref 8.5–9.9)
CALCIUM SERPL-MCNC: 7.1 MG/DL (ref 8.5–9.9)
CALCIUM SERPL-MCNC: 7.3 MG/DL (ref 8.5–9.9)
CALCIUM SERPL-MCNC: 7.5 MG/DL (ref 8.5–9.9)
CALCIUM SERPL-MCNC: 7.9 MG/DL (ref 8.5–9.9)
CHLORIDE BLD-SCNC: 87 MEQ/L (ref 95–107)
CHLORIDE BLD-SCNC: 87 MEQ/L (ref 95–107)
CHLORIDE BLD-SCNC: 88 MEQ/L (ref 95–107)
CHLORIDE BLD-SCNC: 89 MEQ/L (ref 95–107)
CHLORIDE BLD-SCNC: 90 MEQ/L (ref 95–107)
CHLORIDE BLD-SCNC: 91 MEQ/L (ref 95–107)
CHLORIDE BLD-SCNC: 92 MEQ/L (ref 95–107)
CHLORIDE BLD-SCNC: 92 MEQ/L (ref 95–107)
CHLORIDE BLD-SCNC: 94 MEQ/L (ref 95–107)
CK MB: 14.3 NG/ML (ref 0–3.8)
CK MB: 25.1 NG/ML (ref 0–3.8)
CK MB: 63.8 NG/ML (ref 0–3.8)
CK MB: 74.2 NG/ML (ref 0–3.8)
CK MB: 87.2 NG/ML (ref 0–3.8)
CO2: 13 MEQ/L (ref 20–31)
CO2: 13 MEQ/L (ref 20–31)
CO2: 14 MEQ/L (ref 20–31)
CO2: 14 MEQ/L (ref 20–31)
CO2: 15 MEQ/L (ref 20–31)
CO2: 15 MEQ/L (ref 20–31)
CO2: 16 MEQ/L (ref 20–31)
CO2: 16 MEQ/L (ref 20–31)
CO2: 17 MEQ/L (ref 20–31)
CO2: 18 MEQ/L (ref 20–31)
CO2: 20 MEQ/L (ref 20–31)
CO2: 22 MEQ/L (ref 20–31)
CO2: 9 MEQ/L (ref 20–31)
CREAT SERPL-MCNC: 10.01 MG/DL (ref 0.5–0.9)
CREAT SERPL-MCNC: 12.15 MG/DL (ref 0.5–0.9)
CREAT SERPL-MCNC: 2.5 MG/DL (ref 0.5–0.9)
CREAT SERPL-MCNC: 2.55 MG/DL (ref 0.5–0.9)
CREAT SERPL-MCNC: 3.71 MG/DL (ref 0.5–0.9)
CREAT SERPL-MCNC: 3.81 MG/DL (ref 0.5–0.9)
CREAT SERPL-MCNC: 4.54 MG/DL (ref 0.5–0.9)
CREAT SERPL-MCNC: 6.19 MG/DL (ref 0.5–0.9)
CREAT SERPL-MCNC: 6.94 MG/DL (ref 0.5–0.9)
CREAT SERPL-MCNC: 7.04 MG/DL (ref 0.5–0.9)
CREAT SERPL-MCNC: 7.06 MG/DL (ref 0.5–0.9)
CREAT SERPL-MCNC: 7.14 MG/DL (ref 0.5–0.9)
CREAT SERPL-MCNC: 7.31 MG/DL (ref 0.5–0.9)
CREAT SERPL-MCNC: 8.98 MG/DL (ref 0.5–0.9)
CREAT SERPL-MCNC: 9.68 MG/DL (ref 0.5–0.9)
CREATINE KINASE-MB INDEX: 1.3 % (ref 0–3.5)
CREATINE KINASE-MB INDEX: 1.4 % (ref 0–3.5)
CREATINE KINASE-MB INDEX: 3.3 % (ref 0–3.5)
CREATINE KINASE-MB INDEX: 4.2 % (ref 0–3.5)
CREATINE KINASE-MB INDEX: 5 % (ref 0–3.5)
D DIMER: >20 MG/L FEU (ref 0–0.5)
DESCRIPTION BLOOD BANK: NORMAL
EKG ATRIAL RATE: 176 BPM
EKG ATRIAL RATE: 76 BPM
EKG ATRIAL RATE: 78 BPM
EKG ATRIAL RATE: 87 BPM
EKG ATRIAL RATE: 91 BPM
EKG ATRIAL RATE: 96 BPM
EKG P AXIS: 49 DEGREES
EKG P AXIS: 53 DEGREES
EKG P AXIS: 54 DEGREES
EKG P AXIS: 57 DEGREES
EKG P AXIS: 62 DEGREES
EKG P-R INTERVAL: 180 MS
EKG P-R INTERVAL: 180 MS
EKG P-R INTERVAL: 182 MS
EKG P-R INTERVAL: 188 MS
EKG P-R INTERVAL: 214 MS
EKG Q-T INTERVAL: 362 MS
EKG Q-T INTERVAL: 374 MS
EKG Q-T INTERVAL: 378 MS
EKG Q-T INTERVAL: 382 MS
EKG Q-T INTERVAL: 392 MS
EKG Q-T INTERVAL: 426 MS
EKG QRS DURATION: 174 MS
EKG QRS DURATION: 68 MS
EKG QRS DURATION: 72 MS
EKG QRS DURATION: 74 MS
EKG QRS DURATION: 76 MS
EKG QRS DURATION: 90 MS
EKG QTC CALCULATION (BAZETT): 412 MS
EKG QTC CALCULATION (BAZETT): 469 MS
EKG QTC CALCULATION (BAZETT): 471 MS
EKG QTC CALCULATION (BAZETT): 477 MS
EKG QTC CALCULATION (BAZETT): 479 MS
EKG QTC CALCULATION (BAZETT): 625 MS
EKG R AXIS: -25 DEGREES
EKG R AXIS: -29 DEGREES
EKG R AXIS: -29 DEGREES
EKG R AXIS: -3 DEGREES
EKG R AXIS: -47 DEGREES
EKG R AXIS: 0 DEGREES
EKG T AXIS: -28 DEGREES
EKG T AXIS: -46 DEGREES
EKG T AXIS: -67 DEGREES
EKG T AXIS: -69 DEGREES
EKG T AXIS: 145 DEGREES
EKG T AXIS: 244 DEGREES
EKG VENTRICULAR RATE: 168 BPM
EKG VENTRICULAR RATE: 76 BPM
EKG VENTRICULAR RATE: 78 BPM
EKG VENTRICULAR RATE: 87 BPM
EKG VENTRICULAR RATE: 91 BPM
EKG VENTRICULAR RATE: 96 BPM
EOSINOPHILS ABSOLUTE: 0 K/UL (ref 0–0.7)
EOSINOPHILS ABSOLUTE: 0.1 K/UL (ref 0–0.7)
EOSINOPHILS ABSOLUTE: 0.1 K/UL (ref 0–0.7)
EOSINOPHILS ABSOLUTE: 0.2 K/UL (ref 0–0.7)
EOSINOPHILS RELATIVE PERCENT: 0.1 %
EOSINOPHILS RELATIVE PERCENT: 0.3 %
EOSINOPHILS RELATIVE PERCENT: 0.4 %
EOSINOPHILS RELATIVE PERCENT: 0.8 %
EOSINOPHILS RELATIVE PERCENT: 2 %
EOSINOPHILS RELATIVE PERCENT: 2.3 %
EOSINOPHILS RELATIVE PERCENT: 3.3 %
ETHANOL PERCENT: NORMAL G/DL
ETHANOL: <10 MG/DL (ref 0–0.08)
FIBRINOGEN: 289 MG/DL (ref 235–507)
GFR AFRICAN AMERICAN: 10
GFR AFRICAN AMERICAN: 10
GFR AFRICAN AMERICAN: 12.2
GFR AFRICAN AMERICAN: 15
GFR AFRICAN AMERICAN: 15.4
GFR AFRICAN AMERICAN: 17
GFR AFRICAN AMERICAN: 23.8
GFR AFRICAN AMERICAN: 24
GFR AFRICAN AMERICAN: 24.4
GFR AFRICAN AMERICAN: 3.9
GFR AFRICAN AMERICAN: 4.9
GFR AFRICAN AMERICAN: 5.1
GFR AFRICAN AMERICAN: 5.6
GFR AFRICAN AMERICAN: 7
GFR AFRICAN AMERICAN: 7.1
GFR AFRICAN AMERICAN: 7.3
GFR AFRICAN AMERICAN: 7.4
GFR AFRICAN AMERICAN: 7.4
GFR AFRICAN AMERICAN: 7.5
GFR AFRICAN AMERICAN: 8
GFR AFRICAN AMERICAN: 8.6
GFR AFRICAN AMERICAN: 9
GFR AFRICAN AMERICAN: 9
GFR NON-AFRICAN AMERICAN: 10.1
GFR NON-AFRICAN AMERICAN: 12.4
GFR NON-AFRICAN AMERICAN: 12.8
GFR NON-AFRICAN AMERICAN: 14
GFR NON-AFRICAN AMERICAN: 19.7
GFR NON-AFRICAN AMERICAN: 20
GFR NON-AFRICAN AMERICAN: 20.1
GFR NON-AFRICAN AMERICAN: 3.2
GFR NON-AFRICAN AMERICAN: 4.1
GFR NON-AFRICAN AMERICAN: 4.2
GFR NON-AFRICAN AMERICAN: 4.6
GFR NON-AFRICAN AMERICAN: 5.8
GFR NON-AFRICAN AMERICAN: 6
GFR NON-AFRICAN AMERICAN: 6.1
GFR NON-AFRICAN AMERICAN: 6.1
GFR NON-AFRICAN AMERICAN: 6.2
GFR NON-AFRICAN AMERICAN: 7
GFR NON-AFRICAN AMERICAN: 7.1
GFR NON-AFRICAN AMERICAN: 8
GLOBULIN: 2.8 G/DL (ref 2.3–3.5)
GLOBULIN: 3.5 G/DL (ref 2.3–3.5)
GLOBULIN: 3.7 G/DL (ref 2.3–3.5)
GLOBULIN: 3.7 G/DL (ref 2.3–3.5)
GLOBULIN: 3.9 G/DL (ref 2.3–3.5)
GLOBULIN: 4 G/DL (ref 2.3–3.5)
GLOBULIN: 4.2 G/DL (ref 2.3–3.5)
GLOBULIN: 4.8 G/DL (ref 2.3–3.5)
GLOBULIN: 5.1 G/DL (ref 2.3–3.5)
GLUCOSE BLD-MCNC: 102 MG/DL (ref 60–115)
GLUCOSE BLD-MCNC: 151 MG/DL (ref 60–115)
GLUCOSE BLD-MCNC: 154 MG/DL (ref 60–115)
GLUCOSE BLD-MCNC: 165 MG/DL (ref 60–115)
GLUCOSE BLD-MCNC: 171 MG/DL (ref 70–99)
GLUCOSE BLD-MCNC: 176 MG/DL (ref 70–99)
GLUCOSE BLD-MCNC: 183 MG/DL (ref 60–115)
GLUCOSE BLD-MCNC: 202 MG/DL (ref 70–99)
GLUCOSE BLD-MCNC: 207 MG/DL (ref 60–115)
GLUCOSE BLD-MCNC: 212 MG/DL (ref 70–99)
GLUCOSE BLD-MCNC: 215 MG/DL (ref 60–115)
GLUCOSE BLD-MCNC: 230 MG/DL (ref 70–99)
GLUCOSE BLD-MCNC: 248 MG/DL (ref 70–99)
GLUCOSE BLD-MCNC: 254 MG/DL (ref 70–99)
GLUCOSE BLD-MCNC: 256 MG/DL (ref 70–99)
GLUCOSE BLD-MCNC: 259 MG/DL (ref 60–115)
GLUCOSE BLD-MCNC: 275 MG/DL (ref 60–115)
GLUCOSE BLD-MCNC: 275 MG/DL (ref 60–115)
GLUCOSE BLD-MCNC: 281 MG/DL (ref 60–115)
GLUCOSE BLD-MCNC: 297 MG/DL (ref 60–115)
GLUCOSE BLD-MCNC: 297 MG/DL (ref 60–115)
GLUCOSE BLD-MCNC: 300 MG/DL (ref 60–115)
GLUCOSE BLD-MCNC: 315 MG/DL (ref 70–99)
GLUCOSE BLD-MCNC: 318 MG/DL (ref 70–99)
GLUCOSE BLD-MCNC: 334 MG/DL (ref 70–99)
GLUCOSE BLD-MCNC: 342 MG/DL (ref 60–115)
GLUCOSE BLD-MCNC: 342 MG/DL (ref 70–99)
GLUCOSE BLD-MCNC: 347 MG/DL (ref 60–115)
GLUCOSE BLD-MCNC: 354 MG/DL (ref 60–115)
GLUCOSE BLD-MCNC: 354 MG/DL (ref 70–99)
GLUCOSE BLD-MCNC: 355 MG/DL (ref 70–99)
GLUCOSE BLD-MCNC: 360 MG/DL (ref 70–99)
GRAM STAIN RESULT: ABNORMAL
GRAM STAIN RESULT: ABNORMAL
HBA1C MFR BLD: 6.4 % (ref 4.8–5.9)
HBA1C MFR BLD: 6.6 % (ref 4.8–5.9)
HCO3 ARTERIAL: 11.6 MMOL/L (ref 21–29)
HCO3 ARTERIAL: 11.7 MMOL/L (ref 21–29)
HCO3 ARTERIAL: 12.9 MMOL/L (ref 21–29)
HCO3 ARTERIAL: 13.5 MMOL/L (ref 21–29)
HCO3 ARTERIAL: 14.1 MMOL/L (ref 21–29)
HCO3 ARTERIAL: 14.3 MMOL/L (ref 21–29)
HCO3 ARTERIAL: 15 MMOL/L (ref 21–29)
HCO3 ARTERIAL: 18.1 MMOL/L (ref 21–29)
HCO3 ARTERIAL: 18.6 MMOL/L (ref 21–29)
HCO3 ARTERIAL: 19.4 MMOL/L (ref 21–29)
HCT VFR BLD CALC: 20 % (ref 37–47)
HCT VFR BLD CALC: 25.2 % (ref 37–47)
HCT VFR BLD CALC: 26.2 % (ref 37–47)
HCT VFR BLD CALC: 26.3 % (ref 37–47)
HCT VFR BLD CALC: 27.3 % (ref 37–47)
HCT VFR BLD CALC: 27.7 % (ref 37–47)
HCT VFR BLD CALC: 29.2 % (ref 37–47)
HCT VFR BLD CALC: 29.5 % (ref 37–47)
HCT VFR BLD CALC: 29.5 % (ref 37–47)
HEMOGLOBIN: 10.2 GM/DL (ref 12–16)
HEMOGLOBIN: 6.1 G/DL (ref 12–16)
HEMOGLOBIN: 7.8 GM/DL (ref 12–16)
HEMOGLOBIN: 7.9 G/DL (ref 12–16)
HEMOGLOBIN: 8 G/DL (ref 12–16)
HEMOGLOBIN: 8.1 GM/DL (ref 12–16)
HEMOGLOBIN: 8.4 G/DL (ref 12–16)
HEMOGLOBIN: 8.4 G/DL (ref 12–16)
HEMOGLOBIN: 8.4 GM/DL (ref 12–16)
HEMOGLOBIN: 8.5 GM/DL (ref 12–16)
HEMOGLOBIN: 8.7 GM/DL (ref 12–16)
HEMOGLOBIN: 8.8 G/DL (ref 12–16)
HEMOGLOBIN: 8.9 G/DL (ref 12–16)
HEMOGLOBIN: 8.9 G/DL (ref 12–16)
HEMOGLOBIN: 8.9 GM/DL (ref 12–16)
HEMOGLOBIN: 9 G/DL (ref 12–16)
HEMOGLOBIN: 9.1 GM/DL (ref 12–16)
HEMOGLOBIN: 9.4 GM/DL (ref 12–16)
HEMOGLOBIN: 9.5 GM/DL (ref 12–16)
HYPOCHROMIA: ABNORMAL
HYPOCHROMIA: ABNORMAL
INR BLD: 1.9
INR BLD: 2.1
INR BLD: 2.5
INR BLD: 2.7
INR BLD: 4.6
INR BLD: 5.7
INR BLD: >10
LACTATE DEHYDROGENASE: 828 U/L (ref 135–214)
LACTATE: 10.61 MMOL/L (ref 0.4–2)
LACTATE: 14.94 MMOL/L (ref 0.4–2)
LACTATE: 15.16 MMOL/L (ref 0.4–2)
LACTATE: 18.65 MMOL/L (ref 0.4–2)
LACTATE: 18.8 MMOL/L (ref 0.4–2)
LACTATE: 19.71 MMOL/L (ref 0.4–2)
LACTATE: 2.13 MMOL/L (ref 0.4–2)
LACTATE: 3.84 MMOL/L (ref 0.4–2)
LACTATE: >20 MMOL/L (ref 0.4–2)
LACTATE: >20 MMOL/L (ref 0.4–2)
LACTIC ACID, SEPSIS: 12.1 MMOL/L (ref 0.5–1.9)
LACTIC ACID: 2.6 MMOL/L (ref 0.5–2.2)
LACTIC ACID: 2.9 MMOL/L (ref 0.5–2.2)
LIPASE: 12 U/L (ref 12–95)
LIPASE: 14 U/L (ref 12–95)
LYMPHOCYTES ABSOLUTE: 0.2 K/UL (ref 1–4.8)
LYMPHOCYTES ABSOLUTE: 0.2 K/UL (ref 1–4.8)
LYMPHOCYTES ABSOLUTE: 0.3 K/UL (ref 1–4.8)
LYMPHOCYTES ABSOLUTE: 0.3 K/UL (ref 1–4.8)
LYMPHOCYTES ABSOLUTE: 0.4 K/UL (ref 1–4.8)
LYMPHOCYTES ABSOLUTE: 0.6 K/UL (ref 1–4.8)
LYMPHOCYTES ABSOLUTE: 0.6 K/UL (ref 1–4.8)
LYMPHOCYTES RELATIVE PERCENT: 1 %
LYMPHOCYTES RELATIVE PERCENT: 2 %
LYMPHOCYTES RELATIVE PERCENT: 2.3 %
LYMPHOCYTES RELATIVE PERCENT: 2.4 %
LYMPHOCYTES RELATIVE PERCENT: 3 %
LYMPHOCYTES RELATIVE PERCENT: 3.1 %
LYMPHOCYTES RELATIVE PERCENT: 3.4 %
LYMPHOCYTES RELATIVE PERCENT: 4.1 %
LYMPHOCYTES RELATIVE PERCENT: 9.5 %
MACROCYTES: ABNORMAL
MACROCYTES: ABNORMAL
MAGNESIUM: 2 MG/DL (ref 1.7–2.4)
MAGNESIUM: 2.1 MG/DL (ref 1.7–2.4)
MAGNESIUM: 2.1 MG/DL (ref 1.7–2.4)
MAGNESIUM: 2.2 MG/DL (ref 1.7–2.4)
MAGNESIUM: 2.2 MG/DL (ref 1.7–2.4)
MAGNESIUM: 2.3 MG/DL (ref 1.7–2.4)
MCH RBC QN AUTO: 28.5 PG (ref 27–31.3)
MCH RBC QN AUTO: 28.6 PG (ref 27–31.3)
MCH RBC QN AUTO: 28.7 PG (ref 27–31.3)
MCH RBC QN AUTO: 29 PG (ref 27–31.3)
MCH RBC QN AUTO: 29.2 PG (ref 27–31.3)
MCH RBC QN AUTO: 29.3 PG (ref 27–31.3)
MCH RBC QN AUTO: 29.5 PG (ref 27–31.3)
MCH RBC QN AUTO: 29.5 PG (ref 27–31.3)
MCH RBC QN AUTO: 30 PG (ref 27–31.3)
MCHC RBC AUTO-ENTMCNC: 29.8 % (ref 33–37)
MCHC RBC AUTO-ENTMCNC: 30.1 % (ref 33–37)
MCHC RBC AUTO-ENTMCNC: 30.2 % (ref 33–37)
MCHC RBC AUTO-ENTMCNC: 30.5 % (ref 33–37)
MCHC RBC AUTO-ENTMCNC: 30.5 % (ref 33–37)
MCHC RBC AUTO-ENTMCNC: 30.7 % (ref 33–37)
MCHC RBC AUTO-ENTMCNC: 31.4 % (ref 33–37)
MCHC RBC AUTO-ENTMCNC: 31.8 % (ref 33–37)
MCHC RBC AUTO-ENTMCNC: 33.1 % (ref 33–37)
MCV RBC AUTO: 90.7 FL (ref 82–100)
MCV RBC AUTO: 92.8 FL (ref 82–100)
MCV RBC AUTO: 93.1 FL (ref 82–100)
MCV RBC AUTO: 93.4 FL (ref 82–100)
MCV RBC AUTO: 93.8 FL (ref 82–100)
MCV RBC AUTO: 94.7 FL (ref 82–100)
MCV RBC AUTO: 96 FL (ref 82–100)
MCV RBC AUTO: 97 FL (ref 82–100)
MCV RBC AUTO: 97.9 FL (ref 82–100)
MONOCYTES ABSOLUTE: 0 K/UL (ref 0.2–0.8)
MONOCYTES ABSOLUTE: 0.2 K/UL (ref 0.2–0.8)
MONOCYTES ABSOLUTE: 0.8 K/UL (ref 0.2–0.8)
MONOCYTES ABSOLUTE: 1.1 K/UL (ref 0.2–0.8)
MONOCYTES ABSOLUTE: 1.3 K/UL (ref 0.2–0.8)
MONOCYTES ABSOLUTE: 1.3 K/UL (ref 0.2–0.8)
MONOCYTES ABSOLUTE: 1.4 K/UL (ref 0.2–0.8)
MONOCYTES RELATIVE PERCENT: 1.3 %
MONOCYTES RELATIVE PERCENT: 12.4 %
MONOCYTES RELATIVE PERCENT: 2 %
MONOCYTES RELATIVE PERCENT: 7.5 %
MONOCYTES RELATIVE PERCENT: 7.6 %
MONOCYTES RELATIVE PERCENT: 8.2 %
MONOCYTES RELATIVE PERCENT: 9 %
MONOCYTES RELATIVE PERCENT: 9.2 %
MONOCYTES RELATIVE PERCENT: 9.2 %
NEUTROPHILS ABSOLUTE: 11.3 K/UL (ref 1.4–6.5)
NEUTROPHILS ABSOLUTE: 11.9 K/UL (ref 1.4–6.5)
NEUTROPHILS ABSOLUTE: 12.5 K/UL (ref 1.4–6.5)
NEUTROPHILS ABSOLUTE: 13.2 K/UL (ref 1.4–6.5)
NEUTROPHILS ABSOLUTE: 13.2 K/UL (ref 1.4–6.5)
NEUTROPHILS ABSOLUTE: 16 K/UL (ref 1.4–6.5)
NEUTROPHILS ABSOLUTE: 17.2 K/UL (ref 1.4–6.5)
NEUTROPHILS ABSOLUTE: 5.2 K/UL (ref 1.4–6.5)
NEUTROPHILS ABSOLUTE: 9.6 K/UL (ref 1.4–6.5)
NEUTROPHILS RELATIVE PERCENT: 75.7 %
NEUTROPHILS RELATIVE PERCENT: 86.4 %
NEUTROPHILS RELATIVE PERCENT: 87.3 %
NEUTROPHILS RELATIVE PERCENT: 87.9 %
NEUTROPHILS RELATIVE PERCENT: 88.3 %
NEUTROPHILS RELATIVE PERCENT: 89.7 %
NEUTROPHILS RELATIVE PERCENT: 90.4 %
NEUTROPHILS RELATIVE PERCENT: 96 %
NEUTROPHILS RELATIVE PERCENT: 97 %
NUCLEATED RED BLOOD CELLS: 1 /100 WBC
O2 SAT, ARTERIAL: 100 % (ref 93–100)
O2 SAT, ARTERIAL: 38 % (ref 93–100)
O2 SAT, ARTERIAL: 48 % (ref 93–100)
O2 SAT, ARTERIAL: 84 % (ref 93–100)
O2 SAT, ARTERIAL: 92 % (ref 93–100)
O2 SAT, ARTERIAL: 96 % (ref 93–100)
ORGANISM: ABNORMAL
OVALOCYTES: ABNORMAL
PCO2 ARTERIAL: 20 MM HG (ref 35–45)
PCO2 ARTERIAL: 26 MM HG (ref 35–45)
PCO2 ARTERIAL: 28 MM HG (ref 35–45)
PCO2 ARTERIAL: 28 MM HG (ref 35–45)
PCO2 ARTERIAL: 29 MM HG (ref 35–45)
PCO2 ARTERIAL: 33 MM HG (ref 35–45)
PCO2 ARTERIAL: 36 MM HG (ref 35–45)
PCO2 ARTERIAL: 39 MM HG (ref 35–45)
PCO2 ARTERIAL: 43 MM HG (ref 35–45)
PCO2 ARTERIAL: 69 MM HG (ref 35–45)
PDW BLD-RTO: 15.1 % (ref 11.5–14.5)
PDW BLD-RTO: 16 % (ref 11.5–14.5)
PDW BLD-RTO: 16.1 % (ref 11.5–14.5)
PDW BLD-RTO: 16.3 % (ref 11.5–14.5)
PDW BLD-RTO: 16.4 % (ref 11.5–14.5)
PDW BLD-RTO: 16.8 % (ref 11.5–14.5)
PDW BLD-RTO: 17.2 % (ref 11.5–14.5)
PDW BLD-RTO: 17.4 % (ref 11.5–14.5)
PDW BLD-RTO: 17.5 % (ref 11.5–14.5)
PERFORMED ON: ABNORMAL
PERFORMED ON: NORMAL
PH ARTERIAL: 6.9 (ref 7.35–7.45)
PH ARTERIAL: 7.12 (ref 7.35–7.45)
PH ARTERIAL: 7.13 (ref 7.35–7.45)
PH ARTERIAL: 7.27 (ref 7.35–7.45)
PH ARTERIAL: 7.3 (ref 7.35–7.45)
PH ARTERIAL: 7.32 (ref 7.35–7.45)
PH ARTERIAL: 7.33 (ref 7.35–7.45)
PH ARTERIAL: 7.36 (ref 7.35–7.45)
PH ARTERIAL: 7.37 (ref 7.35–7.45)
PH ARTERIAL: 7.45 (ref 7.35–7.45)
PHOSPHORUS: 10.5 MG/DL (ref 2.3–4.8)
PHOSPHORUS: 10.7 MG/DL (ref 2.3–4.8)
PHOSPHORUS: 11.4 MG/DL (ref 2.3–4.8)
PHOSPHORUS: 5.8 MG/DL (ref 2.3–4.8)
PHOSPHORUS: 6.2 MG/DL (ref 2.3–4.8)
PHOSPHORUS: 7 MG/DL (ref 2.3–4.8)
PHOSPHORUS: 9.5 MG/DL (ref 2.3–4.8)
PLATELET # BLD: 126 K/UL (ref 130–400)
PLATELET # BLD: 146 K/UL (ref 130–400)
PLATELET # BLD: 153 K/UL (ref 130–400)
PLATELET # BLD: 16 K/UL (ref 130–400)
PLATELET # BLD: 183 K/UL (ref 130–400)
PLATELET # BLD: 185 K/UL (ref 130–400)
PLATELET # BLD: 196 K/UL (ref 130–400)
PLATELET # BLD: 242 K/UL (ref 130–400)
PLATELET # BLD: 45 K/UL (ref 130–400)
PLATELET SLIDE REVIEW: ABNORMAL
PLATELET SLIDE REVIEW: ABNORMAL
PO2 ARTERIAL: 242 MM HG (ref 75–108)
PO2 ARTERIAL: 279 MM HG (ref 75–108)
PO2 ARTERIAL: 29 MM HG (ref 75–108)
PO2 ARTERIAL: 322 MM HG (ref 75–108)
PO2 ARTERIAL: 44 MM HG (ref 75–108)
PO2 ARTERIAL: 54 MM HG (ref 75–108)
PO2 ARTERIAL: 546 MM HG (ref 75–108)
PO2 ARTERIAL: 612 MM HG (ref 75–108)
PO2 ARTERIAL: 71 MM HG (ref 75–108)
PO2 ARTERIAL: 76 MM HG (ref 75–108)
POC CHLORIDE: 100 MEQ/L (ref 99–110)
POC CHLORIDE: 101 MEQ/L (ref 99–110)
POC CHLORIDE: 101 MEQ/L (ref 99–110)
POC CHLORIDE: 93 MEQ/L (ref 99–110)
POC CHLORIDE: 93 MEQ/L (ref 99–110)
POC CHLORIDE: 94 MEQ/L (ref 99–110)
POC CHLORIDE: 96 MEQ/L (ref 99–110)
POC CHLORIDE: 96 MEQ/L (ref 99–110)
POC CHLORIDE: 98 MEQ/L (ref 99–110)
POC CHLORIDE: 99 MEQ/L (ref 99–110)
POC CREATININE: 2.5 MG/DL (ref 0.6–1.1)
POC CREATININE: 3.5 MG/DL (ref 0.6–1.1)
POC CREATININE: 5.3 MG/DL (ref 0.6–1.1)
POC CREATININE: 5.3 MG/DL (ref 0.6–1.1)
POC CREATININE: 5.8 MG/DL (ref 0.6–1.1)
POC CREATININE: 5.9 MG/DL (ref 0.6–1.1)
POC CREATININE: 6.3 MG/DL (ref 0.6–1.1)
POC CREATININE: 6.6 MG/DL (ref 0.6–1.1)
POC CREATININE: 6.7 MG/DL (ref 0.6–1.1)
POC CREATININE: 7.5 MG/DL (ref 0.6–1.1)
POC FIO2: 100
POC FIO2: 21
POC FIO2: 50
POC FIO2: 60
POC HEMATOCRIT: 23 % (ref 36–48)
POC HEMATOCRIT: 24 % (ref 36–48)
POC HEMATOCRIT: 25 % (ref 36–48)
POC HEMATOCRIT: 25 % (ref 36–48)
POC HEMATOCRIT: 26 % (ref 36–48)
POC HEMATOCRIT: 26 % (ref 36–48)
POC HEMATOCRIT: 27 % (ref 36–48)
POC HEMATOCRIT: 28 % (ref 36–48)
POC HEMATOCRIT: 28 % (ref 36–48)
POC HEMATOCRIT: 30 % (ref 36–48)
POC POTASSIUM: 4.3 MEQ/L (ref 3.5–5.1)
POC POTASSIUM: 4.5 MEQ/L (ref 3.5–5.1)
POC POTASSIUM: 4.7 MEQ/L (ref 3.5–5.1)
POC POTASSIUM: 4.7 MEQ/L (ref 3.5–5.1)
POC POTASSIUM: 4.9 MEQ/L (ref 3.5–5.1)
POC POTASSIUM: 5.2 MEQ/L (ref 3.5–5.1)
POC POTASSIUM: 5.4 MEQ/L (ref 3.5–5.1)
POC POTASSIUM: 5.4 MEQ/L (ref 3.5–5.1)
POC POTASSIUM: 5.7 MEQ/L (ref 3.5–5.1)
POC POTASSIUM: 6 MEQ/L (ref 3.5–5.1)
POC SAMPLE TYPE: ABNORMAL
POC SODIUM: 128 MEQ/L (ref 136–145)
POC SODIUM: 129 MEQ/L (ref 136–145)
POC SODIUM: 132 MEQ/L (ref 136–145)
POC SODIUM: 133 MEQ/L (ref 136–145)
POC SODIUM: 133 MEQ/L (ref 136–145)
POC SODIUM: 134 MEQ/L (ref 136–145)
POC SODIUM: 135 MEQ/L (ref 136–145)
POC SODIUM: 138 MEQ/L (ref 136–145)
POIKILOCYTES: ABNORMAL
POIKILOCYTES: ABNORMAL
POLYCHROMASIA: ABNORMAL
POLYCHROMASIA: ABNORMAL
POTASSIUM REFLEX MAGNESIUM: 3.8 MEQ/L (ref 3.4–4.9)
POTASSIUM REFLEX MAGNESIUM: 4.4 MEQ/L (ref 3.4–4.9)
POTASSIUM REFLEX MAGNESIUM: 4.9 MEQ/L (ref 3.4–4.9)
POTASSIUM REFLEX MAGNESIUM: 5 MEQ/L (ref 3.4–4.9)
POTASSIUM REFLEX MAGNESIUM: 5.1 MEQ/L (ref 3.4–4.9)
POTASSIUM SERPL-SCNC: 4.3 MEQ/L (ref 3.4–4.9)
POTASSIUM SERPL-SCNC: 4.4 MEQ/L (ref 3.4–4.9)
POTASSIUM SERPL-SCNC: 4.5 MEQ/L (ref 3.4–4.9)
POTASSIUM SERPL-SCNC: 4.7 MEQ/L (ref 3.4–4.9)
POTASSIUM SERPL-SCNC: 4.8 MEQ/L (ref 3.4–4.9)
POTASSIUM SERPL-SCNC: 4.8 MEQ/L (ref 3.4–4.9)
POTASSIUM SERPL-SCNC: 5.1 MEQ/L (ref 3.4–4.9)
POTASSIUM SERPL-SCNC: 5.4 MEQ/L (ref 3.4–4.9)
POTASSIUM SERPL-SCNC: 5.5 MEQ/L (ref 3.4–4.9)
POTASSIUM SERPL-SCNC: 5.7 MEQ/L (ref 3.4–4.9)
PROTHROMBIN TIME: 21.5 SEC (ref 12.3–14.9)
PROTHROMBIN TIME: 23.8 SEC (ref 12.3–14.9)
PROTHROMBIN TIME: 26.6 SEC (ref 12.3–14.9)
PROTHROMBIN TIME: 28.5 SEC (ref 12.3–14.9)
PROTHROMBIN TIME: 43 SEC (ref 12.3–14.9)
PROTHROMBIN TIME: 50.5 SEC (ref 12.3–14.9)
PROTHROMBIN TIME: 88.2 SEC (ref 12.3–14.9)
RBC # BLD: 2.06 M/UL (ref 4.2–5.4)
RBC # BLD: 2.7 M/UL (ref 4.2–5.4)
RBC # BLD: 2.8 M/UL (ref 4.2–5.4)
RBC # BLD: 2.83 M/UL (ref 4.2–5.4)
RBC # BLD: 2.88 M/UL (ref 4.2–5.4)
RBC # BLD: 3.01 M/UL (ref 4.2–5.4)
RBC # BLD: 3.01 M/UL (ref 4.2–5.4)
RBC # BLD: 3.11 M/UL (ref 4.2–5.4)
RBC # BLD: 3.11 M/UL (ref 4.2–5.4)
REASON FOR REJECTION: NORMAL
REJECTED TEST: NORMAL
SARS-COV-2, NAAT: NOT DETECTED
SARS-COV-2, NAAT: NOT DETECTED
SCHISTOCYTES: ABNORMAL
SMUDGE CELLS: 8.2
SODIUM BLD-SCNC: 127 MEQ/L (ref 135–144)
SODIUM BLD-SCNC: 129 MEQ/L (ref 135–144)
SODIUM BLD-SCNC: 129 MEQ/L (ref 135–144)
SODIUM BLD-SCNC: 131 MEQ/L (ref 135–144)
SODIUM BLD-SCNC: 132 MEQ/L (ref 135–144)
SODIUM BLD-SCNC: 133 MEQ/L (ref 135–144)
SODIUM BLD-SCNC: 134 MEQ/L (ref 135–144)
SODIUM BLD-SCNC: 135 MEQ/L (ref 135–144)
SODIUM BLD-SCNC: 138 MEQ/L (ref 135–144)
SODIUM BLD-SCNC: 138 MEQ/L (ref 135–144)
SODIUM BLD-SCNC: 139 MEQ/L (ref 135–144)
SODIUM BLD-SCNC: 142 MEQ/L (ref 135–144)
SODIUM BLD-SCNC: 142 MEQ/L (ref 135–144)
T4 FREE: 1 NG/DL (ref 0.84–1.68)
TARGET CELLS: ABNORMAL
TCO2 ARTERIAL: 12 (ref 22–29)
TCO2 ARTERIAL: 13 (ref 22–29)
TCO2 ARTERIAL: 14 (ref 22–29)
TCO2 ARTERIAL: 15 (ref 22–29)
TCO2 ARTERIAL: 16 (ref 22–29)
TCO2 ARTERIAL: 19 (ref 22–29)
TCO2 ARTERIAL: 20 (ref 22–29)
TCO2 ARTERIAL: 21 (ref 22–29)
TOTAL CK: 1047 U/L (ref 0–170)
TOTAL CK: 1729 U/L (ref 0–170)
TOTAL CK: 1772 U/L (ref 0–170)
TOTAL CK: 1874 U/L (ref 0–170)
TOTAL CK: 1933 U/L (ref 0–170)
TOTAL PROTEIN: 3.4 G/DL (ref 6.3–8)
TOTAL PROTEIN: 3.9 G/DL (ref 6.3–8)
TOTAL PROTEIN: 4.8 G/DL (ref 6.3–8)
TOTAL PROTEIN: 5.1 G/DL (ref 6.3–8)
TOTAL PROTEIN: 5.3 G/DL (ref 6.3–8)
TOTAL PROTEIN: 5.8 G/DL (ref 6.3–8)
TOTAL PROTEIN: 5.8 G/DL (ref 6.3–8)
TOTAL PROTEIN: 6 G/DL (ref 6.3–8)
TOTAL PROTEIN: 6.5 G/DL (ref 6.3–8)
TOTAL PROTEIN: 7.5 G/DL (ref 6.3–8)
TOTAL PROTEIN: 7.8 G/DL (ref 6.3–8)
TOXIC GRANULATION: ABNORMAL
TROPONIN: 0.18 NG/ML (ref 0–0.01)
TROPONIN: 0.19 NG/ML (ref 0–0.01)
TROPONIN: 0.2 NG/ML (ref 0–0.01)
TROPONIN: 0.25 NG/ML (ref 0–0.01)
TSH SERPL DL<=0.05 MIU/L-ACNC: 3.06 UIU/ML (ref 0.44–3.86)
VACUOLATED NEUTROPHILS: ABNORMAL
VANCOMYCIN RANDOM: 7.1 UG/ML (ref 10–40)
WBC # BLD: 12.4 K/UL (ref 4.8–10.8)
WBC # BLD: 12.8 K/UL (ref 4.8–10.8)
WBC # BLD: 14.3 K/UL (ref 4.8–10.8)
WBC # BLD: 15 K/UL (ref 4.8–10.8)
WBC # BLD: 15.3 K/UL (ref 4.8–10.8)
WBC # BLD: 17.8 K/UL (ref 4.8–10.8)
WBC # BLD: 19 K/UL (ref 4.8–10.8)
WBC # BLD: 6.9 K/UL (ref 4.8–10.8)
WBC # BLD: 9.9 K/UL (ref 4.8–10.8)
WOUND/ABSCESS: ABNORMAL

## 2021-01-01 PROCEDURE — 83605 ASSAY OF LACTIC ACID: CPT

## 2021-01-01 PROCEDURE — 85025 COMPLETE CBC W/AUTO DIFF WBC: CPT

## 2021-01-01 PROCEDURE — 71045 X-RAY EXAM CHEST 1 VIEW: CPT

## 2021-01-01 PROCEDURE — 70450 CT HEAD/BRAIN W/O DYE: CPT

## 2021-01-01 PROCEDURE — 36415 COLL VENOUS BLD VENIPUNCTURE: CPT

## 2021-01-01 PROCEDURE — 6370000000 HC RX 637 (ALT 250 FOR IP): Performed by: EMERGENCY MEDICINE

## 2021-01-01 PROCEDURE — 36600 WITHDRAWAL OF ARTERIAL BLOOD: CPT

## 2021-01-01 PROCEDURE — 96365 THER/PROPH/DIAG IV INF INIT: CPT

## 2021-01-01 PROCEDURE — 99213 OFFICE O/P EST LOW 20 MIN: CPT

## 2021-01-01 PROCEDURE — 1210000000 HC MED SURG R&B

## 2021-01-01 PROCEDURE — 83735 ASSAY OF MAGNESIUM: CPT

## 2021-01-01 PROCEDURE — 80053 COMPREHEN METABOLIC PANEL: CPT

## 2021-01-01 PROCEDURE — 84295 ASSAY OF SERUM SODIUM: CPT

## 2021-01-01 PROCEDURE — 85610 PROTHROMBIN TIME: CPT

## 2021-01-01 PROCEDURE — 74176 CT ABD & PELVIS W/O CONTRAST: CPT

## 2021-01-01 PROCEDURE — 93005 ELECTROCARDIOGRAM TRACING: CPT | Performed by: INTERNAL MEDICINE

## 2021-01-01 PROCEDURE — 6360000002 HC RX W HCPCS: Performed by: INTERNAL MEDICINE

## 2021-01-01 PROCEDURE — 90945 DIALYSIS ONE EVALUATION: CPT

## 2021-01-01 PROCEDURE — 6360000002 HC RX W HCPCS: Performed by: NURSE PRACTITIONER

## 2021-01-01 PROCEDURE — 82330 ASSAY OF CALCIUM: CPT

## 2021-01-01 PROCEDURE — 6370000000 HC RX 637 (ALT 250 FOR IP): Performed by: NURSE PRACTITIONER

## 2021-01-01 PROCEDURE — 2580000003 HC RX 258: Performed by: INTERNAL MEDICINE

## 2021-01-01 PROCEDURE — 82565 ASSAY OF CREATININE: CPT

## 2021-01-01 PROCEDURE — C9113 INJ PANTOPRAZOLE SODIUM, VIA: HCPCS | Performed by: INTERNAL MEDICINE

## 2021-01-01 PROCEDURE — 99222 1ST HOSP IP/OBS MODERATE 55: CPT | Performed by: INTERNAL MEDICINE

## 2021-01-01 PROCEDURE — 83690 ASSAY OF LIPASE: CPT

## 2021-01-01 PROCEDURE — 96367 TX/PROPH/DG ADDL SEQ IV INF: CPT

## 2021-01-01 PROCEDURE — 5A1945Z RESPIRATORY VENTILATION, 24-96 CONSECUTIVE HOURS: ICD-10-PCS | Performed by: INTERNAL MEDICINE

## 2021-01-01 PROCEDURE — 87075 CULTR BACTERIA EXCEPT BLOOD: CPT

## 2021-01-01 PROCEDURE — 99285 EMERGENCY DEPT VISIT HI MDM: CPT

## 2021-01-01 PROCEDURE — 2500000003 HC RX 250 WO HCPCS: Performed by: INTERNAL MEDICINE

## 2021-01-01 PROCEDURE — 6370000000 HC RX 637 (ALT 250 FOR IP): Performed by: PHYSICIAN ASSISTANT

## 2021-01-01 PROCEDURE — 93005 ELECTROCARDIOGRAM TRACING: CPT | Performed by: EMERGENCY MEDICINE

## 2021-01-01 PROCEDURE — 82553 CREATINE MB FRACTION: CPT

## 2021-01-01 PROCEDURE — 94002 VENT MGMT INPAT INIT DAY: CPT

## 2021-01-01 PROCEDURE — 2500000003 HC RX 250 WO HCPCS

## 2021-01-01 PROCEDURE — 2580000003 HC RX 258

## 2021-01-01 PROCEDURE — 86923 COMPATIBILITY TEST ELECTRIC: CPT

## 2021-01-01 PROCEDURE — 84100 ASSAY OF PHOSPHORUS: CPT

## 2021-01-01 PROCEDURE — 84132 ASSAY OF SERUM POTASSIUM: CPT

## 2021-01-01 PROCEDURE — 82140 ASSAY OF AMMONIA: CPT

## 2021-01-01 PROCEDURE — 84443 ASSAY THYROID STIM HORMONE: CPT

## 2021-01-01 PROCEDURE — 99233 SBSQ HOSP IP/OBS HIGH 50: CPT | Performed by: INTERNAL MEDICINE

## 2021-01-01 PROCEDURE — 2700000000 HC OXYGEN THERAPY PER DAY

## 2021-01-01 PROCEDURE — 82435 ASSAY OF BLOOD CHLORIDE: CPT

## 2021-01-01 PROCEDURE — 99232 SBSQ HOSP IP/OBS MODERATE 35: CPT | Performed by: INTERNAL MEDICINE

## 2021-01-01 PROCEDURE — 84484 ASSAY OF TROPONIN QUANT: CPT

## 2021-01-01 PROCEDURE — 87040 BLOOD CULTURE FOR BACTERIA: CPT

## 2021-01-01 PROCEDURE — 85379 FIBRIN DEGRADATION QUANT: CPT

## 2021-01-01 PROCEDURE — 37799 UNLISTED PX VASCULAR SURGERY: CPT

## 2021-01-01 PROCEDURE — 82948 REAGENT STRIP/BLOOD GLUCOSE: CPT

## 2021-01-01 PROCEDURE — 93005 ELECTROCARDIOGRAM TRACING: CPT | Performed by: PHYSICIAN ASSISTANT

## 2021-01-01 PROCEDURE — 99232 SBSQ HOSP IP/OBS MODERATE 35: CPT | Performed by: NURSE PRACTITIONER

## 2021-01-01 PROCEDURE — 83036 HEMOGLOBIN GLYCOSYLATED A1C: CPT

## 2021-01-01 PROCEDURE — 87077 CULTURE AEROBIC IDENTIFY: CPT

## 2021-01-01 PROCEDURE — 36556 INSERT NON-TUNNEL CV CATH: CPT

## 2021-01-01 PROCEDURE — 0BH18EZ INSERTION OF ENDOTRACHEAL AIRWAY INTO TRACHEA, VIA NATURAL OR ARTIFICIAL OPENING ENDOSCOPIC: ICD-10-PCS | Performed by: INTERNAL MEDICINE

## 2021-01-01 PROCEDURE — 85730 THROMBOPLASTIN TIME PARTIAL: CPT

## 2021-01-01 PROCEDURE — 86901 BLOOD TYPING SEROLOGIC RH(D): CPT

## 2021-01-01 PROCEDURE — 2000000000 HC ICU R&B

## 2021-01-01 PROCEDURE — 2580000003 HC RX 258: Performed by: STUDENT IN AN ORGANIZED HEALTH CARE EDUCATION/TRAINING PROGRAM

## 2021-01-01 PROCEDURE — 87070 CULTURE OTHR SPECIMN AEROBIC: CPT

## 2021-01-01 PROCEDURE — 99223 1ST HOSP IP/OBS HIGH 75: CPT | Performed by: INTERNAL MEDICINE

## 2021-01-01 PROCEDURE — 93010 ELECTROCARDIOGRAM REPORT: CPT | Performed by: INTERNAL MEDICINE

## 2021-01-01 PROCEDURE — 85014 HEMATOCRIT: CPT

## 2021-01-01 PROCEDURE — 2580000003 HC RX 258: Performed by: NURSE PRACTITIONER

## 2021-01-01 PROCEDURE — 82550 ASSAY OF CK (CPK): CPT

## 2021-01-01 PROCEDURE — 93005 ELECTROCARDIOGRAM TRACING: CPT | Performed by: NURSE PRACTITIONER

## 2021-01-01 PROCEDURE — 85384 FIBRINOGEN ACTIVITY: CPT

## 2021-01-01 PROCEDURE — 87186 SC STD MICRODIL/AGAR DIL: CPT

## 2021-01-01 PROCEDURE — 96375 TX/PRO/DX INJ NEW DRUG ADDON: CPT

## 2021-01-01 PROCEDURE — 96376 TX/PRO/DX INJ SAME DRUG ADON: CPT

## 2021-01-01 PROCEDURE — 31500 INSERT EMERGENCY AIRWAY: CPT

## 2021-01-01 PROCEDURE — 86850 RBC ANTIBODY SCREEN: CPT

## 2021-01-01 PROCEDURE — 70551 MRI BRAIN STEM W/O DYE: CPT

## 2021-01-01 PROCEDURE — 99291 CRITICAL CARE FIRST HOUR: CPT | Performed by: INTERNAL MEDICINE

## 2021-01-01 PROCEDURE — 99233 SBSQ HOSP IP/OBS HIGH 50: CPT | Performed by: PSYCHIATRY & NEUROLOGY

## 2021-01-01 PROCEDURE — 99232 SBSQ HOSP IP/OBS MODERATE 35: CPT | Performed by: PSYCHIATRY & NEUROLOGY

## 2021-01-01 PROCEDURE — 6360000002 HC RX W HCPCS: Performed by: PHYSICIAN ASSISTANT

## 2021-01-01 PROCEDURE — 36620 INSERTION CATHETER ARTERY: CPT

## 2021-01-01 PROCEDURE — 6370000000 HC RX 637 (ALT 250 FOR IP): Performed by: INTERNAL MEDICINE

## 2021-01-01 PROCEDURE — 82803 BLOOD GASES ANY COMBINATION: CPT

## 2021-01-01 PROCEDURE — U0002 COVID-19 LAB TEST NON-CDC: HCPCS

## 2021-01-01 PROCEDURE — 92950 HEART/LUNG RESUSCITATION CPR: CPT

## 2021-01-01 PROCEDURE — 94003 VENT MGMT INPAT SUBQ DAY: CPT

## 2021-01-01 PROCEDURE — 2580000003 HC RX 258: Performed by: EMERGENCY MEDICINE

## 2021-01-01 PROCEDURE — 02HV33Z INSERTION OF INFUSION DEVICE INTO SUPERIOR VENA CAVA, PERCUTANEOUS APPROACH: ICD-10-PCS | Performed by: INTERNAL MEDICINE

## 2021-01-01 PROCEDURE — 72125 CT NECK SPINE W/O DYE: CPT

## 2021-01-01 PROCEDURE — 99222 1ST HOSP IP/OBS MODERATE 55: CPT | Performed by: PSYCHIATRY & NEUROLOGY

## 2021-01-01 PROCEDURE — 93970 EXTREMITY STUDY: CPT

## 2021-01-01 PROCEDURE — 87205 SMEAR GRAM STAIN: CPT

## 2021-01-01 PROCEDURE — 83010 ASSAY OF HAPTOGLOBIN QUANT: CPT

## 2021-01-01 PROCEDURE — 93308 TTE F-UP OR LMTD: CPT

## 2021-01-01 PROCEDURE — 86900 BLOOD TYPING SEROLOGIC ABO: CPT

## 2021-01-01 PROCEDURE — 2580000003 HC RX 258: Performed by: PHYSICIAN ASSISTANT

## 2021-01-01 PROCEDURE — 84439 ASSAY OF FREE THYROXINE: CPT

## 2021-01-01 PROCEDURE — 95816 EEG AWAKE AND DROWSY: CPT

## 2021-01-01 PROCEDURE — 96374 THER/PROPH/DIAG INJ IV PUSH: CPT

## 2021-01-01 PROCEDURE — 82077 ASSAY SPEC XCP UR&BREATH IA: CPT

## 2021-01-01 PROCEDURE — 80202 ASSAY OF VANCOMYCIN: CPT

## 2021-01-01 PROCEDURE — 51798 US URINE CAPACITY MEASURE: CPT

## 2021-01-01 PROCEDURE — 83615 LACTATE (LD) (LDH) ENZYME: CPT

## 2021-01-01 RX ORDER — OXYCODONE HYDROCHLORIDE AND ACETAMINOPHEN 5; 325 MG/1; MG/1
1 TABLET ORAL ONCE
Status: COMPLETED | OUTPATIENT
Start: 2021-01-01 | End: 2021-01-01

## 2021-01-01 RX ORDER — SODIUM CHLORIDE, SODIUM LACTATE, POTASSIUM CHLORIDE, CALCIUM CHLORIDE 600; 310; 30; 20 MG/100ML; MG/100ML; MG/100ML; MG/100ML
INJECTION, SOLUTION INTRAVENOUS
Status: COMPLETED
Start: 2021-01-01 | End: 2021-01-01

## 2021-01-01 RX ORDER — EPINEPHRINE 0.1 MG/ML
SYRINGE (ML) INJECTION
Status: COMPLETED | OUTPATIENT
Start: 2021-01-01 | End: 2021-01-01

## 2021-01-01 RX ORDER — SODIUM CHLORIDE 0.9 % (FLUSH) 0.9 %
10 SYRINGE (ML) INJECTION EVERY 12 HOURS SCHEDULED
Status: DISCONTINUED | OUTPATIENT
Start: 2021-01-01 | End: 2021-01-01

## 2021-01-01 RX ORDER — DEXTROSE MONOHYDRATE 25 G/50ML
12.5 INJECTION, SOLUTION INTRAVENOUS PRN
Status: DISCONTINUED | OUTPATIENT
Start: 2021-01-01 | End: 2021-01-01

## 2021-01-01 RX ORDER — MORPHINE SULFATE 2 MG/ML
2 INJECTION, SOLUTION INTRAMUSCULAR; INTRAVENOUS
Status: DISCONTINUED | OUTPATIENT
Start: 2021-01-01 | End: 2021-01-01 | Stop reason: HOSPADM

## 2021-01-01 RX ORDER — MORPHINE SULFATE 2 MG/ML
2 INJECTION, SOLUTION INTRAMUSCULAR; INTRAVENOUS ONCE
Status: COMPLETED | OUTPATIENT
Start: 2021-01-01 | End: 2021-01-01

## 2021-01-01 RX ORDER — CLONIDINE HYDROCHLORIDE 0.1 MG/1
0.1 TABLET ORAL DAILY
Status: DISCONTINUED | OUTPATIENT
Start: 2021-01-01 | End: 2021-01-01 | Stop reason: HOSPADM

## 2021-01-01 RX ORDER — SODIUM CHLORIDE 9 MG/ML
INJECTION, SOLUTION INTRAVENOUS PRN
Status: DISCONTINUED | OUTPATIENT
Start: 2021-01-01 | End: 2021-01-01

## 2021-01-01 RX ORDER — ADENOSINE 3 MG/ML
INJECTION, SOLUTION INTRAVENOUS
Status: COMPLETED | OUTPATIENT
Start: 2021-01-01 | End: 2021-01-01

## 2021-01-01 RX ORDER — METOPROLOL TARTRATE 5 MG/5ML
5 INJECTION INTRAVENOUS EVERY 6 HOURS PRN
Status: DISCONTINUED | OUTPATIENT
Start: 2021-01-01 | End: 2021-01-01

## 2021-01-01 RX ORDER — LORAZEPAM 2 MG/ML
1 INJECTION INTRAMUSCULAR
Status: DISCONTINUED | OUTPATIENT
Start: 2021-01-01 | End: 2021-01-01 | Stop reason: HOSPADM

## 2021-01-01 RX ORDER — CHOLECALCIFEROL (VITAMIN D3) 10 MCG
1 TABLET ORAL DAILY
Status: DISCONTINUED | OUTPATIENT
Start: 2021-01-01 | End: 2021-01-01

## 2021-01-01 RX ORDER — HALOPERIDOL 5 MG/ML
2 INJECTION INTRAMUSCULAR EVERY 6 HOURS PRN
Status: DISCONTINUED | OUTPATIENT
Start: 2021-01-01 | End: 2021-01-01

## 2021-01-01 RX ORDER — LORAZEPAM 2 MG/ML
1 INJECTION INTRAMUSCULAR
Status: ACTIVE | OUTPATIENT
Start: 2021-01-01 | End: 2021-01-01

## 2021-01-01 RX ORDER — ASPIRIN 81 MG/1
81 TABLET, CHEWABLE ORAL DAILY
Status: DISCONTINUED | OUTPATIENT
Start: 2021-01-01 | End: 2021-01-01

## 2021-01-01 RX ORDER — SODIUM CHLORIDE 9 MG/ML
INJECTION, SOLUTION INTRAVENOUS CONTINUOUS PRN
Status: COMPLETED | OUTPATIENT
Start: 2021-01-01 | End: 2021-01-01

## 2021-01-01 RX ORDER — LORAZEPAM 2 MG/ML
0.5 INJECTION INTRAMUSCULAR
Status: DISCONTINUED | OUTPATIENT
Start: 2021-01-01 | End: 2021-01-01 | Stop reason: HOSPADM

## 2021-01-01 RX ORDER — ALBUMIN (HUMAN) 12.5 G/50ML
25 SOLUTION INTRAVENOUS DAILY PRN
Status: DISCONTINUED | OUTPATIENT
Start: 2021-01-01 | End: 2021-01-01

## 2021-01-01 RX ORDER — SODIUM CHLORIDE 450 MG/100ML
INJECTION, SOLUTION INTRAVENOUS
Status: COMPLETED
Start: 2021-01-01 | End: 2021-01-01

## 2021-01-01 RX ORDER — 0.9 % SODIUM CHLORIDE 0.9 %
500 INTRAVENOUS SOLUTION INTRAVENOUS ONCE
Status: COMPLETED | OUTPATIENT
Start: 2021-01-01 | End: 2021-01-01

## 2021-01-01 RX ORDER — PANTOPRAZOLE SODIUM 40 MG/10ML
40 INJECTION, POWDER, LYOPHILIZED, FOR SOLUTION INTRAVENOUS DAILY
Status: DISCONTINUED | OUTPATIENT
Start: 2021-01-01 | End: 2021-01-01

## 2021-01-01 RX ORDER — SODIUM CHLORIDE, SODIUM LACTATE, POTASSIUM CHLORIDE, AND CALCIUM CHLORIDE .6; .31; .03; .02 G/100ML; G/100ML; G/100ML; G/100ML
1000 INJECTION, SOLUTION INTRAVENOUS ONCE
Status: COMPLETED | OUTPATIENT
Start: 2021-01-01 | End: 2021-01-01

## 2021-01-01 RX ORDER — SODIUM CHLORIDE, SODIUM LACTATE, POTASSIUM CHLORIDE, CALCIUM CHLORIDE 600; 310; 30; 20 MG/100ML; MG/100ML; MG/100ML; MG/100ML
INJECTION, SOLUTION INTRAVENOUS ONCE
Status: COMPLETED | OUTPATIENT
Start: 2021-01-01 | End: 2021-01-01

## 2021-01-01 RX ORDER — FENTANYL CITRATE 50 UG/ML
50 INJECTION, SOLUTION INTRAMUSCULAR; INTRAVENOUS
Status: DISCONTINUED | OUTPATIENT
Start: 2021-01-01 | End: 2021-01-01 | Stop reason: HOSPADM

## 2021-01-01 RX ORDER — LIDOCAINE HYDROCHLORIDE 20 MG/ML
5 INJECTION, SOLUTION INFILTRATION; PERINEURAL ONCE
Status: DISCONTINUED | OUTPATIENT
Start: 2021-01-01 | End: 2021-01-01

## 2021-01-01 RX ORDER — SODIUM CHLORIDE 0.9 % (FLUSH) 0.9 %
10 SYRINGE (ML) INJECTION PRN
Status: DISCONTINUED | OUTPATIENT
Start: 2021-01-01 | End: 2021-01-01

## 2021-01-01 RX ORDER — 0.9 % SODIUM CHLORIDE 0.9 %
500 INTRAVENOUS SOLUTION INTRAVENOUS ONCE
Status: DISCONTINUED | OUTPATIENT
Start: 2021-01-01 | End: 2021-01-01

## 2021-01-01 RX ORDER — SODIUM CHLORIDE 9 MG/ML
INJECTION, SOLUTION INTRAVENOUS
Status: COMPLETED
Start: 2021-01-01 | End: 2021-01-01

## 2021-01-01 RX ORDER — HEPARIN SODIUM 5000 [USP'U]/ML
5000 INJECTION, SOLUTION INTRAVENOUS; SUBCUTANEOUS EVERY 8 HOURS SCHEDULED
Status: CANCELLED | OUTPATIENT
Start: 2021-01-01

## 2021-01-01 RX ORDER — ONDANSETRON 2 MG/ML
4 INJECTION INTRAMUSCULAR; INTRAVENOUS EVERY 6 HOURS PRN
Status: DISCONTINUED | OUTPATIENT
Start: 2021-01-01 | End: 2021-01-01

## 2021-01-01 RX ORDER — ATROPINE SULFATE 1 MG/ML
INJECTION, SOLUTION INTRAMUSCULAR; INTRAVENOUS; SUBCUTANEOUS
Status: COMPLETED | OUTPATIENT
Start: 2021-01-01 | End: 2021-01-01

## 2021-01-01 RX ORDER — MORPHINE SULFATE 4 MG/ML
4 INJECTION, SOLUTION INTRAMUSCULAR; INTRAVENOUS ONCE
Status: COMPLETED | OUTPATIENT
Start: 2021-01-01 | End: 2021-01-01

## 2021-01-01 RX ORDER — WARFARIN SODIUM 5 MG/1
10 TABLET ORAL
Status: COMPLETED | OUTPATIENT
Start: 2021-01-01 | End: 2021-01-01

## 2021-01-01 RX ORDER — CHLORHEXIDINE GLUCONATE 0.12 MG/ML
15 RINSE ORAL 2 TIMES DAILY
Status: DISCONTINUED | OUTPATIENT
Start: 2021-01-01 | End: 2021-01-01

## 2021-01-01 RX ORDER — ACETAMINOPHEN 650 MG/1
650 SUPPOSITORY RECTAL EVERY 6 HOURS PRN
Status: DISCONTINUED | OUTPATIENT
Start: 2021-01-01 | End: 2021-01-01

## 2021-01-01 RX ORDER — 0.9 % SODIUM CHLORIDE 0.9 %
1000 INTRAVENOUS SOLUTION INTRAVENOUS
Status: DISPENSED | OUTPATIENT
Start: 2021-01-01 | End: 2021-01-01

## 2021-01-01 RX ORDER — DEXTROSE MONOHYDRATE 50 MG/ML
100 INJECTION, SOLUTION INTRAVENOUS PRN
Status: DISCONTINUED | OUTPATIENT
Start: 2021-01-01 | End: 2021-01-01

## 2021-01-01 RX ORDER — METOPROLOL TARTRATE 50 MG/1
50 TABLET, FILM COATED ORAL 2 TIMES DAILY
Status: DISCONTINUED | OUTPATIENT
Start: 2021-01-01 | End: 2021-01-01

## 2021-01-01 RX ORDER — WARFARIN SODIUM 2.5 MG/1
2.5 TABLET ORAL
Status: COMPLETED | OUTPATIENT
Start: 2021-01-01 | End: 2021-01-01

## 2021-01-01 RX ORDER — NICOTINE POLACRILEX 4 MG
15 LOZENGE BUCCAL PRN
Status: DISCONTINUED | OUTPATIENT
Start: 2021-01-01 | End: 2021-01-01 | Stop reason: SDUPTHER

## 2021-01-01 RX ORDER — SODIUM CHLORIDE 9 MG/ML
INJECTION, SOLUTION INTRAVENOUS
Status: DISCONTINUED
Start: 2021-01-01 | End: 2021-01-01

## 2021-01-01 RX ORDER — ATROPINE SULFATE 10 MG/ML
2 SOLUTION/ DROPS OPHTHALMIC EVERY 4 HOURS PRN
Status: DISCONTINUED | OUTPATIENT
Start: 2021-01-01 | End: 2021-01-01 | Stop reason: HOSPADM

## 2021-01-01 RX ORDER — SODIUM CHLORIDE 9 MG/ML
INJECTION, SOLUTION INTRAVENOUS CONTINUOUS
Status: DISCONTINUED | OUTPATIENT
Start: 2021-01-01 | End: 2021-01-01

## 2021-01-01 RX ORDER — HYDRALAZINE HYDROCHLORIDE 25 MG/1
25 TABLET, FILM COATED ORAL 2 TIMES DAILY
Status: DISCONTINUED | OUTPATIENT
Start: 2021-01-01 | End: 2021-01-01 | Stop reason: HOSPADM

## 2021-01-01 RX ORDER — SODIUM CHLORIDE, SODIUM LACTATE, POTASSIUM CHLORIDE, AND CALCIUM CHLORIDE .6; .31; .03; .02 G/100ML; G/100ML; G/100ML; G/100ML
500 INJECTION, SOLUTION INTRAVENOUS ONCE
Status: COMPLETED | OUTPATIENT
Start: 2021-01-01 | End: 2021-01-01

## 2021-01-01 RX ORDER — SODIUM CHLORIDE 0.9 % (FLUSH) 0.9 %
3 SYRINGE (ML) INJECTION EVERY 8 HOURS
Status: DISCONTINUED | OUTPATIENT
Start: 2021-01-01 | End: 2021-01-01 | Stop reason: HOSPADM

## 2021-01-01 RX ORDER — PHYTONADIONE 10 MG/ML
10 INJECTION, EMULSION INTRAMUSCULAR; INTRAVENOUS; SUBCUTANEOUS ONCE
Status: DISCONTINUED | OUTPATIENT
Start: 2021-01-01 | End: 2021-01-01

## 2021-01-01 RX ORDER — POLYETHYLENE GLYCOL 3350 17 G/17G
17 POWDER, FOR SOLUTION ORAL DAILY PRN
Status: DISCONTINUED | OUTPATIENT
Start: 2021-01-01 | End: 2021-01-01

## 2021-01-01 RX ORDER — TOPIRAMATE 25 MG/1
25 TABLET ORAL 2 TIMES DAILY
Status: DISCONTINUED | OUTPATIENT
Start: 2021-01-01 | End: 2021-01-01

## 2021-01-01 RX ORDER — PROMETHAZINE HYDROCHLORIDE 12.5 MG/1
12.5 TABLET ORAL EVERY 6 HOURS PRN
Status: DISCONTINUED | OUTPATIENT
Start: 2021-01-01 | End: 2021-01-01

## 2021-01-01 RX ORDER — ACETAMINOPHEN 325 MG/1
650 TABLET ORAL EVERY 6 HOURS PRN
Status: DISCONTINUED | OUTPATIENT
Start: 2021-01-01 | End: 2021-01-01

## 2021-01-01 RX ORDER — MORPHINE SULFATE 4 MG/ML
4 INJECTION, SOLUTION INTRAMUSCULAR; INTRAVENOUS
Status: DISCONTINUED | OUTPATIENT
Start: 2021-01-01 | End: 2021-01-01 | Stop reason: HOSPADM

## 2021-01-01 RX ORDER — NICOTINE POLACRILEX 4 MG
15 LOZENGE BUCCAL PRN
Status: DISCONTINUED | OUTPATIENT
Start: 2021-01-01 | End: 2021-01-01

## 2021-01-01 RX ORDER — SODIUM CHLORIDE 9 MG/ML
10 INJECTION INTRAVENOUS DAILY
Status: DISCONTINUED | OUTPATIENT
Start: 2021-01-01 | End: 2021-01-01

## 2021-01-01 RX ORDER — ATORVASTATIN CALCIUM 40 MG/1
40 TABLET, FILM COATED ORAL NIGHTLY
Status: DISCONTINUED | OUTPATIENT
Start: 2021-01-01 | End: 2021-01-01 | Stop reason: HOSPADM

## 2021-01-01 RX ORDER — SODIUM CHLORIDE 9 MG/ML
INJECTION, SOLUTION INTRAVENOUS
Status: DISPENSED
Start: 2021-01-01 | End: 2021-01-01

## 2021-01-01 RX ORDER — DEXTROSE MONOHYDRATE 25 G/50ML
12.5 INJECTION, SOLUTION INTRAVENOUS PRN
Status: DISCONTINUED | OUTPATIENT
Start: 2021-01-01 | End: 2021-01-01 | Stop reason: SDUPTHER

## 2021-01-01 RX ORDER — EPINEPHRINE 0.1 MG/ML
SYRINGE (ML) INJECTION
Status: DISCONTINUED
Start: 2021-01-01 | End: 2021-01-01 | Stop reason: HOSPADM

## 2021-01-01 RX ORDER — PROPOFOL 10 MG/ML
5-50 INJECTION, EMULSION INTRAVENOUS CONTINUOUS
Status: DISCONTINUED | OUTPATIENT
Start: 2021-01-01 | End: 2021-01-01

## 2021-01-01 RX ORDER — DILTIAZEM HYDROCHLORIDE 5 MG/ML
10 INJECTION INTRAVENOUS ONCE
Status: COMPLETED | OUTPATIENT
Start: 2021-01-01 | End: 2021-01-01

## 2021-01-01 RX ORDER — ONDANSETRON 2 MG/ML
4 INJECTION INTRAMUSCULAR; INTRAVENOUS ONCE
Status: COMPLETED | OUTPATIENT
Start: 2021-01-01 | End: 2021-01-01

## 2021-01-01 RX ORDER — FLUOXETINE HYDROCHLORIDE 20 MG/1
20 CAPSULE ORAL DAILY
Status: DISCONTINUED | OUTPATIENT
Start: 2021-01-01 | End: 2021-01-01

## 2021-01-01 RX ORDER — SODIUM CHLORIDE 9 MG/ML
250 INJECTION, SOLUTION INTRAVENOUS ONCE
Status: DISCONTINUED | OUTPATIENT
Start: 2021-01-01 | End: 2021-01-01

## 2021-01-01 RX ORDER — SODIUM CHLORIDE 450 MG/100ML
INJECTION, SOLUTION INTRAVENOUS
Status: DISCONTINUED
Start: 2021-01-01 | End: 2021-01-01 | Stop reason: WASHOUT

## 2021-01-01 RX ORDER — LORAZEPAM 2 MG/ML
2 INJECTION INTRAMUSCULAR EVERY 6 HOURS PRN
Status: DISCONTINUED | OUTPATIENT
Start: 2021-01-01 | End: 2021-01-01

## 2021-01-01 RX ORDER — LORAZEPAM 2 MG/ML
1 INJECTION INTRAMUSCULAR EVERY 6 HOURS PRN
Status: DISCONTINUED | OUTPATIENT
Start: 2021-01-01 | End: 2021-01-01 | Stop reason: HOSPADM

## 2021-01-01 RX ORDER — DEXTROSE MONOHYDRATE 50 MG/ML
100 INJECTION, SOLUTION INTRAVENOUS PRN
Status: DISCONTINUED | OUTPATIENT
Start: 2021-01-01 | End: 2021-01-01 | Stop reason: SDUPTHER

## 2021-01-01 RX ADMIN — SODIUM CHLORIDE 500 ML: 9 INJECTION, SOLUTION INTRAVENOUS at 03:12

## 2021-01-01 RX ADMIN — METOPROLOL TARTRATE 50 MG: 50 TABLET, FILM COATED ORAL at 22:00

## 2021-01-01 RX ADMIN — CALCIUM CHLORIDE, MAGNESIUM CHLORIDE, DEXTROSE MONOHYDRATE, LACTIC ACID, SODIUM CHLORIDE, SODIUM BICARBONATE AND POTASSIUM CHLORIDE 2500 ML/HR: 3.68; 3.05; 22; 5.4; 6.46; 3.09; .314 INJECTION INTRAVENOUS at 14:55

## 2021-01-01 RX ADMIN — DEXTROSE 50 % IN WATER (D50W) INTRAVENOUS SYRINGE 12.5 G: at 01:00

## 2021-01-01 RX ADMIN — PHENYLEPHRINE HYDROCHLORIDE 300 MCG/MIN: 10 INJECTION INTRAVENOUS at 18:22

## 2021-01-01 RX ADMIN — CALCIUM CHLORIDE, MAGNESIUM CHLORIDE, DEXTROSE MONOHYDRATE, LACTIC ACID, SODIUM CHLORIDE, SODIUM BICARBONATE AND POTASSIUM CHLORIDE 2500 ML/HR: 3.68; 3.05; 22; 5.4; 6.46; 3.09; .314 INJECTION INTRAVENOUS at 18:52

## 2021-01-01 RX ADMIN — MORPHINE SULFATE 4 MG: 4 INJECTION, SOLUTION INTRAMUSCULAR; INTRAVENOUS at 15:35

## 2021-01-01 RX ADMIN — ACETAMINOPHEN 650 MG: 325 TABLET ORAL at 06:11

## 2021-01-01 RX ADMIN — HALOPERIDOL LACTATE 2 MG: 5 INJECTION, SOLUTION INTRAMUSCULAR at 16:15

## 2021-01-01 RX ADMIN — PHENYLEPHRINE HYDROCHLORIDE 30 MCG/MIN: 10 INJECTION INTRAVENOUS at 11:07

## 2021-01-01 RX ADMIN — ASPIRIN 81 MG: 81 TABLET, CHEWABLE ORAL at 09:20

## 2021-01-01 RX ADMIN — PROPOFOL 5 MCG/KG/MIN: 10 INJECTION, EMULSION INTRAVENOUS at 03:14

## 2021-01-01 RX ADMIN — Medication 12 MCG/MIN: at 04:50

## 2021-01-01 RX ADMIN — SODIUM CHLORIDE: 9 INJECTION, SOLUTION INTRAVENOUS at 11:41

## 2021-01-01 RX ADMIN — SODIUM BICARBONATE 100 MEQ: 84 INJECTION, SOLUTION INTRAVENOUS at 00:30

## 2021-01-01 RX ADMIN — EPINEPHRINE 1 MG: 0.1 INJECTION, SOLUTION ENDOTRACHEAL; INTRACARDIAC; INTRAVENOUS at 20:49

## 2021-01-01 RX ADMIN — WARFARIN SODIUM 2.5 MG: 2.5 TABLET ORAL at 17:31

## 2021-01-01 RX ADMIN — SODIUM BICARBONATE 50 MEQ: 84 INJECTION, SOLUTION INTRAVENOUS at 07:43

## 2021-01-01 RX ADMIN — PIPERACILLIN AND TAZOBACTAM 2250 MG: 2; .25 INJECTION, POWDER, LYOPHILIZED, FOR SOLUTION INTRAVENOUS at 05:51

## 2021-01-01 RX ADMIN — Medication 50 MEQ: at 02:58

## 2021-01-01 RX ADMIN — TOPIRAMATE 25 MG: 25 TABLET, FILM COATED ORAL at 22:00

## 2021-01-01 RX ADMIN — ADENOSINE 12 MG: 3 INJECTION INTRAVENOUS at 20:55

## 2021-01-01 RX ADMIN — VANCOMYCIN HYDROCHLORIDE 1000 MG: 1 INJECTION, POWDER, LYOPHILIZED, FOR SOLUTION INTRAVENOUS at 22:01

## 2021-01-01 RX ADMIN — EPINEPHRINE 2 MCG/MIN: 1 INJECTION PARENTERAL at 08:03

## 2021-01-01 RX ADMIN — PIPERACILLIN AND TAZOBACTAM 2250 MG: 2; .25 INJECTION, POWDER, LYOPHILIZED, FOR SOLUTION INTRAVENOUS at 16:15

## 2021-01-01 RX ADMIN — ATORVASTATIN CALCIUM 40 MG: 40 TABLET, FILM COATED ORAL at 21:59

## 2021-01-01 RX ADMIN — PANTOPRAZOLE SODIUM 40 MG: 40 INJECTION, POWDER, FOR SOLUTION INTRAVENOUS at 09:20

## 2021-01-01 RX ADMIN — VASOPRESSIN 0.04 UNITS/MIN: 20 INJECTION INTRAVENOUS at 23:30

## 2021-01-01 RX ADMIN — Medication 10 ML: at 12:15

## 2021-01-01 RX ADMIN — SODIUM BICARBONATE 50 MEQ: 84 INJECTION, SOLUTION INTRAVENOUS at 18:20

## 2021-01-01 RX ADMIN — Medication 100 MCG/MIN: at 09:28

## 2021-01-01 RX ADMIN — 0.12% CHLORHEXIDINE GLUCONATE 15 ML: 1.2 RINSE ORAL at 09:20

## 2021-01-01 RX ADMIN — SODIUM BICARBONATE 50 MEQ: 84 INJECTION, SOLUTION INTRAVENOUS at 20:51

## 2021-01-01 RX ADMIN — Medication 100 MCG/MIN: at 03:04

## 2021-01-01 RX ADMIN — SODIUM BICARBONATE 50 MEQ: 84 INJECTION, SOLUTION INTRAVENOUS at 06:27

## 2021-01-01 RX ADMIN — PHENYLEPHRINE HYDROCHLORIDE 300 MCG/MIN: 10 INJECTION INTRAVENOUS at 06:18

## 2021-01-01 RX ADMIN — Medication 10 ML: at 19:41

## 2021-01-01 RX ADMIN — TOPIRAMATE 25 MG: 25 TABLET, FILM COATED ORAL at 09:20

## 2021-01-01 RX ADMIN — PHENYLEPHRINE HYDROCHLORIDE 300 MCG/MIN: 10 INJECTION INTRAVENOUS at 03:05

## 2021-01-01 RX ADMIN — SODIUM CHLORIDE, POTASSIUM CHLORIDE, SODIUM LACTATE AND CALCIUM CHLORIDE 500 ML: 600; 310; 30; 20 INJECTION, SOLUTION INTRAVENOUS at 21:01

## 2021-01-01 RX ADMIN — PHENYLEPHRINE HYDROCHLORIDE 300 MCG/MIN: 10 INJECTION INTRAVENOUS at 00:25

## 2021-01-01 RX ADMIN — SODIUM BICARBONATE: 84 INJECTION, SOLUTION INTRAVENOUS at 17:00

## 2021-01-01 RX ADMIN — ATROPINE SULFATE 1 MG: 1 INJECTION, SOLUTION INTRAMUSCULAR; INTRAVENOUS; SUBCUTANEOUS at 05:05

## 2021-01-01 RX ADMIN — SODIUM BICARBONATE 50 MEQ: 84 INJECTION, SOLUTION INTRAVENOUS at 21:03

## 2021-01-01 RX ADMIN — 0.12% CHLORHEXIDINE GLUCONATE 15 ML: 1.2 RINSE ORAL at 03:18

## 2021-01-01 RX ADMIN — PROPOFOL 10 MCG/KG/MIN: 10 INJECTION, EMULSION INTRAVENOUS at 15:14

## 2021-01-01 RX ADMIN — Medication 100 MCG/MIN: at 15:34

## 2021-01-01 RX ADMIN — SODIUM CHLORIDE: 9 INJECTION, SOLUTION INTRAVENOUS at 12:47

## 2021-01-01 RX ADMIN — TOPIRAMATE 25 MG: 25 TABLET, FILM COATED ORAL at 12:13

## 2021-01-01 RX ADMIN — HALOPERIDOL LACTATE 2 MG: 5 INJECTION, SOLUTION INTRAMUSCULAR at 08:47

## 2021-01-01 RX ADMIN — SODIUM BICARBONATE 50 MEQ: 84 INJECTION, SOLUTION INTRAVENOUS at 05:12

## 2021-01-01 RX ADMIN — PIPERACILLIN AND TAZOBACTAM 2250 MG: 2; .25 INJECTION, POWDER, LYOPHILIZED, FOR SOLUTION INTRAVENOUS at 05:36

## 2021-01-01 RX ADMIN — EPINEPHRINE 1 MG: 0.1 INJECTION, SOLUTION ENDOTRACHEAL; INTRACARDIAC; INTRAVENOUS at 13:08

## 2021-01-01 RX ADMIN — LORAZEPAM 2 MG: 2 INJECTION INTRAMUSCULAR; INTRAVENOUS at 13:23

## 2021-01-01 RX ADMIN — HYDRALAZINE HYDROCHLORIDE 25 MG: 25 TABLET, FILM COATED ORAL at 22:01

## 2021-01-01 RX ADMIN — SODIUM BICARBONATE 50 MEQ: 84 INJECTION, SOLUTION INTRAVENOUS at 13:07

## 2021-01-01 RX ADMIN — PIPERACILLIN AND TAZOBACTAM 2250 MG: 2; .25 INJECTION, POWDER, LYOPHILIZED, FOR SOLUTION INTRAVENOUS at 22:01

## 2021-01-01 RX ADMIN — CALCIUM CHLORIDE, MAGNESIUM CHLORIDE, DEXTROSE MONOHYDRATE, LACTIC ACID, SODIUM CHLORIDE, SODIUM BICARBONATE AND POTASSIUM CHLORIDE 2500 ML/HR: 3.68; 3.05; 22; 5.4; 6.46; 3.09; .314 INJECTION INTRAVENOUS at 02:46

## 2021-01-01 RX ADMIN — Medication 100 MCG/MIN: at 12:37

## 2021-01-01 RX ADMIN — Medication 10 ML: at 21:00

## 2021-01-01 RX ADMIN — SODIUM BICARBONATE 50 MEQ: 84 INJECTION, SOLUTION INTRAVENOUS at 19:37

## 2021-01-01 RX ADMIN — Medication 50 MEQ: at 07:44

## 2021-01-01 RX ADMIN — OXYCODONE HYDROCHLORIDE AND ACETAMINOPHEN 1 TABLET: 5; 325 TABLET ORAL at 17:01

## 2021-01-01 RX ADMIN — SODIUM CHLORIDE 500 ML: 9 INJECTION, SOLUTION INTRAVENOUS at 22:23

## 2021-01-01 RX ADMIN — 0.12% CHLORHEXIDINE GLUCONATE 15 ML: 1.2 RINSE ORAL at 19:40

## 2021-01-01 RX ADMIN — Medication 10 ML: at 22:06

## 2021-01-01 RX ADMIN — Medication 10 ML: at 08:47

## 2021-01-01 RX ADMIN — SODIUM CHLORIDE, POTASSIUM CHLORIDE, SODIUM LACTATE AND CALCIUM CHLORIDE 1000 ML: 600; 310; 30; 20 INJECTION, SOLUTION INTRAVENOUS at 01:06

## 2021-01-01 RX ADMIN — PHENYLEPHRINE HYDROCHLORIDE 300 MCG/MIN: 10 INJECTION INTRAVENOUS at 21:05

## 2021-01-01 RX ADMIN — Medication 0.5 MG: at 17:39

## 2021-01-01 RX ADMIN — ASPIRIN 81 MG: 81 TABLET, CHEWABLE ORAL at 12:12

## 2021-01-01 RX ADMIN — ONDANSETRON 4 MG: 2 INJECTION INTRAMUSCULAR; INTRAVENOUS at 14:39

## 2021-01-01 RX ADMIN — CALCIUM CHLORIDE, MAGNESIUM CHLORIDE, DEXTROSE MONOHYDRATE, LACTIC ACID, SODIUM CHLORIDE, SODIUM BICARBONATE AND POTASSIUM CHLORIDE 2500 ML/HR: 3.68; 3.05; 22; 5.4; 6.46; 3.09; .314 INJECTION INTRAVENOUS at 16:36

## 2021-01-01 RX ADMIN — Medication 100 MCG/MIN: at 06:18

## 2021-01-01 RX ADMIN — PHYTONADIONE 10 MG: 10 INJECTION, EMULSION INTRAMUSCULAR; INTRAVENOUS; SUBCUTANEOUS at 21:14

## 2021-01-01 RX ADMIN — Medication 10 ML: at 08:55

## 2021-01-01 RX ADMIN — SODIUM BICARBONATE: 84 INJECTION, SOLUTION INTRAVENOUS at 04:58

## 2021-01-01 RX ADMIN — SODIUM CHLORIDE 500 ML: 4.5 INJECTION, SOLUTION INTRAVENOUS at 00:26

## 2021-01-01 RX ADMIN — PIPERACILLIN AND TAZOBACTAM 2250 MG: 2; .25 INJECTION, POWDER, LYOPHILIZED, FOR SOLUTION INTRAVENOUS at 06:57

## 2021-01-01 RX ADMIN — CALCIUM CHLORIDE, MAGNESIUM CHLORIDE, DEXTROSE MONOHYDRATE, LACTIC ACID, SODIUM CHLORIDE, SODIUM BICARBONATE AND POTASSIUM CHLORIDE 2500 ML/HR: 3.68; 3.05; 22; 5.4; 6.46; 3.09; .314 INJECTION INTRAVENOUS at 22:31

## 2021-01-01 RX ADMIN — ACETAMINOPHEN 650 MG: 325 TABLET ORAL at 04:34

## 2021-01-01 RX ADMIN — Medication: at 12:47

## 2021-01-01 RX ADMIN — CALCIUM CHLORIDE, MAGNESIUM CHLORIDE, DEXTROSE MONOHYDRATE, LACTIC ACID, SODIUM CHLORIDE, SODIUM BICARBONATE AND POTASSIUM CHLORIDE 2500 ML/HR: 3.68; 3.05; 22; 5.4; 6.46; 3.09; .314 INJECTION INTRAVENOUS at 09:18

## 2021-01-01 RX ADMIN — WARFARIN SODIUM 10 MG: 5 TABLET ORAL at 00:11

## 2021-01-01 RX ADMIN — PIPERACILLIN AND TAZOBACTAM 2250 MG: 2; .25 INJECTION, POWDER, LYOPHILIZED, FOR SOLUTION INTRAVENOUS at 02:50

## 2021-01-01 RX ADMIN — AMIODARONE HYDROCHLORIDE 0.5 MG/MIN: 50 INJECTION, SOLUTION INTRAVENOUS at 16:00

## 2021-01-01 RX ADMIN — Medication 3 ML: at 15:43

## 2021-01-01 RX ADMIN — SODIUM BICARBONATE: 84 INJECTION, SOLUTION INTRAVENOUS at 20:55

## 2021-01-01 RX ADMIN — Medication 50 MEQ: at 06:27

## 2021-01-01 RX ADMIN — CALCIUM CHLORIDE, MAGNESIUM CHLORIDE, DEXTROSE MONOHYDRATE, LACTIC ACID, SODIUM CHLORIDE, SODIUM BICARBONATE AND POTASSIUM CHLORIDE 2500 ML/HR: 3.68; 3.05; 22; 5.4; 6.46; 3.09; .314 INJECTION INTRAVENOUS at 00:34

## 2021-01-01 RX ADMIN — EPINEPHRINE 1 MG: 0.1 INJECTION, SOLUTION ENDOTRACHEAL; INTRACARDIAC; INTRAVENOUS at 13:10

## 2021-01-01 RX ADMIN — 0.12% CHLORHEXIDINE GLUCONATE 15 ML: 1.2 RINSE ORAL at 12:12

## 2021-01-01 RX ADMIN — SODIUM BICARBONATE: 84 INJECTION, SOLUTION INTRAVENOUS at 12:37

## 2021-01-01 RX ADMIN — ADENOSINE 6 MG: 3 INJECTION INTRAVENOUS at 20:52

## 2021-01-01 RX ADMIN — SODIUM CHLORIDE 8.85 UNITS/HR: 9 INJECTION, SOLUTION INTRAVENOUS at 04:43

## 2021-01-01 RX ADMIN — SODIUM CHLORIDE, SODIUM LACTATE, POTASSIUM CHLORIDE, AND CALCIUM CHLORIDE 1000 ML: .6; .31; .03; .02 INJECTION, SOLUTION INTRAVENOUS at 19:36

## 2021-01-01 RX ADMIN — AMIODARONE HYDROCHLORIDE 150 MG: 50 INJECTION, SOLUTION INTRAVENOUS at 10:57

## 2021-01-01 RX ADMIN — VANCOMYCIN HYDROCHLORIDE 1250 MG: 5 INJECTION, POWDER, LYOPHILIZED, FOR SOLUTION INTRAVENOUS at 16:36

## 2021-01-01 RX ADMIN — SODIUM BICARBONATE: 84 INJECTION, SOLUTION INTRAVENOUS at 19:46

## 2021-01-01 RX ADMIN — PIPERACILLIN AND TAZOBACTAM 3375 MG: 3; .375 INJECTION, POWDER, LYOPHILIZED, FOR SOLUTION INTRAVENOUS at 19:40

## 2021-01-01 RX ADMIN — PANTOPRAZOLE SODIUM 40 MG: 40 INJECTION, POWDER, FOR SOLUTION INTRAVENOUS at 17:30

## 2021-01-01 RX ADMIN — VASOPRESSIN 0.04 UNITS/MIN: 20 INJECTION INTRAVENOUS at 08:03

## 2021-01-01 RX ADMIN — SODIUM CHLORIDE: 9 INJECTION, SOLUTION INTRAVENOUS at 22:00

## 2021-01-01 RX ADMIN — SODIUM CHLORIDE, PRESERVATIVE FREE 10 ML: 5 INJECTION INTRAVENOUS at 08:55

## 2021-01-01 RX ADMIN — PIPERACILLIN AND TAZOBACTAM 2250 MG: 2; .25 INJECTION, POWDER, LYOPHILIZED, FOR SOLUTION INTRAVENOUS at 22:24

## 2021-01-01 RX ADMIN — VASOPRESSIN 0.04 UNITS/MIN: 20 INJECTION INTRAVENOUS at 16:08

## 2021-01-01 RX ADMIN — SODIUM CHLORIDE, PRESERVATIVE FREE 10 ML: 5 INJECTION INTRAVENOUS at 17:31

## 2021-01-01 RX ADMIN — SODIUM CHLORIDE, POTASSIUM CHLORIDE, SODIUM LACTATE AND CALCIUM CHLORIDE: 600; 310; 30; 20 INJECTION, SOLUTION INTRAVENOUS at 20:59

## 2021-01-01 RX ADMIN — CALCIUM CHLORIDE, MAGNESIUM CHLORIDE, DEXTROSE MONOHYDRATE, LACTIC ACID, SODIUM CHLORIDE, SODIUM BICARBONATE AND POTASSIUM CHLORIDE 2500 ML/HR: 3.68; 3.05; 22; 5.4; 6.46; 3.09; .314 INJECTION INTRAVENOUS at 13:05

## 2021-01-01 RX ADMIN — PIPERACILLIN AND TAZOBACTAM 2250 MG: 2; .25 INJECTION, POWDER, LYOPHILIZED, FOR SOLUTION INTRAVENOUS at 12:50

## 2021-01-01 RX ADMIN — Medication 10 ML: at 02:59

## 2021-01-01 RX ADMIN — Medication 2 MG: at 14:39

## 2021-01-01 RX ADMIN — SODIUM CHLORIDE 999 ML: 9 INJECTION, SOLUTION INTRAVENOUS at 05:17

## 2021-01-01 RX ADMIN — AMIODARONE HYDROCHLORIDE 0.5 MG/MIN: 50 INJECTION, SOLUTION INTRAVENOUS at 00:26

## 2021-01-01 RX ADMIN — Medication 100 MCG/MIN: at 00:30

## 2021-01-01 RX ADMIN — Medication 30 MCG/MIN: at 13:19

## 2021-01-01 RX ADMIN — VASOPRESSIN 0.03 UNITS/MIN: 20 INJECTION INTRAVENOUS at 16:45

## 2021-01-01 RX ADMIN — VANCOMYCIN HYDROCHLORIDE 1000 MG: 1 INJECTION, POWDER, LYOPHILIZED, FOR SOLUTION INTRAVENOUS at 23:28

## 2021-01-01 RX ADMIN — SODIUM BICARBONATE 50 MEQ: 84 INJECTION, SOLUTION INTRAVENOUS at 02:58

## 2021-01-01 RX ADMIN — CALCIUM CHLORIDE, MAGNESIUM CHLORIDE, DEXTROSE MONOHYDRATE, LACTIC ACID, SODIUM CHLORIDE, SODIUM BICARBONATE AND POTASSIUM CHLORIDE 2500 ML/HR: 3.68; 3.05; 22; 5.4; 6.46; 3.09; .314 INJECTION INTRAVENOUS at 20:25

## 2021-01-01 RX ADMIN — SODIUM BICARBONATE: 84 INJECTION, SOLUTION INTRAVENOUS at 05:03

## 2021-01-01 RX ADMIN — VASOPRESSIN 0.04 UNITS/MIN: 20 INJECTION INTRAVENOUS at 00:26

## 2021-01-01 RX ADMIN — PHENYLEPHRINE HYDROCHLORIDE 300 MCG/MIN: 10 INJECTION INTRAVENOUS at 12:56

## 2021-01-01 RX ADMIN — SODIUM CHLORIDE, POTASSIUM CHLORIDE, SODIUM LACTATE AND CALCIUM CHLORIDE 1000 ML: 600; 310; 30; 20 INJECTION, SOLUTION INTRAVENOUS at 19:36

## 2021-01-01 RX ADMIN — VASOPRESSIN 0.04 UNITS/MIN: 20 INJECTION INTRAVENOUS at 13:33

## 2021-01-01 RX ADMIN — INSULIN HUMAN 10 UNITS: 100 INJECTION, SOLUTION PARENTERAL at 01:00

## 2021-01-01 RX ADMIN — FENTANYL CITRATE 50 MCG: 50 INJECTION, SOLUTION INTRAMUSCULAR; INTRAVENOUS at 15:38

## 2021-01-01 RX ADMIN — AMIODARONE HYDROCHLORIDE 1 MG/MIN: 50 INJECTION, SOLUTION INTRAVENOUS at 11:08

## 2021-01-01 RX ADMIN — EPINEPHRINE 15 MCG/MIN: 1 INJECTION PARENTERAL at 01:04

## 2021-01-01 RX ADMIN — ASCORBIC ACID, VITAMIN A PALMITATE, CHOLECALCIFEROL, THIAMINE HYDROCHLORIDE, RIBOFLAVIN-5 PHOSPHATE SODIUM, PYRIDOXINE HYDROCHLORIDE, NIACINAMIDE, DEXPANTHENOL, ALPHA-TOCOPHEROL ACETATE, VITAMIN K1, FOLIC ACID, BIOTIN, CYANOCOBALAMIN: 200; 3300; 200; 6; 3.6; 6; 40; 15; 10; 150; 600; 60; 5 INJECTION, SOLUTION INTRAVENOUS at 11:33

## 2021-01-01 RX ADMIN — Medication 25 MCG/MIN: at 23:30

## 2021-01-01 RX ADMIN — PHENYLEPHRINE HYDROCHLORIDE 300 MCG/MIN: 10 INJECTION INTRAVENOUS at 09:28

## 2021-01-01 RX ADMIN — Medication 50 MEQ: at 07:43

## 2021-01-01 RX ADMIN — PIPERACILLIN AND TAZOBACTAM 2250 MG: 2; .25 INJECTION, POWDER, LYOPHILIZED, FOR SOLUTION INTRAVENOUS at 12:14

## 2021-01-01 RX ADMIN — TOPIRAMATE 25 MG: 25 TABLET, FILM COATED ORAL at 19:40

## 2021-01-01 RX ADMIN — CALCIUM CHLORIDE, MAGNESIUM CHLORIDE, DEXTROSE MONOHYDRATE, LACTIC ACID, SODIUM CHLORIDE, SODIUM BICARBONATE AND POTASSIUM CHLORIDE 2500 ML/HR: 3.68; 3.05; 22; 5.4; 6.46; 3.09; .314 INJECTION INTRAVENOUS at 11:07

## 2021-01-01 RX ADMIN — Medication 100 MCG/MIN: at 21:33

## 2021-01-01 RX ADMIN — SODIUM BICARBONATE 50 MEQ: 84 INJECTION, SOLUTION INTRAVENOUS at 07:44

## 2021-01-01 RX ADMIN — DILTIAZEM HYDROCHLORIDE 10 MG: 5 INJECTION INTRAVENOUS at 11:43

## 2021-01-01 ASSESSMENT — PAIN SCALES - PAIN ASSESSMENT IN ADVANCED DEMENTIA (PAINAD)
TOTALSCORE: 0
BODYLANGUAGE: 0
FACIALEXPRESSION: 0
TOTALSCORE: 0
BODYLANGUAGE: 0
NEGVOCALIZATION: 0
BREATHING: 0
FACIALEXPRESSION: 0
NEGVOCALIZATION: 0
TOTALSCORE: 0
BODYLANGUAGE: 0
CONSOLABILITY: 0
BREATHING: 0
BODYLANGUAGE: 0
BREATHING: 0
BREATHING: 0
CONSOLABILITY: 0
CONSOLABILITY: 0
NEGVOCALIZATION: 0
TOTALSCORE: 0
BODYLANGUAGE: 0
TOTALSCORE: 0
NEGVOCALIZATION: 0
BODYLANGUAGE: 0
CONSOLABILITY: 0
NEGVOCALIZATION: 0
FACIALEXPRESSION: 0
NEGVOCALIZATION: 0
CONSOLABILITY: 0
TOTALSCORE: 0
BREATHING: 0
FACIALEXPRESSION: 0
NEGVOCALIZATION: 0
CONSOLABILITY: 0
BREATHING: 0
NEGVOCALIZATION: 0
TOTALSCORE: 0
FACIALEXPRESSION: 0
BODYLANGUAGE: 0
NEGVOCALIZATION: 0
CONSOLABILITY: 0
NEGVOCALIZATION: 0
FACIALEXPRESSION: 0
BREATHING: 0
NEGVOCALIZATION: 0
CONSOLABILITY: 0
TOTALSCORE: 0
CONSOLABILITY: 0
TOTALSCORE: 0
NEGVOCALIZATION: 0
CONSOLABILITY: 0
BREATHING: 0
CONSOLABILITY: 0
BODYLANGUAGE: 0
FACIALEXPRESSION: 0
TOTALSCORE: 0
BODYLANGUAGE: 0
BREATHING: 0
FACIALEXPRESSION: 0
BREATHING: 0

## 2021-01-01 ASSESSMENT — PULMONARY FUNCTION TESTS
PIF_VALUE: 25
PIF_VALUE: 22
PIF_VALUE: 43
PIF_VALUE: 23
PIF_VALUE: 49
PIF_VALUE: 33
PIF_VALUE: 38
PIF_VALUE: 35
PIF_VALUE: 25
PIF_VALUE: 31
PIF_VALUE: 22
PIF_VALUE: 17
PIF_VALUE: 37
PIF_VALUE: 21
PIF_VALUE: 24
PIF_VALUE: 27
PIF_VALUE: 38
PIF_VALUE: 28
PIF_VALUE: 75
PIF_VALUE: 29
PIF_VALUE: 36
PIF_VALUE: 26
PIF_VALUE: 24
PIF_VALUE: 37
PIF_VALUE: 26
PIF_VALUE: 18
PIF_VALUE: 20
PIF_VALUE: 21
PIF_VALUE: 45
PIF_VALUE: 22
PIF_VALUE: 25
PIF_VALUE: 19
PIF_VALUE: 22
PIF_VALUE: 20
PIF_VALUE: 43
PIF_VALUE: 32
PIF_VALUE: 26
PIF_VALUE: 18
PIF_VALUE: 36
PIF_VALUE: 41
PIF_VALUE: 23
PIF_VALUE: 29
PIF_VALUE: 19
PIF_VALUE: 42
PIF_VALUE: 72
PIF_VALUE: 26
PIF_VALUE: 24
PIF_VALUE: 29
PIF_VALUE: 27
PIF_VALUE: 24
PIF_VALUE: 25
PIF_VALUE: 45
PIF_VALUE: 33
PIF_VALUE: 56
PIF_VALUE: 23
PIF_VALUE: 36
PIF_VALUE: 73
PIF_VALUE: 35
PIF_VALUE: 38
PIF_VALUE: 30
PIF_VALUE: 31
PIF_VALUE: 23
PIF_VALUE: 31
PIF_VALUE: 71
PIF_VALUE: 24
PIF_VALUE: 27
PIF_VALUE: 36
PIF_VALUE: 28
PIF_VALUE: 44
PIF_VALUE: 24
PIF_VALUE: 47
PIF_VALUE: 34
PIF_VALUE: 26
PIF_VALUE: 28
PIF_VALUE: 76
PIF_VALUE: 34
PIF_VALUE: 31
PIF_VALUE: 36
PIF_VALUE: 32
PIF_VALUE: 22
PIF_VALUE: 37
PIF_VALUE: 23
PIF_VALUE: 32
PIF_VALUE: 84
PIF_VALUE: 26
PIF_VALUE: 33
PIF_VALUE: 29
PIF_VALUE: 44
PIF_VALUE: 17
PIF_VALUE: 27
PIF_VALUE: 29
PIF_VALUE: 24
PIF_VALUE: 40
PIF_VALUE: 70
PIF_VALUE: 18
PIF_VALUE: 40
PIF_VALUE: 24
PIF_VALUE: 22
PIF_VALUE: 20
PIF_VALUE: 26
PIF_VALUE: 29
PIF_VALUE: 53
PIF_VALUE: 85
PIF_VALUE: 35
PIF_VALUE: 23
PIF_VALUE: 25
PIF_VALUE: 26
PIF_VALUE: 42
PIF_VALUE: 17
PIF_VALUE: 18
PIF_VALUE: 23
PIF_VALUE: 24
PIF_VALUE: 32
PIF_VALUE: 26
PIF_VALUE: 36
PIF_VALUE: 35
PIF_VALUE: 41
PIF_VALUE: 29
PIF_VALUE: 18
PIF_VALUE: 21
PIF_VALUE: 39
PIF_VALUE: 34
PIF_VALUE: 38
PIF_VALUE: 29
PIF_VALUE: 41
PIF_VALUE: 28
PIF_VALUE: 23
PIF_VALUE: 22
PIF_VALUE: 37
PIF_VALUE: 19
PIF_VALUE: 71
PIF_VALUE: 26
PIF_VALUE: 42
PIF_VALUE: 23
PIF_VALUE: 82
PIF_VALUE: 28
PIF_VALUE: 20
PIF_VALUE: 23
PIF_VALUE: 27
PIF_VALUE: 38
PIF_VALUE: 44
PIF_VALUE: 22
PIF_VALUE: 41
PIF_VALUE: 24
PIF_VALUE: 24
PIF_VALUE: 30
PIF_VALUE: 41
PIF_VALUE: 30
PIF_VALUE: 22
PIF_VALUE: 25
PIF_VALUE: 15
PIF_VALUE: 20
PIF_VALUE: 36
PIF_VALUE: 26
PIF_VALUE: 26
PIF_VALUE: 28
PIF_VALUE: 21
PIF_VALUE: 20
PIF_VALUE: 30
PIF_VALUE: 22
PIF_VALUE: 38
PIF_VALUE: 23
PIF_VALUE: 26
PIF_VALUE: 20
PIF_VALUE: 41
PIF_VALUE: 26

## 2021-01-01 ASSESSMENT — PAIN SCALES - GENERAL
PAINLEVEL_OUTOF10: 10
PAINLEVEL_OUTOF10: 0
PAINLEVEL_OUTOF10: 8
PAINLEVEL_OUTOF10: 0
PAINLEVEL_OUTOF10: 10
PAINLEVEL_OUTOF10: 0
PAINLEVEL_OUTOF10: 8
PAINLEVEL_OUTOF10: 8
PAINLEVEL_OUTOF10: 0
PAINLEVEL_OUTOF10: 0
PAINLEVEL_OUTOF10: 10
PAINLEVEL_OUTOF10: 0
PAINLEVEL_OUTOF10: 9

## 2021-01-01 ASSESSMENT — ENCOUNTER SYMPTOMS
RESPIRATORY NEGATIVE: 1
ALLERGIC/IMMUNOLOGIC NEGATIVE: 1
ALLERGIC/IMMUNOLOGIC NEGATIVE: 1
SHORTNESS OF BREATH: 0
COLOR CHANGE: 0
APNEA: 0
GASTROINTESTINAL NEGATIVE: 1
DIARRHEA: 1
EYE PAIN: 0
EYES NEGATIVE: 1
TROUBLE SWALLOWING: 0
ABDOMINAL PAIN: 1
VOMITING: 0
BACK PAIN: 1

## 2021-01-01 ASSESSMENT — PAIN DESCRIPTION - PAIN TYPE: TYPE: CHRONIC PAIN

## 2021-01-01 ASSESSMENT — PAIN DESCRIPTION - FREQUENCY: FREQUENCY: CONTINUOUS

## 2021-01-01 ASSESSMENT — PAIN DESCRIPTION - ORIENTATION: ORIENTATION: RIGHT;LEFT

## 2021-01-01 ASSESSMENT — PAIN DESCRIPTION - LOCATION: LOCATION: ABDOMEN

## 2021-02-07 NOTE — ED PROVIDER NOTES
[facility] ED  eMERGENCYdEPARTMENT eNCOUnter      Pt Name: Elizabeth Carty  MRN: [id number]  [place] 1965  Date of evaluation: 2/7/2021  Provider:Phil Morrison PA-C    CHIEF COMPLAINT       Chief Complaint   Patient presents with    Abdominal Pain     has had for 2 months and has had full work up which has been neg.  Back Pain         HISTORY OF PRESENT ILLNESS  (Location/Symptom, Timing/Onset, Context/Setting, Quality, Duration, Modifying Factors, Severity.)   Elizabeth Carty is a 50 y.o. female who presents to the emergency department with complaints of diffuse lower abdominal pain and back pain. Patient does admit that the abdominal pain has been a chronic condition with swelling to her abdominal wall. Patient states that this been ongoing for the past 2 months and has had evaluation by her doctors for it in the past and states that she has been told \"nothing is wrong\". Patient states that the back pain has been ongoing for the past 2 to 3 days. Patient denies any nausea or vomiting but states that she did have some diarrhea over the past 2 days. Patient denies any chest pain, palpitations or shortness of breath. Patient states that she had a mild cough yesterday which has resolved. Patient is a chronic renal failure patient on dialysis states that she has not been to dialysis since Tuesday because she has not felt well    HPI    Nursing Notes were reviewed and I agree. REVIEW OF SYSTEMS    (2-9 systems for level 4, 10 or more for level 5)     Review of Systems   Constitutional: Positive for fatigue. Negative for diaphoresis and fever. HENT: Negative for hearing loss and trouble swallowing. Eyes: Negative for pain. Respiratory: Negative for apnea and shortness of breath. Cardiovascular: Negative for chest pain. Gastrointestinal: Positive for abdominal pain and diarrhea. Negative for vomiting. Endocrine: Negative. Genitourinary: Negative for hematuria. Musculoskeletal: Positive for back pain. Negative for neck pain and neck stiffness. Skin: Negative for color change. Allergic/Immunologic: Negative. Neurological: Negative for dizziness and numbness. Hematological: Negative. Psychiatric/Behavioral: Negative. All other systems reviewed and are negative. Except as noted above the remainder of the review of systems was reviewed and negative.        PAST MEDICAL HISTORY     Past Medical History:   Diagnosis Date    [facility]rial fibrillation ([facility]     Cancer ([facility]     Breast    CHF (congestive heart failure) (HCC)     Chronic kidney disease     [facility]     ESRD (end stage renal disease) ([facility] 2020    Essential hypertension 2020    Heart murmur     Hepatitis C     Hx of CABG 2020    Paroxysmal atrial fibrillation ([facility] 2020         SURGICAL HISTORY       Past Surgical History:   Procedure Laterality Date    BREAST SURGERY       SECTION      CHOLECYSTECTOMY      CORONARY ARTERY BYPASS GRAFT           CURRENT MEDICATIONS       Discharge Medication List as of 2021  5:32 PM      CONTINUE these medications which have NOT CHANGED    Details   Ferric Citrate 1  MG(Fe) TABS Take 210 mg by mouth 3 times daily Pt unsure of dose, DAWHistorical Med      warfarin (COUMADIN) 5 MG tablet Take 1 tablet by mouth daily, Disp-90 tablet,R-1Normal      metoprolol tartrate (LOPRESSOR) 50 MG tablet Take 1 tablet by mouth 2 times daily, Disp-60 tablet, R-3Normal      FLUoxetine (PROZAC) 20 MG capsule Take 1 capsule by mouth daily, Disp-30 capsule, R-1Normal      topiramate (TOPAMAX SPRINKLE) 25 MG capsule Take 25 mg by mouth 2 times dailyHistorical Med      cloNIDine (CATAPRES) 0.1 MG tablet Take 0.1 mg by mouth dailyHistorical Med      diphenhydrAMINE (BENADRYL) 25 MG capsule Take 25 mg by mouth every 6 hours as needed for ItchingHistorical Med aspirin 81 MG chewable tablet Take 1 tablet by mouth daily, Disp-30 tablet, R-3DC to SNF      nystatin (MYCOSTATIN) POWD powder Apply topically 2 times dailyHistorical Med      b complex-C-folic acid (NEPHROCAPS) 1 MG capsule Take 1 capsule by mouth dailyHistorical Med      atorvastatin (LIPITOR) 40 MG tablet Take 40 mg by mouth nightlyHistorical Med      oxyCODONE-acetaminophen (PERCOCET) 5-325 MG per tablet Take 1 tablet by mouth every 4 hours as needed for Pain. Orrie Gastelum Historical Med      ondansetron (ZOFRAN ODT) 4 MG disintegrating tablet Take 1 tablet by mouth every 8 hours as needed for Nausea, Disp-20 tablet,R-0Print      hydrALAZINE (APRESOLINE) 25 MG tablet Take 1 tablet by mouth every 8 hours, Disp-90 tablet, R-3Normal      cyclobenzaprine (FLEXERIL) 5 MG tablet Take 5 mg by mouth 3 times daily as needed for Muscle spasmsHistorical Med             ALLERGIES     Hydrocodone-acetaminophen, Naproxen, Hydrocodone, Quetiapine, and Vicodin [hydrocodone-acetaminophen]    FAMILY HISTORY     History reviewed. No pertinent family history.        SOCIAL HISTORY       Social History     Socioeconomic History    Marital status: Single     Spouse name: None    Number of children: None    Years of education: None    Highest education level: None   Occupational History    None   Social Needs    Financial resource strain: None    Food insecurity     Worry: None     Inability: None    Transportation needs     Medical: None     Non-medical: None   Tobacco Use    Smoking status: Former Smoker    Smokeless tobacco: Never Used   Substance and Sexual Activity    Alcohol use: Never     Frequency: Never    Drug use: Never    Sexual activity: Not Currently   Lifestyle    Physical activity     Days per week: None     Minutes per session: None    Stress: None   Relationships    Social connections     Talks on phone: None     Gets together: None     Attends Yazidism service: None Active member of club or organization: None     Attends meetings of clubs or organizations: None     Relationship status: None    Intimate partner violence     Fear of current or ex partner: None     Emotionally abused: None     Physically abused: None     Forced sexual activity: None   Other Topics Concern    None   Social History Narrative    None       SCREENINGS           PHYSICAL EXAM    (up to 7 forlevel 4, 8 or more for level 5)     ED Triage Vitals [02/07/21 1400]   BP Temp Temp Source Pulse Resp SpO2 Height Weight   136/81 97.7 °F (36.5 °C) Oral 95 22 99 % 5' 3\" (1.6 m) 240 lb (108.9 kg)       Physical Exam  Vitals signs and nursing note reviewed. Constitutional:       General: She is not in acute distress. Appearance: She is well-developed. She is not diaphoretic. HENT:      Head: Normocephalic and atraumatic. Mouth/Throat:      Pharynx: No oropharyngeal exudate. Eyes:      General: No scleral icterus. Conjunctiva/sclera: Conjunctivae normal.      Pupils: Pupils are equal, round, and reactive to light. Neck:      Musculoskeletal: Normal range of motion and neck supple. Trachea: No tracheal deviation. Cardiovascular:      Rate and Rhythm: Normal rate. Heart sounds: Normal heart sounds. Pulmonary:      Effort: Pulmonary effort is normal. No respiratory distress. Breath sounds: Normal breath sounds. Abdominal:      General: Bowel sounds are normal. There is no distension. Palpations: Abdomen is soft. Tenderness: There is abdominal tenderness. Musculoskeletal: Normal range of motion. Skin:     General: Skin is warm and dry. Findings: No erythema or rash. Neurological:      Mental Status: She is alert and oriented to person, place, and time. Cranial Nerves: No cranial nerve deficit. Motor: No abnormal muscle tone. Psychiatric:         Behavior: Behavior normal.         Thought Content:  Thought content normal. Judgment: Judgment normal.           DIAGNOSTIC RESULTS     EKG:    Normal sinus rhythm, rate 96 bpm, no acute ST elevation      RADIOLOGY:   Non-plain film images such as CT, Ultrasound and MRI are read by the radiologist. Plain radiographic images are visualized and preliminarilyinterpreted by Sabrina Sánchez PA-C with the below findings:        Interpretation per the Radiologist below, if available at the time of this note:    XR CHEST PORTABLE   Final Result   STABLE CARDIOMEGALY. CT ABDOMEN PELVIS WO CONTRAST Additional Contrast? None   Final Result      Consolidation right lower lobe, with small effusion. Bilateral small kidneys. Severe diffuse atherosclerotic disease. Diffuse abdominal pelvic wall edema. Cholecystectomy. All CT scans at this facility use dose modulation, iterative reconstruction, and/or weight based dosing when appropriate to reduce radiation dose to as low as reasonably achievable.                    LABS:  Labs Reviewed   COMPREHENSIVE METABOLIC PANEL - Abnormal; Notable for the following components:       Result Value    Sodium 129 (*)     Chloride 87 (*)     CO2 18 (*)     Anion Gap 24 (*)     Glucose 171 (*)     BUN 88 (*)     CREATININE 12.15 (*)     GFR Non- 3.2 (*)     GFR  3.9 (*)     Calcium 7.5 (*)     Albumin 2.7 (*)     AST 43 (*)     Globulin 5.1 (*)     All other components within normal limits    Narrative:     CALL  Wong  [facility] tel. [phone],  BUN and creatinine results called to Nurse Roseanne Collins, 02/07/2021 15:14,  by Honey Song   CBC WITH AUTO DIFFERENTIAL - Abnormal; Notable for the following components:    RBC 3.01 (*)     Hemoglobin 9.0 (*)     Hematocrit 27.3 (*)     RDW 15.1 (*)     Lymphocytes Absolute 0.6 (*)     All other components within normal limits   PHOSPHORUS - Abnormal; Notable for the following components:    Phosphorus 9.5 (*)     All other components within normal limits   LIPASE Narrative:     Alonzo Pacer tel. [phone],  BUN and creatinine results called to Nurse Chris Barnard, 02/07/2021 15:14,  by [place] Hidden   MAGNESIUM    Narrative:     Cheri Manuel tel. [phone],  BUN and creatinine results called to Nurse Chris Barnard, 02/07/2021 15:14,  by [place] Hidden   COVID-19   URINE RT REFLEX TO CULTURE       All other labs were within normal range or not returnedas of this dictation. EMERGENCYDEPARTMENT COURSE and DIFFERENTIAL DIAGNOSIS/MDM:   Vitals:    Vitals:    02/07/21 1515 02/07/21 1537 02/07/21 1600 02/07/21 1630   BP: 136/81 (!) 141/79 (!) 150/85 132/76   Pulse: 96 96 100 96   Resp: 23 19 13   Temp:       TempSrc:       SpO2: 96% 98% 99% 100%   Weight:       Height:           REASSESSMENT        Discussed renal failure and comprehensive metabolic panel with nephrology and patient is okay to be discharged and have dialysis tomorrow as scheduled. Patient has panniculus adiposis and I discussed this with the patient and with her sister. Advised to follow-up with family doctor regarding this and for her chronic back pain she should follow-up with her pain management doctor. MDM    PROCEDURES:    Procedures      FINAL IMPRESSION      1. Panniculus adiposus    2. Chronic low back pain without sciatica, unspecified back pain laterality    3.  Chronic renal failure, stage 5 [facility])          DISPOSITION/PLAN   DISPOSITION Decision To Discharge 02/07/2021 05:30:41 PM      PATIENT REFERRED TO:  Demond Silveira MD  [address] [phone]    Call in 2 days      Meliton Yanez MD  [address]  [phone]    Call in 2 days        DISCHARGE MEDICATIONS:  Discharge Medication List as of 2/7/2021  5:32 PM          (Please note that portions of this note were completed with a voice recognition program.  Efforts were made to edit the dictations but occasionally words are mis-transcribed.)    KWAN Wong PA-C 02/07/21 [address], KWAN  02/07/21 1821

## 2021-02-07 NOTE — ED NOTES
Pt COVID swab performed. Specimen labeled and sent to lab via tube system. Order for rapid test verified with provider. PT tolerated well.        Donald Phillips RN  02/07/21 1459

## 2021-02-07 NOTE — ED NOTES
PCT at the bedside to assist pt with dressing and transfer to chair     Larry Lino RN  02/07/21 1800

## 2021-02-07 NOTE — ED NOTES
Pt unable to urinate. Provider aware. Pt verbalized understanding of DC instructions to f/o with pain management for ongoing back pain, PCP for management of skin irritation to panus and that she must have dialysis tomorrow.      Nazario Quinteros RN  02/07/21 2235

## 2021-02-07 NOTE — ED NOTES
Pt yelling for help  Pt states that she is uncomfortable and wants something for pain     Jreemie Tomas RN  02/07/21 1675

## 2021-02-10 NOTE — ED NOTES
Patient called for assistance back from bathroom. Patient transferred to bed with minimal assistance of nurse.  Patient given warm blanket     Glen Lesch, RN  02/10/21 5644

## 2021-02-10 NOTE — ED NOTES
Labs obtained by this RN, labeled and sent to lab via tube system.       Robson Santiago RN  02/10/21 6264

## 2021-02-10 NOTE — ED NOTES
Patient assisted to bathroom via wc. Patient transferred with minimal assistance of Nurse. Patient directed to call for assistance when done.       Victorino Oquendo RN  02/10/21 5867

## 2021-02-10 NOTE — ED NOTES
Bed: 21  Expected date:   Expected time:   Means of arrival:   Comments:  63 y/o gen weakness VSS     Kamlesh Javed, MARY  02/10/21 1500

## 2021-02-10 NOTE — ED NOTES
Dr. Po May at the bedside at this time. Assessment completed.       Victorino Oquendo RN  02/10/21 8884

## 2021-02-10 NOTE — ED PROVIDER NOTES
[facility] ED  eMERGENCY dEPARTMENT eNCOUnter      Pt Name: Dottie Hu  MRN: [id number]  [place] 1965  Date of evaluation: 2/10/2021  Provider: Aiyana Yepez MD    CHIEF COMPLAINT       Chief Complaint   Patient presents with    Extremity Weakness     while walking to car         HISTORY OF PRESENT ILLNESS   (Location/Symptom, Timing/Onset,Context/Setting, Quality, Duration, Modifying Factors, Severity)  Note limiting factors. Dottie Hu is a 50 y.o. female who presents to the emergency department with a history of walking to her car from her appointment and she got very weak and tired and slowly put herself onto her knees. No loss of consciousness she did not fall to the pavement hit her head or any other part of her body. Patient had no chest pain headache blurry double vision nausea vomiting diarrhea diaphoresis. No fever or chills. Patient does state that this happens occasionally to her. EMS was called and she was brought to the emergency department for further evaluation. Patient did have a normal dialysis yesterday with no problem. Patient also complains that she has low back pain and bilateral leg pain. She takes Percocet for this pain. This pain has been ongoing for years. She states that she has some type of disease which hardens the fat and causes the pain and there is really nothing to do about it. This is chronic and ongoing in nature. Pain is worse with movement or palpation better with rest does not radiate dull and achy in nature currently a 6/10. Patient last had Percocet this morning. HPI    NursingNotes were reviewed. REVIEW OF SYSTEMS    (2-9 systems for level 4, 10 or more for level 5)     Review of Systems   Constitutional: Positive for fatigue. Eyes: Negative. Respiratory: Negative. Gastrointestinal: Negative. Endocrine: Negative. Genitourinary: Negative. Musculoskeletal: Negative. Skin: Negative. Allergic/Immunologic: Negative. Neurological: Negative. Hematological: Negative. Psychiatric/Behavioral: Negative. Except as noted above the remainder of the review of systems was reviewed and negative.        PAST MEDICAL HISTORY     Past Medical History:   Diagnosis Date    Atrial fibrillation ([facility]     Cancer ([facility]     Breast    CHF (congestive heart failure) (HCC)     Chronic kidney disease     Diabetes mellitus ([facility]     ESRD (end stage renal disease) ([facility] 2020    Essential hypertension 2020    Heart murmur     Hepatitis C     Hx of CABG 2020    Paroxysmal atrial fibrillation ([facility] 2020         SURGICALHISTORY       Past Surgical History:   Procedure Laterality Date    BREAST SURGERY       SECTION      CHOLECYSTECTOMY      CORONARY ARTERY BYPASS GRAFT           CURRENT MEDICATIONS       Current Discharge Medication List      CONTINUE these medications which have NOT CHANGED    Details   Ferric Citrate 1  MG(Fe) TABS Take 210 mg by mouth 3 times daily Pt unsure of dose      ondansetron (ZOFRAN ODT) 4 MG disintegrating tablet Take 1 tablet by mouth every 8 hours as needed for Nausea  Qty: 20 tablet, Refills: 0      warfarin (COUMADIN) 5 MG tablet Take 1 tablet by mouth daily  Qty: 90 tablet, Refills: 1    Associated Diagnoses: Paroxysmal atrial fibrillation (HCC)      hydrALAZINE (APRESOLINE) 25 MG tablet Take 1 tablet by mouth every 8 hours  Qty: 90 tablet, Refills: 3      metoprolol tartrate (LOPRESSOR) 50 MG tablet Take 1 tablet by mouth 2 times daily  Qty: 60 tablet, Refills: 3      FLUoxetine (PROZAC) 20 MG capsule Take 1 capsule by mouth daily  Qty: 30 capsule, Refills: 1      topiramate (TOPAMAX SPRINKLE) 25 MG capsule Take 25 mg by mouth 2 times daily      cloNIDine (CATAPRES) 0.1 MG tablet Take 0.1 mg by mouth daily      diphenhydrAMINE (BENADRYL) 25 MG capsule Take 25 mg by mouth every 6 hours as needed for Itching aspirin 81 MG chewable tablet Take 1 tablet by mouth daily  Qty: 30 tablet, Refills: 3      nystatin (MYCOSTATIN) POWD powder Apply topically 2 times daily      b complex-C-folic acid (NEPHROCAPS) 1 MG capsule Take 1 capsule by mouth daily      atorvastatin (LIPITOR) 40 MG tablet Take 40 mg by mouth nightly      cyclobenzaprine (FLEXERIL) 5 MG tablet Take 5 mg by mouth 3 times daily as needed for Muscle spasms      oxyCODONE-acetaminophen (PERCOCET) 5-325 MG per tablet Take 1 tablet by mouth every 4 hours as needed for Pain. [employer] [facility]     Hydrocodone-acetaminophen, Naproxen, Hydrocodone, Quetiapine, and Vicodin [hydrocodone-acetaminophen]    FAMILY HISTORY     History reviewed. No pertinent family history.        SOCIAL HISTORY       Social History     Socioeconomic History    Marital status: Single     Spouse name: None    Number of children: None    Years of education: None    Highest education level: None   Occupational History    None   Social Needs    Financial resource strain: None    Food insecurity     Worry: None     Inability: None    Transportation needs     Medical: None     Non-medical: None   Tobacco Use    Smoking status: Former Smoker    Smokeless tobacco: Never Used   Substance and Sexual Activity    Alcohol use: Never     Frequency: Never    Drug use: Never    Sexual activity: Not Currently   Lifestyle    Physical activity     Days per week: None     Minutes per session: None    Stress: None   Relationships    Social connections     Talks on phone: None     Gets together: None     Attends Christianity service: None     Active member of club or organization: None     Attends meetings of clubs or organizations: None     Relationship status: None    Intimate partner violence     Fear of current or ex partner: None     Emotionally abused: None     Physically abused: None     Forced sexual activity: None   Other Topics Concern    None   Social History Narrative    None SCREENINGS    Caleb Coma Scale  Eye Opening: Spontaneous  Best Verbal Response: Oriented  Best Motor Response: Obeys commands  Caleb Coma Scale Score: 15 @FLOW([id number])@      PHYSICAL EXAM    (up to 7 for level 4, 8 or more for level 5)     ED Triage Vitals [02/10/21 1501]   BP Temp Temp src Pulse Resp SpO2 Height Weight   (!) 105/55 -- -- 92 16 98 % 5' 3\" (1.6 m) 240 lb (108.9 kg)       Physical Exam  Constitutional:       Appearance: She is obese. HENT:      Head: Normocephalic and atraumatic. Nose: Nose normal.      Mouth/Throat:      Mouth: Mucous membranes are moist.   Eyes:      Extraocular Movements: Extraocular movements intact. Conjunctiva/sclera: Conjunctivae normal.   Neck:      Musculoskeletal: Normal range of motion. Cardiovascular:      Rate and Rhythm: Normal rate and regular rhythm. Pulses: Normal pulses. Pulmonary:      Effort: Pulmonary effort is normal.      Breath sounds: Normal breath sounds. Abdominal:      Palpations: Abdomen is soft. Musculoskeletal: Normal range of motion. Skin:     General: Skin is warm. Capillary Refill: Capillary refill takes less than 2 seconds. Neurological:      General: No focal deficit present. Mental Status: She is alert and oriented to person, place, and time. Psychiatric:         Mood and Affect: Mood normal.         DIAGNOSTIC RESULTS     EKG: All EKG's are interpreted by the Emergency Department Physician who either signs or Co-signsthis chart in the absence of a cardiologist.    EKG shows normal sinus rhythm 91 bpm SC interval 182 ms QRS duration 76 ms  ms no acute ST elevations or depressions no ischemic changes noted.     RADIOLOGY:   Non-plain filmimages such as CT, Ultrasound and MRI are read by the radiologist. Plain radiographic images are visualized and preliminarily interpreted by the emergency physician with the below findings: Interpretation per the Radiologist below, if available at the time ofthis note:    XR CHEST PORTABLE   Final Result    [facility]            ED BEDSIDE ULTRASOUND:   Performed by ED Physician - none    LABS:  Labs Reviewed   COMPREHENSIVE METABOLIC PANEL - Abnormal; Notable for the following components:       Result Value    Sodium 134 (*)     Chloride 90 (*)     CO2 15 (*)     Anion Gap 29 (*)     Glucose 176 (*)     BUN 62 (*)     CREATININE 8.98 (*)     GFR Non- 4.6 (*)     GFR  5.6 (*)     Calcium 7.9 (*)     Albumin 2.7 (*)     ALT 45 (*)     AST 60 (*)     Globulin 4.8 (*)     All other components within normal limits    Narrative:     [facility]  [facility] [phone],  CREA and TROP results called to and read back by Lavone Flax, 02/10/2021  16:34, by Alaina Driscoll   TROPONIN - Abnormal; Notable for the following components:    Troponin 0.252 (*)     All other components within normal limits    Narrative:     CALL  [facility] tel. [phone],  CREA and TROP results called to and read back by Lavone Flax, 02/10/2021  16:34, by Alaina Driscoll   CBC WITH AUTO DIFFERENTIAL - Abnormal; Notable for the following components:    WBC 15.0 (*)     RBC 3.11 (*)     Hemoglobin 8.9 (*)     Hematocrit 29.5 (*)     MCHC 30.1 (*)     RDW 16.1 (*)     Neutrophils Absolute 13.2 (*)     Lymphocytes Absolute 0.3 (*)     Monocytes Absolute 1.4 (*)     All other components within normal limits   LIPASE    Narrative:     CALL  [facility] tel. [phone],  CREA and TROP results called to and read back by Lavone Flax, 02/10/2021  16:34, by [address]    Narrative:     [facility] tel. [phone],  CREA and TROP results called to and read back by Lavone Flax, 02/10/2021  16:34, by Alaina Driscoll   TSH WITHOUT REFLEX    Narrative:     [facility] tel. [phone],  CREA and TROP results called to and read back by Lavone Flax, 02/10/2021  16:34, by Alaina Driscoll   T4, FREE    Narrative: CALL  [facility] tel. [phone],  CREA and TROP results called to and read back by Jacque Stringer, 02/10/2021  16:34, by [id number]       All other labs were within normal range or not returned as of this dictation. EMERGENCY DEPARTMENT COURSE and DIFFERENTIAL DIAGNOSIS/MDM:   Vitals:    Vitals:    02/10/21 1501 02/10/21 1701   BP: (!) 105/55 106/76   Pulse: 92 91   Resp: 16 16   SpO2: 98% 99%   Weight: 240 lb (108.9 kg)    Height: 5' 3\" (1.6 m)            CONSULTS:  None    PROCEDURES:  Unless otherwise noted below, none     Procedures    FINAL IMPRESSION      1. Episode of generalized weakness    2. Other chronic pain          DISPOSITION/PLAN   DISPOSITION Decision To Discharge 02/10/2021 05:05:43 PM  Patient is doing much better and was walking with only minimal difficulty. She was assisted to standing and with the walker was able to walk to the bathroom without any difficulty. Patient's pain in her back and lower extremities are chronic she has had at least over 10 years. She has been evaluated for it again recently I believe 2 days ago. Patient has no neurovascular deficits. No chest pain no abdominal pain no headache blurry double vision. No signs of injury. Patient's blood work other than a slightly elevated white count is unremarkable. Patient in good and stable condition for discharge home.     PATIENT REFERRED TO:  Sue Hartman MD  [address] [phone]    In 1 day        DISCHARGE MEDICATIONS:  Current Discharge Medication List             (Please note that portions of this note were completed with a voice recognition program.  Efforts were made to edit the dictations but occasionally words are mis-transcribed.)    Ham Tapia MD (electronically signed)  Attending Emergency Physician        Ham Tapia MD  02/10/21 [phone]) [phone]

## 2021-02-12 PROBLEM — R77.8 ELEVATED TROPONIN: Status: ACTIVE | Noted: 2021-01-01

## 2021-02-12 PROBLEM — M62.82 RHABDOMYOLYSIS: Status: ACTIVE | Noted: 2021-01-01

## 2021-02-12 PROBLEM — L03.90 CELLULITIS: Status: ACTIVE | Noted: 2021-01-01

## 2021-02-12 PROBLEM — R79.89 ELEVATED TROPONIN: Status: ACTIVE | Noted: 2021-01-01

## 2021-02-12 NOTE — PROGRESS NOTES
10.7*  --   --    MG 2.1 2.2  --   --      PT/INR:  Recent Labs     02/11/21 2115 02/12/21  0913   PROTIME 43.0* 50.5*   INR 4.6 5.7*     APTT:No results for input(s): APTT in the last 72 hours. LIVER PROFILE:  Recent Labs     02/10/21  1515 02/11/21 2115 02/12/21  0527   AST 60* 117* 98*   ALT 45* 76* 73*   BILITOT 0.7 0.7 0.6   ALKPHOS 95 95 83       Imaging Last 24 Hours:  Ct Head Wo Contrast    Result Date: 2/12/2021  CT Brain Contrast medium:  Not utilized. History: Altered mental status, headache, neck pain, possible fall Comparison:  CT brain, July 26, 2020 Findings: Extra-axial spaces:  Normal. Intracranial hemorrhage:  None. Ventricular system:  Normal for age. Basal Cisterns:  Normal. Cerebral Parenchyma:  Normal. Midline Shift:  None. Cerebellum:  Normal. Paranasal sinuses and mastoid air cells:  Normal. Visualized Orbits:  Normal.     Impression: No acute findings. . All CT scans at this facility use dose modulation, iterative reconstruction, and/or weight based dosing when appropriate to reduce radiation dose to as low as reasonably achievable. Ct Cervical Spine Wo Contrast    Result Date: 2/12/2021  CT cervical spine without intravenous contrast medium. HISTORY:  Altered mental status, headache, neck pain. fall TECHNICAL FACTORS: CT cervical spine obtained and formatted as 2.5 mm contiguous axial images from skull base to the level of. Sagittal and coronal reconstructions were obtained during postprocessing. No contrast medium was utilized. COMPARISON: None FINDINGS: Cervical vertebral bodies are normal in height and alignment Atlantooccipital articulation maintained. Atlantoaxial interval preserved. Neural foramina intact. Mild disc space narrowing C5-C6. No fractures, dislocations, bone lesions. Limited imaging lung apices without anomaly. Carotid arteries and soft tissues are without anomaly. No fracture.  All CT scans at this facility use dose modulation, iterative reconstruction, and/or weight based dosing when appropriate to reduce radiation dose to as low as reasonably achievable. Xr Chest Portable    Result Date: 2/12/2021  EXAMINATION: CHEST PORTABLE VIEW  CLINICAL HISTORY: Fatigue COMPARISONS: February 5, 2021  FINDINGS: Single  views of the chest is submitted. There are multiple median sternotomy wires. There is an endovascular stent overlying the left upper chest. The cardiac silhouette is enlarged Pulmonary vascular unremarkable. Right sided trachea. No focal infiltrates. No Pneumothoraces. NO ACUTE ACTIVE CARDIOPULMONARY PROCESS    Xr Chest Portable    Result Date: 2/10/2021  EXAMINATION: CHEST PORTABLE VIEW  CLINICAL HISTORY: Extremity weakness COMPARISONS: February 7, 2021  FINDINGS: Single  views of the chest is submitted. There are multiple median sternotomy wires. The cardiac silhouette is enlarged Pulmonary vascular unremarkable. Right sided trachea. No focal infiltrates. No Pneumothoraces. NO ACUTE ACTIVE CARDIOPULMONARY PROCESS    Us Dup Lower Extremities Bilateral Venous    Result Date: 2/12/2021  US DUP LOWER EXTREMITIES BILATERAL VENOUS CLINICAL HISTORY: Confusion, agitation COMPARISONS: FINDINGS: Duplex color ultrasound as well as  both gray scale and spectral Doppler ultrasound of the deep venous system of both the left land right lower extremity from the inguinal ligaments to the popliteal fossa was performed. There are no findings of deep venous thrombus in the visualized vessels of the left or right  lower extremity. NO FINDINGS OF  DEEP VENOUS THROMBUS IN THE VISUALIZED VESSELS OF THE LEFT OR RIGHT  LOWER EXTREMITY.     Assessment//Plan           Hospital Problems           Last Modified POA    * (Principal) Cellulitis 2/12/2021 Yes    CHF (congestive heart failure) ([address] 2/12/2021 Yes    Coronary artery disease of native artery of native heart with stable angina pectoris ([facility] 2/12/2021 Yes    ESRD (end stage renal disease) ([facility] 2/12/2021 Yes    Essential hypertension 2/12/2021 Yes    End-stage renal disease on hemodialysis ([address] 2/12/2021 Yes    A-fib ([facility] 2/12/2021 Yes    Elevated troponin 2/12/2021 Yes    Rhabdomyolysis 2/12/2021 Yes    Hyperlipidemia 2/12/2021 Yes    Major depression 2/12/2021 Yes    Overview Signed 2/12/2021  1:10 AM by Jeffrey Almendarez RN, NP     Last Assessment & Plan:   Assessment:   Stable chronic depression    PLAN:   -Continue home Prozac  first diagnosed in 2000, was suicidal, situational         Abdominal wall cellulitis 2/12/2021 Yes    Calciphylaxis 2/12/2021 Yes        Assessment & Plan  1) abd wall cellulitis  2) encephalopathy due to uremia  3) non compliance  4) ESRD on HD  5) Rhabdomyolysis    Continue with maintenance hemodialysis. Continue with IV antibiotics. Neurology evaluation for altered mental status. CT head and cervical is negative. Repeat CK. Avoid fluid overload. Follow-up cultures.   Electronically signed by Sohan Ceja MD on 2/12/21 at 12:19 PM EST

## 2021-02-12 NOTE — CONSULTS
Infectious Diseases Inpatient Consult Note      Reason for Consult:   Cellulitis  Requesting Physician:   Bibiana Coley NP  Primary Care Physician:  Coty aPrtida MD  History Obtained From:   Pt, EPIC    Admit Date: 2021  Hospital Day: 2      HISTORY OF PRESENT ILLNESS:  This is a 50 y.o. female was admitted to [facility]  from home through ER with increased obtundation and decreased responsiveness. Patient skipped dialysis, her last dialysis was on Monday. Patient was found to have extensive wounds and skin discoloration that look to be related to calciphylaxis. Was started on IV vancomycin and Zosyn for cellulitis. Patient has end-stage renal disease on hemodialysis through left upper extremity AV fistula, was noted to have large ecchymotic area over the fistula site. Patient had CT of head and C-spine because of altered mentation and recent fall without acute finding. No fevers during this admission. + leukocytosis.     CHIEF COMPLAINT:      Past Medical History:   Diagnosis Date    Atrial fibrillation ([address]     Cancer (HCC)     Breast    CHF (congestive heart failure) (HCC)     Chronic kidney disease     Diabetes mellitus ([address]     ESRD (end stage renal disease) ([address] 2020    Essential hypertension 2020    Heart murmur     Hepatitis C     Hx of CABG 2020    Paroxysmal atrial fibrillation ([address] 2020       Past Surgical History:   Procedure Laterality Date    BREAST SURGERY       SECTION      CHOLECYSTECTOMY      CORONARY ARTERY BYPASS GRAFT         Current Medications:     sodium chloride flush  10 mL Intravenous 2 times per day    piperacillin-tazobactam  2,250 mg Intravenous Q8H    vancomycin (VANCOCIN) intermittent dosing (placeholder)   Other RX Placeholder    aspirin  81 mg Oral Daily    atorvastatin  40 mg Oral Nightly    b complex-C-folic acid  1 capsule Oral Daily    cloNIDine  0.1 mg Oral Daily    Ferric Citrate  210 mg Oral TID WC    FLUoxetine  20 mg Oral Daily    hydrALAZINE  25 mg Oral BID    metoprolol tartrate  50 mg Oral BID    topiramate  25 mg Oral BID    warfarin (COUMADIN) daily dosing (placeholder)   Other RX Placeholder       Allergies:  Hydrocodone-acetaminophen, Naproxen, Hydrocodone, Quetiapine, and Vicodin [hydrocodone-acetaminophen]    Social History     Socioeconomic History    Marital status: Single     Spouse name: Not on file    Number of children: Not on file    Years of education: Not on file    Highest education level: Not on file   Occupational History    Not on file   Social Needs    Financial resource strain: Not on file    Food insecurity     Worry: Not on file     Inability: Not on file    Transportation needs     Medical: Not on file     Non-medical: Not on file   Tobacco Use    Smoking status: Former Smoker    Smokeless tobacco: Never Used   Substance and Sexual Activity    Alcohol use: Never     Frequency: Never    Drug use: Never    Sexual activity: Not Currently   Lifestyle    Physical activity     Days per week: Not on file     Minutes per session: Not on file    Stress: Not on file   Relationships    Social connections     Talks on phone: Not on file     Gets together: Not on file     Attends Yarsani service: Not on file     Active member of club or organization: Not on file     Attends meetings of clubs or organizations: Not on file     Relationship status: Not on file    Intimate partner violence     Fear of current or ex partner: Not on file     Emotionally abused: Not on file     Physically abused: Not on file     Forced sexual activity: Not on file   Other Topics Concern    Not on file   Social History Narrative    Not on file         Family History:   History reviewed. No pertinent family history.     Review of Systems  unable to provide ROS because of altered mentation      Physical Exam  Vitals:    02/12/21 0100 02/12/21 0130 02/12/21 0220 02/12/21 0227   BP: 94/66 105/67 (!) native heart with stable angina pectoris ([facility]    ESRD (end stage renal disease) ([facility]    Essential hypertension    Hx of CABG    Paroxysmal atrial fibrillation (HCC)    CHF (congestive heart failure), NYHA class I, acute on chronic, combined ([facility]    Uremia    Goals of care, counseling/discussion    Anemia due to chronic kidney disease, on chronic dialysis ([facility]    MDD (major depressive disorder), recurrent severe, without psychosis [facility]    A-fib ([facility]    Renal failure    Hyperkalemia    Dyspnea on exertion    Abrasion of back    Atrial fibrillation with rapid ventricular response (HCC)    Cellulitis    Elevated troponin    Rhabdomyolysis    Hyperlipidemia    Major depression       PLAN:  · Continue IV Zosyn and Vanco for now  · D/C Vanco if negative MRSA  · Check blood cultures and accordingly adjust antibiotic therapy  · Continue local wound care  · Follow-up CBC        Royce Dillard MD

## 2021-02-12 NOTE — PROGRESS NOTES
Pharmacy Note  Vancomycin Consult    [facility] Renae French Blood is a 50 y.o. female started on Vancomycin for cellulitis; consult received from [facility] NP to manage therapy. Also receiving the following antibiotics: Zosyn. Patient Active Problem List   Diagnosis    Angina pectoris, unspecified ([facility]    CHF (congestive heart failure) ([facility])    NSTEMI (non-ST elevated myocardial infarction) ([facility]    Coronary artery disease of native artery of native heart with stable angina pectoris ([address]    ESRD (end stage renal disease) ([address]    Essential hypertension    Hx of CABG    Paroxysmal atrial fibrillation ([address]    CHF (congestive heart failure), NYHA class I, acute on chronic, combined ([address]    Uremia    Goals of care, counseling/discussion    Anemia due to chronic kidney disease, on chronic dialysis ([address]    MDD (major depressive disorder), recurrent severe, without psychosis ([facility]    A-fib ([facility]    Renal failure    Hyperkalemia    Dyspnea on exertion    Abrasion of back    Atrial fibrillation with rapid ventricular response ([facility])    Cellulitis    Elevated troponin    Rhabdomyolysis    Hyperlipidemia    Major depression     Allergies:  Hydrocodone-acetaminophen, Naproxen, Hydrocodone, Quetiapine, and Vicodin [hydrocodone-acetaminophen]     Temp max: 97.7F    Recent Labs     02/10/21  1515 02/10/21  1640 02/11/21  2115 02/12/21  0248   BUN 62*  --  78*  --    CREATININE 8.98*  --  9.68* 7.5*   WBC  --  15.0* 14.3*  --      No intake or output data in the 24 hours ending 02/12/21 0324    Culture Date      Source                       Results  2/12/21                blood                          in process  2/12/21                wound                         in process                     Ht Readings from Last 1 Encounters:   02/11/21 5' 3\" (1.6 m)        Wt Readings from Last 1 Encounters:   02/11/21 230 lb (104.3 kg)       Body mass index is 40.74 kg/m².     Estimated Creatinine Clearance: 10 mL/min (A) (based on SCr of 7.5 mg/dL [facility])). Goal Trough Level: 10-25 mcg/mL    Assessment/Plan:  Vancomycin 1000mg initiated in ER 2/11/21 2330. Subsequent dosing following each HD treatment will be based on random levels pre-HD. HD schedule at home MWF, no orders yet for inpatient. Timing of trough level will be determined based on culture results, renal function, and clinical response. Thank you for the consult. Will continue to follow. TAMMIE Andrew. Ph.  2/12/2021  3:30 AM

## 2021-02-12 NOTE — CARE COORDINATION
ATTEMPTED TO MEET WITH PATIENT TO DISCUSS DC PLANNING, PATIENT MOANING, WRITHING IN BED. NOT HOLDING A CONVERSATION AT THIS TIME. 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 DECLINES REHAB, SNF STATES SHE WANTS HER SISTER TO RETURN HOME AND REQUEST [facility] FOR PT/OT, NURSE AT [place].  ALSO REQUESTING HOSPITAL BED, [place], [place], PULSE OX AT [place].

## 2021-02-12 NOTE — PROGRESS NOTES
[facility] Occupational Therapy      Date: 2021  Patient Name: Usha Rangel        MRN: [id number]  Account: [de-identified]   : 1965  (50 y.o.)  Room: [place] [address]    Chart reviewed, attempted OT at [phone] for occupational therapy evaluation. Patient not seen 2° to:    Pt. off floor for test/procedure: Patient off floor for dialysis. Will attempt again when able.     Electronically signed by Roxy Bamberger, OTR/L on 2021 at 3:06 PM

## 2021-02-12 NOTE — PROGRESS NOTES
Physical Therapy Missed Treatment   Facility/Department: [facility] MED SURG S225/[id number]    NAME: Norah Urbina    : 1965 (50 y.o.)  MRN: [id number]    Account: [de-identified]  Gender: female      PT evaluation and treatment orders received. Chart reviewed. PT evaluation attempted. Pt currently off floor for hemodialysis. Nursing staff aware. Will follow and attempt PT evaluation again at earliest availability.        Javier Simpson, PT, 21 at 2:49 PM

## 2021-02-12 NOTE — ED PROVIDER NOTES
fibrillation ([facility]     Cancer ([facility]     Breast    CHF (congestive heart failure) (HCC)     Chronic kidney disease     Diabetes mellitus [facility]     ESRD (end stage renal disease) ([facility] 2020    Essential hypertension 2020    Heart murmur     Hepatitis C     Hx of CABG 2020    Paroxysmal atrial fibrillation ([facility] 2020         SURGICAL HISTORY       Past Surgical History:   Procedure Laterality Date    BREAST SURGERY       SECTION      CHOLECYSTECTOMY      CORONARY ARTERY BYPASS GRAFT           CURRENT MEDICATIONS       Previous Medications    ASPIRIN 81 MG CHEWABLE TABLET    Take 1 tablet by mouth daily    ATORVASTATIN (LIPITOR) 40 MG TABLET    Take 40 mg by mouth nightly    B COMPLEX-C-FOLIC ACID (NEPHROCAPS) 1 MG CAPSULE    Take 1 capsule by mouth daily    CLONIDINE (CATAPRES) 0.1 MG TABLET    Take 0.1 mg by mouth daily    CYCLOBENZAPRINE (FLEXERIL) 5 MG TABLET    Take 5 mg by mouth 3 times daily as needed for Muscle spasms    DIPHENHYDRAMINE (BENADRYL) 25 MG CAPSULE    Take 25 mg by mouth every 6 hours as needed for Itching    FERRIC CITRATE 1  MG(FE) TABS    Take 210 mg by mouth 3 times daily Pt unsure of dose    FLUOXETINE (PROZAC) 20 MG CAPSULE    Take 1 capsule by mouth daily    HYDRALAZINE (APRESOLINE) 25 MG TABLET    Take 1 tablet by mouth every 8 hours    METOPROLOL TARTRATE (LOPRESSOR) 50 MG TABLET    Take 1 tablet by mouth 2 times daily    NYSTATIN (MYCOSTATIN) POWD POWDER    Apply topically 2 times daily    ONDANSETRON (ZOFRAN ODT) 4 MG DISINTEGRATING TABLET    Take 1 tablet by mouth every 8 hours as needed for Nausea    OXYCODONE-ACETAMINOPHEN (PERCOCET) 5-325 MG PER TABLET    Take 1 tablet by mouth every 4 hours as needed for Pain. .    TOPIRAMATE (TOPAMAX SPRINKLE) 25 MG CAPSULE    Take 25 mg by mouth 2 times daily    WARFARIN (COUMADIN) 5 MG TABLET    Take 1 tablet by mouth daily       ALLERGIES     Hydrocodone-acetaminophen, Naproxen, Hydrocodone, Quetiapine, and Vicodin [hydrocodone-acetaminophen]    FAMILY HISTORY     History reviewed. No pertinent family history. SOCIAL HISTORY       Social History     Socioeconomic History    Marital status: Single     Spouse name: None    Number of children: None    Years of education: None    Highest education level: None   Occupational History    None   Social Needs    Financial resource strain: None    Food insecurity     Worry: None     Inability: None    Transportation needs     Medical: None     Non-medical: None   Tobacco Use    Smoking status: Former Smoker    Smokeless tobacco: Never Used   Substance and Sexual Activity    Alcohol use: Never     Frequency: Never    Drug use: Never    Sexual activity: Not Currently   Lifestyle    Physical activity     Days per week: None     Minutes per session: None    Stress: None   Relationships    Social connections     Talks on phone: None     Gets together: None     Attends Anglican service: None     Active member of club or organization: None     Attends meetings of clubs or organizations: None     Relationship status: None    Intimate partner violence     Fear of current or ex partner: None     Emotionally abused: None     Physically abused: None     Forced sexual activity: None   Other Topics Concern    None   Social History Narrative    None       SCREENINGS        [place] Coma Scale  Eye Opening: To speech  Best Verbal Response: Oriented  Best Motor Response: Obeys commands  [place] Coma Scale Score: 14               PHYSICAL EXAM    (up to 7 for level 4, 8 or more for level 5)     ED Triage Vitals [02/11/21 2055]   BP Temp Temp Source Pulse Resp SpO2 Height Weight   113/63 97.5 °F (36.4 °C) Oral 87 20 97 % 5' 3\" (1.6 m) 230 lb (104.3 kg)       Physical Exam  Vitals signs and nursing note reviewed. Constitutional:       General: She is not in acute distress. Appearance: She is well-developed. She is morbidly obese. She is ill-appearing. She is not diaphoretic. HENT:      Head: Normocephalic and atraumatic. Mouth/Throat:      Pharynx: No oropharyngeal exudate. Eyes:      General: No scleral icterus. Conjunctiva/sclera: Conjunctivae normal.      Pupils: Pupils are equal, round, and reactive to light. Neck:      Musculoskeletal: Normal range of motion and neck supple. Trachea: No tracheal deviation. Cardiovascular:      Rate and Rhythm: Normal rate. Heart sounds: Normal heart sounds. Pulmonary:      Effort: Pulmonary effort is normal. No respiratory distress. Breath sounds: Normal breath sounds. Abdominal:      General: Bowel sounds are normal. There is no distension. Palpations: Abdomen is soft. Musculoskeletal: Normal range of motion. Skin:     General: Skin is warm and dry. Findings: Erythema and rash present. Neurological:      Mental Status: She is alert and oriented to person, place, and time. Cranial Nerves: No cranial nerve deficit. Motor: No abnormal muscle tone. Psychiatric:         Behavior: Behavior normal.         Thought Content:  Thought content normal.         Judgment: Judgment normal.         DIAGNOSTIC RESULTS     EKG: All EKG's are interpreted by the Emergency Department Physician who either signs or Co-signs this chart in the absence of a cardiologist.    Normal sinus rhythm, rate 87 bpm, no acute ST elevation    RADIOLOGY:   Non-plain film images such as CT, Ultrasound and MRI are read by the radiologist. Plain radiographic images are visualized and preliminarily interpreted by the emergency physician with the below findings:    CXR- Neg  CTs neg    Interpretation per the Radiologist below, if available at the time of this note:    XR CHEST PORTABLE    (Results Pending)   CT HEAD WO CONTRAST    (Results Pending)   CT CERVICAL SPINE WO CONTRAST    (Results Pending)         ED BEDSIDE ULTRASOUND:   Performed by ED Physician - none    LABS:  Labs Reviewed portions of this note were completed with a voice recognition program.  Efforts were made to edit the dictations but occasionally words are mis-transcribed.)    Leandra Stanford PA-C (electronically signed)  Attending Emergency Physician            Leandra Stanford PA-C  02/11/21 5458

## 2021-02-12 NOTE — ED NOTES
Pt moaning, pt must be prompted to answer questions more than once.        Efrain Levine RN  02/11/21 7585

## 2021-02-12 NOTE — PROGRESS NOTES
Renal Adjustment Per Protocol:     Zosyn 3.375gm IV q8hr extended infusion changed to Zosyn 2.25gm IV q8hr based on CrCl < 20 ml/min on HD. Recent Labs     02/11/21 2115   CREATININE 9.68*    Estimated Creatinine Clearance: 8 mL/min (A) (based on SCr of 9.68 mg/dL [facility])). GREG Mayer Ph.  2/12/2021  2:40 AM

## 2021-02-12 NOTE — CONSULTS
[facility]  Consult Note           Reason for Consult:  ESRD management  Requesting Physician:  Dr. Anita Brizuela    Chief Complaint:  AMS  History Obtained From:  electronic medical record    History of Present Ilness:    50y.o. year old female with history s/f ESRD on IHD MWF, A.fib, T2DM, HTN, CAD s/p CABG who presented for AMS. Pt missed HD on Wed, last rx on Monday. Found to have cellulitis of abdominal wall w/ calciphylaxis, currently on vanc and zosyn. ID consulted. Past Medical History:        Diagnosis Date    Atrial fibrillation ([facility]     Cancer (HCC)     Breast    CHF (congestive heart failure) (HCC)     Chronic kidney disease     Diabetes mellitus ([facility]     ESRD (end stage renal disease) ([facility] 2020    Essential hypertension 2020    Heart murmur     Hepatitis C     Hx of CABG 2020    Paroxysmal atrial fibrillation ([facility] 2020       Past Surgical History:        Procedure Laterality Date    BREAST SURGERY       SECTION      CHOLECYSTECTOMY      CORONARY ARTERY BYPASS GRAFT         Home Medications:    No current facility-administered medications on file prior to encounter.       Current Outpatient Medications on File Prior to Encounter   Medication Sig Dispense Refill    warfarin (COUMADIN) 5 MG tablet Take 1 tablet by mouth daily 90 tablet 1    hydrALAZINE (APRESOLINE) 25 MG tablet Take 1 tablet by mouth every 8 hours (Patient taking differently: Take 25 mg by mouth 2 times daily ) 90 tablet 3    metoprolol tartrate (LOPRESSOR) 50 MG tablet Take 1 tablet by mouth 2 times daily 60 tablet 3    topiramate (TOPAMAX SPRINKLE) 25 MG capsule Take 25 mg by mouth 2 times daily      cloNIDine (CATAPRES) 0.1 MG tablet Take 0.1 mg by mouth daily      aspirin 81 MG chewable tablet Take 1 tablet by mouth daily 30 tablet 3    nystatin (MYCOSTATIN) POWD powder Apply topically 2 times daily      b complex-C-folic acid (NEPHROCAPS) 1 MG capsule Take 1 capsule by mouth daily      atorvastatin (LIPITOR) 40 MG tablet Take 40 mg by mouth nightly      Ferric Citrate 1  MG(Fe) TABS Take 210 mg by mouth 3 times daily Pt unsure of dose      ondansetron (ZOFRAN ODT) 4 MG disintegrating tablet Take 1 tablet by mouth every 8 hours as needed for Nausea 20 tablet 0    FLUoxetine (PROZAC) 20 MG capsule Take 1 capsule by mouth daily (Patient taking differently: Take 40 mg by mouth daily ) 30 capsule 1    diphenhydrAMINE (BENADRYL) 25 MG capsule Take 25 mg by mouth every 6 hours as needed for Itching      cyclobenzaprine (FLEXERIL) 5 MG tablet Take 5 mg by mouth 3 times daily as needed for Muscle spasms      oxyCODONE-acetaminophen (PERCOCET) 5-325 MG per tablet Take 1 tablet by mouth every 4 hours as needed for Pain. .         Allergies:  Hydrocodone-acetaminophen, Naproxen, Hydrocodone, Quetiapine, and Vicodin [hydrocodone-acetaminophen]    Social History:    Social History     Socioeconomic History    Marital status: Single     Spouse name: Not on file    Number of children: Not on file    Years of education: Not on file    Highest education level: Not on file   Occupational History    Not on file   Social Needs    Financial resource strain: Not on file    Food insecurity     Worry: Not on file     Inability: Not on file    Transportation needs     Medical: Not on file     Non-medical: Not on file   Tobacco Use    Smoking status: Former Smoker    Smokeless tobacco: Never Used   Substance and Sexual Activity    Alcohol use: Never     Frequency: Never    Drug use: Never    Sexual activity: Not Currently   Lifestyle    Physical activity     Days per week: Not on file     Minutes per session: Not on file    Stress: Not on file   Relationships    Social connections     Talks on phone: Not on file     Gets together: Not on file     Attends Gnosticism service: Not on file     Active member of club or organization: Not on file     Attends meetings of clubs or organizations: Not on file     Relationship status: Not on file    Intimate partner violence     Fear of current or ex partner: Not on file     Emotionally abused: Not on file     Physically abused: Not on file     Forced sexual activity: Not on file   Other Topics Concern    Not on file   Social History Narrative    Not on file       Family History:   History reviewed. No pertinent family history.     Review of Systems:   Unable to obtain due to altered mental status      Physical exam:   Constitutional:  VITALS:  BP (!) 102/58   Pulse 85   Temp 97.7 °F (36.5 °C) (Oral)   Resp 14   Ht 5' 3\" (1.6 m)   Wt 230 lb (104.3 kg)   SpO2 96%   BMI 40.74 kg/m²     General: lethargic, in no apparent distress  HEENT: normocephalic, atraumatic, anicteric  Neck: supple, no mass  Lungs: non-labored respirations, clear to auscultation bilaterally  Heart: regular rate and rhythm, no murmurs or rubs  Abdomen: soft, non-tender, non-distended  MSK: no joint swelling or tenderness  Ext: no cyanosis, no peripheral edema  Neuro: lethargic, not following commands  Psych: unable to assess  Skin: abdominal erythema and wounds  Vascular access: LUE AVF     Data/  CBC:   Lab Results   Component Value Date    WBC 12.8 02/12/2021    RBC 2.83 02/12/2021    RBC 3.51 08/12/2018    HGB 8.4 02/12/2021    HCT 26.3 02/12/2021    MCV 92.8 02/12/2021    MCH 29.5 02/12/2021    MCHC 31.8 02/12/2021    RDW 16.0 02/12/2021     02/12/2021    MPV 7.1 08/12/2018     BMP:    Lab Results   Component Value Date     02/12/2021    K 5.0 02/12/2021    CL 91 02/12/2021    CO2 18 02/12/2021    BUN 77 02/12/2021    LABALBU 2.1 02/12/2021    CREATININE 10.01 02/12/2021    CALCIUM 6.8 02/12/2021    GFRAA 4.9 02/12/2021    LABGLOM 4.1 02/12/2021    GLUCOSE 212 02/12/2021     Ct Abdomen Pelvis Wo Contrast Additional Contrast? None    Result Date: 2/7/2021  CT of the Abdomen and Pelvis none intravenous contrast medium History:  Diffuse lower abdominal and back pain with abdominal wall swelling. Technical Factors: CT imaging of the abdomen and pelvis were obtained and formatted as 5 mm contiguous axial images from the domes of the diaphragm to the symphysis pubis. Sagittal and coronal reconstructions were also obtained. Oral contrast medium:  None. Intravenous contrast medium:  None. Comparison:  CT abdomen pelvis, July 14, 2020. Findings: Lungs:  Consolidation right lower lobe, with small right effusion. Median sternotomy. Liver:  Normal in size, shape, and attenuation. Bile Ducts:  Normal in caliber. Gallbladder:  Surgically absent. Pancreas:  Normal without masses, cysts, ductal dilatation or calcification. Spleen:  Normal in size without masses or calcifications. No splenules. Kidneys:  Small in size, with regular and left kidneys measuring 6.4 cm, and 6.6 cm in craniocaudal dimension, respectively. No hydronephrosis, masses, or stones. Adrenals:  Normal. Small bowel:  Normal in caliber. Appendix:  Not visualized. Colon:  Normal in caliber. Peritoneum:  No ascites, free air, or fluid collections. Vessels: Aorta normal in course and caliber. Severe atherosclerotic change, involving bilateral renal vasculature, inferior mesenteric, celiac, hepatic, splenic, as well as bilateral iliac vasculature. . Lymph nodes:  Retroperitoneal:  No enlarged retroperitoneal lymph nodes. Mesenteric:  No enlarged mesenteric lymph nodes. Pelvic: No enlarged pelvic lymph nodes. Ureters: Normal in course and caliber. No calcifications. Bladder: Decompressed. Reproductive organs: No pelvic masses. Abdominal Wall:  No hernia identified. No diastasis of rectus musculature. Diffuse edematous change, anterior and lateral dominant or wall, bilateral pelvic wall, dependent portions pelvic wall. Bones:  No bone lesions. No degenerative changes. No post operative changes. Consolidation right lower lobe, with small effusion. Bilateral small kidneys.  Severe diffuse atherosclerotic disease. Diffuse abdominal pelvic wall edema. Cholecystectomy. All CT scans at this facility use dose modulation, iterative reconstruction, and/or weight based dosing when appropriate to reduce radiation dose to as low as reasonably achievable. Ct Head Wo Contrast    Result Date: 2/12/2021  CT Brain Contrast medium:  Not utilized. History: Altered mental status, headache, neck pain, possible fall Comparison:  CT brain, July 26, 2020 Findings: Extra-axial spaces:  Normal. Intracranial hemorrhage:  None. Ventricular system:  Normal for age. Basal Cisterns:  Normal. Cerebral Parenchyma:  Normal. Midline Shift:  None. Cerebellum:  Normal. Paranasal sinuses and mastoid air cells:  Normal. Visualized Orbits:  Normal.     Impression: No acute findings. . All CT scans at this facility use dose modulation, iterative reconstruction, and/or weight based dosing when appropriate to reduce radiation dose to as low as reasonably achievable. Ct Cervical Spine Wo Contrast    Result Date: 2/12/2021  CT cervical spine without intravenous contrast medium. HISTORY:  Altered mental status, headache, neck pain. fall TECHNICAL FACTORS: CT cervical spine obtained and formatted as 2.5 mm contiguous axial images from skull base to the level of. Sagittal and coronal reconstructions were obtained during postprocessing. No contrast medium was utilized. COMPARISON: None FINDINGS: Cervical vertebral bodies are normal in height and alignment Atlantooccipital articulation maintained. Atlantoaxial interval preserved. Neural foramina intact. Mild disc space narrowing C5-C6. No fractures, dislocations, bone lesions. Limited imaging lung apices without anomaly. Carotid arteries and soft tissues are without anomaly. No fracture. All CT scans at this facility use dose modulation, iterative reconstruction, and/or weight based dosing when appropriate to reduce radiation dose to as low as reasonably achievable.      Xr Chest Portable    Result Date: 2/12/2021  EXAMINATION: CHEST PORTABLE VIEW  CLINICAL HISTORY: Fatigue COMPARISONS: February 5, 2021  FINDINGS: Single  views of the chest is submitted. There are multiple median sternotomy wires. There is an endovascular stent overlying the left upper chest. The cardiac silhouette is enlarged Pulmonary vascular unremarkable. Right sided trachea. No focal infiltrates. No Pneumothoraces. NO ACUTE ACTIVE CARDIOPULMONARY PROCESS    Xr Chest Portable    Result Date: 2/10/2021  EXAMINATION: CHEST PORTABLE VIEW  CLINICAL HISTORY: Extremity weakness COMPARISONS: February 7, 2021  FINDINGS: Single  views of the chest is submitted. There are multiple median sternotomy wires. The cardiac silhouette is enlarged Pulmonary vascular unremarkable. Right sided trachea. No focal infiltrates. No Pneumothoraces. NO ACUTE ACTIVE CARDIOPULMONARY PROCESS    Xr Chest Portable    Result Date: 2/7/2021  EXAMINATION: XR CHEST PORTABLE CLINICAL HISTORY: COUGH COMPARISONS: NOVEMBER 24, 2020 FINDINGS: Median sternotomy. Cardiopericardial silhouette diffusely enlarged, unchanged. Lungs clear. STABLE CARDIOMEGALY. Us Dup Lower Extremities Bilateral Venous    Result Date: 2/12/2021  US DUP LOWER EXTREMITIES BILATERAL VENOUS CLINICAL HISTORY: Confusion, agitation COMPARISONS: FINDINGS: Duplex color ultrasound as well as  both gray scale and spectral Doppler ultrasound of the deep venous system of both the left land right lower extremity from the inguinal ligaments to the popliteal fossa was performed. There are no findings of deep venous thrombus in the visualized vessels of the left or right  lower extremity. NO FINDINGS OF  DEEP VENOUS THROMBUS IN THE VISUALIZED VESSELS OF THE LEFT OR RIGHT  LOWER EXTREMITY.       Assessment:  50y.o. year old female with history s/f ESRD on IHD MWF, A.fib, T2DM, HTN, CAD s/p CABG who presented for AMS. 1. ESRD on IHD MWF  2. AMS: w/u pending  3. Abdominal wall cellulitis: on vanc and zosyn, ID onboard   4. Elevated CK  5. Hyperkalemia  6. Metabolic acidosis 2/2 renal failure and lactic acidosis  7. Hypoalbuminemia   8. Transaminitis     Plan:  - continue IHD MWF   - ok w/ dosing of zosyn     Thank you for the consultation. Will continue to follow  Please do not hesitate to call with questions.     Eitan Ochoa MD

## 2021-02-12 NOTE — H&P
[facility]  MEDICINE    HISTORY AND PHYSICAL EXAM    PATIENT NAME:  Nick Steele    MRN:  [id number]  SERVICE DATE:  2/12/2021   SERVICE TIME:  1:11 AM    Primary Care Physician: Sue Julian MD         SUBJECTIVE  CHIEF COMPLAINT: Change in mental status. HPI: Patient being admitted for cellulitis of multiple wounds in various stages of healing. Patient was brought into the ER because family felt that she was not acting right, not taking her medications as she should, and she fell earlier. During my examination patient does not contribute much to the current or past medical history. Patient will attempt to answer questions but quickly falls asleep. Patient will obey simple commands. Patient does not appear to be in distress. Respirations are even and unlabored. No guarding or grimacing. Following history obtained from EMR, ER staff, EMS and report given to ED by family. Patient has end-stage renal disease with dialysis on Monday Wednesday Friday. Apparently patient missed her dialysis on Wednesday. In addition, family states that she is not taking her medications as she should. They state that she has been very fatigued and even fell at one time. They are also concerned because she has multiple wounds and has not cared for herself. At this time it seems logical that patient be admitted for cellulitis, ESRD for dialysis, and consideration for nursing home placement. Patient's other past medical history includes CAD with CABG, atrial fibrillation, CHF, hypertension, hyperlipidemia, depression, and remote history of breast cancer.     PAST MEDICAL HISTORY:    Past Medical History:   Diagnosis Date    Atrial fibrillation ([address]     Cancer ([address]     Breast    CHF (congestive heart failure) (HCC)     Chronic kidney disease     Diabetes mellitus ([address]     ESRD (end stage renal disease) ([address] 2/14/2020    Essential hypertension 2/14/2020    Heart murmur     Hepatitis C     Hx of CABG 2020    [facility]oxysmal [facility] fibrillation ([facility] 2020     PAST SURGICAL HISTORY:    Past Surgical History:   Procedure Laterality Date    BREAST SURGERY       SECTION      CHOLECYSTECTOMY      CORONARY ARTERY BYPASS GRAFT       FAMILY HISTORY:  History reviewed. No pertinent family history. SOCIAL HISTORY:    Social History     Socioeconomic History    Marital status: Single     Spouse name: Not on file    Number of children: Not on file    Years of education: Not on file    Highest education level: Not on file   Occupational History    Not on file   Social Needs    Financial resource strain: Not on file    Food insecurity     Worry: Not on file     Inability: Not on file    Transportation needs     Medical: Not on file     Non-medical: Not on file   Tobacco Use    Smoking status: Former Smoker    Smokeless tobacco: Never Used   Substance and Sexual Activity    Alcohol use: Never     Frequency: Never    Drug use: Never    Sexual activity: Not Currently   Lifestyle    Physical activity     Days per week: Not on file     Minutes per session: Not on file    Stress: Not on file   Relationships    Social connections     Talks on phone: Not on file     Gets together: Not on file     Attends [employer] service: Not on file     Active member of club or organization: Not on file     Attends meetings of clubs or organizations: Not on file     Relationship status: Not on file    Intimate partner violence     Fear of current or ex partner: Not on file     Emotionally abused: Not on file     Physically abused: Not on file     Forced sexual activity: Not on file   Other Topics Concern    Not on file   Social History Narrative    Not on file     MEDICATIONS:   Prior to Admission medications    Medication Sig Start Date End Date Taking?  Authorizing Provider   Ferric Citrate 1  MG(Fe) TABS Take 210 mg by mouth 3 times daily Pt unsure of dose    Historical Provider, MD   ondansetron [facility] ODT) 4 MG disintegrating tablet Take 1 tablet by mouth every 8 hours as needed for Nausea 9/10/20   Endy Reilly PA-C   warfarin (COUMADIN) 5 MG tablet Take 1 tablet by mouth daily 7/10/20   Christian Bray, DO   hydrALAZINE (APRESOLINE) 25 MG tablet Take 1 tablet by mouth every 8 hours  Patient taking differently: Take 25 mg by mouth 2 times daily  6/23/20   Sherrie Ramos,    metoprolol tartrate (LOPRESSOR) 50 MG tablet Take 1 tablet by mouth 2 times daily 4/15/20   Shelly Peabody, MD   FLUoxetine (PROZAC) 20 MG capsule Take 1 capsule by mouth daily  Patient taking differently: Take 40 mg by mouth daily  4/16/20   Marj Eugene MD   topiramate (TOPAMAX SPRINKLE) 25 MG capsule Take 25 mg by mouth 2 times daily    Historical Provider, MD   cloNIDine (CATAPRES) 0.1 MG tablet Take 0.1 mg by mouth daily    Historical Provider, MD   diphenhydrAMINE (BENADRYL) 25 MG capsule Take 25 mg by mouth every 6 hours as needed for Itching    Historical Provider, MD   aspirin 81 MG chewable tablet Take 1 tablet by mouth daily 10/15/19   Sukumar Chirinos, DO   nystatin (MYCOSTATIN) POWD powder Apply topically 2 times daily    Historical Provider, MD   b complex-C-folic acid (NEPHROCAPS) 1 MG capsule Take 1 capsule by mouth daily    Historical Provider, MD   atorvastatin (LIPITOR) 40 MG tablet Take 40 mg by mouth nightly    Historical Provider, MD   cyclobenzaprine (FLEXERIL) 5 MG tablet Take 5 mg by mouth 3 times daily as needed for Muscle spasms    Historical Provider, MD   oxyCODONE-acetaminophen (PERCOCET) 5-325 MG per tablet Take 1 tablet by mouth every 4 hours as needed for Pain. Julieta Moreno     Historical Provider, MD       ALLERGIES: Hydrocodone-acetaminophen, Naproxen, Hydrocodone, Quetiapine, and Vicodin [hydrocodone-acetaminophen]    REVIEW OF SYSTEM:   Review of Systems   Unable to perform ROS: Mental status change         OBJECTIVE  PHYSICAL EXAM: BP (!) 91/52   Pulse 84   Temp 97.5 °F (36.4 °C) (Oral)   Resp 20   Ht 5' 3\" (1.6 m)   Wt 230 lb (104.3 kg)   SpO2 98%   BMI 40.74 kg/m²     Physical Exam  Vitals signs and nursing note reviewed. Constitutional:       General: She is not in acute distress. Appearance: She is well-developed. She is obese. HENT:      Head: Normocephalic. Nose: Nose normal.      Mouth/Throat:      Mouth: Mucous membranes are moist.   Eyes:      Pupils: Pupils are equal, round, and reactive to light. Neck:      Musculoskeletal: Normal range of motion. Cardiovascular:      Rate and Rhythm: Normal rate and regular rhythm. Pulses: Normal pulses. Pulmonary:      Effort: Pulmonary effort is normal. No respiratory distress. Breath sounds: Normal breath sounds. No wheezing or rales. Abdominal:      General: Bowel sounds are normal. There is no distension. Palpations: Abdomen is soft. There is no mass. Tenderness: There is no abdominal tenderness. There is no guarding or rebound. Hernia: No hernia is present. Musculoskeletal: Normal range of motion. Right lower leg: 3+ Pitting Edema present. Left lower leg: 3+ Pitting Edema present. Skin:     General: Skin is warm and dry. Capillary Refill: Capillary refill takes less than 2 seconds. Neurological:      Mental Status: She is disoriented. Sensory: Sensation is intact. Comments: Patient appears fatigued. Obey simple commands. Attempts to answer questions but falls asleep quickly. Responds to verbal and painful stimuli. Photos below obtained with patient consent and Nacho Lopez RN at bedside. Patient's back:    Patient's right breast:    Patient's right abdominal/groin pannus:    Patient's abdominal pannus:          DATA:     Diagnostic tests reviewed for today's visit:    Most recent labs and imaging results reviewed.      LABS:    Recent Results (from the past 24 hour(s))   EKG 12 Lead - Chest Pain    Collection Time: 02/11/21  9:10 PM   Result Value Ref Range    Ventricular Rate 87 BPM    Atrial Rate 87 BPM    P-R Interval 180 ms    QRS Duration 74 ms    Q-T Interval 392 ms    QTc Calculation (Bazett) 471 ms    P Axis 49 degrees    R Axis -25 degrees    T Axis -67 degrees   Comprehensive Metabolic Panel    Collection Time: 02/11/21  9:15 PM   Result Value Ref Range    Sodium 133 (L) 135 - 144 mEq/L    Potassium 4.8 3.4 - 4.9 mEq/L    Chloride 91 (L) 95 - 107 mEq/L    CO2 17 (L) 20 - 31 mEq/L    Anion Gap 25 (H) 9 - 15 mEq/L    Glucose 202 (H) 70 - 99 mg/dL    BUN 78 (HH) 6 - 20 mg/dL    CREATININE 9.68 (HH) 0.50 - 0.90 mg/dL    GFR Non- 4.2 (L) >60    GFR  5.1 (L) >60    Calcium 7.3 (L) 8.5 - 9.9 mg/dL    Total Protein 6.5 6.3 - 8.0 g/dL    Albumin 2.3 (L) 3.5 - 4.6 g/dL    Total Bilirubin 0.7 0.2 - 0.7 mg/dL    Alkaline Phosphatase 95 40 - 130 U/L    ALT 76 (H) 0 - 33 U/L     (H) 0 - 35 U/L    Globulin 4.2 (H) 2.3 - 3.5 g/dL   CBC Auto Differential    Collection Time: 02/11/21  9:15 PM   Result Value Ref Range    WBC 14.3 (H) 4.8 - 10.8 K/uL    RBC 3.01 (L) 4.20 - 5.40 M/uL    Hemoglobin 8.8 (L) 12.0 - 16.0 g/dL    Hematocrit 29.5 (L) 37.0 - 47.0 %    MCV 97.9 82.0 - 100.0 fL    MCH 29.2 27.0 - 31.3 pg    MCHC 29.8 (L) 33.0 - 37.0 %    RDW 16.8 (H) 11.5 - 14.5 %    Platelets 311 982 - 873 K/uL    Neutrophils % 87.3 %    Lymphocytes % 3.1 %    Monocytes % 9.0 %    Eosinophils % 0.4 %    Basophils % 0.2 %    Neutrophils Absolute 12.5 (H) 1.4 - 6.5 K/uL    Lymphocytes Absolute 0.4 (L) 1.0 - 4.8 K/uL    Monocytes Absolute 1.3 (H) 0.2 - 0.8 K/uL    Eosinophils Absolute 0.1 0.0 - 0.7 K/uL    Basophils Absolute 0.0 0.0 - 0.2 K/uL   Magnesium    Collection Time: 02/11/21  9:15 PM   Result Value Ref Range    Magnesium 2.2 1.7 - 2.4 mg/dL   Lactic Acid, Plasma    Collection Time: 02/11/21  9:15 PM   Result Value Ref Range    Lactic Acid 2.9 (H) 0.5 - 2.2 mmol/L   Ethanol    Collection Time: 02/11/21  9:15 PM   Result Value Ref Range    Ethanol Lvl <10 mg/dL will monitor CMP daily. We will get bilateral ultrasound rule out DVT. 6) Elevated lactic acid: Lactic acid 2.9. Patient received 500ml normal saline in ED. We will recheck lactic acid. Active Problems:  7) A-fib: History of A. fib on Coumadin. EKG sinus today. We will consult pharmacy to resume Coumadin. 8) CAD/CHF: History of heart failure and CABG. Last echo June 2020 showed EF 60%. Patient on aspirin and statin. We will resume home med. We will keep patient on telemetry monitoring. 9) Essential hypertension: Patient on home meds to control. We will monitor BP regularly and resume home meds. 10) Hyperlipidemia: Patient on home med control. We will resume home meds. 11) Major depression: Patient on home med control. We will resume home meds. Plan of care discussed with: patient    SIGNATURE: Rupali Gabriel RN, NP  DATE: February 12, 2021  TIME: 1:11 AM     LOGAN Gary MD - supervising physician

## 2021-02-12 NOTE — PROGRESS NOTES
Pt's sister sent her home med list in with the pt, medications were verified. Pt's VSS, multiple wounds noted on the body. Dr. Leon Browning was notified of pt c/o pain,pt was unable to tell me the location of the pain.  Also her BUN 77, creatinine 10.01.

## 2021-02-12 NOTE — FLOWSHEET NOTE
2/12/21 @ [phone] Faxed Dialysis orders to [facility]; notified [facility] # [phone]

## 2021-02-12 NOTE — PROGRESS NOTES
Clinical Pharmacy Note    Warfarin consult follow-up    Recent Labs     02/12/21  0913   INR 5.7*     Recent Labs     02/10/21  1640 02/11/21  2115 02/12/21  0248 02/12/21  0527   HGB 8.9* 8.8* 8.1* 8.4*   HCT 29.5* 29.5*  --  26.3*    183  --  185       Significant drug:drug interactions:  New warfarin drug-drug interactions: n/a  Discontinued drug-drug interactions: n/a  Other warfarin drug-drug interactions:  acetaminophen, aspirin, atorvastatin, fluoxetine, Zosyn, vancomycin    Current diet order/intake: renal    Date INR Warfarin Dose   2/11/21 4.6 10mg ordered by ER provider   2/12/21 5.7 HOLD                                Daily PT/INR during pharmacy consult to monitor and dose warfarin therapy. Thank you for the consult.

## 2021-02-12 NOTE — PROGRESS NOTES
Alcohol use: Never     Frequency: Never    Drug use: Never       ALLERGIES    Allergies   Allergen Reactions    Hydrocodone-Acetaminophen Hives, Itching and Nausea Only     Other reaction(s): Nausea      Naproxen Hives and Nausea Only     Other reaction(s): Nausea      Hydrocodone Hives    Quetiapine Other (See Comments)     Restless leg     Vicodin [Hydrocodone-Acetaminophen] Hives       MEDICATIONS    No current facility-administered medications on file prior to encounter.       Current Outpatient Medications on File Prior to Encounter   Medication Sig Dispense Refill    warfarin (COUMADIN) 5 MG tablet Take 1 tablet by mouth daily 90 tablet 1    hydrALAZINE (APRESOLINE) 25 MG tablet Take 1 tablet by mouth every 8 hours (Patient taking differently: Take 25 mg by mouth 2 times daily ) 90 tablet 3    metoprolol tartrate (LOPRESSOR) 50 MG tablet Take 1 tablet by mouth 2 times daily 60 tablet 3    topiramate (TOPAMAX SPRINKLE) 25 MG capsule Take 25 mg by mouth 2 times daily      cloNIDine (CATAPRES) 0.1 MG tablet Take 0.1 mg by mouth daily      aspirin 81 MG chewable tablet Take 1 tablet by mouth daily 30 tablet 3    nystatin (MYCOSTATIN) POWD powder Apply topically 2 times daily      b complex-C-folic acid (NEPHROCAPS) 1 MG capsule Take 1 capsule by mouth daily      atorvastatin (LIPITOR) 40 MG tablet Take 40 mg by mouth nightly      Ferric Citrate 1  MG(Fe) TABS Take 210 mg by mouth 3 times daily Pt unsure of dose      ondansetron (ZOFRAN ODT) 4 MG disintegrating tablet Take 1 tablet by mouth every 8 hours as needed for Nausea 20 tablet 0    FLUoxetine (PROZAC) 20 MG capsule Take 1 capsule by mouth daily (Patient taking differently: Take 40 mg by mouth daily ) 30 capsule 1    diphenhydrAMINE (BENADRYL) 25 MG capsule Take 25 mg by mouth every 6 hours as needed for Itching      cyclobenzaprine (FLEXERIL) 5 MG tablet Take 5 mg by mouth 3 times daily as needed for Muscle spasms      oxyCODONE-acetaminophen (PERCOCET) 5-325 MG per tablet Take 1 tablet by mouth every 4 hours as needed for Pain. .         Objective    /82   Pulse 51   Temp 97.7 °F (36.5 °C)   Resp 16   Ht 5' 3\" (1.6 m)   Wt 230 lb (104.3 kg)   SpO2 96%   BMI 40.74 kg/m²     LABS:  WBC:    Lab Results   Component Value Date    WBC 12.8 02/12/2021     H/H:    Lab Results   Component Value Date    HGB 8.4 02/12/2021    HCT 26.3 02/12/2021     PTT:    Lab Results   Component Value Date    APTT 31.1 11/24/2020   [APTT}  PT/INR:    Lab Results   Component Value Date    PROTIME 50.5 02/12/2021    INR 5.7 02/12/2021     HgBA1c:    Lab Results   Component Value Date    LABA1C 5.1 04/04/2020       Assessment   Meliton Risk Score: Meliton Scale Score: 16    Patient Active Problem List   Diagnosis    Angina pectoris, unspecified ([address]    CHF (congestive heart failure) ([address]    NSTEMI (non-ST elevated myocardial infarction) ([address]    Coronary artery disease of native artery of native heart with stable angina pectoris ([address]    ESRD (end stage renal disease) ([address]    Essential hypertension    Hx of CABG    Paroxysmal atrial fibrillation ([address]    CHF (congestive heart failure), NYHA class I, acute on chronic, combined ([address]    Uremia    Goals of care, counseling/discussion    End-stage renal disease on hemodialysis ([address]    MDD (major depressive disorder), recurrent severe, without psychosis ([address]    A-fib ([address]    Renal failure    Hyperkalemia    Dyspnea on exertion    Abrasion of back    Atrial fibrillation with rapid ventricular response (HCC)    Cellulitis    Elevated troponin    Rhabdomyolysis    Hyperlipidemia    Major depression    Abdominal wall cellulitis    Calciphylaxis       Measurements:  Wound 02/11/21 Abdomen Right; Lower;Quadrant (Active)   Dressing Change Due 02/12/21 02/12/21 1345   Wound Length (cm) 7 cm 02/12/21 1345   Wound Width (cm) 11 cm 02/12/21 1345   Wound Surface Area (cm^2) 77 cm^2 02/12/21 1345   Wound Assessment Slough;Pink/red 02/12/21 1345   Drainage Amount Small 02/12/21 1345   Drainage Description Yellow 02/12/21 1345   Odor Mild 02/12/21 1345   Nadia-wound Assessment Ecchymosis; Induration 02/12/21 1345   Margins Undefined edges 02/12/21 1345   Wound Thickness Description not for Pressure Injury Full thickness 02/12/21 1345   Number of days: 1       Wound 02/11/21 Abdomen Mid (Active)   Dressing Change Due 02/12/21 02/12/21 1345   Wound Length (cm) 1.5 cm 02/12/21 1345   Wound Width (cm) 3 cm 02/12/21 1345   Wound Surface Area (cm^2) 4.5 cm^2 02/12/21 1345   Wound Assessment Eschar moist 02/12/21 1345   Drainage Amount Scant 02/12/21 1345   Drainage Description Yellow 02/12/21 1345   Odor None 02/12/21 1345   Nadia-wound Assessment Ecchymosis; Induration 02/12/21 1345   Margins Undefined edges 02/12/21 1345   Wound Thickness Description not for Pressure Injury Full thickness 02/12/21 1345   Number of days: 1       Wound 02/11/21 Breast Right (Active)   Dressing Change Due 02/12/21 02/12/21 1345   Wound Length (cm) 2 cm 02/12/21 1345   Wound Width (cm) 2 cm 02/12/21 1345   Wound Surface Area (cm^2) 4 cm^2 02/12/21 1345   Wound Assessment Eschar dry 02/12/21 1345   Drainage Amount None 02/12/21 1345   Odor None 02/12/21 1345   Nadia-wound Assessment Ecchymosis; Induration 02/12/21 1345   Wound Thickness Description not for Pressure Injury Full thickness 02/12/21 1345   Number of days: 1     Patient's entire abdominal pannus and right breast are ecchymotic and indurated. RLQ of the abdomin has wound with soft, yellow slough and some granular tissue. Mid abdomen has a wound with moist black/brown eschar. Right breast has wound with dry black eschar. Unable to apply dressing at this time, patient is in dialysis about to be hooked-up to the machine. Wound NP informed of consultation and dressing regimen ordered to promote autolytic debridement.      Plan   Plan of Care: Wound 02/11/21 Abdomen

## 2021-02-12 NOTE — ED NOTES
Pt has multiple wounds on body  Pt has 18 wounds on her back that are scabbed over  Pt has a large abscess under right breast - culture sent  Pt has excoriation and redness under left breast  Pt has 1 large wound under right abd fold and and 2 small ones.   Pt also has a wound on the vaginal fold  Pt has 5 wounds on right arm and 3 on right hand  Pt has 2 wound on left arm and large bruising  Pt has 4 small wounds on left shoulder and 1 large  Pt has 1 large bruise on the left hip  Pt has 2 wounds on right shoulder      Belén Ugalde RN  02/11/21 [address], RN  02/12/21 [id number]

## 2021-02-12 NOTE — ED TRIAGE NOTES
Pt presents by EMS after sister called squad for pt who she sts is acting incoherently. Sister sts that pt has not been taking her meds and that she fell today.    Pt was in this ER yesterday for a fall as well

## 2021-02-13 NOTE — FLOWSHEET NOTE
9863- Patient has been yelling out all morning, pulling at things, unable to tell me what is wrong. Patient can not follow instructions to swallow medications, keeps closing eyes. Physician notified, physician at bedside, see new orders. Assessment complete, see flow chart. Patient only opens eyes to voice, will tell me her name, but disoriented to time and place. Patient will not follow commands at this time. Medicated with haldol at this time. Patient removed IV at this time, catheter intact, minimal bleeding. 1215- Patient resting. Critical creatinine reported to Dr. Dai Carter. 1300- Patient resting. 1420- Critical PT/ INR reported to Dr. Sharyle Haff. Patient is starting to yell out again, but is unable follow commands or tell me what is wrong. 1800-Sister updated via phone.

## 2021-02-13 NOTE — PROGRESS NOTES
Nephrology Progress Note       Assessment:  50y.o. year old female with history s/f ESRD on IHD MWF, A.fib, T2DM, HTN, CAD s/p CABG who presented for AMS.    1. ESRD on IHD MWF  2. AMS: w/u pending  3. Abdominal wall cellulitis: on vanc and zosyn, ID onboard   4. Elevated CK  5. Hyperkalemia  6. Metabolic acidosis 2/2 renal failure and lactic acidosis  7. Hypoalbuminemia   8.  Transaminitis      Plan:  - continue IHD MWF   - ok w/ fluids for today but stop as soon as not needed to avoid fluid overload     Patient Active Problem List:     Angina pectoris, unspecified (HCC)     CHF (congestive heart failure) ([facility])     NSTEMI (non-ST elevated myocardial infarction) ([address]     Coronary artery disease of native artery of native heart with stable angina pectoris ([address]     ESRD (end stage renal disease) ([address]     Essential hypertension     Hx of CABG     Paroxysmal atrial fibrillation ([facility])     CHF (congestive heart failure), NYHA class I, acute on chronic, combined ([address]     Uremia     Goals of care, counseling/discussion     End-stage renal disease on hemodialysis ([address]     MDD (major depressive disorder), recurrent severe, without psychosis ([address]     A-fib ([address]     Renal failure     Hyperkalemia     Dyspnea on exertion     Abrasion of back     Atrial fibrillation with rapid ventricular response (HCC)     Cellulitis     Elevated troponin     Rhabdomyolysis     Hyperlipidemia     Major depression     Abdominal wall cellulitis     Calciphylaxis      Subjective:  Admit Date: 2/11/2021    Interval History: got HD yesterday, remains confused, started on IVFs    Medications:  Scheduled Meds:   sodium chloride flush  10 mL Intravenous 2 times per day    piperacillin-tazobactam  2,250 mg Intravenous Q8H    vancomycin (VANCOCIN) intermittent dosing (placeholder)   Other RX Placeholder    aspirin  81 mg Oral Daily    atorvastatin  40 mg Oral Nightly    b complex-C-folic acid  1 capsule Oral Daily    cloNIDine  0.1 mg Oral Daily    Ferric Citrate  210 mg Oral TID WC    FLUoxetine  20 mg Oral Daily    hydrALAZINE  25 mg Oral BID    metoprolol tartrate  50 mg Oral BID    topiramate  25 mg Oral BID    warfarin (COUMADIN) daily dosing (placeholder)   Other RX Placeholder     Continuous Infusions:   sodium chloride 50 mL/hr at 02/13/21 1141       CBC:   Recent Labs     02/12/21  0527 02/13/21  0820   WBC 12.8* 15.3*   HGB 8.4* 8.0*    153     CMP:    Recent Labs     02/11/21  2115 02/12/21  0248 02/12/21  0527 02/13/21  0820   *  --  132* 131*   K 4.8  --  5.0* 4.4   CL 91*  --  91* 87*   CO2 17*  --  18* 20   BUN 78*  --  77* 47*   CREATININE 9.68* 7.5* 10.01* 6.19*   GLUCOSE 202*  --  212* 230*   CALCIUM 7.3*  --  6.8* 7.1*   LABGLOM 4.2* 6* 4.1* 7.1*     Troponin:   Recent Labs     02/12/21  0913   TROPONINI 0.188*     BNP: No results for input(s): BNP in the last 72 hours. INR:   Recent Labs     02/12/21  0913   INR 5.7*     Lipids:   Recent Labs     02/10/21  1515   LIPASE 12     Liver:   Recent Labs     02/13/21  0820   *   ALT 76*   ALKPHOS 84   PROT 6.0*   LABALBU 2.0*   BILITOT 0.7     Iron:  No results for input(s): IRONS, FERRITIN in the last 72 hours. Invalid input(s): LABIRONS  Urinalysis: No results for input(s): UA in the last 72 hours.     Objective:  Vitals: BP (!) 103/53   Pulse 74   Temp 98.1 °F (36.7 °C) (Oral)   Resp 18   Ht 5' 3\" (1.6 m)   Wt 223 lb 5.2 oz (101.3 kg)   SpO2 96%   BMI 39.56 kg/m²    Wt Readings from Last 3 Encounters:   02/12/21 223 lb 5.2 oz (101.3 kg)   02/10/21 240 lb (108.9 kg)   02/07/21 240 lb (108.9 kg)      24HR INTAKE/OUTPUT:      Intake/Output Summary (Last 24 hours) at 2/13/2021 1226  Last data filed at 2/13/2021 0847  Gross per 24 hour   Intake 10 ml   Output --   Net 10 ml       General: lethargic, in no apparent distress  HEENT: normocephalic, atraumatic, anicteric  Lungs: non-labored respirations, clear to auscultation bilaterally  Heart: regular rate and rhythm, no murmurs or rubs  Abdomen: soft, non-tender, non-distended  MSK: no joint swelling or tenderness  Ext: no cyanosis, no peripheral edema  Neuro: lethargic, able to follow some commands   Skin: abdominal erythema and wounds  Vascular access: NAEEM DOZIER      Electronically signed by Margie Sheikh MD

## 2021-02-13 NOTE — PROGRESS NOTES
Pt. Baseline mental status has improved throughout 7p-7a shift. She has been more easily able to arousal.She will now open eyes to command. She will shout and cry out intermittently with repositioning. Tylenol given for comfort. Sister Grover Lynn was updated. She very concerned with placement after discharge.

## 2021-02-13 NOTE — PROGRESS NOTES
Infectious Disease     Patient Name: Asha Marc  Date: 2021  YOB: 1965  Medical Record Number: [id number]        Abdominal wall cellulitis with calciphylaxis  Infected wounds      Review of Systems   Unable to perform ROS: Mental status change       Review of Systems: All 14 review of systems negative other than as stated above    Social History     Tobacco Use    Smoking status: Former Smoker    Smokeless tobacco: Never Used   Substance Use Topics    Alcohol use: Never     Frequency: Never    Drug use: Never         Past Medical History:   Diagnosis Date    [facility]     Cancer ([facility]     Breast    CHF (congestive heart failure) (HCC)     Chronic kidney disease     [facility] mellitus [facility]     ESRD (end stage renal disease) ([facility] 2020    Essential hypertension 2020    Heart murmur     Hepatitis C     Hx of CABG 2020    Paroxysmal atrial fibrillation ([facility] 2020           Past Surgical History:   Procedure Laterality Date    BREAST SURGERY       SECTION      CHOLECYSTECTOMY      CORONARY ARTERY BYPASS GRAFT           No current facility-administered medications on file prior to encounter.       Current Outpatient Medications on File Prior to Encounter   Medication Sig Dispense Refill    warfarin (COUMADIN) 5 MG tablet Take 1 tablet by mouth daily 90 tablet 1    hydrALAZINE (APRESOLINE) 25 MG tablet Take 1 tablet by mouth every 8 hours (Patient taking differently: Take 25 mg by mouth 2 times daily ) 90 tablet 3    metoprolol tartrate (LOPRESSOR) 50 MG tablet Take 1 tablet by mouth 2 times daily 60 tablet 3    topiramate (TOPAMAX SPRINKLE) 25 MG capsule Take 25 mg by mouth 2 times daily      cloNIDine (CATAPRES) 0.1 MG tablet Take 0.1 mg by mouth daily      aspirin 81 MG chewable tablet Take 1 tablet by mouth daily 30 tablet 3    nystatin (MYCOSTATIN) POWD powder Apply topically 2 times daily      b complex-C-folic acid (NEPHROCAPS) 1 MG capsule Take 1 capsule by mouth daily      atorvastatin (LIPITOR) 40 MG tablet Take 40 mg by mouth nightly      Ferric Citrate 1  MG(Fe) TABS Take 210 mg by mouth 3 times daily Pt unsure of dose      ondansetron (ZOFRAN ODT) 4 MG disintegrating tablet Take 1 tablet by mouth every 8 hours as needed for Nausea 20 tablet 0    FLUoxetine (PROZAC) 20 MG capsule Take 1 capsule by mouth daily (Patient taking differently: Take 40 mg by mouth daily ) 30 capsule 1    diphenhydrAMINE (BENADRYL) 25 MG capsule Take 25 mg by mouth every 6 hours as needed for Itching      cyclobenzaprine (FLEXERIL) 5 MG tablet Take 5 mg by mouth 3 times daily as needed for Muscle spasms      oxyCODONE-acetaminophen (PERCOCET) 5-325 MG per tablet Take 1 tablet by mouth every 4 hours as needed for Pain. .         Allergies   Allergen Reactions    Hydrocodone-Acetaminophen Hives, Itching and Nausea Only     Other reaction(s): Nausea      Naproxen Hives and Nausea Only     Other reaction(s): Nausea      Hydrocodone Hives    Quetiapine Other (See Comments)     Restless leg     Vicodin [Hydrocodone-Acetaminophen] Hives         History reviewed. No pertinent family history. Physical Exam:      Physical Exam   Constitutional: No distress. Cardiovascular: Normal heart sounds. No murmur heard. Pulmonary/Chest: Breath sounds normal. No respiratory distress. She has no wheezes. She has no rales. She exhibits no tenderness. Abdominal: Soft. Bowel sounds are normal. She exhibits no distension and no mass. There is abdominal tenderness. There is no rebound and no guarding. Several areas of induration ulceration and folds of pannus   Skin: She is not diaphoretic. Blood pressure (!) 103/53, pulse 74, temperature 98.1 °F (36.7 °C), temperature source Oral, resp. rate 18, height 5' 3\" (1.6 m), weight 223 lb 5.2 oz (101.3 kg), SpO2 96 %, not currently breastfeeding.       .   Lab Results   Component Value Date WBC 15.3 (H) 02/13/2021    HGB 8.0 (L) 02/13/2021    HCT 26.2 (L) 02/13/2021    MCV 93.4 02/13/2021     02/13/2021     Lab Results   Component Value Date     02/13/2021    K 4.4 02/13/2021    CL 87 02/13/2021    CO2 20 02/13/2021    BUN 47 02/13/2021    CREATININE 6.19 02/13/2021    GLUCOSE 230 02/13/2021    CALCIUM 7.1 02/13/2021        Culture, Anaerobic and Aerobic [[id number]] (Abnormal) Collected: 02/11/21 4843   Order Status: Completed Specimen: Wound Updated: 02/13/21 1032    Gram Stain Result No WBC's   Rare epithelial cells   Many Gram positive cocci   Many Gram negative rods   Abnormal     Anaerobic Culture Culture in progress    Organism Proteus mirabilisAbnormal     WOUND/ABSCESS --    Heavy growth   Sensitivity to follow     Organism Gram negative rodAbnormal              ASSESSMENT:  Patient Active Problem List   Diagnosis    Angina pectoris, unspecified ([address]    [facility]) [facility]    NSTEMI (non-ST elevated myocardial infarction) ([address]    Coronary artery disease of native artery of native heart with stable angina pectoris ([address]    ESRD (end stage renal disease) ([address]    Essential hypertension    Hx of CABG    Paroxysmal atrial fibrillation ([address]    CHF (congestive heart failure), NYHA class I, acute on chronic, combined ([address]    Uremia    Goals of care, counseling/discussion    End-stage renal disease on hemodialysis ([address]    MDD (major depressive disorder), recurrent severe, without psychosis ([address]    A-fib ([address]    Renal failure    Hyperkalemia    Dyspnea on exertion    Abrasion of back    Atrial fibrillation with rapid ventricular response ([facility]    Cellulitis    Elevated troponin    Rhabdomyolysis    Hyperlipidemia    Major depression    Abdominal wall cellulitis    Calciphylaxis         PLAN:    Abdominal wall cellulitis with calciphylaxis  Infected woundsZosyn vancomycin

## 2021-02-13 NOTE — PROGRESS NOTES
Physical Therapy   [facility] E[facility]T317-[facility]    NAME: Brooke Maynard    : 1965 (50 y.o.)  MRN: [id number]    Account: [de-identified]  Gender: female    PT evaluation and treatment orders received. Chart reviewed. PT eval attempted. Hold PT eval: pt lethargic and difficult to arouse. Discussed pt's case with RN. Pt was medicated with haldol this AM.      Will attempt PT evaluation again at earliest convenience.       Electronically signed by Saad Glaser PT on 21 at 12:13 PM EST

## 2021-02-13 NOTE — PROGRESS NOTES
Clinical Pharmacy Note    Warfarin consult follow-up    Recent Labs     02/13/21  0820   INR >10.0*     Recent Labs     02/11/21  2115 02/12/21  0248 02/12/21  0527 02/13/21  0820   HGB 8.8* 8.1* 8.4* 8.0*   HCT 29.5*  --  26.3* 26.2*     --  185 153       Significant drug:drug interactions:  New warfarin drug-drug interactions: n/a  Discontinued drug-drug interactions: n/a  Other warfarin drug-drug interactions: acetaminophen, aspirin, atorvastatin, fluoxetine, Zosyn, vancomycin    Diet  Recent diet/intake: renal diet, 0% of most recent documented meal    Notes:  Date INR Warfarin Dose   2/11/21 4.6 10mg ordered by ER provider   2/12/21 5.7 HOLD   2/13/21 >10  Vit K 10 mg IV ordered                                      INR remains supra-therapeutic at >10, Vit K 10 mg IV ordered by Dr. Ankit Gan. Will resumed warfarin dosing when INR < 3. Daily PT/INR during pharmacy consult to monitor and dose warfarin therapy. Thank you,    Fatimah Molina, PharmD.   3:49 PM 2/13/2021

## 2021-02-14 NOTE — PROCEDURES
Asha Marc is a 50 y.o. female patient. 1. Infected ulcer of skin, unspecified ulcer stage ([facility]    2. Chronic renal failure, stage 5 (HCC)    3. Disorientation    4. Cellulitis, unspecified cellulitis site    5. Acute respiratory failure, unspecified whether with hypoxia or hypercapnia (HCC)    6. Cardiac arrest [facility])      Past Medical History:   Diagnosis Date    [facility]     Cancer ([facility]     Breast    CHF (congestive heart failure) (HCC)     Chronic kidney disease     Diabetes mellitus ([facility]     ESRD (end stage renal disease) ([facility] 2/14/2020    Essential hypertension 2/14/2020    Heart murmur     Hepatitis C     Hx of CABG 2/14/2020    Paroxysmal atrial fibrillation (HCC) 2/14/2020     Blood pressure (!) 89/45, pulse 82, temperature 98.1 °F (36.7 °C), temperature source Axillary, resp. rate 28, height 5' 3\" (1.6 m), weight 223 lb 5.2 oz (101.3 kg), SpO2 (!) 89 %, not currently breastfeeding. Insert Arterial Line    Date/Time: 2/14/2021 2:08 PM  Performed by: Heidi Parnell MD  Authorized by: Heidi Parnell MD   Consent: The procedure was performed in an emergent situation.   Patient identity confirmed: hospital-assigned identification number and anonymous protocol, patient vented/unresponsive  Indications: multiple ABGs and respiratory failure  Needle gauge: 18  Seldinger technique: Seldinger technique used  Cutdown: cutdown required  Number of attempts: 2  Post-procedure: line sutured and dressing applied  Post-procedure CMS: normal          Heidi Parnell MD  2/14/2021

## 2021-02-14 NOTE — PROGRESS NOTES
[facility] Occupational Therapy Department  Change in Status Communication Form      To the referring physician:    Due to an acute change in this patient's medical status, new Occupational Therapy Eval and Treatment orders are required. Pt. has transferred from 2W to ICU. Please reorder when pt. is medically stable to resume OOB activity, as appropriate. We thank you for your referral.     [facility] OT Department.      Electronically signed by JOSELIN Hoffman on 2/14/21 at 12:40 PM EST

## 2021-02-14 NOTE — PROGRESS NOTES
[facility] Occupational Therapy      Date: 2021  Patient Name: Dottie Hu        MRN: [id number]  Account: [de-identified]   : 1965  (50 y.o.)  Room: [place] [id number]    Chart reviewed, attempted OT at [address] [place] [address] 50 for occupational therapy evaluation. Patient not seen 2° to: Other: Patient unable to be aroused at this time despite multiple verbal and tactile cues. Spoke to [facility] aware. Will attempt again when able.     Electronically signed by JOSELIN Santoyo on 2021 at 8:13 AM

## 2021-02-14 NOTE — PROGRESS NOTES
Received patient from the [facility]. Patient was intubated. Patient to get PICC line placed but is too unstable. Dr. Venancio Khan , patient coded in the meantime. CPR for about 10 minutes. CVC to right groin. No success with Art line. Fred called to place art line and said they will call back. 1350Nvíctor and Dr Vida Champion talked to Andreea Simpson, listed as sister, will bring son to see patient.  and physicians talked to sister and brother. tg JACOBS is on way from [facility] to call bother and switch POA to someone local.  1520 Dr. Nico Domínguez attempting Art line. 1600 dr. Yovana Morelos called for orders needed for ICU patients. Dr Nico Domínguez on unit and notified of patient's sa02 in 76s and [de-identified]; no new orders. [place] 36 Dr. Charito Badillo called and said he wants the ETT pulled back 2cm. Respiratory was notified.

## 2021-02-14 NOTE — PROGRESS NOTES
Infectious Disease     Patient Name: Earnestine Velez  Date: 2/14/2021  YOB: 1965  Medical Record Number: [id number]        Abdominal wall cellulitis with calciphylaxis  Infected wounds      Patient had arrest intubated and transferred to ICU      Review of Systems   Unable to perform ROS: Intubated          Physical Exam   Constitutional: No distress. Cardiovascular: Normal heart sounds. No murmur heard. Pulmonary/Chest: Breath sounds normal. No respiratory distress. She has no wheezes. She has no rales. She exhibits no tenderness. Abdominal: Soft. Bowel sounds are normal. She exhibits no distension and no mass. There is abdominal tenderness. There is no rebound and no guarding. Several areas of induration ulceration and folds of pannus   Skin: She is not diaphoretic. Blood pressure (!) 89/45, pulse 82, temperature 98.1 °F (36.7 °C), temperature source Axillary, resp. rate 28, height 5' 3\" (1.6 m), weight 223 lb 5.2 oz (101.3 kg), SpO2 (!) 89 %, not currently breastfeeding.       .   Lab Results   Component Value Date    WBC 17.8 (H) 02/14/2021    HGB 8.9 (L) 02/14/2021    HCT 29.2 (L) 02/14/2021    MCV 93.8 02/14/2021     02/14/2021     Lab Results   Component Value Date     02/14/2021    K 4.9 02/14/2021    CL 90 02/14/2021    CO2 18 02/14/2021    BUN 62 02/14/2021    CREATININE 6.7 02/14/2021    CREATININE 7.06 02/14/2021    GLUCOSE 248 02/14/2021    CALCIUM 6.9 02/14/2021        Culture, Anaerobic and Aerobic [[id number]] (Abnormal) Collected: 02/11/21 3905   Order Status: Completed Specimen: Wound Updated: 02/13/21 1032    Gram Stain Result No WBC's   Rare epithelial cells   Many Gram positive cocci   Many Gram negative rods   Abnormal     Anaerobic Culture Culture in progress    Organism Proteus mirabilisAbnormal     WOUND/ABSCESS --    Heavy growth   Sensitivity to follow     Organism Gram negative rodAbnormal              ASSESSMENT:  PLAN:    Abdominal wall

## 2021-02-14 NOTE — CONSULTS
Never    Sexual activity: Not Currently   Lifestyle    Physical activity     Days per week: Not on file     Minutes per session: Not on file    Stress: Not on file   Relationships    Social connections     Talks on phone: Not on file     Gets together: Not on file     Attends Yarsanism service: Not on file     Active member of club or organization: Not on file     Attends meetings of clubs or organizations: Not on file     Relationship status: Not on file    Intimate partner violence     Fear of current or ex partner: Not on file     Emotionally abused: Not on file     Physically abused: Not on file     Forced sexual activity: Not on file   Other Topics Concern    Not on file   Social History Narrative    Not on file     History reviewed. No pertinent family history.   Allergies   Allergen Reactions    Hydrocodone-Acetaminophen Hives, Itching and Nausea Only     Other reaction(s): Nausea      Naproxen Hives and Nausea Only     Other reaction(s): Nausea      Hydrocodone Hives    Quetiapine Other (See Comments)     Restless leg     Vicodin [Hydrocodone-Acetaminophen] Hives     Current Facility-Administered Medications   Medication Dose Route Frequency Provider Last Rate Last Admin    PN-Adult Premix 4.25/10 - Peripheral Line   Intravenous Continuous TPN Felipa Riggs MD 50 mL/hr at 02/14/21 1133 New Bag at 02/14/21 1133    insulin lispro (HUMALOG) injection vial 0-6 Units  0-6 Units Subcutaneous TID WC Felipa Riggs MD        insulin lispro (HUMALOG) injection vial 0-3 Units  0-3 Units Subcutaneous Nightly Felipa Riggs MD        glucose (GLUTOSE) 40 % oral gel 15 g  15 g Oral PRN Felipa Riggs MD        dextrose 50 % IV solution  12.5 g Intravenous PRN Felipa Riggs MD        glucagon (rDNA) injection 1 mg  1 mg Intramuscular PRN Felipa Riggs MD        dextrose 5 % solution  100 mL/hr Intravenous PRN Felipa Riggs MD        metoprolol (LOPRESSOR) injection 5 mg  5 mg Intravenous Q6H PRN Tez Li Sharyle Haff, MD        dilTIAZem injection 10 mg  10 mg Intravenous Once Summer Freed MD        dilTIAZem 125 mg in dextrose 5 % 125 mL infusion  5-15 mg/hr Intravenous Continuous Arvind Neves MD        LORazepam (ATIVAN) injection 1 mg  1 mg Intravenous Q6H PRN Summer Freed MD        haloperidol lactate (HALDOL) injection 2 mg  2 mg Intramuscular Q6H PRN Summer Freed MD   2 mg at 02/13/21 1615    sodium chloride flush 0.9 % injection 10 mL  10 mL Intravenous 2 times per day Luda Kim RN, NP   10 mL at 02/13/21 0847    sodium chloride flush 0.9 % injection 10 mL  10 mL Intravenous PRN Luda Kim RN, NP        promethazine (PHENERGAN) tablet 12.5 mg  12.5 mg Oral Q6H PRN Luda Kim RN, NP        Or    ondansetron (ZOFRAN) injection 4 mg  4 mg Intravenous Q6H PRN Luda Kim RN, NP        polyethylene glycol (GLYCOLAX) packet 17 g  17 g Oral Daily PRN Luda Kim RN, NP        acetaminophen (TYLENOL) tablet 650 mg  650 mg Oral Q6H PRN Luda Kim RN, NP   650 mg at 02/13/21 0434    Or    acetaminophen (TYLENOL) suppository 650 mg  650 mg Rectal Q6H PRN Luda Kim RN, NP        piperacillin-tazobactam (ZOSYN) 2,250 mg in dextrose 5 % 50 mL IVPB (mini-bag)  2,250 mg Intravenous Q8H Luda Kim RN, NP   Stopped at 02/14/21 0739    vancomycin (VANCOCIN) intermittent dosing (placeholder)   Other RX Placeholder Luda Kim RN, NP        aspirin chewable tablet 81 mg  81 mg Oral Daily Luda Kim RN, NP        [Held by provider] atorvastatin (LIPITOR) tablet 40 mg  40 mg Oral Nightly Luda Kim RN, NP   40 mg at 02/12/21 0562    b complex-C-folic acid (NEPHROCAPS) capsule 1 mg  1 capsule Oral Daily Luda Kim RN, NP        [Held by provider] cloNIDine (CATAPRES) tablet 0.1 mg  0.1 mg Oral Daily Luda Kim RN, NP        Ferric Citrate TABS 210 mg 210 mg Oral TID WC Luda Kim RN, NP        FLUoxetine (PROZAC) capsule 20 mg  20 mg Oral Daily Luda Kim RN, NP        [Held by provider] hydrALAZINE (APRESOLINE) tablet 25 mg  25 mg Oral BID Luda Kim RN, NP   25 mg at 02/12/21 2201    metoprolol tartrate (LOPRESSOR) tablet 50 mg  50 mg Oral BID Luda Kim RN, NP   50 mg at 02/12/21 2200    topiramate (TOPAMAX) tablet 25 mg  25 mg Oral BID Luda Kim RN, NP   25 mg at 02/12/21 2200    warfarin (COUMADIN) daily dosing (placeholder)   Other RX Placeholder Luda Kim RN, NP   Stopped at 02/12/21 2205    albumin human 25 % IV solution 25 g  25 g Intravenous Daily PRN Guanakito Ahmadi MD            Objective:   /70   Pulse 91   Temp 98.1 °F (36.7 °C) (Axillary)   Resp 16   Ht 5' 3\" (1.6 m)   Wt 223 lb 5.2 oz (101.3 kg)   SpO2 96%   BMI 39.56 kg/m²     Physical Exam  Vitals signs reviewed. Constitutional:       Appearance: She is ill-appearing. Eyes:      Pupils: Pupils are equal, round, and reactive to light. Neck:      Musculoskeletal: Normal range of motion. Cardiovascular:      Rate and Rhythm: Normal rate and regular rhythm. Heart sounds: No murmur. Pulmonary:      Effort: Pulmonary effort is normal.      Breath sounds: Normal breath sounds. Abdominal:      General: Bowel sounds are normal.   Musculoskeletal: Normal range of motion. Skin:     General: Skin is warm. Neurological:      Mental Status: She is lethargic and disoriented. Cranial Nerves: No cranial nerve deficit. Sensory: No sensory deficit. Motor: No abnormal muscle tone. Coordination: Coordination normal.      Deep Tendon Reflexes: Babinski sign absent on the right side. Babinski sign absent on the left side. Reflex Scores:       Tricep reflexes are 2+ on the right side and 2+ on the left side.        Bicep reflexes are 2+ on the right side and 2+ on the left appropriate to reduce radiation dose to as low as reasonably achievable. Xr Chest Portable    Result Date: 2/12/2021  EXAMINATION: CHEST PORTABLE VIEW  CLINICAL HISTORY: Fatigue COMPARISONS: February 5, 2021  FINDINGS: Single  views of the chest is submitted. There are multiple median sternotomy wires. There is an endovascular stent overlying the left upper chest. The cardiac silhouette is enlarged Pulmonary vascular unremarkable. Right sided trachea. No focal infiltrates. No Pneumothoraces. NO ACUTE ACTIVE CARDIOPULMONARY PROCESS    Us Dup Lower Extremities Bilateral Venous    Result Date: 2/12/2021  US DUP LOWER EXTREMITIES BILATERAL VENOUS CLINICAL HISTORY: Confusion, agitation COMPARISONS: FINDINGS: Duplex color ultrasound as well as  both gray scale and spectral Doppler ultrasound of the deep venous system of both the left land right lower extremity from the inguinal ligaments to the popliteal fossa was performed. There are no findings of deep venous thrombus in the visualized vessels of the left or right  lower extremity. NO FINDINGS OF  DEEP VENOUS THROMBUS IN THE VISUALIZED VESSELS OF THE LEFT OR RIGHT  LOWER EXTREMITY.       Lab Results   Component Value Date    WBC 17.8 02/14/2021    RBC 3.11 02/14/2021    RBC 3.51 08/12/2018    HGB 9.5 02/14/2021    HCT 29.2 02/14/2021    MCV 93.8 02/14/2021    MCH 28.6 02/14/2021    MCHC 30.5 02/14/2021    RDW 16.4 02/14/2021     02/14/2021    MPV 7.1 08/12/2018     Lab Results   Component Value Date     02/14/2021    K 4.9 02/14/2021    CL 90 02/14/2021    CO2 18 02/14/2021    BUN 62 02/14/2021    CREATININE 6.3 02/14/2021    CREATININE 7.06 02/14/2021    GFRAA 8 02/14/2021    LABGLOM 7 02/14/2021    GLUCOSE 248 02/14/2021    PROT 5.8 02/14/2021    LABALBU 1.9 02/14/2021    CALCIUM 6.9 02/14/2021    BILITOT 0.9 02/14/2021    ALKPHOS 89 02/14/2021    AST 75 02/14/2021 ALT 69 02/14/2021     Lab Results   Component Value Date    PROTIME 21.5 02/14/2021    INR 1.9 02/14/2021     Lab Results   Component Value Date    TSH 3.060 02/10/2021    FERRITIN 703.0 03/01/2020    IRON 57 03/01/2020    TIBC 161 03/01/2020     Lab Results   Component Value Date    TRIG 46 11/25/2020    HDL 51 11/25/2020    LDLCALC 48 11/25/2020     Lab Results   Component Value Date    ETOH <10 02/11/2021     No results found for: LITHIUM, DILFRTOT, VALPROATE    Assessment:   Encephalopathy likely representing metabolic encephalopathy with underlying infection and sepsis. Patient has significant elevated white count and significant azotemia. There is no medication that would accumulate because of renal failure in her system at least at this time. Recommend that we hold the Prozac for now given that this can accumulate as well. MRI of the brain does not show any new stroke. Patient has lumbar issues and gait difficulties will arrange for MRI of the lumbosacral spine. In the event that patient does not show any responses and there is no other identified infective process except for her wounds we will consider a lumbar puncture. We will arrange for an EEG tomorrow. Marvin Mead MD, Susie Beverly, American Board of Psychiatry & Neurology  Board Certified in Vascular Neurology  Board Certified in Neuromuscular Medicine  Certified in [facility]:

## 2021-02-14 NOTE — CONSULTS
Consults    Patient Name: Roshni Andrews Date: 2021  8:43 PM  MR #: [id number]  : 1965    Attending Physician: Ema Hernandez MD  Reason for consult: AF    History of Presenting Illness:      Gerard Workman is a 50 y.o. female on hospital day 2 with a history of . History Obtained From:  patient, electronic medical record    Pt admitted with MS changes and severe weakness. In the recent 1 week PTA she has fallen 3 times due to weakness. She lives with her niece who is in the room now. She is currnetly being treated for Abd wall cellulitis    She just went into rapid AF rate up to 180s. Pt is moaning and very somnolent. Os sats are stable. 2L O2 placed Empirically. She has h/o PAF and has been on Warfarin but reversed due to INR>10. This AM INR 1.9    ESRD on HD  History:      EKG: Rapid AF on Telemetry  Past Medical History:   Diagnosis Date    Atrial fibrillation (HCC)     Cancer (HCC)     Breast    CHF (congestive heart failure) (HCC)     Chronic kidney disease     Diabetes mellitus ([address]     [facility] 2020    Essential hypertension 2020    Heart murmur     Hepatitis C     Hx of CABG 2020    Paroxysmal atrial fibrillation ([facility] 2020     Past Surgical History:   Procedure Laterality Date    BREAST SURGERY       SECTION      CHOLECYSTECTOMY      CORONARY ARTERY BYPASS GRAFT         Family History  History reviewed. No pertinent family history.   [] Unable to obtain due to ventilated and/ or neurologic status    Social History     Socioeconomic History    Marital status: Single     Spouse name: Not on file    Number of children: Not on file    Years of education: Not on file    Highest education level: Not on file   Occupational History    Not on file   Social Needs    Financial resource strain: Not on file    Food insecurity     Worry: Not on file     Inability: Not on file   [employer] needs Medical: Not on file     Non-medical: Not on file   Tobacco Use    Smoking status: Former Smoker    Smokeless tobacco: Never Used   Substance and Sexual Activity    Alcohol use: Never     Frequency: Never    Drug use: Never    Sexual activity: Not Currently   Lifestyle    Physical activity     Days per week: Not on file     Minutes per session: Not on file    Stress: Not on file   Relationships    Social connections     Talks on phone: Not on file     Gets together: Not on file     Attends Taoism service: Not on file     Active member of club or organization: Not on file     Attends meetings of clubs or organizations: Not on file     Relationship status: Not on file    Intimate partner violence     Fear of current or ex partner: Not on file     Emotionally abused: Not on file     Physically abused: Not on file     Forced sexual activity: Not on file   Other Topics Concern    Not on file   Social History Narrative    Not on file      [] Unable to obtain due to ventilated and/ or neurologic status      Home Medications:      Medications Prior to Admission: warfarin (COUMADIN) 5 MG tablet, Take 1 tablet by mouth daily  hydrALAZINE (APRESOLINE) 25 MG tablet, Take 1 tablet by mouth every 8 hours (Patient taking differently: Take 25 mg by mouth 2 times daily )  metoprolol tartrate (LOPRESSOR) 50 MG tablet, Take 1 tablet by mouth 2 times daily  topiramate (TOPAMAX SPRINKLE) 25 MG capsule, Take 25 mg by mouth 2 times daily  cloNIDine (CATAPRES) 0.1 MG tablet, Take 0.1 mg by mouth daily  aspirin 81 MG chewable tablet, Take 1 tablet by mouth daily  nystatin (MYCOSTATIN) POWD powder, Apply topically 2 times daily  b complex-C-folic acid (NEPHROCAPS) 1 MG capsule, Take 1 capsule by mouth daily  atorvastatin (LIPITOR) 40 MG tablet, Take 40 mg by mouth nightly  Ferric Citrate 1  MG(Fe) TABS, Take 210 mg by mouth 3 times daily Pt unsure of dose  ondansetron (ZOFRAN ODT) 4 MG disintegrating tablet, Take 1 Hives    Quetiapine Other (See Comments)     Restless leg     Vicodin [Hydrocodone-Acetaminophen] Hives        Review of Systems:       Review of Systems    Not obtainable  Objective Findings:     Vitals:/70   Pulse 91   Temp 98.1 °F (36.7 °C) (Axillary)   Resp 16   Ht 5' 3\" (1.6 m)   Wt 223 lb 5.2 oz (101.3 kg)   SpO2 96%   BMI 39.56 kg/m²      Physical Examination:    Physical Exam   Constitutional: No distress. She appears acutely ill. HENT:   Normal cephalic and Atraumatic   Eyes: Pupils are equal, round, and reactive to light. Neck: Normal range of motion and thyroid normal. Neck supple. No JVD present. No neck adenopathy. No thyromegaly present. Cardiovascular: Normal heart sounds, intact distal pulses and normal pulses. An irregularly irregular rhythm present. Tachycardia present. Pulmonary/Chest: Effort normal and breath sounds normal. She has no wheezes. She has no rales. She exhibits no tenderness. Abdominal: Soft. Bowel sounds are normal. There is no abdominal tenderness. Musculoskeletal: Normal range of motion. General: No tenderness or edema. Neurological: She exhibits altered mental status. Skin: Skin is warm. No cyanosis. Nails show no clubbing.          Results/ Medications reviewed 2/14/2021, 11:21 AM     Laboratory, Microbiology, Pathology, Radiology, Cardiology, Medications and Transcriptions reviewed  Scheduled Meds:   insulin lispro  0-6 Units Subcutaneous TID WC    insulin lispro  0-3 Units Subcutaneous Nightly    dilTIAZem  10 mg Intravenous Once    sodium chloride flush  10 mL Intravenous 2 times per day    piperacillin-tazobactam  2,250 mg Intravenous Q8H    vancomycin (VANCOCIN) intermittent dosing (placeholder)   Other RX Placeholder    aspirin  81 mg Oral Daily    [Held by provider] atorvastatin  40 mg Oral Nightly    b complex-C-folic acid  1 capsule Oral Daily    [Held by provider] cloNIDine  0.1 mg Oral Daily    Ferric Citrate  210 mg Oral TID WC    FLUoxetine  20 mg Oral Daily    [Held by provider] hydrALAZINE  25 mg Oral BID    metoprolol tartrate  50 mg Oral BID    topiramate  25 mg Oral BID    warfarin (COUMADIN) daily dosing (placeholder)   Other RX Placeholder     Continuous Infusions:   PN-Adult Premix 4.25/10 - Peripheral Line      dextrose         Recent Labs     02/12/21 0527 02/13/21 0820 02/14/21  0505 02/14/21  1106   WBC 12.8* 15.3* 17.8*  --    HGB 8.4* 8.0* 8.9* 9.5*   HCT 26.3* 26.2* 29.2*  --    MCV 92.8 93.4 93.8  --     153 146  --      Recent Labs     02/11/21 2115 02/11/21 2115 02/12/21 0527 02/13/21 0820 02/14/21  0505 02/14/21  1106   *  --  132* 131* 134*  --    K 4.8  --  5.0* 4.4 4.9  --    CL 91*  --  91* 87* 90*  --    CO2 17*  --  18* 20 18*  --    PHOS 10.7*  --   --   --   --   --    BUN 78*  --  77* 47* 62*  --    CREATININE 9.68*   < > 10.01* 6.19* 7.06* 6.3*    < > = values in this interval not displayed. Recent Labs     02/12/21 0527 02/13/21 0820 02/14/21  0505   AST 98* 103* 75*   ALT 73* 76* 69*   BILITOT 0.6 0.7 0.9*   ALKPHOS 83 84 89     No results for input(s): LIPASE, AMYLASE in the last 72 hours. Recent Labs     02/12/21 0527 02/12/21 0913 02/13/21 0820 02/14/21  0505   PROT 5.8*  --  6.0* 5.8*   INR  --  5.7* >10.0* 1.9     Ct Abdomen Pelvis Wo Contrast Additional Contrast? Radiologist Recommendation    Result Date: 2/13/2021  EXAMINATION: CT ABDOMEN PELVIS WO CONTRAST DATE AND TIME:2/13/2021 9:23 AM CLINICAL HISTORY: Acute abdominal pain. Epigastric pain. abd  brusing and severe pain  COMPARISON: 4/7/2021 TECHNIQUE: Contiguous axial CT sections of the abdomen and pelvis. No IV contrast administered. Unenhanced imaging is limited for the evaluation of some intra-abdominal and pelvic pathologies.   All CT scans at this facility use dose modulation, iterative reconstruction, and/or weight based dosing when appropriate to reduce radiation dose to as low as reasonably achievable. FINDINGS   Liver: Negative     Spleen: Negative    Pancreas: Negative     Kidney: Small/atrophic appearing kidneys. No hydronephrosis. Adrenal glands are negative. Bowel: Negative    Nodes: No lymphadenopathy. Aorta: No aneurysm    Peritoneum: No free fluid. Mild diffuse subcutaneous edema similar in appearance previous CT. Pelvis: No abnormal soft tissue mass. The bladder is negative. Bones: No acute osseous abnormalities. Lung bases: Bibasilar atelectasis with small right effusion. Persistent moderate subcutaneous edema. No intraperitoneal free fluid or hematoma. No solid organ injury. Overall no significant change       NO ACUTE PATHOLOGY IN THE ABDOMEN OR PELVIS. Ct Abdomen Pelvis Wo Contrast Additional Contrast? None    Result Date: 2/7/2021  CT of the Abdomen and Pelvis none intravenous contrast medium History:  Diffuse lower abdominal and back pain with abdominal wall swelling. Technical Factors: CT imaging of the abdomen and pelvis were obtained and formatted as 5 mm contiguous axial images from the domes of the diaphragm to the symphysis pubis. Sagittal and coronal reconstructions were also obtained. Oral contrast medium:  None. Intravenous contrast medium:  None. Comparison:  CT abdomen pelvis, July 14, 2020. Findings: Lungs:  Consolidation right lower lobe, with small right effusion. Median sternotomy. Liver:  Normal in size, shape, and attenuation. Bile Ducts:  Normal in caliber. Gallbladder:  Surgically absent. Pancreas:  Normal without masses, cysts, ductal dilatation or calcification. Spleen:  Normal in size without masses or calcifications. No splenules. Kidneys:  Small in size, with regular and left kidneys measuring 6.4 cm, and 6.6 cm in craniocaudal dimension, respectively. No hydronephrosis, masses, or stones. Adrenals:  Normal. Small bowel:  Normal in caliber. Appendix:  Not visualized. Colon:  Normal in caliber.  Peritoneum:  No ascites, free air, or fluid collections. Vessels: Aorta normal in course and caliber. Severe atherosclerotic change, involving bilateral renal vasculature, inferior mesenteric, celiac, hepatic, splenic, as well as bilateral iliac vasculature. . Lymph nodes:  Retroperitoneal:  No enlarged retroperitoneal lymph nodes. Mesenteric:  No enlarged mesenteric lymph nodes. Pelvic: No enlarged pelvic lymph nodes. Ureters: Normal in course and caliber. No calcifications. Bladder: Decompressed. Reproductive organs: No pelvic masses. Abdominal Wall:  No hernia identified. No diastasis of rectus musculature. Diffuse edematous change, anterior and lateral dominant or wall, bilateral pelvic wall, dependent portions pelvic wall. Bones:  No bone lesions. No degenerative changes. No post operative changes. Consolidation right lower lobe, with small effusion. Bilateral small kidneys. Severe diffuse atherosclerotic disease. Diffuse abdominal pelvic wall edema. Cholecystectomy. All CT scans at this facility use dose modulation, iterative reconstruction, and/or weight based dosing when appropriate to reduce radiation dose to as low as reasonably achievable. Ct Head Wo Contrast    Result Date: 2/12/2021  CT Brain Contrast medium:  Not utilized. History: Altered mental status, headache, neck pain, possible fall Comparison:  CT brain, July 26, 2020 Findings: Extra-axial spaces:  Normal. Intracranial hemorrhage:  None. Ventricular system:  Normal for age. Basal Cisterns:  Normal. Cerebral Parenchyma:  Normal. Midline Shift:  None. Cerebellum:  Normal. Paranasal sinuses and mastoid air cells:  Normal. Visualized Orbits:  Normal.     Impression: No acute findings. . All CT scans at this facility use dose modulation, iterative reconstruction, and/or weight based dosing when appropriate to reduce radiation dose to as low as reasonably achievable. Ct Cervical Spine Wo Contrast    Result Date: 2/12/2021  CT cervical spine without intravenous contrast medium. HISTORY:  Altered mental status, headache, neck pain. fall TECHNICAL FACTORS: CT cervical spine obtained and formatted as 2.5 mm contiguous axial images from skull base to the level of. Sagittal and coronal reconstructions were obtained during postprocessing. No contrast medium was utilized. COMPARISON: None FINDINGS: Cervical vertebral bodies are normal in height and alignment Atlantooccipital articulation maintained. Atlantoaxial interval preserved. Neural foramina intact. Mild disc space narrowing C5-C6. No fractures, dislocations, bone lesions. Limited imaging lung apices without anomaly. Carotid arteries and soft tissues are without anomaly. No fracture. All CT scans at this facility use dose modulation, iterative reconstruction, and/or weight based dosing when appropriate to reduce radiation dose to as low as reasonably achievable. Xr Chest Portable    Result Date: 2/12/2021  EXAMINATION: CHEST PORTABLE VIEW  CLINICAL HISTORY: Fatigue COMPARISONS: February 5, 2021  FINDINGS: Single  views of the chest is submitted. There are multiple median sternotomy wires. There is an endovascular stent overlying the left upper chest. The cardiac silhouette is enlarged Pulmonary vascular unremarkable. Right sided trachea. No focal infiltrates. No Pneumothoraces. NO ACUTE ACTIVE CARDIOPULMONARY PROCESS    Xr Chest Portable    Result Date: 2/10/2021  EXAMINATION: CHEST PORTABLE VIEW  CLINICAL HISTORY: Extremity weakness COMPARISONS: February 7, 2021  FINDINGS: Single  views of the chest is submitted. There are multiple median sternotomy wires. The cardiac silhouette is enlarged Pulmonary vascular unremarkable. Right sided trachea. No focal infiltrates. No Pneumothoraces.                                                                                    NO ACUTE ACTIVE CARDIOPULMONARY PROCESS    Xr Chest Portable    Result Date: 2/7/2021  EXAMINATION: XR CHEST PORTABLE CLINICAL HISTORY: COUGH COMPARISONS: NOVEMBER 24, 2020 FINDINGS: Median sternotomy. Cardiopericardial silhouette diffusely enlarged, unchanged. Lungs clear. STABLE CARDIOMEGALY. Mri Brain Wo Contrast    Result Date: 2/13/2021  EXAMINATION: MRI BRAIN WO CONTRAST DATE AND TIME:2/13/2021 9:53 AM CLINICAL HISTORY: Headache   r/o CVA  COMPARISON: None TECHNIQUE:  Multi-sequence multiplanar imaging of the brain was performed. Sequences included T1-weighted, T2-weighted, FLAIR, diffusion-weighted, ADC maps, and  susceptibility weighted imaging. VOLUME: None   MR CONTRAST: None FINDINGS. There was significant motion artifact during the exam limiting optimal quality. Acute change: No restricted diffusion to suggest an acute infarct. Magnetic susceptibility:There are no areas of abnormal magnetic susceptibility to suggest the presence of any acute blood products. Ventricles: Ventricles and sulci are age-appropriate. Brain parenchyma There are nonspecific white matter hyperintensities that may be related to microvascular ischemic change or  normal aging. Mass: No mass or mass effect. No extra-axial fluid                                                 Brainstem:Brainstem is unremarkable. Skull base: Cerebellar tonsils are normally positioned. No evidence of a marrow replacement process. Vasculature: The major intracranial arterial structures and dural venous sinuses showed typical flow void, suggesting patency by spin-echo criteria. Sinuses: The  mastoids and visualized paranasal sinuses are clear. NO ACUTE INTRACRANIAL PATHOLOGY.       Us Dup Lower Extremities Bilateral Venous    Result Date: 2/12/2021  US DUP LOWER EXTREMITIES BILATERAL VENOUS CLINICAL HISTORY: Confusion, agitation COMPARISONS: FINDINGS: Duplex color ultrasound as well as  both gray scale and spectral Doppler ultrasound of the deep venous system of both the left land right lower extremity from the inguinal ligaments to the popliteal fossa was performed. There are no findings of deep venous thrombus in the visualized vessels of the left or right  lower extremity. NO FINDINGS OF  DEEP VENOUS THROMBUS IN THE VISUALIZED VESSELS OF THE LEFT OR RIGHT  LOWER EXTREMITY. Active Hospital Problems    Diagnosis Date Noted    Cellulitis [L03.90] 02/12/2021     Priority: Low    Rhabdomyolysis [M62.82] 02/12/2021     Priority: Low    Abdominal wall cellulitis [T92.928]      Priority: Low    Calciphylaxis [E83.59]      Priority: Low    Elevated troponin [R77.8] 01/21/2021     Priority: Low    A-fib ([facility] [I48.91] 04/04/2020     Priority: Low    End-stage renal disease on hemodialysis ([facility] [N18.6, Z99.2]      Priority: Low    ESRD (end stage renal disease) ([facility] [N18.6] 02/14/2020     Priority: Low    Essential hypertension [I10] 02/14/2020     Priority: Low    Coronary artery disease of native artery of native heart with stable angina pectoris ([facility] [I25.118] 10/15/2019     Priority: Low    CHF (congestive heart failure) ([facility] [I50.9]      Priority: Low    Hyperlipidemia [E78.5] 06/12/2012     Priority: Low    Major depression [F32.9] 05/09/2006     Priority: Low         Impression/Plan:   1. Abd wall Cellulitis  2. MS changes  3. Uncontrolled AF- CCB iv Bolus. And gtt. Continue Warfrin. Follow INR carefully as it may further drop. May need Heparin if INR <2  4. CABG x3  5. LVEF 60% previously      Thank you for allowing us to participate in the care of this patient. Will continue to follow. Please call if questions or concerns arise.     Electronically signed by Fabrizio Friend MD on 2/14/2021 at 11:21 AM

## 2021-02-14 NOTE — PROGRESS NOTES
Physical Therapy Missed Treatment   Facility/Department: [facility] MED SURG X360/[id number]    NAME: Elizabeth Doctor    : 1965 (50 y.o.)  MRN: [id number]    Account: [de-identified]  Gender: female      PT referral received. Chart reviewed. PT eval attempted at 56. Unable to wake pt to participate actively in eval.  RN notified. Will follow and attempt PT evaluation again at earliest availability.        Samuel Velarde, PT, 21 at 8:13 AM

## 2021-02-14 NOTE — FLOWSHEET NOTE
0930- Patient opens eyes when name is said, quickly shuts her eyes. Patient was sat up for breakfast this morning, but turned her head everytime the spoon touched her lips. Patient did the same thing when I attempted to swab mouth. Patient moans in pain when touched. Dressings were changed after bath on night shift. Dressings are still clean dry and intact. Bowel sounds are active in RUQ and LUQ, hypoactive in RLQ and LLQ. Patient repositioned to left side at this time. 1100- Patient went into AFIB, breathing labored, oxygen applied. physician notified, cardio consulted. 1140- 10mg IV ditiazem given. 3 attempts made for another IV access, unsuccessful. Patient is responsive to pain, moaning a little bit, able to open eyes when responding to name. 1215- Walked past patient room, patient had eyes open, breathing labored. Patient unresponsive, pupils not reactive. Rapid response called. Vitals taken BP 44/28, no good spo2 reading. Physician arrived, see code charting. 1320- I called the sister and updated her on patient status.

## 2021-02-14 NOTE — PROGRESS NOTES
Nephrology Progress Note       Assessment:  50y.o. year old female with history s/f ESRD on IHD MWF, A.fib, T2DM, HTN, CAD s/p CABG who presented for AMS.    1. ESRD on IHD MWF  2. AMS: w/u pending  3. Abdominal wall cellulitis: on vanc and zosyn, ID onboard   4. Elevated CK  5. Hyperkalemia  6. Metabolic acidosis 2/2 renal failure and lactic acidosis  7. Hypoalbuminemia   8. Transaminitis   9.  Acute hypoxic respiratory failure: s/p intubation      Plan:  - continue IHD MWF (will assess tomorrow if able to tolerate IHD or will need CRRT    Patient Active Problem List:     Angina pectoris, unspecified (HCC)     CHF (congestive heart failure) (HCC)     NSTEMI (non-ST elevated myocardial infarction) ([address]     Coronary artery disease of native artery of native heart with stable angina pectoris ([address]     ESRD (end stage renal disease) ([address]     Essential hypertension     Hx of CABG     Paroxysmal atrial fibrillation (HCC)     CHF (congestive heart failure), NYHA class I, acute on chronic, combined ([address]     Uremia     Goals of care, counseling/discussion     End-stage renal disease on hemodialysis ([address]     MDD (major depressive disorder), recurrent severe, without psychosis ([address]     A-fib ([address]     Renal failure     Hyperkalemia     Dyspnea on exertion     Abrasion of back     Atrial fibrillation with rapid ventricular response (HCC)     Cellulitis     Elevated troponin     Rhabdomyolysis     Hyperlipidemia     Major depression     Abdominal wall cellulitis     Calciphylaxis      Subjective:  Admit Date: 2/11/2021    Interval History: transferred to ICU for agonal breathing and hypotension, s/p intubation, BP improved w/ fluids     Medications:  Scheduled Meds:   insulin lispro  0-6 Units Subcutaneous TID WC    insulin lispro  0-3 Units Subcutaneous Nightly    lidocaine  5 mL Intradermal Once    sodium chloride flush  10 mL Intravenous 2 times per day    sodium chloride flush  10 mL Intravenous 2 times per day  piperacillin-tazobactam  2,250 mg Intravenous Q8H    vancomycin (VANCOCIN) intermittent dosing (placeholder)   Other RX Placeholder    aspirin  81 mg Oral Daily    [Held by provider] atorvastatin  40 mg Oral Nightly    b complex-C-folic acid  1 capsule Oral Daily    [Held by provider] cloNIDine  0.1 mg Oral Daily    Ferric Citrate  210 mg Oral TID WC    [Held by provider] hydrALAZINE  25 mg Oral BID    metoprolol tartrate  50 mg Oral BID    topiramate  25 mg Oral BID    warfarin (COUMADIN) daily dosing (placeholder)   Other RX Placeholder     Continuous Infusions:   PN-Adult Premix 4.25/10 - Peripheral Line 50 mL/hr at 02/14/21 1133    dextrose      dilTIAZem (CARDIZEM) 125 mg in dextrose 5% 125 mL infusion      sodium chloride         CBC:   Recent Labs     02/13/21  0820 02/14/21  0505 02/14/21  1106   WBC 15.3* 17.8*  --    HGB 8.0* 8.9* 9.5*    146  --      CMP:    Recent Labs     02/12/21  0527 02/13/21  0820 02/14/21  0505 02/14/21  1106   * 131* 134*  --    K 5.0* 4.4 4.9  --    CL 91* 87* 90*  --    CO2 18* 20 18*  --    BUN 77* 47* 62*  --    CREATININE 10.01* 6.19* 7.06* 6.3*   GLUCOSE 212* 230* 248*  --    CALCIUM 6.8* 7.1* 6.9*  --    LABGLOM 4.1* 7.1* 6.1* 7*     Troponin:   Recent Labs     02/12/21  0913   TROPONINI 0.188*     BNP: No results for input(s): BNP in the last 72 hours. INR:   Recent Labs     02/14/21  0505   INR 1.9     Lipids:   No results for input(s): CHOL, LDLDIRECT, TRIG, HDL, AMYLASE, LIPASE in the last 72 hours. Liver:   Recent Labs     02/14/21  0505   AST 75*   ALT 69*   ALKPHOS 89   PROT 5.8*   LABALBU 1.9*   BILITOT 0.9*     Iron:  No results for input(s): IRONS, FERRITIN in the last 72 hours. Invalid input(s): LABIRONS  Urinalysis: No results for input(s): UA in the last 72 hours.     Objective:  Vitals: BP (!) 44/28   Pulse 94   Temp 98.1 °F (36.7 °C) (Axillary)   Resp 16   Ht 5' 3\" (1.6 m)   Wt 223 lb 5.2 oz (101.3 kg)   SpO2 (!) 89% BMI 39.56 kg/m²    Wt Readings from Last 3 Encounters:   02/12/21 223 lb 5.2 oz (101.3 kg)   02/10/21 240 lb (108.9 kg)   02/07/21 240 lb (108.9 kg)      24HR INTAKE/OUTPUT:    No intake or output data in the 24 hours ending 02/14/21 1301    General: lethargic, in no apparent distress  HEENT: normocephalic, atraumatic, anicteric  Lungs: non-labored respirations, clear to auscultation bilaterally  Heart: regular rate and rhythm, no murmurs or rubs  Abdomen: soft, non-tender, non-distended  MSK: no joint swelling or tenderness  Ext: no cyanosis, no peripheral edema  Neuro: lethargic, able to follow some commands   Skin: abdominal erythema and wounds  Vascular access: FUNMILAYOE AVF      Electronically signed by Willis Bowen MD

## 2021-02-14 NOTE — PROCEDURES
Fidelina Mahoney is a 50 y.o. female patient. 1. Infected ulcer of skin, unspecified ulcer stage ([facility]    2. Chronic renal failure, stage 5 (HCC)    3. Disorientation    4. Cellulitis, unspecified cellulitis site    5. Acute respiratory failure, unspecified whether with hypoxia or hypercapnia (HCC)      Past Medical History:   Diagnosis Date    Atrial fibrillation (HCC)     Cancer (HCC)     Breast    CHF (congestive heart failure) (HCC)     Chronic kidney disease     Diabetes mellitus ([facility]     ESRD (end stage renal disease) ([facility] 2/14/2020    Essential hypertension 2/14/2020    Heart murmur     Hepatitis C     Hx of CABG 2/14/2020    Paroxysmal atrial fibrillation (HCC) 2/14/2020     Blood pressure (!) 89/45, pulse 82, temperature 98.1 °F (36.7 °C), temperature source Axillary, resp. rate 28, height 5' 3\" (1.6 m), weight 223 lb 5.2 oz (101.3 kg), SpO2 (!) 89 %, not currently breastfeeding. Central Line    Date/Time: 2/14/2021 2:07 PM  Performed by: Tatyana Smith MD  Authorized by: Tatyana Smith MD   Consent: The procedure was performed in an emergent situation.   Patient identity confirmed: provided demographic data, hospital-assigned identification number and arm band  Indications: vascular access  Preparation: skin prepped with 2% chlorhexidine  Location details: right femoral  Catheter size: 7 Fr  Ultrasound guidance: yes  Number of attempts: 2          Tatyana Smith MD  2/14/2021

## 2021-02-14 NOTE — PLAN OF CARE
I updated family on code blue- patient is now intubated and had at least 10 minutes of resuscitation. I informed the family that we are providing max support and that the patient is critically ill and unfortunately unlikely to survive the hospitalization. They expressed their understanding. Brother Stephane Beach can be reached at [phone]. He will reach out to other family to discuss case and see if we should continue to resuscitate if she codes again. For now, we will continue current care- full code. I will update Attending Dr. Stephanie Medrano.     Gilford Blacker, DO

## 2021-02-14 NOTE — FLOWSHEET NOTE
Pt Has multiple skin integrity sites. Wound cultures were obtained from tunneling wound on right breast and abdominal wound. All wounds were cleansed and clean dressings were applied. Pt expressing pain in calves during assessment. Pt doesn't not respond to commands but cries in pain. Pt ocassionly will  Respond to name.

## 2021-02-14 NOTE — PROGRESS NOTES
Physical Therapy   Facility/Department: [facility] MED SURG IC07/IC07-01    Patient D/C'd from current physical therapy POC due to an acute change in this patient's medical status (transfered to ICU). New physical therapy Eval and Treat orders are required to resume PT when pt is stable and appropriate for out of bed activity.      [facility]) Physical Therapy Department    Electronically signed by Loida Denise PT on 2/14/21 at 12:44 PM EST

## 2021-02-14 NOTE — CONSULTS
Pulmonary Medicine  Consult Note      Reason for consultation: Respiratory failure on vent support    Consulting physician: Dr. Alyx Mosley:      This is 63-year-old female with coronary disease status post CABG, atrial fibrillation, end-stage renal disease on dialysis, noncompliance with dialysis and skipping dialysis, hypertension, hyperlipidemia was presented with altered mental status. .  Patient skipped dialysis on her last dialysis session on Monday. At home she was not responding to verbal stimuli required sternal rub. After sternal rub she follow some commands but falls back to sleep. She was brought to ER and admitted on the floor. She has questionable cellulitis of abdominal wall with multiple ulcer. She has been on Vanco and Zosyn and ID consulted. Patient has rhabdomyolysis with elevated CK, lactic acidosis. Patient was sent to ICU after rapid response due to agonal breathing and hypotension on the floor. She was intubated for airway protection. Patient had ABG shows pH 7.40. She had A. fib with RVR started on Cardizem drip per cardiology patient was transferred to ICU patient coded in ICU due to no pulse. ACLS protocol done. .  Pulse return  after CPR, epi and shock. Currently on levo and vasopressin. She is unresponsive. Family at bedside. Right femoral line placed during code. Unable to place A-line. I tried multiple times in right radial and antecubital area but unable to get. Left femoral area skin is excoriated and small hematoma after Dr. Lety Law had tried multiple times before. Currently on a vent support with assist control with rate 28 tidal volume is 400 FiO2 100% and PEEP of 8.     Past Medical History:        Diagnosis Date    Atrial fibrillation ([facility]     Cancer ([facility]     Breast    CHF (congestive heart failure) (HCC)     Chronic kidney disease     Diabetes mellitus ([facility]     [facility] ([facility] 2/14/2020    Essential hypertension 2020    Heart murmur     Hepatitis C     Hx of CABG 2020    Paroxysmal atrial fibrillation ([facility] 2020       Past Surgical History:        Procedure Laterality Date    BREAST SURGERY       SECTION      CHOLECYSTECTOMY      CORONARY ARTERY BYPASS GRAFT         Social History:     reports that she has quit smoking. She has never used smokeless tobacco. She reports that she does not drink alcohol or use drugs. Family History:   History reviewed. No pertinent family history.     Allergies:  Hydrocodone-acetaminophen, Naproxen, Hydrocodone, Quetiapine, and Vicodin [hydrocodone-acetaminophen]        MEDICATIONS during current hospitalization:    Continuous Infusions:   PN-Adult Premix 4.25/10 - Peripheral Line 50 mL/hr at 21 1133    dextrose      dilTIAZem (CARDIZEM) 125 mg in dextrose 5% 125 mL infusion      sodium chloride      sodium chloride      sodium bicarbonate infusion         Scheduled Meds:   insulin lispro  0-6 Units Subcutaneous TID WC    insulin lispro  0-3 Units Subcutaneous Nightly    lidocaine  5 mL Intradermal Once    sodium chloride flush  10 mL Intravenous 2 times per day    sodium chloride flush  10 mL Intravenous 2 times per day    piperacillin-tazobactam  2,250 mg Intravenous Q8H    vancomycin (VANCOCIN) intermittent dosing (placeholder)   Other RX Placeholder    aspirin  81 mg Oral Daily    [Held by provider] atorvastatin  40 mg Oral Nightly    b complex-C-folic acid  1 capsule Oral Daily    [Held by provider] cloNIDine  0.1 mg Oral Daily    Ferric Citrate  210 mg Oral TID WC    [Held by provider] hydrALAZINE  25 mg Oral BID    metoprolol tartrate  50 mg Oral BID    topiramate  25 mg Oral BID    warfarin (COUMADIN) daily dosing (placeholder)   Other RX Placeholder       PRN Meds:glucose, dextrose, glucagon (rDNA), dextrose, metoprolol, LORazepam, sodium chloride flush, LORazepam, haloperidol lactate, sodium chloride flush, promethazine **OR** ondansetron, polyethylene glycol, acetaminophen **OR** acetaminophen, albumin human    REVIEW OF SYSTEMS:  As in history of present illness  Other 14 point review of system is negative. PHYSICAL EXAM:    Vitals:  BP (!) 89/45   Pulse 82   Temp 98.1 °F (36.7 °C) (Axillary)   Resp 28   Ht 5' 3\" (1.6 m)   Wt 223 lb 5.2 oz (101.3 kg)   SpO2 (!) 89%   BMI 39.56 kg/m²   General: Orally intubated, unresponsive. .  Head: Atraumatic , Normocephalic   Eyes: PERRL. No sclera icterus. No conjunctival injection. No discharge   ENT: No nasal  discharge. Pharynx clear. Neck:  Trachea midline. No thyromegaly, no JVD, No cervical adenopathy. Chest : Bilaterally symmetrical ,Normal effort,  No accessory muscle use  Lung : Diminished BS bilateraly. No Rales. No wheezing. No rhonchi. Heart[de-identified] Normal  rate. Regular rhythm. No mumur ,  Rub or gallop  ABD: Obese,  Non-distended. Multiple wounds on abdomen, under breast mainly on the right breast.   Extremities: Groin area is excoriated both side right femoral central line placed. No pitting in both lower leg , No Cyanosis ,No clubbing  Neuro: Unresponsive  Skin: Multiple wound on abdomen, under the breast on right side, skin excoriation and mottling in both lower extremity mainly in groin area.         Data Review  Recent Labs     02/12/21  0527 02/13/21  0820 02/14/21  0505 02/14/21  1106 02/14/21  1258 02/14/21  1426   WBC 12.8* 15.3* 17.8*  --   --   --    HGB 8.4* 8.0* 8.9* 9.5* 9.1* 8.9*   HCT 26.3* 26.2* 29.2*  --   --   --     153 146  --   --   --       Recent Labs     02/12/21  0527 02/13/21  0820 02/14/21  0505 02/14/21  1106 02/14/21  1258 02/14/21  1426   * 131* 134*  --   --   --    K 5.0* 4.4 4.9  --   --   --    CL 91* 87* 90*  --   --   --    CO2 18* 20 18*  --   --   --    BUN 77* 47* 62*  --   --   --    CREATININE 10.01* 6.19* 7.06* 6.3* 5.9* 6.7*   GLUCOSE 212* 230* 248*  --   --   --        MV Settings:     Vent Mode: AC/VC  Vt Ordered: 400 mL  Rate Set: 28 bmp  PEEP/CPAP: 5  Peak Inspiratory Pressure: 82 cmH2O    ABGs:   Recent Labs     02/14/21  1106 02/14/21  1258 02/14/21  1426   PHART 7.449 7.133* 6.897*   OQP5UHJ 26* 43 69*   PO2ART 76* 29* 44*   MGZ1XCK 18.1* 14.3* 13.5*   BEART -6* -15* -20*   M4TNNHVD 96* 38* 48*   CSA8MTC 19* 16* 16*     O2 Device: None (Room air)  O2 Flow Rate (L/min): 0 L/min  Lab Results   Component Value Date    LACTA 2.6 02/12/2021    LACTA 2.9 02/11/2021    LACTA 1.0 06/23/2020       Radiology  Ct Abdomen Pelvis Wo Contrast Additional Contrast? Radiologist Recommendation    Result Date: 2/13/2021  EXAMINATION: CT ABDOMEN PELVIS WO CONTRAST DATE AND TIME:2/13/2021 9:23 AM CLINICAL HISTORY: Acute abdominal pain. Epigastric pain. abd  brusing and severe pain  COMPARISON: 4/7/2021 TECHNIQUE: Contiguous axial CT sections of the abdomen and pelvis. No IV contrast administered. Unenhanced imaging is limited for the evaluation of some intra-abdominal and pelvic pathologies. All CT scans at this facility use dose modulation, iterative reconstruction, and/or weight based dosing when appropriate to reduce radiation dose to as low as reasonably achievable. FINDINGS   Liver: Negative     Spleen: Negative    Pancreas: Negative     Kidney: Small/atrophic appearing kidneys. No hydronephrosis. Adrenal glands are negative. Bowel: Negative    Nodes: No lymphadenopathy. Aorta: No aneurysm    Peritoneum: No free fluid. Mild diffuse subcutaneous edema similar in appearance previous CT. Pelvis: No abnormal soft tissue mass. The bladder is negative. Bones: No acute osseous abnormalities. Lung bases: Bibasilar atelectasis with small right effusion. Persistent moderate subcutaneous edema. No intraperitoneal free fluid or hematoma. No solid organ injury. Overall no significant change       NO ACUTE PATHOLOGY IN THE ABDOMEN OR PELVIS.      Ct Abdomen Pelvis Wo Contrast Additional Contrast? None    Result Date: 2/7/2021  CT of the Abdomen and Pelvis none intravenous contrast medium History:  Diffuse lower abdominal and back pain with abdominal wall swelling. Technical Factors: CT imaging of the abdomen and pelvis were obtained and formatted as 5 mm contiguous axial images from the domes of the diaphragm to the symphysis pubis. Sagittal and coronal reconstructions were also obtained. Oral contrast medium:  None. Intravenous contrast medium:  None. Comparison:  CT abdomen pelvis, July 14, 2020. Findings: Lungs:  Consolidation right lower lobe, with small right effusion. Median sternotomy. Liver:  Normal in size, shape, and attenuation. Bile Ducts:  Normal in caliber. Gallbladder:  Surgically absent. Pancreas:  Normal without masses, cysts, ductal dilatation or calcification. Spleen:  Normal in size without masses or calcifications. No splenules. Kidneys:  Small in size, with regular and left kidneys measuring 6.4 cm, and 6.6 cm in craniocaudal dimension, respectively. No hydronephrosis, masses, or stones. Adrenals:  Normal. Small bowel:  Normal in caliber. Appendix:  Not visualized. Colon:  Normal in caliber. Peritoneum:  No ascites, free air, or fluid collections. Vessels: Aorta normal in course and caliber. Severe atherosclerotic change, involving bilateral renal vasculature, inferior mesenteric, celiac, hepatic, splenic, as well as bilateral iliac vasculature. . Lymph nodes:  Retroperitoneal:  No enlarged retroperitoneal lymph nodes. Mesenteric:  No enlarged mesenteric lymph nodes. Pelvic: No enlarged pelvic lymph nodes. Ureters: Normal in course and caliber. No calcifications. Bladder: Decompressed. Reproductive organs: No pelvic masses. Abdominal Wall:  No hernia identified. No diastasis of rectus musculature. Diffuse edematous change, anterior and lateral dominant or wall, bilateral pelvic wall, dependent portions pelvic wall. Bones:  No bone lesions. No degenerative changes. No post operative changes. Consolidation right lower lobe, with small effusion. Bilateral small kidneys. Severe diffuse atherosclerotic disease. Diffuse abdominal pelvic wall edema. Cholecystectomy. All CT scans at this facility use dose modulation, iterative reconstruction, and/or weight based dosing when appropriate to reduce radiation dose to as low as reasonably achievable. Ct Head Wo Contrast    Result Date: 2/12/2021  CT Brain Contrast medium:  Not utilized. History: Altered mental status, headache, neck pain, possible fall Comparison:  CT brain, July 26, 2020 Findings: Extra-axial spaces:  Normal. Intracranial hemorrhage:  None. Ventricular system:  Normal for age. Basal Cisterns:  Normal. Cerebral Parenchyma:  Normal. Midline Shift:  None. Cerebellum:  Normal. Paranasal sinuses and mastoid air cells:  Normal. Visualized Orbits:  Normal.     Impression: No acute findings. . All CT scans at this facility use dose modulation, iterative reconstruction, and/or weight based dosing when appropriate to reduce radiation dose to as low as reasonably achievable. Ct Cervical Spine Wo Contrast    Result Date: 2/12/2021  CT cervical spine without intravenous contrast medium. HISTORY:  Altered mental status, headache, neck pain. fall TECHNICAL FACTORS: CT cervical spine obtained and formatted as 2.5 mm contiguous axial images from skull base to the level of. Sagittal and coronal reconstructions were obtained during postprocessing. No contrast medium was utilized. COMPARISON: None FINDINGS: Cervical vertebral bodies are normal in height and alignment Atlantooccipital articulation maintained. Atlantoaxial interval preserved. Neural foramina intact. Mild disc space narrowing C5-C6. No fractures, dislocations, bone lesions. Limited imaging lung apices without anomaly. Carotid arteries and soft tissues are without anomaly. No fracture.  All CT scans at this facility use dose modulation, iterative reconstruction, and/or weight based dosing when appropriate to reduce radiation dose to as low as reasonably achievable. Xr Chest Portable    Result Date: 2/14/2021  XR CHEST PORTABLE Clinical History:  Intubation. Shortness of breath. Comparison:  2/11/2021. RESULT: Endotracheal tube extending toward the right, slightly below the michelle toward the right mainstem bronchus. Proximal retraction is suggested. Median sternotomy. No consolidation. No pleural effusion. No pneumothorax. Normal pulmonary vascular pattern. Stable mildly enlarged cardiomediastinal silhouette. Vascular stent left axillary region, unchanged. No acute osseous findings. Endotracheal tube extending toward the right mainstem bronchus. Proximal retraction suggested. Xr Chest Portable    Result Date: 2/12/2021  EXAMINATION: CHEST PORTABLE VIEW  CLINICAL HISTORY: Fatigue COMPARISONS: February 5, 2021  FINDINGS: Single  views of the chest is submitted. There are multiple median sternotomy wires. There is an endovascular stent overlying the left upper chest. The cardiac silhouette is enlarged Pulmonary vascular unremarkable. Right sided trachea. No focal infiltrates. No Pneumothoraces. NO ACUTE ACTIVE CARDIOPULMONARY PROCESS    Xr Chest Portable    Result Date: 2/10/2021  EXAMINATION: CHEST PORTABLE VIEW  CLINICAL HISTORY: Extremity weakness COMPARISONS: February 7, 2021  FINDINGS: Single  views of the chest is submitted. There are multiple median sternotomy wires. The cardiac silhouette is enlarged Pulmonary vascular unremarkable. Right sided trachea. No focal infiltrates. No Pneumothoraces. NO ACUTE ACTIVE CARDIOPULMONARY PROCESS    Xr Chest Portable    Result Date: 2/7/2021  EXAMINATION: XR CHEST PORTABLE CLINICAL HISTORY: COUGH COMPARISONS: NOVEMBER 24, 2020 FINDINGS: Median sternotomy.  Cardiopericardial right  lower extremity. NO FINDINGS OF  DEEP VENOUS THROMBUS IN THE VISUALIZED VESSELS OF THE LEFT OR RIGHT  LOWER EXTREMITY. Assessment and  plan:     1. S/p code, intubation on vent support. 2. Altered mental status  3. Cellulitis of abdominal wall with multiple ulcer  4. End-stage renal disease, noncompliant with dialysis  5. Rhabdomyolysis  6. Lactic acidosis  7. Elevated troponin    Patient is coded multiple times, she is currently intubated unresponsive. On vent support with assist control with respiratory rate 28,  PEEP 5. Patient did ABG pH 7.44 PCO2 26 PO2 76 saturation 96 bicarb 18 on the floor now her last ABG is pH 6.89 PCO2 69 PO2 44. She is on Levophed and vasopressin. She is on IV Zosyn and vancomycin. She is on bicarb drip. Vonzella Goodpasture She has severe lactic acidosis. Discussed with the brother and sister and gave update. She is critically ill and family aware about this. POA was informed by family. Prognosis poor. Thank you for consult    NOTE: This report was transcribed using voice recognition software. Every effort was made to ensure accuracy; however, inadvertent computerized transcription errors may be present.     Electronically signed by Kerri Brittle, MD, FCCP on 2/14/2021 at 3:48 PM

## 2021-02-14 NOTE — PROGRESS NOTES
1210:  Pt rapid Response - pt hypo tensive- with pulse  Normal Saline started- Intubation planned  12:21- patient intubated and ventilator in place  12:24- family notified - will come to hospital after   12:25- 81/48 -76 97%02 on Ventilator   Patient to Transfer to ICU 5   12:31: patient in route to ICU Bed 5

## 2021-02-14 NOTE — SIGNIFICANT EVENT
Rapid response was called for agonal breathing and hypotension. Patient was intubated for airway protection. ABGs noted. ABG was done before intubation over an hour before the rapid response and PH was 7.4. Patient will get a PICC line for poor IV access. Blood pressure improved with bolus 250. Patient was given ice by her sister at bedside as per nursing. Patient never lost a pulse. Will consult pulmonary for vent management. Patient also had a A. fib with RVR started on Cardizem drip by cardiology.     CCT was 45 min

## 2021-02-14 NOTE — SIGNIFICANT EVENT
Patient had a cardiac arrest upon coming to the ICU. ACLS protocol was carried out. I place a central line right femoral vein during the code. Patient received epi and bicarb. Will start A-line. Regained pulse. Patient coded again after 30 minutes of first code. A-line is not reading on the monitor readjusted very positional.  Came out after trying to reposition. Attempted the a line again and was unsuccessful. Will get anesthesia to try.  Spoke with Dr. Shae Nelson  Cc 1 hour and 30 min

## 2021-02-15 PROBLEM — S21.009A: Status: ACTIVE | Noted: 2021-01-01

## 2021-02-15 PROBLEM — S31.109A: Status: ACTIVE | Noted: 2021-01-01

## 2021-02-15 NOTE — PROGRESS NOTES
Clinical Pharmacy Note    Enid Ward is a 50 y.o. female for whom pharmacy has been asked to manage warfarin therapy. Reason for Admission: altered mental status and abdominal wall cellulitis     Consulting Physician: Dr. Fredy Montoya   Warfarin dose prior to admission: warfarin 5mg PO daily (35mg TWD)   Warfarin Indication: atrial fibrillation   Target INR range: 2-3  Order for concomitant anticoagulant therapy: none at this times    Outpatient warfarin provider: [facility]     Past Medical History:   Diagnosis Date    Atrial fibrillation ([facility]     Cancer ([facility]     Breast    CHF (congestive heart failure) (HCC)     Chronic kidney disease     Diabetes mellitus ([facility]     ESRD (end stage renal disease) ([facility] 2/14/2020    Essential hypertension 2/14/2020    Heart murmur     Hepatitis C     Hx of CABG 2/14/2020    Paroxysmal atrial fibrillation ([facility] 2/14/2020               Recent Labs     02/15/21  0600   INR 2.5     Recent Labs     02/14/21  0505 02/14/21  0505 02/14/21  1630 02/14/21  1630 02/15/21  0022 02/15/21  0600 02/15/21  0611   HGB 8.9*   < > 8.4*   < > 8.7* 7.9* 8.5*   HCT 29.2*  --  27.7*  --   --  25.2*  --      --  126*  --   --  45*  --     < > = values in this interval not displayed. Current warfarin drug-drug interactions: amiodarone, aspirin, acetaminophen     Current diet order/intake: NPO    Date INR Warfarin Dose   02-15-21 2.5 2.5 mg                                      INR of 2.5 is within goal range of 2-3 for atrial fibrillation. Upon admission via ED patient was administered warfarin 10mg X 1 dose, with supra-therapeutic INR of 4.7- INR then giovanni to >10, and 10mg IV Vitamin K administered 02/13/21. Dose of warfarin prior to admission is warfarin 5mg daily, or 35mg TWD. Will scale back so as to achieve non-supra-therapeutic INRs and give warfarin 2.5mg today only. Continue with daily PT/INR during pharmacy consult to monitor and dose warfarin therapy.     Thank you for the consult.     Brenda Tineo, PharmD   2/15/2021 3:49 PM

## 2021-02-15 NOTE — PROGRESS NOTES
Physician Progress Note      Kathleen Cerrato  CSN #:                  [id number]  :                       1965  ADMIT DATE:       2021 8:43 PM  [address] [employer] [place] DATE:  RESPONDING  PROVIDER #:        Yoan Taylor MD          QUERY TEXT:    Pt admitted with abdominal wall cellulitis. Patient noted to have sepsis   criteria. If possible, please document in the progress notes and discharge   summary if you are evaluating and /or treating any of the following: The medical record reflects the following:  Risk Factors: cellulitis, ESRD, acute respiratory failure, cardiac arrest,   rhabdomyolysis, noncompliance  Clinical Indicators: WBC 12.8-19.0, lactic acid 2.6-12.1, lactate 2.16->20.00,   CK 6541-3437, ALT 69-75, AST   Treatment: intubation, epinephrine, levophed, vasopressin, ID consult    Osorio CORNEJO, RN, CDS  [phone]  Options provided:  -- Sepsis, present on admission  -- Sepsis, present on admission  -- Sepsis, not present on admission,  -- No Sepsis, localized infection such only  -- Other - I will add my own diagnosis  -- Disagree - Not applicable / Not valid  -- Disagree - Clinically unable to determine / Unknown  -- Refer to Clinical Documentation Reviewer    PROVIDER RESPONSE TEXT:    This patient has sepsis which was present on admission.     Query created by: Estefani Larsen on 2/15/2021 7:19 AM      Electronically signed by:  Yoan Taylor MD 2/15/2021 2:18 PM

## 2021-02-15 NOTE — FLOWSHEET NOTE
02/15/21 0955   Vital Signs   BP (!) 164/66   Temp 97 °F (36.1 °C)   Pulse 175   Weight 231 lb 0.7 oz (104.8 kg)   Percent Weight Change 0   Post-Hemodialysis Assessment   Post-Treatment Procedures Blood returned; Access bleeding time < 10 minutes   Machine Disinfection Process Exterior Machine Disinfection   Rinseback Volume (ml) 300 ml   Total Liters Processed (l/min) 20.7 l/min   Dialyzer Clearance Moderately streaked   Duration of Treatment (minutes) 65 minutes   NET Removed (ml) 0 ml   Tolerated Treatment Poor   Patient Response to Treatment   (pt completed 1hr and 5 min. HD TX and removed No fluid. pt t)   Bilateral Breath Sounds Clear;Diminished   Edema Generalized;Right lower extremity; Left lower extremity   Edema Generalized +2   RLE Edema +2   LLE Edema +2   Physician Notified? Yes   pt completed 1hr and 5 min. HD TX and removed No fluid. pt tolerated HD TX poorly. pt became tachy 45 min. into HD TX., pt having hypotension and thready pulse, notified Dr. Dennie Hartmann and received orders to end HD [address]. pt's blood returned per protocol. pt access dressing is clean, dry and intact. report given to primary nurse.

## 2021-02-15 NOTE — PROGRESS NOTES
Progress Note  Date:2/15/2021       Room:[place] [address]  Patient Name:Ann Marie Mishra     YOB: 1965     Age:55 y.o.      ROS: ROS could not be obtained bc of acuity of medical conditon  Subjective    Subjective     Review of Systems    Objective         Vitals Last 24 Hours:  TEMPERATURE:  Temp  Av.8 °F (36.6 °C)  Min: 97 °F (36.1 °C)  Max: 98.7 °F (37.1 °C)  RESPIRATIONS RANGE: Resp  Av.6  Min: 13  Max: 39  PULSE OXIMETRY RANGE: SpO2  Av.7 %  Min: 3 %  Max: 100 %  PULSE RANGE: Pulse  Av.4  Min: 26  Max: 175  BLOOD PRESSURE RANGE: Systolic (95RHW), WJF:925 , Min:44 , BAM:732   ; Diastolic (66EXI), WUC:35, Min:28, Max:77    I/O (24Hr): Intake/Output Summary (Last 24 hours) at 2/15/2021 1156  Last data filed at 2/15/2021 0600  Gross per 24 hour   Intake 4284.69 ml   Output 500 ml   Net 3784.69 ml     Objective:  General Appearance:  Ill-appearing. Vital signs: (most recent): Blood pressure (!) 164/66, pulse 146, temperature 97 °F (36.1 °C), resp. rate 28, height 5' 3\" (1.6 m), weight 231 lb 0.7 oz (104.8 kg), SpO2 100 %, not currently breastfeeding. HEENT: Normal HEENT exam.    Lungs:  Normal effort. Heart: Normal rate. S1 normal and S2 normal.    Abdomen: Abdomen is soft. Bowel sounds are normal.   There is no epigastric area or suprapubic area tenderness. Pulses: Distal pulses are intact. Pupils:  Pupils are equal, round, and reactive to light. Skin:  Warm.       Labs/Imaging/Diagnostics    Labs:  CBC:  Recent Labs     21  0820 21  0505 21  0505 21  1630 21  1630 02/15/21  0022 02/15/21  0600 02/15/21  0611   WBC 15.3* 17.8*  --  19.0*  --   --  12.4*  --    RBC 2.80* 3.11*  --  2.88*  --   --  2.70*  --    HGB 8.0* 8.9*   < > 8.4*   < > 8.7* 7.9* 8.5*   HCT 26.2* 29.2*  --  27.7*  --   --  25.2*  --    MCV 93.4 93.8  --  96.0  --   --  93.1  --    RDW 16.3* 16.4*  --  17.4*  --   --  17.2*  --     146  --  126*  -- --   --   --     < > = values in this interval not displayed. CHEMISTRIES:  Recent Labs     02/15/21  0136 02/15/21  0600 02/15/21  0607 02/15/21  0611    138 142  --    K 5.4* 3.8 4.7  --    CL 91* 94* 90*  --    CO2 9* 22 16*  --    BUN 66* 40* 71*  --    CREATININE 6.94* 3.71* 7.14* 5.3*   GLUCOSE 360* 254* 318*  --    PHOS 11.4*  --  10.5*  --    MG 2.3  --  2.2  --      PT/INR:  Recent Labs     02/14/21  0505 02/15/21  0015 02/15/21  0600   PROTIME 21.5* 23.8* 26.6*   INR 1.9 2.1 2.5     APTT:  Recent Labs     02/15/21  0015   APTT 43.9*     LIVER PROFILE:  Recent Labs     02/14/21  0505 02/14/21  1630 02/15/21  0600   AST 75* 155* 162*  142*   ALT 69* 75* 80*  73*   BILIDIR  --   --  0.9*   BILITOT 0.9* 1.0* 1.5*  1.1*   ALKPHOS 89 105 105  100       Imaging Last 24 Hours:  Ct Head Wo Contrast    Result Date: 2/12/2021  CT Brain Contrast medium:  Not utilized. History: Altered mental status, headache, neck pain, possible fall Comparison:  CT brain, July 26, 2020 Findings: Extra-axial spaces:  Normal. Intracranial hemorrhage:  None. Ventricular system:  Normal for age. Basal Cisterns:  Normal. Cerebral Parenchyma:  Normal. Midline Shift:  None. Cerebellum:  Normal. Paranasal sinuses and mastoid air cells:  Normal. Visualized Orbits:  Normal.     Impression: No acute findings. . All CT scans at this facility use dose modulation, iterative reconstruction, and/or weight based dosing when appropriate to reduce radiation dose to as low as reasonably achievable. Ct Cervical Spine Wo Contrast    Result Date: 2/12/2021  CT cervical spine without intravenous contrast medium. HISTORY:  Altered mental status, headache, neck pain. fall TECHNICAL FACTORS: CT cervical spine obtained and formatted as 2.5 mm contiguous axial images from skull base to the level of. Sagittal and coronal reconstructions were obtained during postprocessing. No contrast medium was utilized.  COMPARISON: None FINDINGS: Cervical vertebral bodies are normal in height and alignment Atlantooccipital articulation maintained. Atlantoaxial interval preserved. Neural foramina intact. Mild disc space narrowing C5-C6. No fractures, dislocations, bone lesions. Limited imaging lung apices without anomaly. Carotid arteries and soft tissues are without anomaly. No fracture. All CT scans at this facility use dose modulation, iterative reconstruction, and/or weight based dosing when appropriate to reduce radiation dose to as low as reasonably achievable. Xr Chest Portable    Result Date: 2/12/2021  EXAMINATION: CHEST PORTABLE VIEW  CLINICAL HISTORY: Fatigue COMPARISONS: February 5, 2021  FINDINGS: Single  views of the chest is submitted. There are multiple median sternotomy wires. There is an endovascular stent overlying the left upper chest. The cardiac silhouette is enlarged Pulmonary vascular unremarkable. Right sided trachea. No focal infiltrates. No Pneumothoraces. NO ACUTE ACTIVE CARDIOPULMONARY PROCESS    Xr Chest Portable    Result Date: 2/10/2021  EXAMINATION: CHEST PORTABLE VIEW  CLINICAL HISTORY: Extremity weakness COMPARISONS: February 7, 2021  FINDINGS: Single  views of the chest is submitted. There are multiple median sternotomy wires. The cardiac silhouette is enlarged Pulmonary vascular unremarkable. Right sided trachea. No focal infiltrates. No Pneumothoraces.                                                                                    NO ACUTE ACTIVE CARDIOPULMONARY PROCESS    Us Dup Lower Extremities Bilateral Venous    Result Date: 2/12/2021  US DUP LOWER EXTREMITIES BILATERAL VENOUS CLINICAL HISTORY: Confusion, agitation COMPARISONS: FINDINGS: Duplex color ultrasound as well as  both gray scale and spectral Doppler ultrasound of the deep venous system of both the left land right lower extremity from the inguinal ligaments to the popliteal fossa was performed. There are no findings of deep venous thrombus in the visualized vessels of the left or right  lower extremity. NO FINDINGS OF  DEEP VENOUS THROMBUS IN THE VISUALIZED VESSELS OF THE LEFT OR RIGHT  LOWER EXTREMITY. Assessment//Plan           Hospital Problems           Last Modified POA    * (Principal) Cellulitis 2/12/2021 Yes    CHF (congestive heart failure) ([address] 2/12/2021 Yes    Coronary artery disease of native artery of native heart with stable angina pectoris ([address] 2/12/2021 Yes    ESRD (end stage renal disease) ([address] 2/12/2021 Yes    Essential hypertension 2/12/2021 Yes    End-stage renal disease on hemodialysis ([address] 2/12/2021 Yes    A-fib ([address] 2/12/2021 Yes    Elevated troponin 2/12/2021 Yes    Rhabdomyolysis 2/12/2021 Yes    Hyperlipidemia 2/12/2021 Yes    Major depression 2/12/2021 Yes    Overview Signed 2/12/2021  1:10 AM by Dago Maxwell RN, NP     Last Assessment & Plan:   Assessment:   Stable chronic depression    PLAN:   -Continue home Prozac  first diagnosed in 2000, was suicidal, situational         Abdominal wall cellulitis 2/12/2021 Yes    Calciphylaxis 2/12/2021 Yes        Assessment & Plan    1) abd wall cellulitis  2) encephalopathy due to uremia  3) non compliance  4) ESRD on HD  5) Rhabdomyolysis    Continue with maintenance hemodialysis. Continue with IV antibiotics. Neurology evaluation for altered mental status. CT head and cervical is negative. Repeat CK. Avoid fluid overload. Follow-up cultures. 2/13: We will try to speech and swallow eval.  IV fluid. Avoid fluid overload since the patient is end-stage renal disease. Rhabdomyolysis improving. Antibiotics as per ID. Patient needs placement upon clearance.   Electronically signed by Felipa Riggs MD on 2/12/21 at 12:19 PM EST

## 2021-02-15 NOTE — PROGRESS NOTES
Pharmacy Vancomycin Consult     Vancomycin Day: 5  Current Dosing: vancomycin 1000mg IV X 1 dose after hemodialysis sessions on 02/11/21 + 02/12/21     Recent Labs     02/14/21  0505 02/14/21  0505 02/14/21  1630 02/14/21  1630 02/15/21  0600 02/15/21  0607 02/15/21  0611   BUN 62*  --  67*   < > 40* 71*  --    CREATININE 7.06*   < > 7.31*   < > 3.71* 7.14* 5.3*   WBC 17.8*  --  19.0*  --   --   --   --     < > = values in this interval not displayed.        Intake/Output Summary (Last 24 hours) at 2/15/2021 1059  Last data filed at 2/15/2021 0600  Gross per 24 hour   Intake 4284.69 ml   Output 500 ml   Net 3784.69 ml     Culture Date      Source                       Results  Culture, Wound [[id number]] (Abnormal) Collected: 02/14/21 0438   Order Status: Completed Specimen: Abdomen from Breast Updated: 02/15/21 0930    Gram Stain Result No WBC's   Few epithelial cells   Few Small Gram positive rods     Organism Proteus mirabilisAbnormal     WOUND/ABSCESS --    Light growth   Sensitivity to follow     Organism Gram negative rodAbnormal     WOUND/ABSCESS --    Light growth   ID and sensitivity to follow     Organism DiphtheroidsAbnormal     WOUND/ABSCESS --    Heavy growth   No further workup    Narrative:     ORDER#: [id number]                          ORDERED BY: TOBI KILPATRICK   SOURCE: Breast                             COLLECTED:  02/14/21 04:38   ANTIBIOTICS AT JESSIE.:                      RECEIVED :  02/14/21 04:38   Culture, Wound [[id number]] (Abnormal) Collected: 02/14/21 0438   Order Status: Completed Specimen: Abdomen Updated: 02/15/21 0916    Gram Stain Result No WBC's   Few Gram negative rods     Organism Proteus mirabilisAbnormal     WOUND/ABSCESS --    Moderate growth   Sensitivity to follow     Organism Gram negative rodAbnormal     WOUND/ABSCESS --    Moderate growth   ID and sensitivity to follow    Narrative:     ORDER#: [id number]                          ORDERED BY: FINESSE MAGUEO   SOURCE: Abdomen                            COLLECTED:  02/14/21 04:38   ANTIBIOTICS AT JESSIE.:                      RECEIVED :  02/14/21 04:38   Culture, Blood 1 [[id number]] Collected: 02/15/21 2245   Order Status: Sent Specimen: Blood Updated: 02/15/21 0550   Culture, Blood 2 [[id number]] Collected: 02/15/21 0549   Order Status: Sent Specimen: Blood Updated: 02/15/21 0550   Culture, Anaerobic and Aerobic [[id number]] (Abnormal)  Collected: 02/11/21 2256   Order Status: Completed Specimen: Wound Updated: 02/14/21 1109    Gram Stain Result No WBC's   Rare epithelial cells   Many Gram positive cocci   Many Gram negative rods   Abnormal     Anaerobic Culture No Anaerobes Isolated    Organism Proteus mirabilisAbnormal     WOUND/ABSCESS Heavy growth    Organism Escherichia coliAbnormal     WOUND/ABSCESS Heavy growth    Organism BHS Group B (Strep agalacticae)Abnormal     WOUND/ABSCESS --    Rare growth   No further workup   Susceptibility testing of penicillin and other beta lactams is   not necessary for beta hemolytic Streptococci since resistant   strains have not been identified. (CLSI M100)    Narrative:     ORDER#: [id number]                          ORDERED BY: AMEYA Arciniega   SOURCE: Wound No site given                COLLECTED:  02/11/21 22:56   ANTIBIOTICS AT JESSIE.:                      RECEIVED :  02/11/21 22:56   Culture, Blood 1 [[id number]] Collected: 02/12/21 0013   Order Status: Completed Specimen: Blood Updated: 02/13/21 0615    Blood Culture, Routine No Growth to date. Any change in status will be called. Narrative:     ORDER#: [id number]                          ORDERED BY: AMEYA Arciniega   SOURCE: Blood                              COLLECTED:  02/12/21 00:13   ANTIBIOTICS AT JESSIE.:                      RECEIVED :  02/12/21 00:21   Culture, Blood 2 [[id number]] Collected: 02/12/21 0013   Order Status: Completed Specimen: Blood Updated: 02/13/21 0615    Culture, Blood 2 No Growth to date.

## 2021-02-15 NOTE — PROCEDURES
Fidelina Mahoney is a 50 y.o. female patient. 1. Infected ulcer of skin, unspecified ulcer stage ([facility]    2. Chronic renal failure, stage 5 (HCC)    3. Disorientation    4. Cellulitis, unspecified cellulitis site    5. Acute respiratory failure, unspecified whether with hypoxia or hypercapnia ([facility]    6. Cardiac arrest [facility])      Past Medical History:   Diagnosis Date    [facility]     Cancer ([facility]     Breast    CHF (congestive heart failure) (HCC)     Chronic kidney disease     Diabetes mellitus ([facility]     ESRD (end stage renal disease) [facility] 2/14/2020    Essential hypertension 2/14/2020    Heart murmur     Hepatitis C     Hx of CABG 2/14/2020    Paroxysmal atrial fibrillation ([facility] 2/14/2020     Blood pressure (!) 132/40, pulse 145, temperature 97.7 °F (36.5 °C), resp. rate 28, height 5' 3\" (1.6 m), weight 231 lb 0.7 oz (104.8 kg), SpO2 100 %, not currently breastfeeding. Central Line    Date/Time: 2/15/2021 11:00 AM  Performed by: Hilary Valverde DO  Authorized by: Hilary Valverde DO   Consent: Verbal consent obtained. Risks and benefits: risks, benefits and alternatives were discussed  Consent given by: brother. Patient understanding: patient states understanding of the procedure being performed  Patient consent: the patient's understanding of the procedure matches consent given  Procedure consent: procedure consent matches procedure scheduled  Patient identity confirmed: arm band  Time out: Immediately prior to procedure a \"time out\" was called to verify the correct patient, procedure, equipment, support staff and site/side marked as required.   Indications: vascular access  Anesthesia: local infiltration    Anesthesia:  Local Anesthetic: lidocaine 1% without epinephrine  Preparation: skin prepped with ChloraPrep  Skin prep agent dried: skin prep agent completely dried prior to procedure  Sterile barriers: all five maximum sterile barriers used - cap, mask, sterile gown, sterile gloves, and large sterile sheet  Hand hygiene: hand hygiene performed prior to central venous catheter insertion  Location details: left internal jugular  Patient position: Trendelenburg  Catheter type: triple lumen  Catheter size: 12 Fr  Pre-procedure: landmarks identified  Ultrasound guidance: yes  Sterile ultrasound techniques: sterile gel and sterile probe covers were used  Number of attempts: 1  Successful placement: yes  Post-procedure: line sutured and dressing applied  Assessment: blood return through all ports and free fluid flow (CXR pending)  Patient tolerance: patient tolerated the procedure well with no immediate complications          0 [facility],   2/15/2021

## 2021-02-15 NOTE — PROGRESS NOTES
Progress Note  Date:2/15/2021       Room:[place] [address]  Patient Name:Ann Marie Mishra     YOB: 1965     Age:55 y.o.      ROS: ROS could not be obtained bc of acuity of medical conditon  Subjective    Subjective     Review of Systems    Objective         Vitals Last 24 Hours:  TEMPERATURE:  Temp  Av.8 °F (36.6 °C)  Min: 97 °F (36.1 °C)  Max: 98.7 °F (37.1 °C)  RESPIRATIONS RANGE: Resp  Av.6  Min: 13  Max: 39  PULSE OXIMETRY RANGE: SpO2  Av.7 %  Min: 3 %  Max: 100 %  PULSE RANGE: Pulse  Av.4  Min: 26  Max: 175  BLOOD PRESSURE RANGE: Systolic (89KDN), CZU:274 , Min:44 , DOP:689   ; Diastolic (89UWH), PBB:42, Min:28, Max:77    I/O (24Hr): Intake/Output Summary (Last 24 hours) at 2/15/2021 1200  Last data filed at 2/15/2021 0600  Gross per 24 hour   Intake 4284.69 ml   Output 500 ml   Net 3784.69 ml     Objective:  General Appearance:  Ill-appearing. Vital signs: (most recent): Blood pressure (!) 164/66, pulse 146, temperature 97 °F (36.1 °C), resp. rate 28, height 5' 3\" (1.6 m), weight 231 lb 0.7 oz (104.8 kg), SpO2 100 %, not currently breastfeeding. HEENT: Normal HEENT exam.    Lungs:  Normal effort. Heart: Normal rate. S1 normal and S2 normal.    Abdomen: Abdomen is soft. Bowel sounds are normal.   There is no epigastric area or suprapubic area tenderness. Pulses: Distal pulses are intact. Pupils:  Pupils are equal, round, and reactive to light. Skin:  Warm.       Progress Note  Date:2/15/2021       Room:[place] [address]  Patient Name:Ann Marie Mishra     YOB: 1965     Age:55 y.o.      ROS: ROS could not be obtained bc of acuity of medical conditon  Subjective    Subjective     Review of Systems    Objective         Vitals Last 24 Hours:  TEMPERATURE:  Temp  Av.8 °F (36.6 °C)  Min: 97 °F (36.1 °C)  Max: 98.7 °F (37.1 °C)  RESPIRATIONS RANGE: Resp  Av.6  Min: 13  Max: 39  PULSE OXIMETRY RANGE: SpO2  Av.7 %  Min: 3 %  Max: 100 %  PULSE RANGE: Pulse  Av.4  Min: 26  Max: 175  BLOOD PRESSURE RANGE: Systolic (69NMC), WYO:214 , Min:44 , DKU:476   ; Diastolic (42ASZ), XVL:63, Min:28, Max:77    I/O (24Hr): Intake/Output Summary (Last 24 hours) at 2/15/2021 1200  Last data filed at 2/15/2021 0600  Gross per 24 hour   Intake 4284.69 ml   Output 500 ml   Net 3784.69 ml     Objective:  General Appearance:  Ill-appearing. Vital signs: (most recent): Blood pressure (!) 164/66, pulse 146, temperature 97 °F (36.1 °C), resp. rate 28, height 5' 3\" (1.6 m), weight 231 lb 0.7 oz (104.8 kg), SpO2 100 %, not currently breastfeeding. HEENT: Normal HEENT exam.    Lungs:  Normal effort. Heart: Normal rate. S1 normal and S2 normal.    Abdomen: Abdomen is soft. Bowel sounds are normal.   There is no epigastric area or suprapubic area tenderness. Pulses: Distal pulses are intact. Pupils:  Pupils are equal, round, and reactive to light. Skin:  Warm. Labs/Imaging/Diagnostics    Labs:  CBC:  Recent Labs     21  0820 21  0505 21  0505 21  1630 21  1630 02/15/21  0022 02/15/21  0600 02/15/21  0611   WBC 15.3* 17.8*  --  19.0*  --   --  12.4*  --    RBC 2.80* 3.11*  --  2.88*  --   --  2.70*  --    HGB 8.0* 8.9*   < > 8.4*   < > 8.7* 7.9* 8.5*   HCT 26.2* 29.2*  --  27.7*  --   --  25.2*  --    MCV 93.4 93.8  --  96.0  --   --  93.1  --    RDW 16.3* 16.4*  --  17.4*  --   --  17.2*  --     146  --  126*  --   --   --   --     < > = values in this interval not displayed.      CHEMISTRIES:  Recent Labs     02/15/21  0136 02/15/21  0600 02/15/21  0607 02/15/21  0611    138 142  --    K 5.4* 3.8 4.7  --    CL 91* 94* 90*  --    CO2 9* 22 16*  --    BUN 66* 40* 71*  --    CREATININE 6.94* 3.71* 7.14* 5.3*   GLUCOSE 360* 254* 318*  --    PHOS 11.4*  --  10.5*  --    MG 2.3  --  2.2  --      PT/INR:  Recent Labs     21  0505 02/15/21  0015 02/15/21  06   PROTIME 21.5* 23.8* 26.6*   INR 1.9 2.1 2. 5     APTT:  Recent Labs     02/15/21  0015   APTT 43.9*     LIVER PROFILE:  Recent Labs     02/14/21  0505 02/14/21  1630 02/15/21  0600   AST 75* 155* 162*  142*   ALT 69* 75* 80*  73*   BILIDIR  --   --  0.9*   BILITOT 0.9* 1.0* 1.5*  1.1*   ALKPHOS 89 105 105  100       Imaging Last 24 Hours:  Ct Head Wo Contrast    Result Date: 2/12/2021  CT Brain Contrast medium:  Not utilized. History: Altered mental status, headache, neck pain, possible fall Comparison:  CT brain, July 26, 2020 Findings: Extra-axial spaces:  Normal. Intracranial hemorrhage:  None. Ventricular system:  Normal for age. Basal Cisterns:  Normal. Cerebral Parenchyma:  Normal. Midline Shift:  None. Cerebellum:  Normal. Paranasal sinuses and mastoid air cells:  Normal. Visualized Orbits:  Normal.     Impression: No acute findings. . All CT scans at this facility use dose modulation, iterative reconstruction, and/or weight based dosing when appropriate to reduce radiation dose to as low as reasonably achievable. Ct Cervical Spine Wo Contrast    Result Date: 2/12/2021  CT cervical spine without intravenous contrast medium. HISTORY:  Altered mental status, headache, neck pain. fall TECHNICAL FACTORS: CT cervical spine obtained and formatted as 2.5 mm contiguous axial images from skull base to the level of. Sagittal and coronal reconstructions were obtained during postprocessing. No contrast medium was utilized. COMPARISON: None FINDINGS: Cervical vertebral bodies are normal in height and alignment Atlantooccipital articulation maintained. Atlantoaxial interval preserved. Neural foramina intact. Mild disc space narrowing C5-C6. No fractures, dislocations, bone lesions. Limited imaging lung apices without anomaly. Carotid arteries and soft tissues are without anomaly. No fracture.  All CT scans at this facility use dose modulation, iterative reconstruction, and/or weight based dosing when appropriate to reduce radiation dose to as low as reasonably achievable. Xr Chest Portable    Result Date: 2/12/2021  EXAMINATION: CHEST PORTABLE VIEW  CLINICAL HISTORY: Fatigue COMPARISONS: February 5, 2021  FINDINGS: Single  views of the chest is submitted. There are multiple median sternotomy wires. There is an endovascular stent overlying the left upper chest. The cardiac silhouette is enlarged Pulmonary vascular unremarkable. Right sided trachea. No focal infiltrates. No Pneumothoraces. NO ACUTE ACTIVE CARDIOPULMONARY PROCESS    Xr Chest Portable    Result Date: 2/10/2021  EXAMINATION: CHEST PORTABLE VIEW  CLINICAL HISTORY: Extremity weakness COMPARISONS: February 7, 2021  FINDINGS: Single  views of the chest is submitted. There are multiple median sternotomy wires. The cardiac silhouette is enlarged Pulmonary vascular unremarkable. Right sided trachea. No focal infiltrates. No Pneumothoraces. NO ACUTE ACTIVE CARDIOPULMONARY PROCESS    Us Dup Lower Extremities Bilateral Venous    Result Date: 2/12/2021  US DUP LOWER EXTREMITIES BILATERAL VENOUS CLINICAL HISTORY: Confusion, agitation COMPARISONS: FINDINGS: Duplex color ultrasound as well as  both gray scale and spectral Doppler ultrasound of the deep venous system of both the left land right lower extremity from the inguinal ligaments to the popliteal fossa was performed. There are no findings of deep venous thrombus in the visualized vessels of the left or right  lower extremity. NO FINDINGS OF  DEEP VENOUS THROMBUS IN THE VISUALIZED VESSELS OF THE LEFT OR RIGHT  LOWER EXTREMITY.     Assessment//Plan           Hospital Problems           Last Modified POA    * (Principal) Cellulitis 2/12/2021 Yes    CHF (congestive heart failure) ([address] 2/12/2021 Yes    Coronary artery disease of native artery of native heart with stable angina pectoris ([facility] 2/12/2021 Yes    ESRD (end stage renal disease) ([facility] 2/12/2021 Yes    Essential hypertension 2/12/2021 Yes    End-stage renal disease on hemodialysis ([facility] 2/12/2021 Yes    A-fib ([facility] 2/12/2021 Yes    Elevated troponin 2/12/2021 Yes    Rhabdomyolysis 2/12/2021 Yes    Hyperlipidemia 2/12/2021 Yes    Major depression 2/12/2021 Yes    Overview Signed 2/12/2021  1:10 AM by Karolina Galicia RN, NP     Last Assessment & Plan:   Assessment:   Stable chronic depression    PLAN:   -Continue home Prozac  first diagnosed in 2000, was suicidal, situational         Abdominal wall cellulitis 2/12/2021 Yes    Calciphylaxis 2/12/2021 Yes        Assessment & Plan    1) abd wall cellulitis  2) encephalopathy due to uremia  3) non compliance  4) ESRD on HD  5) Rhabdomyolysis    Continue with maintenance hemodialysis. Continue with IV antibiotics. Neurology evaluation for altered mental status. CT head and cervical is negative. Repeat CK. Avoid fluid overload. Follow-up cultures. 2/13: We will try to speech and swallow eval.  IV fluid. Avoid fluid overload since the patient is end-stage renal disease. Rhabdomyolysis improving. Antibiotics as per ID. Patient needs placement upon clearance. Electronically signed by Claribel Urias MD on 2/12/21 at 12:19 PM EST  Labs/Imaging/Diagnostics    Labs:  CBC:  Recent Labs     02/13/21  0820 02/14/21  0505 02/14/21  0505 02/14/21  1630 02/14/21  1630 02/15/21  0022 02/15/21  0600 02/15/21  0611   WBC 15.3* 17.8*  --  19.0*  --   --  12.4*  --    RBC 2.80* 3.11*  --  2.88*  --   --  2.70*  --    HGB 8.0* 8.9*   < > 8.4*   < > 8.7* 7.9* 8.5*   HCT 26.2* 29.2*  --  27.7*  --   --  25.2*  --    MCV 93.4 93.8  --  96.0  --   --  93.1  --    RDW 16.3* 16.4*  --  17.4*  --   --  17.2*  --     146  --  126*  --   --   --   --     < > = values in this interval not displayed.      CHEMISTRIES:  Recent Labs     02/15/21  0136 02/15/21  0600 02/15/21  0607 02/15/21  0611   NA 142 138 142  --    K 5.4* 3.8 4.7  --    CL 91* 94* 90*  --    CO2 9* 22 16*  --    BUN 66* 40* 71*  --    CREATININE 6.94* 3.71* 7.14* 5.3*   GLUCOSE 360* 254* 318*  --    PHOS 11.4*  --  10.5*  --    MG 2.3  --  2.2  --      PT/INR:  Recent Labs     02/14/21  0505 02/15/21  0015 02/15/21  0600   PROTIME 21.5* 23.8* 26.6*   INR 1.9 2.1 2.5     APTT:  Recent Labs     02/15/21  0015   APTT 43.9*     LIVER PROFILE:  Recent Labs     02/14/21  0505 02/14/21  1630 02/15/21  0600   AST 75* 155* 162*  142*   ALT 69* 75* 80*  73*   BILIDIR  --   --  0.9*   BILITOT 0.9* 1.0* 1.5*  1.1*   ALKPHOS 89 105 105  100       Imaging Last 24 Hours:  Ct Head Wo Contrast    Result Date: 2/12/2021  CT Brain Contrast medium:  Not utilized. History: Altered mental status, headache, neck pain, possible fall Comparison:  CT brain, July 26, 2020 Findings: Extra-axial spaces:  Normal. Intracranial hemorrhage:  None. Ventricular system:  Normal for age. Basal Cisterns:  Normal. Cerebral Parenchyma:  Normal. Midline Shift:  None. Cerebellum:  Normal. Paranasal sinuses and mastoid air cells:  Normal. Visualized Orbits:  Normal.     Impression: No acute findings. . All CT scans at this facility use dose modulation, iterative reconstruction, and/or weight based dosing when appropriate to reduce radiation dose to as low as reasonably achievable. Ct Cervical Spine Wo Contrast    Result Date: 2/12/2021  CT cervical spine without intravenous contrast medium. HISTORY:  Altered mental status, headache, neck pain. fall TECHNICAL FACTORS: CT cervical spine obtained and formatted as 2.5 mm contiguous axial images from skull base to the level of. Sagittal and coronal reconstructions were obtained during postprocessing. No contrast medium was utilized. COMPARISON: None FINDINGS: Cervical vertebral bodies are normal in height and alignment Atlantooccipital articulation maintained. Atlantoaxial interval preserved. Neural foramina intact.  Mild disc VENOUS THROMBUS IN THE VISUALIZED VESSELS OF THE LEFT OR RIGHT  LOWER EXTREMITY. Assessment//Plan           Hospital Problems           Last Modified POA    * (Principal) Cellulitis 2/12/2021 Yes    CHF (congestive heart failure) ([facility] 2/12/2021 Yes    Coronary artery disease of native artery of native heart with stable angina pectoris ([address] 2/12/2021 Yes    ESRD (end stage renal disease) ([address] 2/12/2021 Yes    Essential hypertension 2/12/2021 Yes    End-stage renal disease on hemodialysis ([address] 2/12/2021 Yes    A-fib ([facility] 2/12/2021 Yes    Elevated troponin 2/12/2021 Yes    Rhabdomyolysis 2/12/2021 Yes    Hyperlipidemia 2/12/2021 Yes    Major depression 2/12/2021 Yes    Overview Signed 2/12/2021  1:10 AM by Harrel Kocher, RN, NP     Last Assessment & Plan:   Assessment:   Stable chronic depression    PLAN:   -Continue home Prozac  first diagnosed in 2000, was suicidal, situational         Abdominal wall cellulitis 2/12/2021 Yes    Calciphylaxis 2/12/2021 Yes        Assessment & Plan    1) abd wall cellulitis  2) encephalopathy due to uremia  3) non compliance  4) ESRD on HD  5) Rhabdomyolysis    Continue with maintenance hemodialysis. Continue with IV antibiotics. Neurology evaluation for altered mental status. CT head and cervical is negative. Repeat CK. Avoid fluid overload. Follow-up cultures. 2/13: We will try to speech and swallow eval.  IV fluid. Avoid fluid overload since the patient is end-stage renal disease. Rhabdomyolysis improving. Antibiotics as per ID. Patient needs placement upon clearance. 2/14: Patient did have A. fib with RVR started on Cardizem drip. After bolus of Cardizem patient had a hypotension and respiratory failure intubated was sent to ICU. In ICU patient coded twice. A-line and CVC was placed by me. A-line came out. Tried again was not successful. Family was updated about the care and prognosis. 2/15: Patient will be started on CRRT as per nursing. Patient coded again last night. Anticoagulation as per cardiology. She is on amiodarone drip for uncontrolled A. fib.   Electronically signed by Loreto Adams MD on 2/12/21 at 12:19 PM EST

## 2021-02-15 NOTE — PLAN OF CARE
Nutrition Problem #1: Inadequate oral intake  Intervention: Food and/or Nutrient Delivery: Continue NPO(Begin EN when medically appropriate. Recommend Low Calorie High Protein TF ( Vital HP ) @ 20 ml/hr x 24 hrs via OG. Goal rate 30 ml/hr + 3 protein modualrs per day.  Water flush 100 ml, 4 x daily)  Nutritional Goals: initation of EN when medically appropriate

## 2021-02-15 NOTE — PROGRESS NOTES
Comprehensive Nutrition Assessment    Type and Reason for Visit:  Initial(vent)    Nutrition Recommendations/Plan:   Continue NPO  Begin EN when medically appropriate. Recommend Low Calorie High Protein TF ( Vital HP ) @ 20 ml/hr x 24 hrs via OG. Goal rate 30 ml/hr + 3 protein modualrs per day. Water flush 100 ml, 4 x daily  Monitor for changes in propofol rate for adjustments to EN goal rate      Nutrition Assessment:  Unable to determine nutritional status PTA, due to intubation. Pt currently on hypothermic protocol, will monitor for ability to start EN when medically appropriate    Malnutrition Assessment:  Malnutrition Status: At risk for malnutrition (Comment)    Context:  Acute Illness     Findings of the 6 clinical characteristics of malnutrition:  Energy Intake:  Unable to assess  Weight Loss:  No significant weight loss     Body Fat Loss:  No significant body fat loss     Muscle Mass Loss:  Unable to assess    Fluid Accumulation:  1 - Mild     Strength:  Not Performed    Estimated Daily Nutrient Needs:  Energy (kcal):  7389-0925 kcals @ 10-11 kcal/kg; Weight Used for Energy Requirements:  Current(105 kg)     Protein (g):  ~130 g protein @ 2.5 g/kg IBW; Weight Used for Protein Requirements:  Ideal(52 kg)        Fluid (ml/day):  500-800 ml; Method Used for Fluid Requirements:  Standard Renal      Nutrition Related Findings:  OG in place.  intubated 2/14, coded x 2 , now on artic sun, hx of ESRD with HD, did not tolerate full session today, + muliple pressores, current propofol @ 6.1 ml/hr ( ~ 160 kcals), last BM n/a, glucose poorly controlled, elevated renal labs, 2+ BLE edeam per nursing, meds include nepropcaps, insulin, ;anuric, Hx CHF, [place] CA, ESRD wth HD, Hep C, DM, CABG      Wounds:  Multiple, Wound Consult Pending(see flowsheet ; calicphylaxis per notes)       Current Nutrition Therapies:    Diet NPO Effective Now    Anthropometric Measures:  · Height: 5' 3\" (160 cm)  · Current Body Weight: 231 lb (104.8 kg)((2/15), 2+ edema present)   · Admission Body Weight: 223 lb (101.2 kg)(2/12)    · Usual Body Weight: 220 lb (99.8 kg)(7/20)     · Ideal Body Weight: 115 lbs;  · BMI: 40.9  · BMI Categories: Obese Class 3 (BMI 40.0 or greater)       Nutrition Diagnosis:   · Inadequate oral intake related to impaired respiratory function as evidenced by NPO or clear liquid status due to medical condition, intubation    · Increased nutrient needs related to increase demand for energy/nutrients as evidenced by wounds      Nutrition Interventions:   Food and/or Nutrient Delivery:  Continue NPO(Begin EN when medically appropriate. Recommend Low Calorie High Protein TF ( Vital HP ) @ 20 ml/hr x 24 hrs via OG. Goal rate 30 ml/hr + 3 protein modualrs per day. Water flush 100 ml, 4 x daily)  Nutrition Education/Counseling:  No recommendation at this time   Coordination of Nutrition Care:  No recommendation at this time    Goals:  initation of EN when medically appropriate       Nutrition Monitoring and Evaluation:      Food/Nutrient Intake Outcomes:  Enteral Nutrition Intake/Tolerance  Physical Signs/Symptoms Outcomes:  Biochemical Data, GI Status, Fluid Status or Edema, Hemodynamic Status, Weight, Skin     Discharge Planning:     Too soon to determine     Electronically signed by Juwan Qiu RD, LD on 2/15/21 at 1:05 PM EST

## 2021-02-15 NOTE — CONSULTS
Fiana Weinberg 39   Consult Note      [facility] RECORD NUMBER:  [id number]  AGE: 50 y.o. GENDER: female  : 1965  EPISODE DATE:  2021    Subjective:     Chief Complaint   Patient presents with    Altered Mental Status     sister called naina edmond she says the pt fell earlier today, has not been taking her meds and is not acting \"coherently\"         HISTORY of PRESENT ILLNESS HPI     Barbi Park is a 50 y.o. female who presents today for wound/ulcer evaluation. History of Wound Context: Presented to ED after family noted change in mentation. Patient is normally a dialysis patient, last tx was Monday. Presented to ED Friday. Had been seen earlier in day by EMS after falling, not transported to ED at that time. Later called EMS again after which patient was transported to the ED. On arrival patient complains of chronic abdominal and back pain- though drowsy and needs prompted to respond to questions. On arrival multiple open wounds within folds of abdominal pannus, both right and left and right, and upper right breast with tunneling and open wound beneath left breast. Over the weekend, patient had deterioration in condition coding several times, requiring intubation. Now in ICU. Central line placed, also dialysis cathether for CRRT. 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 Active problems include: paroxysmal atrial fib with RVR, rhabdomyolysis. CHF, elevated troponins. hyperkalemia,  hx of CABG. Has cooling blankets on during exam, levophed drip up. Wound/Ulcer Pain Timing/Severity: unable to determine. Following Code Blue at  on 21, no response to painful stimuli, without sedation up.     Quality of pain: N/A  Severity:  0 / 10   Modifying Factors: patient has propofol drip up, sedated at time of exam.   Associated Signs/Symptoms: drainage and generalized ecchymosis with violacious hue to abdomen and panus to olayinka area. Necrosis to right breast with violet/purple ecchymosis. Ulcer Identification:  Ulcer Type: traumatic and calciphylaxis  Contributing Factors: edema, diabetes, poor glucose control, chronic pressure, decreased mobility, shear force, obesity, decreased tissue oxygenation, incontinence of stool and non-adherence    Wound: N/A        PAST MEDICAL HISTORY        Diagnosis Date    Atrial fibrillation (HCC)     Cancer (HCC)     Breast    CHF (congestive heart failure) (HCC)     Chronic kidney disease     Diabetes mellitus ([facility]     [facility]D [facility] stage [facility] disease) ([facility] 2020    Essential hypertension 2020    Heart murmur     Hepatitis C     Hx of CABG 2020    Paroxysmal atrial fibrillation ([facility] 2020       PAST SURGICAL HISTORY    Past Surgical History:   Procedure Laterality Date    BREAST SURGERY       SECTION      CHOLECYSTECTOMY      CORONARY ARTERY BYPASS GRAFT         FAMILY HISTORY    History reviewed. No pertinent family history. SOCIAL HISTORY    Social History     Tobacco Use    Smoking status: Former Smoker    Smokeless tobacco: Never Used   Substance Use Topics    Alcohol use: Never     Frequency: Never    Drug use: Never       ALLERGIES    Allergies   Allergen Reactions    Hydrocodone-Acetaminophen Hives, Itching and Nausea Only     Other reaction(s): Nausea      Naproxen Hives and Nausea Only     Other reaction(s): Nausea      Hydrocodone Hives    Quetiapine Other (See Comments)     Restless leg     Vicodin [Hydrocodone-Acetaminophen] Hives       MEDICATIONS    No current facility-administered medications on file prior to encounter.       Current Outpatient Medications on File Prior to Encounter   Medication Sig Dispense Refill    warfarin (COUMADIN) 5 MG tablet Take 1 tablet by mouth daily 90 tablet 1    hydrALAZINE (APRESOLINE) 25 MG tablet Take 1 tablet by mouth every 8 hours (Patient taking differently: Take 25 mg by mouth 2 times daily ) 90 tablet 3    metoprolol tartrate (LOPRESSOR) 50 MG tablet Take 1 tablet by mouth 2 times daily 60 tablet 3    topiramate (TOPAMAX SPRINKLE) 25 MG capsule Take 25 mg by mouth 2 times daily      cloNIDine (CATAPRES) 0.1 MG tablet Take 0.1 mg by mouth daily      aspirin 81 MG chewable tablet Take 1 tablet by mouth daily 30 tablet 3    nystatin (MYCOSTATIN) POWD powder Apply topically 2 times daily      b complex-C-folic acid (NEPHROCAPS) 1 MG capsule Take 1 capsule by mouth daily      atorvastatin (LIPITOR) 40 MG tablet Take 40 mg by mouth nightly      Ferric Citrate 1  MG(Fe) TABS Take 210 mg by mouth 3 times daily Pt unsure of dose      ondansetron (ZOFRAN ODT) 4 MG disintegrating tablet Take 1 tablet by mouth every 8 hours as needed for Nausea 20 tablet 0    FLUoxetine (PROZAC) 20 MG capsule Take 1 capsule by mouth daily (Patient taking differently: Take 40 mg by mouth daily ) 30 capsule 1    diphenhydrAMINE (BENADRYL) 25 MG capsule Take 25 mg by mouth every 6 hours as needed for Itching      cyclobenzaprine (FLEXERIL) 5 MG tablet Take 5 mg by mouth 3 times daily as needed for Muscle spasms      oxyCODONE-acetaminophen (PERCOCET) 5-325 MG per tablet Take 1 tablet by mouth every 4 hours as needed for Pain. Philip Serrano REVIEW OF SYSTEMS    Pertinent items are noted in HPI. Objective:      BP (!) (P) 164/66   Pulse 145   Temp (P) 97 °F (36.1 °C)   Resp 28   Ht 5' 3\" (1.6 m)   Wt (P) 231 lb 0.7 oz (104.8 kg)   SpO2 100%   BMI (P) 40.93 kg/m²     Wt Readings from Last 3 Encounters:   02/15/21 (P) 231 lb 0.7 oz (104.8 kg)   02/10/21 240 lb (108.9 kg)   02/07/21 240 lb (108.9 kg)       PHYSICAL EXAM    Constitutional:   Currently intubated and on ventilator. No response to care, diprivan drip maintained. Respiratory:  Maintained per ventilator. Vascular:    Extremities positive for pitting edema.  Generalized edema throughout. Neurological:  Protective sensation not able to be assessed at current time/sedated. Dermatological:  Wound description noted in wound assessment. Anterior area from above breasts to perineum is firm, and ecchymotic with violacious coloration of entire area with open wounds of right intertriginous areas of pannus on both right and left. Right breast has open necrotic area beneath breast, left breast also has open areas of full thickness wounds, one unstageable-covered with slough. Due to current instability of condition, requested nursing staff to contact provider, during next time to be turned to allow further examination. Psychiatric:  No response throughout exam -currently has diprivan drip up. Assessment:      Problem List Items Addressed This Visit     ESRD (end stage renal disease) ([address]    Relevant Orders    CENTRAL LINE (Completed)    * (Principal) Cellulitis      Other Visit Diagnoses     Infected ulcer of skin, unspecified ulcer stage ([address]    -  Primary    Chronic renal failure, stage 5 ([address]        Disorientation        Acute respiratory failure, unspecified whether with hypoxia or hypercapnia ([address]        Relevant Orders    INTUBATION (Completed)    Cardiac arrest ([address]        Relevant Orders    CENTRAL LINE (Completed)    Insert arterial line (Completed)           Procedure Note  Indications:  Based on my examination of this patient's wound(s)/ulcer(s) today, debridement is not required to promote healing and evaluate the wound base. Anticipate that as condition stabilizes will require debridement of unstageable areas to evaluate depth of wounds. Not able to perform at this time. Wound 02/11/21 Abdomen Right; Lower;Quadrant (Active)   Wound Image   02/13/21 0324   Wound Cleansed Soap and water 02/14/21 0000   Dressing/Treatment Dry dressing;Petroleum impregnated gauze 02/14/21 2000   Dressing Change Due 02/15/21 02/15/21 0400   Wound Length (cm) 7 cm 02/12/21 1345 Wound Width (cm) 11 cm 02/12/21 1345   Wound Surface Area (cm^2) 77 cm^2 02/12/21 1345   Wound Assessment Slough;[place]/red 02/14/21 2000   Drainage Amount Moderate 02/14/21 2000   Drainage Description Serous 02/14/21 2000   Odor Malodorous/putrid 02/14/21 2000   Nadia-wound Assessment Ecchymosis; Induration 02/14/21 2000   Margins Undefined edges 02/14/21 2000   Wound Thickness Description not for Pressure Injury Full thickness 02/14/21 2000   Number of days: 4       Wound 02/11/21 Abdomen Mid (Active)   Wound Cleansed Betadine/povidone iodine 02/14/21 0000   Dressing/Treatment Interdry Ag/wicking fabric with Ag 02/14/21 2000   Dressing Change Due 02/15/21 02/14/21 2000   Wound Length (cm) 1.5 cm 02/12/21 1345   Wound Width (cm) 3 cm 02/12/21 1345   Wound Surface Area (cm^2) 4.5 cm^2 02/12/21 1345   Wound Assessment Dry 02/14/21 2000   Drainage Amount None 02/14/21 2000   Drainage Description Yellow 02/14/21 2000   Odor None 02/14/21 2000   Nadia-wound Assessment Ecchymosis 02/14/21 2000   Margins Undefined edges 02/14/21 2000   Wound Thickness Description not for Pressure Injury Full thickness 02/14/21 2000   Number of days: 4       Wound 02/11/21 Breast Right (Active)   Wound Image   02/13/21 0324   Wound Cleansed Betadine/povidone iodine 02/14/21 0000   Dressing/Treatment Interdry Ag/wicking fabric with Ag 02/14/21 2000   Dressing Change Due 02/15/21 02/14/21 2000   Wound Length (cm) 2 cm 02/12/21 1345   Wound Width (cm) 2 cm 02/12/21 1345   Wound Surface Area (cm^2) 4 cm^2 02/12/21 1345   Wound Assessment Purple/maroon 02/14/21 2000   Drainage Amount None 02/14/21 2000   Drainage Description Serous 02/14/21 2000   Odor Malodorous/putrid 02/14/21 2000   Nadia-wound Assessment Ecchymosis; Induration 02/14/21 2000   Wound Thickness Description not for Pressure Injury Full thickness 02/14/21 2000   Number of days: 4       Wound Breast Lateral;Lower;Right Necrotic nipple (Active)   Wound Etiology Other 02/14/21 0000 Wound Cleansed Betadine/povidone iodine 02/14/21 0000   Dressing/Treatment Dry dressing 02/14/21 2000   Dressing Change Due 02/15/21 02/14/21 2000   Drainage Amount None 02/14/21 2000   Wound Thickness Description not for Pressure Injury Full thickness 02/14/21 2000   Number of days:        Wound Breast Lower;Right [place] Wound (Active)   Dressing Status Clean;Dry; Intact 02/14/21 0930   Wound Cleansed Soap and water 02/14/21 0000   Dressing/Treatment Packing;Petroleum impregnated gauze 02/14/21 0930   Dressing Change Due 02/15/21 02/14/21 0930   Wound Assessment Purple/maroon 02/14/21 2000   Drainage Amount Scant 02/14/21 2000   Drainage Description Serosanguinous 02/14/21 2000   Wound Thickness Description not for Pressure Injury Full thickness 02/14/21 2000   Number of days:                                                          Plan:     Continue to apply Pluragel wound dressing, on 4 x 4s and insert into abdominal pannus or beneath bilateral breasts, to cover any open, necrotic, or slough covered wound areas. Use caution in cleansing area with 4 x 4s and 0.9 NS to not further denude affected areas. May inset ABDs over the 4 x 4s to secure Pluragel dressings. Please do not apply tape to skin/areas of edema/breakdown. Plain packing 1/4\" to any tunneled area of right breast with Pluragel to open wound areas of breast with 4 x 4s and ABD over. When condition stabilizes, will evaluate for need/ability to debride any slough covered or necrotic areas to determine depth of ulcerations.                 Electronically signed by FRANCHESKA Townsend NP on 2/15/2021 at 11:34 AM

## 2021-02-15 NOTE — PROGRESS NOTES
Pulmonary & Critical Care Medicine ICU Progress Note    Subjective:     Overnight events noted. She was attempted on intermittent dialysis today, however had return of A. fib with RVR and this was stopped. She does remain on 3 vasopressor agents at this time. She does remain on the cooling protocol as well.     EXAM:  General: Sedated on the ventilator  HEENT: Normocephalic, atraumatic, oral ETT in place  Lungs : Coarse breath sounds bilaterally  Heart: Tachycardic and irregular  ABD: Obese, positive bowel sounds, soft  Extremities : Warm, dry, wounds noted  Neuro: Sedated on propofol  Skin: Wounds noted    MV Settings:  Vent Mode: AC/VC Rate Set: 28 bmp/Vt Ordered: 400 mL/ /FiO2 : 60 %     Recent Labs     02/15/21  0022 02/15/21  0611   PHART 7.271* 7.363   XRR4LTA 28* 33*   PO2ART 54* 612*         IV:   propofol 10 mcg/kg/min (02/15/21 0656)    cisatracurium (NIMBEX) infusion      EPINEPHrine infusion 2 mcg/min (02/15/21 0803)    sodium chloride      phenylephrine (KATELYNN-SYNEPHRINE) 50mg/250mL infusion      amiodarone 450mg/250ml D5W infusion      amiodarone 450mg/250ml D5W infusion      sodium chloride      norepinephrine 6 mcg/min (02/15/21 0622)    vasopressin (Septic Shock) infusion 0.04 Units/min (02/15/21 0803)    sodium bicarbonate infusion 50 mL/hr at 02/15/21 0458    dextrose         Vitals:  BP (!) 132/40   Pulse 145   Temp 97.7 °F (36.5 °C)   Resp 28   Ht 5' 3\" (1.6 m)   Wt 231 lb 0.7 oz (104.8 kg)   SpO2 100%   BMI 40.93 kg/m²          Intake/Output Summary (Last 24 hours) at 2/15/2021 1102  Last data filed at 2/15/2021 0600  Gross per 24 hour   Intake 4284.69 ml   Output 500 ml   Net 3784.69 ml       Medications:  Scheduled Meds:   chlorhexidine  15 mL Mouth/Throat BID    amiodarone bolus  150 mg Intravenous Once    lidocaine  5 mL Intradermal Once    sodium chloride flush  10 mL Intravenous 2 times per day    insulin lispro  0-12 Units Subcutaneous Q6H    sodium chloride flush  10 mL Intravenous 2 times per day    piperacillin-tazobactam  2,250 mg Intravenous Q8H    vancomycin (VANCOCIN) intermittent dosing (placeholder)   Other RX Placeholder    aspirin  81 mg Oral Daily    [Held by provider] atorvastatin  40 mg Oral Nightly    b complex-C-folic acid  1 capsule Oral Daily    [Held by provider] cloNIDine  0.1 mg Oral Daily    Ferric Citrate  210 mg Oral TID WC    [Held by provider] hydrALAZINE  25 mg Oral BID    metoprolol tartrate  50 mg Oral BID    topiramate  25 mg Oral BID       Labs:   CBC:   Recent Labs     02/13/21  0820 02/14/21  0505 02/14/21  0505 02/14/21  1630 02/14/21  1630 02/14/21  2100 02/15/21  0022 02/15/21  0611   WBC 15.3* 17.8*  --  19.0*  --   --   --   --    HGB 8.0* 8.9*   < > 8.4*   < > 9.4* 8.7* 8.5*   HCT 26.2* 29.2*  --  27.7*  --   --   --   --    MCV 93.4 93.8  --  96.0  --   --   --   --     146  --  126*  --   --   --   --     < > = values in this interval not displayed. BMP:   Recent Labs     02/15/21  0136 02/15/21  0600 02/15/21  0607 02/15/21  0611    138 142  --    K 5.4* 3.8 4.7  --    CL 91* 94* 90*  --    CO2 9* 22 16*  --    PHOS 11.4*  --  10.5*  --    BUN 66* 40* 71*  --    CREATININE 6.94* 3.71* 7.14* 5.3*     LIVER PROFILE:   Recent Labs     02/14/21  0505 02/14/21  1630 02/15/21  0600   AST 75* 155* 162*  142*   ALT 69* 75* 80*  73*   BILIDIR  --   --  0.9*   BILITOT 0.9* 1.0* 1.5*  1.1*   ALKPHOS 89 105 105  100     PT/INR:   Recent Labs     02/14/21  0505 02/15/21  0015 02/15/21  0600   PROTIME 21.5* 23.8* 26.6*   INR 1.9 2.1 2.5     APTT:   Recent Labs     02/15/21  0015   APTT 43.9*     UA:No results for input(s): NITRITE, COLORU, PHUR, LABCAST, WBCUA, RBCUA, MUCUS, TRICHOMONAS, YEAST, BACTERIA, CLARITYU, SPECGRAV, LEUKOCYTESUR, UROBILINOGEN, BILIRUBINUR, BLOODU, GLUCOSEU, AMORPHOUS in the last 72 hours. Invalid input(s): KETONESU      Assessment/Plan:    1.  Acute hypoxic respiratory failure-patient did require intubation after rapid response was called secondary to agonal respirations. She was transferred to the ICU afterwards. She did have a several cardiac arrest events after the initial event as well. At this time we will continue with ventilatory support until her neurologic function can be better assessed. Her FiO2 and PEEP will be weaned as able. 2. Cardiac arrest, status post successful ROSC-she was initiated on the cooling protocol last evening. However per reports that she is able to follow commands when her sedation is held. If this is the case then her cooling protocol will be discontinued. She will continue with supportive measures otherwise. I do suspect the patient may had had a component of electrolyte abnormalities as the etiology of her CODE BLUE. Cardiology does continue to follow. 3. A. fib with RVR-patient did have a bout of A. fib with RVR last evening as well. I do believe she received adenosine after ROSC of one of her cardiac events. She was started on amiodarone this morning given A. fib with RVR. Cardiology does continue to follow as well. 4. Septic shock-initially the patient was on epinephrine, Levophed, and vasopressin this morning at the time of my evaluation. However given her A. fib with RVR her epinephrine was able to be discontinued and her Levophed will be switched to Estevan-Synephrine. She will continue with vasopressin and Estevan-Synephrine. She was placed on CRRT by nephrology given that she did not tolerate intermittent hemodialysis today. She is currently receiving Zosyn and empiric vancomycin. Her cultures are currently pending. 5. End-stage renal disease-nephrology does continue to follow. The patient does have reports of some noncompliance as an outpatient. She is currently on CRRT, which was initiated after a dialysis catheter was placed. Nutrition: N.p.o. at this time.   She likely can be initiated on tube feeds in the next 24 hours if she remains on support of the ventilator. Prophylaxis: Her INR is currently 2.5. She was on Coumadin at home. She will continue with Coumadin and does not require additional DVT prophylaxis. She will be started on stress ulcer prophylaxis at this time. Code Status: Full. I did speak with patient's brother over the phone today. This is a 50 y.o. female who is critically ill secondary to severe septic shock and acute respiratory failure. I have spent a total of 45 minutes of critical care time with this patient, review of the chart, and discussion with the bedside staff; excluding any billable procedures.     Electronically signed by Elsa Watters DO on 2/15/2021 at 11:02 AM

## 2021-02-15 NOTE — PROGRESS NOTES
Code Blue called approximately 2048. Epinephrine was given and compressions started. On my arrival pt was receiving CPR. One dose Bicarb push given, and ROSC noted on pulse check. HR in the 190s. Adenosine was given x2 doses, with normal sinus tachycardia noted on EKG monitoring. Pt was hypotensive to 82/46. 1L LR given wide open with leovphed and vasopression continued. 3A Bicarb infusion started. ABG Shows pH 7.125. Pt already set at RR 28 on the vent to maximize respiratory compensation. Vitals: 100/54 HR 90 RR 28 Temp pending. General: Nonresponsive to stimuli, no withdraw to pain, intubated with no sedation  HEENT pupils constricted equal sluggish to react  CVS regular rhythm no murmurs  Lungs: Clear to auscultation bilaterally no wheezes no rales  Abdomen soft nondistended nontender  Extremities: Nonedematous  Skin: Diffuse ecchymosis throughout the pannus bilateral thighs and breast with right tunneling breast abscess appreciated. Cap refill ok. Neuro: Not responsive to stimuli not withdrawing to pain with pupils sluggish to react      A/P  Septic shock secondary to multiple cutaneous abscesses and cellulitis: Continue vancomycin, Zosyn. Blood cultures ordered. ID following. Levophed/Vasopressin goal MAP 65. 1L LR wide open pressure bagged in, 3A Bicarb infusion 125/hr. Repeat Lactic acid Q2h and monitor capilary refill      Cardiopulmonary arrest: due to above. Stat CT head. Arterial line placed. Start hypothermia protocol if ct head is negative family updated and wants continued resuscitation, and is aware of poor prognosis. ECHO ordered following recurrent arrest. Cardiology following. Lactic acidosis: due to septic shock poor perfusion complicated by ESRD. Planned HD tomorrow, may need CVVH if pressures cannot tolerate full HD. Defer to nephrology. Bicarb push 1A with 3A infusion now. ESRD on HD: stat BMP now.  Nephrology following    Approximately 55 minutes critical care time not including procedural intervention. Patient Name: Akbar Cotton   Medical Record Number: [id number]  Date: 2/14/2021   Time: 10:48 PM   Room/Bed: [place] [address]  Arterial Line Placement Procedure Note                   Indication: shock    Consent: Unable to be obtained due to the emergent nature of this procedure. Shahid's Test: Not indicated in this particular procedure    Procedure: The skin over the left femoral artery was prepped with betadine and draped in a sterile fashion. Local anesthesia was not performed due to the emergent nature of this procedure. An 18 gauge arterial line catheter was then inserted, using a modified Seldinger technique, into the vessel. The transducer set was then attached and securely fastened to the skin with sutures. Waveforms on the monitor were observed and found to be adequate. The patient had good distal perfusion after the procedure. The site was then dressed in a sterile fashion. The patient tolerated the procedure well.      Complications: None    Electronically Signed by: @[email or web address]@

## 2021-02-15 NOTE — PROGRESS NOTES
2000: Patient assessment completed, Patient has multiple open wound over chest and abdomen. Necrotic right breast with tunneling and open wound under left breast.  2048: Patient has no pulse present,PEA and code blue called. 1 round of compression achieved ROSC adenosine 6 and 12 pushed due SVT rhythm. Patient was hypotensive while on vasopressin and levophed. 5181 Patient again pulseless and code blue called. Rosc was achieved after two rounds of compression. 0100 Patient pulselss again and code blue called. 2 rounds of compression achieved ROSC. 1AMP D50 pushed and 10 units regular insulin.   0600 Patient abgs show an improvement from 7.125 to 7.36  0715 Patient report given to [facility]

## 2021-02-15 NOTE — PROGRESS NOTES
Code blue again called 0022, ACLS protocol followed with ROS after 2 rounds CPR. Again Epinepherine/Bicarb pushed. Pulse maintained for approximately 45 minutes when again the patient coded. ACLS protocol was followed and after 2 rounds of CPR again pulse was reestablished. Patient's ABG showed potassium of 6.0. 1 g calcium 10 units insulin and 1 amp of D50 were given. Lactic acid elevated at 19.7. Hemoglobin 8.7. Yusra Poole updated over the phone.

## 2021-02-15 NOTE — FLOWSHEET NOTE
Per Ana Ortiz RN pt too unstable to come down for her Lumbar MRI. I asked her to please notify ordering Dr to see if he still wanted once pt stable.

## 2021-02-15 NOTE — PROGRESS NOTES
Patient on a ventilator that withdraws from pain but does not follow commands and on arctic sun. Patient on levo, vaso, and epi drip. See mar for titrations NSR70's   0830- HD in to do treatment on patient- during treatment patients HR went up to 150-190 in afib RVR has thready pulse. BP dropped 80's/40's. Dr Yousuf Cheung in amiodarone bolus given and drip started after to follow. 0900- patient in NSR BP stable- pressors are now levo, vaso and joy-synephrine. Dr Yousuf Cheung wants propofol turned off to see if patient can follow commands  1000- patient arousable able to squeeze hands and wiggle toes but lethargic at this time. Dr Yousuf Cheung wants arctic sun removed since patient follows commands  1100- arctic sun has been removed. 1200- patients brother claudine has been updated, consent has been received to start patient on CRRT and place temporary HD catheter  1500- CRRT has been started  1530- patient looks anxious and is restless this RN asked her if she wanted to be sedated again and patient shook head yes- propofol has been restarted  1700- all patients dressing changed per dr orders   [id number]- patients BP 70's/40's with pressors maxed out- dr Yousuf Cheung in and ordered 2 amps of bicarb and upped bicarb drip to 150ml/hr. Propofol turned off since patients bp is low. Dr Yousuf Cheung aware of patients temperature being low- bear hugger applied  1729- patients bp stable now 108/20  1745- patients friend briana at bedside with patients son- patient responding to family asking questions by nodding her head  1815- patients bp low again- another amp of bicarb ordered by dr Pinky Logan- notified dr Kirill Stoner of patient receiving 3 amps od bicarb   1900- notified dr Yousuf Cheung of patients bp 70/30's- she stated to give another bicarb and to notify dr Ezequiel Cotto of patient situation to talk woth family  1- dr Ezequiel Cotto in and ordered LR bolus.    80- dr Kirill Stoner called back and stated he is going to change his net goal fluid removal to 0.   2130- patients bp continues to be low with being maxed out on 3 pressors- dr ware orders NS bolus  2200- NS bolus not touching patients BP- still 80's/30's, dr Maria L French wants ABG done

## 2021-02-15 NOTE — PROGRESS NOTES
PHARMACY NOTE:   ICU Rounds Attended (10-15 minutes outside patient room):    Pt diagnosis: cellulitis     IV Fluids: none    Renal:   Recent Labs     02/15/21  0600 02/15/21  0607 02/15/21  0611   CREATININE 3.71* 7.14* 5.3*    Estimated Creatinine Clearance: 14 mL/min (A) (based on SCr of 5.3 mg/dL [facility])).         Antimicrobial Therapy:   Day 4   Antimicrobial agents: Zosyn 2.25gm IV Q8 hours (renally adjusted)                                      Pharmacy to dose vancomycin, 1250mg IV X 1 post hemodialysis today   Cultures +strep, e.coli, and proteus in wound    ID on consult: yes    Recent Labs     02/13/21  0820 02/14/21  0505 02/14/21  1630 02/15/21  0600   WBC 15.3* 17.8* 19.0* 12.4*           Pressors:   norepinephrine @ 6 mcg/min --> 0.76 mcg/kg/min,   epinephrine @ 2mcg/min --> 0.19 mcg/kg/min,   vasopressin @ 0.04 units/min,   Phenylephrine added as well   Sedation:   Propofol @ 10 mcg/kg/min,   Drips: amiodarone gtt, bicarb gtt, nimbex drip     Insulin Therapy (goal: 140-180): medium dose sliding   0 units given past 24 hours    Recent Labs     02/15/21  0136 02/15/21  0600 02/15/21  0607   GLUCOSE 360* 254* 318*       Steroid Therapy: none   Stress Ulcer Prophylaxis:   Recommendations sent     DVT Prophylaxis/Anticoagulant Therapy: warfarin at home, pending CT  Recent Labs     02/13/21  0820 02/14/21  0505 02/14/21  1630    146 126*     Recent Labs     02/14/21  0505 02/15/21  0015 02/15/21  0600   INR 1.9 2.1 2.5         Bowel Regimen:   Miralax daily PRN     IV to PO: none at this time    Diet (NPO, tube feeds, TPN): NPO   Oxygen Therapy: intubated      Follow up/Changes:   Core measures assessed, recommendations made      Jocy Moya PharmD   2/15/2021 11:06 AM

## 2021-02-15 NOTE — PROGRESS NOTES
chloride flush  10 mL Intravenous 2 times per day    insulin lispro  0-12 Units Subcutaneous Q6H    sodium chloride flush  10 mL Intravenous 2 times per day    piperacillin-tazobactam  2,250 mg Intravenous Q8H    vancomycin (VANCOCIN) intermittent dosing (placeholder)   Other RX Placeholder    aspirin  81 mg Oral Daily    [Held by provider] atorvastatin  40 mg Oral Nightly    b complex-C-folic acid  1 capsule Oral Daily    [Held by provider] cloNIDine  0.1 mg Oral Daily    Ferric Citrate  210 mg Oral TID WC    [Held by provider] hydrALAZINE  25 mg Oral BID    metoprolol tartrate  50 mg Oral BID    topiramate  25 mg Oral BID     Continuous Infusions:   propofol 10 mcg/kg/min (02/15/21 0656)    cisatracurium (NIMBEX) infusion      EPINEPHrine infusion 2 mcg/min (02/15/21 0803)    sodium chloride      phenylephrine (KATELYNN-SYNEPHRINE) 50mg/250mL infusion 30 mcg/min (02/15/21 1107)    amiodarone 450mg/250ml D5W infusion      amiodarone 450mg/250ml D5W infusion 0.5 mg/min (02/15/21 1108)    prismasol BGK 4/2.5      sodium chloride      norepinephrine 6 mcg/min (02/15/21 0622)    vasopressin (Septic Shock) infusion 0.04 Units/min (02/15/21 0803)    sodium bicarbonate infusion 50 mL/hr at 02/15/21 0458    dextrose         CBC:   Recent Labs     02/14/21  0505 02/14/21  0505 02/14/21  1630 02/14/21  1630 02/15/21  0600 02/15/21  0611   WBC 17.8*  --  19.0*  --  12.4*  --    HGB 8.9*   < > 8.4*   < > 7.9* 8.5*     --  126*  --   --   --     < > = values in this interval not displayed. CMP:    Recent Labs     02/15/21  0136 02/15/21  0600 02/15/21  0607 02/15/21  0611    138 142  --    K 5.4* 3.8 4.7  --    CL 91* 94* 90*  --    CO2 9* 22 16*  --    BUN 66* 40* 71*  --    CREATININE 6.94* 3.71* 7.14* 5.3*   GLUCOSE 360* 254* 318*  --    CALCIUM 7.0* 7.0* 6.7*  --    LABGLOM 6.2* 12.8* 6.0* 8*     Troponin:   No results for input(s): TROPONINI in the last 72 hours.   BNP: No results for

## 2021-02-15 NOTE — PROGRESS NOTES
Infectious Diseases Inpatient Progress Note          HISTORY OF PRESENT ILLNESS:  Follow up septic shock, currently in ICU on Levophed Estevan-Synephrine and vasopressin, on IV Vanco and Zosyn, well tolerated. Currently intubated on assist control 60%. Getting EEG done. Has severe mottling and bluish discoloration of the abdomen and lower extremities  Current Medications:     chlorhexidine  15 mL Mouth/Throat BID    vancomycin  1,250 mg Intravenous Once    lidocaine  5 mL Intradermal Once    sodium chloride flush  10 mL Intravenous 2 times per day    insulin lispro  0-12 Units Subcutaneous Q6H    sodium chloride flush  10 mL Intravenous 2 times per day    piperacillin-tazobactam  2,250 mg Intravenous Q8H    vancomycin (VANCOCIN) intermittent dosing (placeholder)   Other RX Placeholder    aspirin  81 mg Oral Daily    [Held by provider] atorvastatin  40 mg Oral Nightly    b complex-C-folic acid  1 capsule Oral Daily    [Held by provider] cloNIDine  0.1 mg Oral Daily    Ferric Citrate  210 mg Oral TID WC    [Held by provider] hydrALAZINE  25 mg Oral BID    metoprolol tartrate  50 mg Oral BID    topiramate  25 mg Oral BID       Allergies:  Hydrocodone-acetaminophen, Naproxen, Hydrocodone, Quetiapine, and Vicodin [hydrocodone-acetaminophen]      Review of Systems  unable to provide ROS because of sedation      Physical Exam  Vitals:    02/27/17 1800 02/27/17 1830 02/27/17 1855 02/27/17 1900   BP: 118/78 118/78  102/69   Pulse: 77 80 81 80   Resp: 14 14 14 (!) 0   Temp:       TempSrc:       SpO2: 100% 100% 100% 100%   Weight:       Height:         General Appearance: sedated, non responsive to verbals  Intubated on assist control 60%  Positive mild icteric sclera  Skin: warm and dry, no rash.    Head: normocephalic and atraumatic  Eyes:  anicteric sclerae and skin  Negative JVD  Lungs: Clear Lungs, no rhonchi, no crackles, no wheezes  Heart: RRR, nl S1/S2, no murmur  Abdomen: Severe mottling of abdominal wall and bilateral lower extremities  Cold to touch with nonpalpable peripheral pulses  NEUROLOGICAL: unable to evaluate because of sedation  No leg edema  abdominal wounds           DATA:    Lab Results   Component Value Date    WBC 12.4 (H) 02/15/2021    HGB 8.5 (L) 02/15/2021    HCT 25.2 (L) 02/15/2021    MCV 93.1 02/15/2021    PLT 45 (L) 02/15/2021     Lab Results   Component Value Date    CREATININE 5.3 (HH) 02/15/2021    BUN 71 (H) 02/15/2021     02/15/2021    K 4.7 02/15/2021    CL 90 (L) 02/15/2021    CO2 16 (L) 02/15/2021       Hepatic Function Panel:  Lab Results   Component Value Date    ALKPHOS 105 02/15/2021    ALKPHOS 100 02/15/2021    ALT 80 02/15/2021    ALT 73 02/15/2021     02/15/2021     02/15/2021    PROT 5.1 02/15/2021    PROT 4.8 02/15/2021    BILITOT 1.5 02/15/2021    BILITOT 1.1 02/15/2021    BILIDIR 0.9 02/15/2021    IBILI 0.2 02/15/2021    LABALBU 1.6 02/15/2021    LABALBU 1.4 02/15/2021       Microbiology:   No results for input(s): BC in the last 72 hours. No results for input(s): Jerlyn Sly in the last 72 hours. No results for input(s): LABURIN in the last 72 hours.   Recent Labs     02/14/21  0438   WNDABS Light growth  Sensitivity to follow    Light growth  ID and sensitivity to follow    Heavy growth  No further workup    Moderate growth  Sensitivity to follow    Moderate growth  ID and sensitivity to follow             IMPRESSION:    · Septic shock  · Acute respiratory failure  · Extensive calciphylaxis  · End-stage renal disease on hemodialysis  · Altered mentation on admission  · Grave prognosis in view of the extensive severe mottling of abdomen and legs    Patient Active Problem List   Diagnosis    [facility]ina pectoris, unspecified ([facility]    CHF (congestive heart failure) ([facility]    NSTEMI (non-ST elevated myocardial infarction) ([facility]    Coronary artery disease of native artery of native heart with stable angina pectoris ([facility]    ESRD (end stage renal disease) ([facility]    Essential hypertension    Hx of CABG    Paroxysmal atrial fibrillation (HCC)    CHF (congestive heart failure), NYHA class I, acute on chronic, combined ([address]    Uremia    Goals of care, counseling/discussion    End-stage renal disease on hemodialysis ([facility]    MDD (major depressive disorder), recurrent severe, without psychosis ([facility]    A-fib ([facility]    Renal failure    Hyperkalemia    Dyspnea on exertion    Abrasion of back    Atrial fibrillation with rapid ventricular response (HCC)    Cellulitis    Elevated troponin    Rhabdomyolysis    Hyperlipidemia    Major depression    Abdominal wall cellulitis    Calciphylaxis    Open breast wound, unspecified laterality, initial encounter    Wound, open, abdominal wall, anterior, initial encounter       PLAN:  · Continue IV vancomycin and Zosyn  · Vent and vasopressor support  · Check EEG  · Grave prognosis    Sammie Grey MD

## 2021-02-15 NOTE — PROGRESS NOTES
Progress Note  Patient: Ovi Macdonald Mishra  Unit/Bed: IC05/IC05-01  YOB: 1965  MRN: [id number]  Acct: [de-identified]   Admitting Diagnosis: Cellulitis [L03.90]  Admit Date:  2021  Hospital Day: 3    Chief Complaint: AF ressp failure ESRD    Histories:  Past Medical History:   Diagnosis Date    Atrial fibrillation ([facility]     Cancer ([facility]     Breast    CHF (congestive heart failure) ([facility]     Chronic kidney disease     Diabetes mellitus ([facility]     ESRD (end stage renal disease) ([facility] 2020    Essential hypertension 2020    Heart murmur     Hepatitis C     Hx of CABG 2020    Paroxysmal atrial fibrillation ([facility] 2020     Past Surgical History:   Procedure Laterality Date    BREAST SURGERY       SECTION      CHOLECYSTECTOMY      CORONARY ARTERY BYPASS GRAFT       History reviewed. No pertinent family history.   Social History     Socioeconomic History    Marital status: Single     Spouse name: None    Number of children: None    Years of education: None    Highest education level: None   Occupational History    None   Social Needs    Financial resource strain: None    Food insecurity     Worry: None     Inability: None    Transportation needs     Medical: None     Non-medical: None   Tobacco Use    Smoking status: Former Smoker    Smokeless tobacco: Never Used   Substance and Sexual Activity    Alcohol use: Never     Frequency: Never    Drug use: Never    Sexual activity: Not Currently   Lifestyle    Physical activity     Days per week: None     Minutes per session: None    Stress: None   Relationships    Social connections     Talks on phone: None     Gets together: None     Attends Confucianism service: None     Active member of club or organization: None     Attends meetings of clubs or organizations: None     Relationship status: None    Intimate partner violence     Fear of current or ex partner: None     Emotionally abused: None     Physically abused: None     Forced sexual activity: None   Other Topics Concern    None   Social History Narrative    None       Subjective/HPI Rapid called. Respiratory distress. Intubated on 1W and transferred to ICU. Remains vented. HD this am caused marked rapid AF rate 180s. HD stopped. ZWT:    Review of Systems:   Review of Systems  Vented    Physical Examination:    BP (!) 132/40   Pulse 145   Temp 97.7 °F (36.5 °C)   Resp 28   Ht 5' 3\" (1.6 m)   Wt 231 lb 0.7 oz (104.8 kg)   SpO2 100%   BMI 40.93 kg/m²    Physical Exam   Constitutional: She appears healthy. No distress. HENT:   Normal cephalic and Atraumatic   Eyes: Pupils are equal, round, and reactive to light. Neck: Normal range of motion and thyroid normal. Neck supple. No JVD present. No neck adenopathy. No thyromegaly present. Cardiovascular: Normal rate, regular rhythm, normal heart sounds, intact distal pulses and normal pulses. Pulmonary/Chest: Effort normal and breath sounds normal. She has no wheezes. She has no rales. She exhibits no tenderness. Abdominal: Soft. Bowel sounds are normal. There is no abdominal tenderness. Musculoskeletal: Normal range of motion. General: No tenderness or edema. Neurological: She is alert and oriented to person, place, and time. Skin: Skin is warm. No cyanosis. Nails show no clubbing.        LABS:  CBC:   Lab Results   Component Value Date    WBC 19.0 02/14/2021    RBC 2.88 02/14/2021    RBC 3.51 08/12/2018    HGB 8.5 02/15/2021    HCT 27.7 02/14/2021    MCV 96.0 02/14/2021    MCH 29.0 02/14/2021    MCHC 30.2 02/14/2021    RDW 17.4 02/14/2021     02/14/2021    MPV 7.1 08/12/2018     CBC with Differential:    Lab Results   Component Value Date    WBC 19.0 02/14/2021    RBC 2.88 02/14/2021    RBC 3.51 08/12/2018    HGB 8.5 02/15/2021    HCT 27.7 02/14/2021     02/14/2021    MCV 96.0 02/14/2021    MCH 29.0 02/14/2021    MCHC 30.2 02/14/2021    RDW 17.4 02/14/2021    LYMPHOPCT 1.0 02/14/2021    LYMPHOPCT 20.7 08/12/2018    MONOPCT 7.5 02/14/2021    BASOPCT 0.3 02/14/2021    MONOSABS 1.4 02/14/2021    LYMPHSABS 0.2 02/14/2021    EOSABS 0.2 02/14/2021    BASOSABS 0.1 02/14/2021     CMP:    Lab Results   Component Value Date     02/15/2021    K 4.7 02/15/2021    K 3.8 02/15/2021    CL 90 02/15/2021    CO2 16 02/15/2021    BUN 71 02/15/2021    CREATININE 5.3 02/15/2021    CREATININE 7.14 02/15/2021    GFRAA 10 02/15/2021    LABGLOM 8 02/15/2021    GLUCOSE 318 02/15/2021    PROT 5.1 02/15/2021    PROT 4.8 02/15/2021    LABALBU 1.6 02/15/2021    LABALBU 1.4 02/15/2021    CALCIUM 6.7 02/15/2021    BILITOT 1.5 02/15/2021    BILITOT 1.1 02/15/2021    ALKPHOS 105 02/15/2021    ALKPHOS 100 02/15/2021     02/15/2021     02/15/2021    ALT 80 02/15/2021    ALT 73 02/15/2021     BMP:    Lab Results   Component Value Date     02/15/2021    K 4.7 02/15/2021    K 3.8 02/15/2021    CL 90 02/15/2021    CO2 16 02/15/2021    BUN 71 02/15/2021    LABALBU 1.6 02/15/2021    LABALBU 1.4 02/15/2021    CREATININE 5.3 02/15/2021    CREATININE 7.14 02/15/2021    CALCIUM 6.7 02/15/2021    GFRAA 10 02/15/2021    LABGLOM 8 02/15/2021    GLUCOSE 318 02/15/2021     Magnesium:    Lab Results   Component Value Date    MG 2.2 02/15/2021     Troponin:    Lab Results   Component Value Date    TROPONINI 0.188 02/12/2021        Active Hospital Problems    Diagnosis Date Noted    Cellulitis [L03.90] 02/12/2021     Priority: Low    Rhabdomyolysis [M62.82] 02/12/2021     Priority: Low    Abdominal wall cellulitis [Z42.284]      Priority: Low    Calciphylaxis [E83.59]      Priority: Low    Elevated troponin [R77.8] 01/21/2021     Priority: Low    A-fib ([facility] [I48.91] 04/04/2020     Priority: Low    End-stage renal disease on hemodialysis ([facility] [N18.6, Z99.2]      Priority: Low    ESRD (end stage renal disease) ([facility] [N18.6] 02/14/2020     Priority: Low    Essential hypertension [I10] 02/14/2020 Priority: Low    Coronary artery disease of native artery of native heart with stable angina pectoris ([facility] [I25.118] 10/15/2019     Priority: Low    CHF (congestive heart failure) ([facility] [I50.9]      Priority: Low    Hyperlipidemia [E78.5] 06/12/2012     Priority: Low    Major depression [F32.9] 05/09/2006     Priority: Low        Assessment/Plan:  1. Abd wall Cellulitis  2. Respiratory failure- vented. 3. MS changes  4. Uncontrolled AF- start Amiodarone gtt. INR 2.5  5. CABG x3  6.  LVEF 60% previously        Electronically signed by Tatiana Soriano MD on 2/15/2021 at 10:42 AM

## 2021-02-15 NOTE — PROGRESS NOTES
Progress Note  Date:2/15/2021       Room:[place] [address]  Patient Name:Ann Marie Mishra     YOB: 1965     Age:55 y.o.      ROS: ROS could not be obtained bc of acuity of medical conditon  Subjective    Subjective     Review of Systems    Objective         Vitals Last 24 Hours:  TEMPERATURE:  Temp  Av.8 °F (36.6 °C)  Min: 97 °F (36.1 °C)  Max: 98.7 °F (37.1 °C)  RESPIRATIONS RANGE: Resp  Av.6  Min: 13  Max: 39  PULSE OXIMETRY RANGE: SpO2  Av.7 %  Min: 3 %  Max: 100 %  PULSE RANGE: Pulse  Av.4  Min: 26  Max: 175  BLOOD PRESSURE RANGE: Systolic (01QKG), HXF:975 , Min:44 , JTP:426   ; Diastolic (66LNQ), MGW:81, Min:28, Max:77    I/O (24Hr): Intake/Output Summary (Last 24 hours) at 2/15/2021 1158  Last data filed at 2/15/2021 0600  Gross per 24 hour   Intake 4284.69 ml   Output 500 ml   Net 3784.69 ml     Objective:  General Appearance:  Ill-appearing. Vital signs: (most recent): Blood pressure (!) 164/66, pulse 146, temperature 97 °F (36.1 °C), resp. rate 28, height 5' 3\" (1.6 m), weight 231 lb 0.7 oz (104.8 kg), SpO2 100 %, not currently breastfeeding. HEENT: Normal HEENT exam.    Lungs:  Normal effort. Heart: Normal rate. S1 normal and S2 normal.    Abdomen: Abdomen is soft. Bowel sounds are normal.   There is no epigastric area or suprapubic area tenderness. Pulses: Distal pulses are intact. Pupils:  Pupils are equal, round, and reactive to light. Skin:  Warm.       Progress Note  Date:2/15/2021       Room:[place] [address]  Patient Name:Ann Marie Mishra     YOB: 1965     Age:55 y.o.      ROS: ROS could not be obtained bc of acuity of medical conditon  Subjective    Subjective     Review of Systems    Objective         Vitals Last 24 Hours:  TEMPERATURE:  Temp  Av.8 °F (36.6 °C)  Min: 97 °F (36.1 °C)  Max: 98.7 °F (37.1 °C)  RESPIRATIONS RANGE: Resp  Av.6  Min: 13  Max: 39  PULSE OXIMETRY RANGE: SpO2  Av.7 %  Min: 3 %  Max: 100 %  PULSE RANGE: Pulse  Av.4  Min: 26  Max: 175  BLOOD PRESSURE RANGE: Systolic (93WFV), QSJ:726 , Min:44 , ZRD:490   ; Diastolic (05ULI), UJE:49, Min:28, Max:77    I/O (24Hr): Intake/Output Summary (Last 24 hours) at 2/15/2021 1158  Last data filed at 2/15/2021 0600  Gross per 24 hour   Intake 4284.69 ml   Output 500 ml   Net 3784.69 ml     Objective:  General Appearance:  Ill-appearing. Vital signs: (most recent): Blood pressure (!) 164/66, pulse 146, temperature 97 °F (36.1 °C), resp. rate 28, height 5' 3\" (1.6 m), weight 231 lb 0.7 oz (104.8 kg), SpO2 100 %, not currently breastfeeding. HEENT: Normal HEENT exam.    Lungs:  Normal effort. Heart: Normal rate. S1 normal and S2 normal.    Abdomen: Abdomen is soft. Bowel sounds are normal.   There is no epigastric area or suprapubic area tenderness. Pulses: Distal pulses are intact. Pupils:  Pupils are equal, round, and reactive to light. Skin:  Warm. Labs/Imaging/Diagnostics    Labs:  CBC:  Recent Labs     21  0820 21  0505 21  0505 21  1630 21  1630 02/15/21  0022 02/15/21  0600 02/15/21  0611   WBC 15.3* 17.8*  --  19.0*  --   --  12.4*  --    RBC 2.80* 3.11*  --  2.88*  --   --  2.70*  --    HGB 8.0* 8.9*   < > 8.4*   < > 8.7* 7.9* 8.5*   HCT 26.2* 29.2*  --  27.7*  --   --  25.2*  --    MCV 93.4 93.8  --  96.0  --   --  93.1  --    RDW 16.3* 16.4*  --  17.4*  --   --  17.2*  --     146  --  126*  --   --   --   --     < > = values in this interval not displayed.      CHEMISTRIES:  Recent Labs     02/15/21  0136 02/15/21  0600 02/15/21  0607 02/15/21  0611    138 142  --    K 5.4* 3.8 4.7  --    CL 91* 94* 90*  --    CO2 9* 22 16*  --    BUN 66* 40* 71*  --    CREATININE 6.94* 3.71* 7.14* 5.3*   GLUCOSE 360* 254* 318*  --    PHOS 11.4*  --  10.5*  --    MG 2.3  --  2.2  --      PT/INR:  Recent Labs     21  0505 02/15/21  0015 02/15/21  06   PROTIME 21.5* 23.8* 26.6*   INR 1.9 2.1 2. 5     APTT:  Recent Labs     02/15/21  0015   APTT 43.9*     LIVER PROFILE:  Recent Labs     02/14/21  0505 02/14/21  1630 02/15/21  0600   AST 75* 155* 162*  142*   ALT 69* 75* 80*  73*   BILIDIR  --   --  0.9*   BILITOT 0.9* 1.0* 1.5*  1.1*   ALKPHOS 89 105 105  100       Imaging Last 24 Hours:  Ct Head Wo Contrast    Result Date: 2/12/2021  CT Brain Contrast medium:  Not utilized. History: Altered mental status, headache, neck pain, possible fall Comparison:  CT brain, July 26, 2020 Findings: Extra-axial spaces:  Normal. Intracranial hemorrhage:  None. Ventricular system:  Normal for age. Basal Cisterns:  Normal. Cerebral Parenchyma:  Normal. Midline Shift:  None. Cerebellum:  Normal. Paranasal sinuses and mastoid air cells:  Normal. Visualized Orbits:  Normal.     Impression: No acute findings. . All CT scans at this facility use dose modulation, iterative reconstruction, and/or weight based dosing when appropriate to reduce radiation dose to as low as reasonably achievable. Ct Cervical Spine Wo Contrast    Result Date: 2/12/2021  CT cervical spine without intravenous contrast medium. HISTORY:  Altered mental status, headache, neck pain. fall TECHNICAL FACTORS: CT cervical spine obtained and formatted as 2.5 mm contiguous axial images from skull base to the level of. Sagittal and coronal reconstructions were obtained during postprocessing. No contrast medium was utilized. COMPARISON: None FINDINGS: Cervical vertebral bodies are normal in height and alignment Atlantooccipital articulation maintained. Atlantoaxial interval preserved. Neural foramina intact. Mild disc space narrowing C5-C6. No fractures, dislocations, bone lesions. Limited imaging lung apices without anomaly. Carotid arteries and soft tissues are without anomaly. No fracture.  All CT scans at this facility use dose modulation, iterative reconstruction, and/or weight based dosing when appropriate to reduce radiation dose to as low as reasonably achievable. Xr Chest Portable    Result Date: 2/12/2021  EXAMINATION: CHEST PORTABLE VIEW  CLINICAL HISTORY: Fatigue COMPARISONS: February 5, 2021  FINDINGS: Single  views of the chest is submitted. There are multiple median sternotomy wires. There is an endovascular stent overlying the left upper chest. The cardiac silhouette is enlarged Pulmonary vascular unremarkable. Right sided trachea. No focal infiltrates. No Pneumothoraces. NO ACUTE ACTIVE CARDIOPULMONARY PROCESS    Xr Chest Portable    Result Date: 2/10/2021  EXAMINATION: CHEST PORTABLE VIEW  CLINICAL HISTORY: Extremity weakness COMPARISONS: February 7, 2021  FINDINGS: Single  views of the chest is submitted. There are multiple median sternotomy wires. The cardiac silhouette is enlarged Pulmonary vascular unremarkable. Right sided trachea. No focal infiltrates. No Pneumothoraces. NO ACUTE ACTIVE CARDIOPULMONARY PROCESS    Us Dup Lower Extremities Bilateral Venous    Result Date: 2/12/2021  US DUP LOWER EXTREMITIES BILATERAL VENOUS CLINICAL HISTORY: Confusion, agitation COMPARISONS: FINDINGS: Duplex color ultrasound as well as  both gray scale and spectral Doppler ultrasound of the deep venous system of both the left land right lower extremity from the inguinal ligaments to the popliteal fossa was performed. There are no findings of deep venous thrombus in the visualized vessels of the left or right  lower extremity. NO FINDINGS OF  DEEP VENOUS THROMBUS IN THE VISUALIZED VESSELS OF THE LEFT OR RIGHT  LOWER EXTREMITY.     Assessment//Plan           Hospital Problems           Last Modified POA    * (Principal) Cellulitis 2/12/2021 Yes    CHF (congestive heart failure) ([address] 2/12/2021 Yes    Coronary artery disease of native artery of native heart with stable angina pectoris ([facility] 2/12/2021 Yes    ESRD (end stage renal disease) ([facility] 2/12/2021 Yes    Essential hypertension 2/12/2021 Yes    End-stage renal disease on hemodialysis ([facility] 2/12/2021 Yes    A-fib ([facility] 2/12/2021 Yes    Elevated troponin 2/12/2021 Yes    Rhabdomyolysis 2/12/2021 Yes    Hyperlipidemia 2/12/2021 Yes    Major depression 2/12/2021 Yes    Overview Signed 2/12/2021  1:10 AM by Renay Barbour, RN, NP     Last Assessment & Plan:   Assessment:   Stable chronic depression    PLAN:   -Continue home Prozac  first diagnosed in 2000, was suicidal, situational         Abdominal wall cellulitis 2/12/2021 Yes    Calciphylaxis 2/12/2021 Yes        Assessment & Plan    1) abd wall cellulitis  2) encephalopathy due to uremia  3) non compliance  4) ESRD on HD  5) Rhabdomyolysis    Continue with maintenance hemodialysis. Continue with IV antibiotics. Neurology evaluation for altered mental status. CT head and cervical is negative. Repeat CK. Avoid fluid overload. Follow-up cultures. 2/13: We will try to speech and swallow eval.  IV fluid. Avoid fluid overload since the patient is end-stage renal disease. Rhabdomyolysis improving. Antibiotics as per ID. Patient needs placement upon clearance. Electronically signed by Joanna Anderson MD on 2/12/21 at 12:19 PM EST  Labs/Imaging/Diagnostics    Labs:  CBC:  Recent Labs     02/13/21  0820 02/14/21  0505 02/14/21  0505 02/14/21  1630 02/14/21  1630 02/15/21  0022 02/15/21  0600 02/15/21  0611   WBC 15.3* 17.8*  --  19.0*  --   --  12.4*  --    RBC 2.80* 3.11*  --  2.88*  --   --  2.70*  --    HGB 8.0* 8.9*   < > 8.4*   < > 8.7* 7.9* 8.5*   HCT 26.2* 29.2*  --  27.7*  --   --  25.2*  --    MCV 93.4 93.8  --  96.0  --   --  93.1  --    RDW 16.3* 16.4*  --  17.4*  --   --  17.2*  --     146  --  126*  --   --   --   --     < > = values in this interval not displayed.      CHEMISTRIES:  Recent Labs     02/15/21  0136 02/15/21  0600 02/15/21  0607 02/15/21  0611   NA 142 138 142  --    K 5.4* 3.8 4.7  --    CL 91* 94* 90*  --    CO2 9* 22 16*  --    BUN 66* 40* 71*  --    CREATININE 6.94* 3.71* 7.14* 5.3*   GLUCOSE 360* 254* 318*  --    PHOS 11.4*  --  10.5*  --    MG 2.3  --  2.2  --      PT/INR:  Recent Labs     02/14/21  0505 02/15/21  0015 02/15/21  0600   PROTIME 21.5* 23.8* 26.6*   INR 1.9 2.1 2.5     APTT:  Recent Labs     02/15/21  0015   APTT 43.9*     LIVER PROFILE:  Recent Labs     02/14/21  0505 02/14/21  1630 02/15/21  0600   AST 75* 155* 162*  142*   ALT 69* 75* 80*  73*   BILIDIR  --   --  0.9*   BILITOT 0.9* 1.0* 1.5*  1.1*   ALKPHOS 89 105 105  100       Imaging Last 24 Hours:  Ct Head Wo Contrast    Result Date: 2/12/2021  CT Brain Contrast medium:  Not utilized. History: Altered mental status, headache, neck pain, possible fall Comparison:  CT brain, July 26, 2020 Findings: Extra-axial spaces:  Normal. Intracranial hemorrhage:  None. Ventricular system:  Normal for age. Basal Cisterns:  Normal. Cerebral Parenchyma:  Normal. Midline Shift:  None. Cerebellum:  Normal. Paranasal sinuses and mastoid air cells:  Normal. Visualized Orbits:  Normal.     Impression: No acute findings. . All CT scans at this facility use dose modulation, iterative reconstruction, and/or weight based dosing when appropriate to reduce radiation dose to as low as reasonably achievable. Ct Cervical Spine Wo Contrast    Result Date: 2/12/2021  CT cervical spine without intravenous contrast medium. HISTORY:  Altered mental status, headache, neck pain. fall TECHNICAL FACTORS: CT cervical spine obtained and formatted as 2.5 mm contiguous axial images from skull base to the level of. Sagittal and coronal reconstructions were obtained during postprocessing. No contrast medium was utilized. COMPARISON: None FINDINGS: Cervical vertebral bodies are normal in height and alignment Atlantooccipital articulation maintained. Atlantoaxial interval preserved. Neural foramina intact.  Mild disc space narrowing C5-C6. No fractures, dislocations, bone lesions. Limited imaging lung apices without anomaly. Carotid arteries and soft tissues are without anomaly. No fracture. All CT scans at this facility use dose modulation, iterative reconstruction, and/or weight based dosing when appropriate to reduce radiation dose to as low as reasonably achievable. Xr Chest Portable    Result Date: 2/12/2021  EXAMINATION: CHEST PORTABLE VIEW  CLINICAL HISTORY: Fatigue COMPARISONS: February 5, 2021  FINDINGS: Single  views of the chest is submitted. There are multiple median sternotomy wires. There is an endovascular stent overlying the left upper chest. The cardiac silhouette is enlarged Pulmonary vascular unremarkable. Right sided trachea. No focal infiltrates. No Pneumothoraces. NO ACUTE ACTIVE CARDIOPULMONARY PROCESS    Xr Chest Portable    Result Date: 2/10/2021  EXAMINATION: CHEST PORTABLE VIEW  CLINICAL HISTORY: Extremity weakness COMPARISONS: February 7, 2021  FINDINGS: Single  views of the chest is submitted. There are multiple median sternotomy wires. The cardiac silhouette is enlarged Pulmonary vascular unremarkable. Right sided trachea. No focal infiltrates. No Pneumothoraces. NO ACUTE ACTIVE CARDIOPULMONARY PROCESS    Us Dup Lower Extremities Bilateral Venous    Result Date: 2/12/2021  US DUP LOWER EXTREMITIES BILATERAL VENOUS CLINICAL HISTORY: Confusion, agitation COMPARISONS: FINDINGS: Duplex color ultrasound as well as  both gray scale and spectral Doppler ultrasound of the deep venous system of both the left land right lower extremity from the inguinal ligaments to the popliteal fossa was performed. There are no findings of deep venous thrombus in the visualized vessels of the left or right  lower extremity.      NO FINDINGS OF  DEEP VENOUS THROMBUS IN THE VISUALIZED VESSELS OF THE LEFT OR RIGHT  LOWER EXTREMITY. Assessment//Plan           Hospital Problems           Last Modified POA    * (Principal) Cellulitis 2/12/2021 Yes    CHF (congestive heart failure) ([address] 2/12/2021 Yes    Coronary artery disease of native artery of native heart with stable angina pectoris ([address] 2/12/2021 Yes    ESRD (end stage renal disease) ([address] 2/12/2021 Yes    Essential hypertension 2/12/2021 Yes    End-stage renal disease on hemodialysis ([address] 2/12/2021 Yes    [facility] 2/12/2021 Yes    Elevated troponin 2/12/2021 Yes    Rhabdomyolysis 2/12/2021 Yes    Hyperlipidemia 2/12/2021 Yes    Major depression 2/12/2021 Yes    Overview Signed 2/12/2021  1:10 AM by Frannie Hamilton RN, NP     Last Assessment & Plan:   Assessment:   Stable chronic depression    PLAN:   -Continue home Prozac  first diagnosed in 2000, was suicidal, situational         Abdominal wall cellulitis 2/12/2021 Yes    Calciphylaxis 2/12/2021 Yes        Assessment & Plan    1) abd wall cellulitis  2) encephalopathy due to uremia  3) non compliance  4) ESRD on HD  5) Rhabdomyolysis    Continue with maintenance hemodialysis. Continue with IV antibiotics. Neurology evaluation for altered mental status. CT head and cervical is negative. Repeat CK. Avoid fluid overload. Follow-up cultures. 2/13: We will try to speech and swallow eval.  IV fluid. Avoid fluid overload since the patient is end-stage renal disease. Rhabdomyolysis improving. Antibiotics as per ID. Patient needs placement upon clearance. 2/14: Patient did have A. fib with RVR started on Cardizem drip. After bolus of Cardizem patient had a hypotension and respiratory failure intubated was sent to ICU. In ICU patient coded twice. A-line and CVC was placed by me. A-line came out. Tried again was not successful. Family was updated about the care and prognosis.   Electronically signed by Joy Lim MD on 2/12/21 at 12:19 PM EST

## 2021-02-16 NOTE — PROGRESS NOTES
Nephrology Progress Note       Assessment:  50y.o. year old female with history s/f ESRD on IHD MWF, A.fib, T2DM, HTN, CAD s/p CABG who presented for AMS.    1. ESRD on IHD MWF: currently on CRRT started 2/15, has [facility] in place   2. Abdominal wall cellulitis now w/ septic shocK: ID onboard  3. Cardiac arrest: s/p ROSC, multiple episodes   4. Elevated CK  5. Hyperkalemia  6. Metabolic acidosis 2/2 renal failure and lactic acidosis  7. Hypoalbuminemia   8. Transaminitis   9. Acute hypoxic respiratory failure: s/p intubation   10. Septic shock : on 3 pressors  11.  A.fib w/ RVR: cardiology managing      Plan:  - continue CVVHD   - has very poor prognosis     Patient Active Problem List:     Angina pectoris, unspecified (HCC)     CHF (congestive heart failure) (HCC)     NSTEMI (non-ST elevated myocardial infarction) ([address]     Coronary artery disease of native artery of native heart with stable angina pectoris ([address]     ESRD (end stage renal disease) ([address]     Essential hypertension     Hx of CABG     Paroxysmal atrial fibrillation (HCC)     CHF (congestive heart failure), NYHA class I, acute on chronic, combined ([address]     Uremia     Goals of care, counseling/discussion     End-stage renal disease on hemodialysis ([address]     MDD (major depressive disorder), recurrent severe, without psychosis ([address]     A-fib ([address]     Renal failure     Hyperkalemia     Dyspnea on exertion     Abrasion of back     Atrial fibrillation with rapid ventricular response (HCC)     Cellulitis     Elevated troponin     Rhabdomyolysis     Hyperlipidemia     Major depression     Abdominal wall cellulitis     Calciphylaxis      Subjective:  Admit Date: 2/11/2021    Interval History: remains on CRRT, on 3 pressors, intubated, family at bedside, now DNR-CCA    Medications:  Scheduled Meds:   EPINEPHrine        sodium bicarbonate        chlorhexidine  15 mL Mouth/Throat BID    piperacillin-tazobactam  3,375 mg Intravenous Q8H    pantoprazole  40 mg Intravenous Daily    And    sodium chloride (PF)  10 mL Intravenous Daily    warfarin (COUMADIN) daily dosing (placeholder)   Other RX Placeholder    lidocaine  5 mL Intradermal Once    sodium chloride flush  10 mL Intravenous 2 times per day    sodium chloride flush  10 mL Intravenous 2 times per day    vancomycin (VANCOCIN) intermittent dosing (placeholder)   Other RX Placeholder    aspirin  81 mg Oral Daily    [Held by provider] atorvastatin  40 mg Oral Nightly    b complex-C-folic acid  1 capsule Oral Daily    [Held by provider] cloNIDine  0.1 mg Oral Daily    [Held by provider] hydrALAZINE  25 mg Oral BID    topiramate  25 mg Oral BID     Continuous Infusions:   insulin 3.2 Units/hr (02/16/21 0928)    sodium chloride      phenylephrine (KATELYNN-SYNEPHRINE) 50mg/250mL infusion 300 mcg/min (02/16/21 1256)    amiodarone 450mg/250ml D5W infusion 0.5 mg/min (02/16/21 0026)    prismasol BGK 4/2.5 2,500 mL/hr (02/16/21 1107)    sodium chloride      norepinephrine 100 mcg/min (02/16/21 1237)    vasopressin (Septic Shock) infusion 0.04 Units/min (02/16/21 0700)    sodium bicarbonate infusion 150 mL/hr at 02/16/21 1237    dextrose         CBC:   Recent Labs     02/15/21  0600 02/15/21  0600 02/15/21  2226 02/16/21  0600   WBC 12.4*  --   --  9.9   HGB 7.9*   < > 7.8* 6.1*   PLT 45*  --   --  16*    < > = values in this interval not displayed. CMP:    Recent Labs     02/15/21  1215 02/15/21  1215 02/15/21  1805 02/15/21  2226 02/16/21  0317     --  134*  --  127*  129*   K 4.8  --  4.4  --  5.1*  5.1*   CL 92*  --  91*  --  88*  89*   CO2 16*  --  15*  --  13*  13*   BUN 53*  --  40*  --  30*  29*   CREATININE 4.54*   < > 3.81* 2.5* 2.55*  2.50*   GLUCOSE 334*  --  342*  --  355*  354*   CALCIUM 6.5*  --  6.2*  --  6.9*  6.8*   LABGLOM 10.1*   < > 12.4* 20* 19.7*  20.1*    < > = values in this interval not displayed.      Troponin:   No results for input(s): TROPONINI in the last 72 hours.  BNP: No results for input(s): BNP in the last 72 hours. INR:   Recent Labs     02/16/21  0600   INR 2.7     Lipids:   No results for input(s): CHOL, LDLDIRECT, TRIG, HDL, AMYLASE, LIPASE in the last 72 hours. Liver:   Recent Labs     02/16/21  0600   *   ALT 68*   ALKPHOS 84   PROT 3.4*   LABALBU 1.1*   BILITOT 1.6*     Iron:  No results for input(s): IRONS, FERRITIN in the last 72 hours. Invalid input(s): LABIRONS  Urinalysis: No results for input(s): UA in the last 72 hours.     Objective:  Vitals: BP (!) 29/10   Pulse 67   Temp 92.3 °F (33.5 °C) (Rectal)   Resp 22   Ht 5' 3\" (1.6 m)   Wt 254 lb 10.1 oz (115.5 kg)   SpO2 (!) 69%   BMI 45.11 kg/m²    Wt Readings from Last 3 Encounters:   02/16/21 254 lb 10.1 oz (115.5 kg)   02/10/21 240 lb (108.9 kg)   02/07/21 240 lb (108.9 kg)      24HR INTAKE/OUTPUT:      Intake/Output Summary (Last 24 hours) at 2/16/2021 1259  Last data filed at 2/16/2021 1100  Gross per 24 hour   Intake 7726.3 ml   Output 1810 ml   Net 5916.3 ml       General: intubated, sedated  HEENT: normocephalic, atraumatic, ETT in place  Lungs: intubated, on vent, coarse breath sounds  Heart: regular rate and rhythm, no murmurs or rubs  Abdomen: soft, non-tender, non-distended  Ext: no cyanosis, no peripheral edema  Neuro: unable to assess, sedated      Electronically signed by Farheen Miner MD

## 2021-02-16 NOTE — PROGRESS NOTES
Persistent hypotension despite maximal pressor support. pH 7.316. CVVH removing fluid for net 0 on/off. Remains on vasopressin, levophed, neosynepherine. Will change to UF, fluid bolus and follow pressor response. IVC under US shows complete collapse with respiratory variation suggesting intravascular depletion.

## 2021-02-16 NOTE — PROGRESS NOTES
Progress Note  Patient: Lacie Mishra  Unit/Bed: IC05/IC05-01  YOB: 1965  MRN: [id number]  Acct: [de-identified]   Admitting Diagnosis: Cellulitis [L03.90]  Admit Date:  2021  Hospital Day: 4    Chief Complaint: AF ressp failure ESRD    Histories:  Past Medical History:   Diagnosis Date    Atrial fibrillation ([facility]     Cancer ([facility]     Breast    CHF (congestive heart failure) ([facility]     Chronic kidney disease     Diabetes mellitus ([facility]     ESRD (end stage renal disease) ([facility] 2020    Essential hypertension 2020    Heart murmur     Hepatitis C     Hx of CABG 2020    Paroxysmal atrial fibrillation ([facility] 2020     Past Surgical History:   Procedure Laterality Date    BREAST SURGERY       SECTION      CHOLECYSTECTOMY      CORONARY ARTERY BYPASS GRAFT       History reviewed. No pertinent family history.   Social History     Socioeconomic History    Marital status: Single     Spouse name: None    Number of children: None    Years of education: None    Highest education level: None   Occupational History    None   Social Needs    Financial resource strain: None    Food insecurity     Worry: None     Inability: None    Transportation needs     Medical: None     Non-medical: None   Tobacco Use    Smoking status: Former Smoker    Smokeless tobacco: Never Used   Substance and Sexual Activity    Alcohol use: Never     Frequency: Never    Drug use: Never    Sexual activity: Not Currently   Lifestyle    Physical activity     Days per week: None     Minutes per session: None    Stress: None   Relationships    Social connections     Talks on phone: None     Gets together: None     Attends Protestant service: None     Active member of club or organization: None     Attends meetings of clubs or organizations: None     Relationship status: None    Intimate partner violence     Fear of current or ex partner: None     Emotionally abused: None     Physically [L03.90] 02/12/2021     Priority: Low    Rhabdomyolysis [M62.82] 02/12/2021     Priority: Low    Abdominal wall cellulitis [J85.798]      Priority: Low    Calciphylaxis [E83.59]      Priority: Low    Elevated troponin [R77.8] 01/21/2021     Priority: Low    A-fib ([facility] [I48.91] 04/04/2020     Priority: Low    End-stage renal disease on hemodialysis ([facility] [N18.6, Z99.2]      Priority: Low    ESRD (end stage renal disease) ([facility] [N18.6] 02/14/2020     Priority: Low    Essential hypertension [I10] 02/14/2020     Priority: Low    Coronary artery disease of native artery of native heart with stable angina pectoris ([facility] [I25.118] 10/15/2019     Priority: Low    CHF (congestive heart failure) ([facility] [I50.9]      Priority: Low    Hyperlipidemia [E78.5] 06/12/2012     Priority: Low    Major depression [F32.9] 05/09/2006     Priority: Low        Assessment/Plan:  1. Abd wall Cellulitis  2. Sepsis  3. Respiratory failure- vented. 4. MS changes  5. PAF- converted to SR. Continue Amiodarone gtt. INR 2.7  6. CABG x3  7. LVEF 60% previously   8. Critically ill pt on max pressors with unfortunate distal vasoconstriction.         Electronically signed by Julieth Reveles MD on 2/16/2021 at 10:28 AM

## 2021-02-16 NOTE — DISCHARGE SUMMARY
Hospital Medicine Discharge Summary    Noemi Jeffers  :  1965  MRN:  [id number]    Admit date:  2021  Discharge date:  2021    Admitting Physician: Belia Harper MD  Primary Care Physician:  Corbin Taylor MD    Noemi Jeffers is a 50 y.o. female that was admitted and treated at [facility] for the following medical issues:     Principal Problem:    Cellulitis  Active Problems:    CHF (congestive heart failure) (HCC)    Coronary artery disease of native artery of native heart with stable angina pectoris (HCC)    ESRD (end stage renal disease) ([address]    Essential hypertension    End-stage renal disease on hemodialysis ([address]    A-fib (HCC)    Elevated troponin    Rhabdomyolysis    Hyperlipidemia    Major depression    Abdominal wall cellulitis    Calciphylaxis    Open breast wound, unspecified laterality, initial encounter    Wound, open, abdominal wall, anterior, initial encounter    Acute respiratory failure with hypoxia ([address]  Resolved Problems:    * No resolved hospital problems. *      Discharge Diagnoses:    Principal Problem:    Cellulitis  Active Problems:    CHF (congestive heart failure) (HCC)    Coronary artery disease of native artery of native heart with stable angina pectoris (HCC)    ESRD (end stage renal disease) ([address]    Essential hypertension    End-stage renal disease on hemodialysis (HCC)    A-fib (HCC)    Elevated troponin    Rhabdomyolysis    Hyperlipidemia    Major depression    Abdominal wall cellulitis    Calciphylaxis    Open breast wound, unspecified laterality, initial encounter    Wound, open, abdominal wall, anterior, initial encounter    Acute respiratory failure with hypoxia ([address]  Resolved Problems:    * No resolved hospital problems.  *    Chief Complaint   Patient presents with    Altered Mental Status     sister called naina edmond she says the pt fell earlier today, has not been taking her meds and is not acting \"coherently\" Hospital Course:   [facility] is a 50 y.o. female who was admitted with sepsis, multiorgan failure and consequently cardiopulm arrest.  Subsequently dnr-cc.    on  at 1601pm.          BP (!) 29/10   Pulse (!) 0   Temp (!) 91 °F (32.8 °C) (Rectal)   Resp 29   Ht 5' 3\" (1.6 m)   Wt 254 lb 10.1 oz (115.5 kg)   SpO2 99%   BMI 45.11 kg/m²     Patient was seen by the following consultants while admitted to [facility]:   Consults:  IP CONSULT TO INFECTIOUS DISEASES  IP CONSULT TO NEPHROLOGY  IP CONSULT TO CASE MANAGEMENT  IP CONSULT TO PHARMACY  IP CONSULT TO WOUND CARE PROVIDER  IP CONSULT TO NEUROLOGY  IP CONSULT TO CARDIOLOGY  IP CONSULT TO PULMONOLOGY  IP CONSULT TO PHARMACY  IP CONSULT TO HOSPICE    Significant Diagnostic Studies:    Refer to chart      Signed:  Ronnie Yadav

## 2021-02-16 NOTE — PROGRESS NOTES
Rapid response called again for hypotension, however by my arrival pressure had been improved with 2A bicarb pushes. Pt remains unresponsive with diffuse mottling and bruising with dusky discoloration of the digits. Pt is persistently hyperglycemic and started on insulin infusion.

## 2021-02-16 NOTE — FLOWSHEET NOTE
Shift summary-    Received this patient at shift change. Patient maxed out on joy, levo, and vaso. BP unstable and labile. Fingers and toes blackened. Eyes yellowed, swollen weeping. L pupil fixed. R reactive sluggish. No gag. No corneal.   Patient withdraws to pain. Non purposeful movement noted in all four extremities. Spoke to Dr Lisa Marcelino around 0720. 2 Amps of bicarb ordered push for hypotension. Given- pulled in override. CRRT continued. Goal to remove 0. Updated order. Patient hypothermic. Dr Manda Hedrick aware. EKG done. Labs sent. Insulin drip updated. Family refused some one touches for comfort promotion. Continued drips. Brother made patient a DNR CCA. Brother, Son, And sister visited over the shift. Provided extensive emotional support to son. Patient referred to hospice for bereavement d.t son being so young. [address] worker came and spoke to [employer]- aware. Will follow up outpatient. Other brother visited- shortly after patient made a DNR CC. Son left around . CRRT stopped around 1545. Extubated. 1550. Drips stopped 1555. TOD 1601. Life ban alerted. Spoke to Juan C. Dr Jeffy Benson aware of TOD. Family thankful for help/care. Belongings given to family member Guille Cantu. No further needs. Patient sent to [facility] after TOD. Family to decide  home at a later date. Number given to contact to make arrangements.

## 2021-02-16 NOTE — FLOWSHEET NOTE
[facility]    Pt responsive. delayed responses to commands. Rt pupil 3 sluggish left pupil 3 brisk, Bilateral lung sounds rhonchi and diminished. 0000- Pt BP is increasing since fluid removal has been stopped. 0330- pt labs back. notified nephrology and Dr. Mitul Logan of electrolyte and CRRT changes. 6262- rapid response called. Pt HR went tara into low 20's Atropine administered. Pt Bicarb infusion temporarily increased due to immediate unavailability of sodium bicarb injection. Sodium Bicarb infusion returned to rate of 125 ml/hr when injection arrived N/S bolus was started. 0500- Pt BP and HR stabalized. Dr. Justin Harman notified of changes in pt status. 3578- Morning labs back. Dr. Mitul Logan notified of critical lab values. See chart for orders.     Handoff report to [facility] RN

## 2021-02-16 NOTE — PROGRESS NOTES
Pulmonary & Critical Care Medicine ICU Progress Note    Subjective:     Overnight events noted. She does remain on CRRT and the support of the ventilator. She remains with 3 vasopressors in place.     EXAM:  General: No acute distress, on the ventilator  HEENT: Normocephalic, atraumatic, oral ETT in place  Lungs : Coarse breath sounds bilaterally  Heart: Regular rate and rhythm  ABD: Obese, absent bowel sounds, soft  Extremities : Warm, dry, wounds noted  Neuro: Unresponsive, no sedation in place  Skin: Wounds noted    MV Settings:  Vent Mode: AC/VC Rate Set: 28 bmp/Vt Ordered: 400 mL/ /FiO2 : 30 %     Recent Labs     02/15/21  1642 02/15/21  2226   PHART 7.328* 7.316*   [id number] 29* 28*   PO2ART 322* 242*         IV:   insulin 8.85 Units/hr (02/16/21 0443)    sodium chloride      sodium chloride      propofol Stopped (02/15/21 1701)    cisatracurium (NIMBEX) infusion      phenylephrine (KATELYNN-SYNEPHRINE) 50mg/250mL infusion 300 mcg/min (02/16/21 0618)    amiodarone 450mg/250ml D5W infusion 0.5 mg/min (02/16/21 0026)    prismasol BGK 4/2.5 2,500 mL/hr (02/16/21 0246)    sodium chloride      norepinephrine 100 mcg/min (02/16/21 0618)    vasopressin (Septic Shock) infusion 0.04 Units/min (02/16/21 0026)    sodium bicarbonate infusion 150 mL/hr at 02/16/21 0503    dextrose         Vitals:  BP (!) 29/10   Pulse 84   Temp 92.1 °F (33.4 °C) (Rectal)   Resp 20   Ht 5' 3\" (1.6 m)   Wt 231 lb 0.7 oz (104.8 kg)   SpO2 98%   BMI 40.93 kg/m²          Intake/Output Summary (Last 24 hours) at 2/16/2021 0734  Last data filed at 2/16/2021 0650  Gross per 24 hour   Intake 3539.3 ml   Output 1724 ml   Net 1815.3 ml       Medications:  Scheduled Meds:   sodium bicarbonate        sodium bicarbonate        sodium bicarbonate        sodium bicarbonate        sodium bicarbonate  100 mEq Intravenous Once    chlorhexidine  15 mL Mouth/Throat BID    piperacillin-tazobactam  3,375 mg Intravenous Q8H    pantoprazole 40 mg Intravenous Daily    And    sodium chloride (PF)  10 mL Intravenous Daily    [Held by provider] insulin lispro  0-18 Units Subcutaneous TID WC    [Held by provider] insulin lispro  0-9 Units Subcutaneous Nightly    warfarin (COUMADIN) daily dosing (placeholder)   Other RX Placeholder    lidocaine  5 mL Intradermal Once    sodium chloride flush  10 mL Intravenous 2 times per day    sodium chloride flush  10 mL Intravenous 2 times per day    vancomycin (VANCOCIN) intermittent dosing (placeholder)   Other RX Placeholder    aspirin  81 mg Oral Daily    [Held by provider] atorvastatin  40 mg Oral Nightly    b complex-C-folic acid  1 capsule Oral Daily    [Held by provider] cloNIDine  0.1 mg Oral Daily    [Held by provider] hydrALAZINE  25 mg Oral BID    topiramate  25 mg Oral BID       Labs:   CBC:   Recent Labs     02/14/21  1630 02/14/21  1630 02/15/21  0600 02/15/21  0600 02/15/21  1642 02/15/21  2226 02/16/21  0600   WBC 19.0*  --  12.4*  --   --   --  9.9   HGB 8.4*   < > 7.9*   < > 8.4* 7.8* 6.1*   HCT 27.7*  --  25.2*  --   --   --  20.0*   MCV 96.0  --  93.1  --   --   --  97.0   *  --  45*  --   --   --  16*    < > = values in this interval not displayed. BMP:   Recent Labs     02/15/21  1215 02/15/21  1215 02/15/21  1805 02/15/21  2226 02/16/21  0317     --  134*  --  127*  129*   K 4.8  --  4.4  --  5.1*  5.1*   CL 92*  --  91*  --  88*  89*   CO2 16*  --  15*  --  13*  13*   PHOS 7.0*  --  6.2*  --  5.8*   BUN 53*  --  40*  --  30*  29*   CREATININE 4.54*   < > 3.81* 2.5* 2.55*  2.50*    < > = values in this interval not displayed.      LIVER PROFILE:   Recent Labs     02/14/21  1630 02/15/21  0600 02/16/21  0317   * 162*  142* 139*   ALT 75* 80*  73* 78*   BILIDIR  --  0.9*  --    BILITOT 1.0* 1.5*  1.1* 1.7*   ALKPHOS 105 105  100 93     PT/INR:   Recent Labs     02/15/21  0015 02/15/21  0600 02/16/21  0600   PROTIME 23.8* 26.6* 28.5*   INR 2.1 2.5 2.7 APTT:   Recent Labs     02/15/21  0015   APTT 43.9*     UA:No results for input(s): NITRITE, COLORU, PHUR, LABCAST, WBCUA, RBCUA, MUCUS, TRICHOMONAS, YEAST, BACTERIA, CLARITYU, SPECGRAV, LEUKOCYTESUR, UROBILINOGEN, BILIRUBINUR, BLOODU, GLUCOSEU, AMORPHOUS in the last 72 hours. Invalid input(s): [facility]      Assessment/Plan:    1. Acute hypoxic respiratory failure-she does continue on the support of the ventilator. She did have a rapid response last evening secondary to bradycardia and hypotension. She was given additional bicarbonate. She does remain on vasopressors as well. Patient does have a poor prognosis for meaningful survival.  I did speak with the patient's brother, who is the POA, over the phone yesterday afternoon as well as again this morning. After our second conversation this morning he did elect to make her DNR CCA. 2. Cardiac arrest, status post successful ROSC-yesterday her cooling protocol was discontinued given that she was able to follow commands. Continue with current supportive care. 3. A. fib with RVR-she is currently on amiodarone. Cardiology does continue to follow. 4. Septic shock-she does remain with significant vasopressor requirement. She is currently on Levophed, Estevan-Synephrine, and vasopressin all at maximal doses. I did have a conversation with the patient's brother over the phone. She currently is a DNR CCA. We discussed that there will be no further escalation or additional of additional vasopressor agents. She is currently on empiric antibiotics including Zosyn and vancomycin. Pharmacy does assist with the dosing of these medications. 5. End-stage renal disease-she was initiated on CRRT yesterday given that she is currently requiring vasopressor agents. This does continue in place. Nutrition: N.p.o. at this time. Prophylaxis: Protonix for GI prophylaxis. She is on Coumadin for DVT prophylaxis.     Code Status: After my discussion with the patient's family this morning she is currently a DNR CCA. This is a 50 y.o. female who is critically ill secondary to severe septic shock and acute respiratory failure. I have spent a total of 50 minutes of critical care time with this patient, review of the chart, and discussion with the bedside staff; excluding any billable procedures.     Electronically signed by Colonel Alysha DO on 2/16/2021 at 7:34 AM

## 2021-02-16 NOTE — PROGRESS NOTES
60%  Positive  icteric sclera, edematous conjunctiva  Skin: warm and dry, no rash. Head: normocephalic and atraumatic  Eyes:  anicteric sclerae and skin  Negative JVD  Lungs: Bilateral scattered rhonchi  Heart: RRR, nl S1/S2, no murmur  Abdomen: Worsening severe mottling of abdominal wall and bilateral lower extremities  Cold to touch with nonpalpable peripheral pulses  NEUROLOGICAL: unable to evaluate because of sedation  No leg edema  abdominal wounds           DATA:    Lab Results   Component Value Date    WBC 9.9 02/16/2021    HGB 6.1 (LL) 02/16/2021    HCT 20.0 (LL) 02/16/2021    MCV 97.0 02/16/2021    PLT 16 (LL) 02/16/2021     Lab Results   Component Value Date    CREATININE 2.55 (H) 02/16/2021    CREATININE 2.50 (H) 02/16/2021    BUN 30 (H) 02/16/2021    BUN 29 (H) 02/16/2021     (L) 02/16/2021     (L) 02/16/2021    K 5.1 (H) 02/16/2021    K 5.1 (H) 02/16/2021    CL 88 (L) 02/16/2021    CL 89 (L) 02/16/2021    CO2 13 (L) 02/16/2021    CO2 13 (L) 02/16/2021       Hepatic Function Panel:  Lab Results   Component Value Date    ALKPHOS 84 02/16/2021    ALT 68 02/16/2021     02/16/2021    PROT 3.4 02/16/2021    BILITOT 1.6 02/16/2021    BILIDIR 1.5 02/16/2021    IBILI 0.1 02/16/2021    LABALBU 1.1 02/16/2021       Microbiology:   Recent Labs     02/15/21  0545   BC No Growth to date. Any change in status will be called. Recent Labs     02/15/21  0549   BLOODCULT2 No Growth to date. Any change in status will be called. No results for input(s): LABURIN in the last 72 hours.   Recent Labs     02/14/21  0438   WNDABS Light growth  Light growth  ID and sensitivity to follow    Heavy growth  No further workup    Light growth  No further workup    Moderate growth  Moderate growth           IMPRESSION:    · Septic shock  · Acute respiratory failure  · Extensive calciphylaxis  · End-stage renal disease on hemodialysis  · Altered mentation on admission  · Grave prognosis in view of the

## 2021-02-16 NOTE — PROGRESS NOTES
day Luda Kim RN, KATELYN   10 mL at 02/16/21 0855    sodium chloride flush 0.9 % injection 10 mL  10 mL Intravenous PRN Luda Kim RN, KATELYN        promethazine (PHENERGAN) tablet 12.5 mg  12.5 mg Oral Q6H PRN Luda Kim RN, NP        Or    ondansetron (ZOFRAN) injection 4 mg  4 mg Intravenous Q6H PRN Luda Kim RN, NP        polyethylene glycol (GLYCOLAX) packet 17 g  17 g Oral Daily PRN Luda Kim RN, NP        acetaminophen (TYLENOL) tablet 650 mg  650 mg Oral Q6H PRN Luda Kim RN NP   650 mg at 02/13/21 0434    Or    acetaminophen (TYLENOL) suppository 650 mg  650 mg Rectal Q6H PRN Luda Kim RN, KATELYN        vancomycin (VANCOCIN) intermittent dosing (placeholder)   Other RX Placeholder Luda Kim RN, NP        aspirin chewable tablet 81 mg  81 mg Oral Daily Luda Kim RN, NP   81 mg at 02/16/21 0920    [Held by provider] atorvastatin (LIPITOR) tablet 40 mg  40 mg Oral Nightly Luda Kim RN, NP   40 mg at 02/12/21 2159    b complex-C-folic acid (NEPHROCAPS) capsule 1 mg  1 capsule Oral Daily Luda Kim RN, NP   Stopped at 02/16/21 0855    [Held by provider] cloNIDine (CATAPRES) tablet 0.1 mg  0.1 mg Oral Daily Luda Kim RN, NP        [Held by provider] hydrALAZINE (APRESOLINE) tablet 25 mg  25 mg Oral BID Luda Kim RN, NP   25 mg at 02/12/21 2201    topiramate (TOPAMAX) tablet 25 mg  25 mg Oral BID Luda Kim RN, NP   25 mg at 02/16/21 0920    albumin human 25 % IV solution 25 g  25 g Intravenous Daily PRN Jaky Estrella MD           PHYSICAL EXAM:    BP (!) 29/10   Pulse 67   Temp 92.3 °F (33.5 °C) (Rectal)   Resp 20   Ht 5' 3\" (1.6 m)   Wt 254 lb 10.1 oz (115.5 kg)   SpO2 (!) 86%   BMI 45.11 kg/m²   General Appearance:      Skin:  normal  CVS - Normal sounds, No murmurs , No carotid Bruits  RS -CTA  Abdomen Soft, MD, Joe Iyer, American Board of Psychiatry & Neurology  Board Certified in Vascular Neurology  Board Certified in Neuromuscular Medicine  Certified in . [place]

## 2021-02-17 LAB
BLOOD CULTURE, ROUTINE: NORMAL
CULTURE, BLOOD 2: NORMAL
GRAM STAIN RESULT: ABNORMAL
HAPTOGLOBIN: 63 MG/DL (ref 30–200)
ORGANISM: ABNORMAL
WOUND/ABSCESS: ABNORMAL

## 2021-02-17 NOTE — ADT AUTH CERT
Cellulitis - Care Day 6 (2/16/2021) by Vaishnavi Jalloh RN       Review Status Review Entered   Completed 2/17/2021 12:17      Criteria Review      Care Day: 6 Care Date: 2/16/2021 Level of Care: ICU    Guideline Day 2    Level Of Care    ( ) Floor    2/17/2021 12:17 PM EST by Marie Laboy      ICU on vent    Clinical Status    ( ) * Dehydration absent    ( ) * Mental status at baseline    2/17/2021 12:17 PM EST by Marie Laboy      sedated    ( ) * Hemodynamic stability    2/17/2021 12:17 PM EST by Itzel Laboy      temp 91, pulse 66, resp 27, bp 89/33, map 45, pulse ox 79% on vent with fio2 30%    (X) * Afebrile or fever improved    Routes    (X) * Oral hydration, medications    2/17/2021 12:17 PM EST by Itzel Laboy      iv ns 500 cc iv x1  iv sodium bicarb 100 meq iv prn x2  iv sodium bicarb 50 meq iv x1   iv cordarone gtt at 16.7 ml/ hr  iv humulin r insulin gtt at 0.5 ml/ h r  iv levophed gtt at 98.3 ml/ hr  iv neosenephrine gtt at 90 ml/ h r  iv prismasol BGK 4/2.5 dial    Interventions    (X) WBC    2/17/2021 12:17 PM EST by Itzel Laboy      na 127  k 5.1  cl 88  co2 13  bun 30  creat 2.55  anion gap 26  gfr 19.7  gluc 355  ca 6.9  phos 5.8  tp 3.4   ck 1729  ck mb 87.2  ld 828  rbc 2.06  h/h 6.1/ 20/mchc 30.5  rdw 17.5  ptl 16  neut 9.6   lymph 0.3  mono 0.0    Medications    (X) IV antibiotics    2/17/2021 12:17 PM EST by Marie Laboy      iv zosyn 3.375 gm iv q8h  iv sodium bicarb gtt at 150 ml/ h r  iv vasopressin gtt at 12 ml/ h r    * Milestone   Additional Notes   2/16/2021  care day 6      Physical Exam   General Appearance: sedated, non responsive to verbals   Intubated on assist control 60%   Positive  icteric sclera, edematous conjunctiva   Skin: warm and dry, no rash.     Head: normocephalic and atraumatic   Eyes:  anicteric sclerae and skin   Negative JVD   Lungs: Bilateral scattered rhonchi   Heart: RRR, nl S1/S2, no murmur   Abdomen: Worsening severe mottling of abdominal wall and bilateral lower extremities Cold to touch with nonpalpable peripheral pulses   NEUROLOGICAL: unable to evaluate because of sedation         Treatment plan attending/ consults    Infectious disease impression       IMPRESSION:     · Septic shock   · Acute respiratory failure   · Extensive calciphylaxis   · End-stage renal disease on hemodialysis   · Altered mentation on admission   · Grave prognosis in view of the extensive severe mottling of abdomen and legs       Patient Active Problem List   Diagnosis   · Angina pectoris, unspecified ([facility])   · CHF (congestive heart failure) ([facility])   · NSTEMI (non-ST elevated myocardial infarction) ([facility] [address]   · Coronary artery disease of native artery of native heart with stable angina pectoris ([facility])   · ESRD (end stage renal disease) ([facility] [address]   · Essential hypertension   · Hx of CABG   · Paroxysmal atrial fibrillation ([facility])   · CHF (congestive heart failure), NYHA class I, acute on chronic, combined ([facility] [address]   · Uremia   · Goals of care, counseling/discussion   · End-stage renal disease on hemodialysis ([facility] [address]   · MDD (major depressive disorder), recurrent severe, without psychosis ([facility])   · A-fib ([facility] [address]   · Renal failure   · Hyperkalemia   · Dyspnea on exertion   · Abrasion of back   · Atrial fibrillation with rapid ventricular response ([facility])   · Cellulitis   · Elevated troponin   · Rhabdomyolysis   · Hyperlipidemia   · Major depression   · Abdominal wall cellulitis   · Calciphylaxis   · Open breast wound, unspecified laterality, initial encounter   · Wound, open, abdominal wall, anterior, initial encounter   · Acute respiratory failure with hypoxia ([facility] [address]           PLAN:   · Recommend terminal weaning and pressor discontinuation once family decides to withdraw care    · continue IV vancomycin and Zosyn for now   · Vent and vasopressor support   · Grave prognosis      Renal impression   Assessment:   50 y. o. year old female with history s/f ESRD on IHD MWF, A.fib, T2DM, HTN, CAD s/p CABG who presented for AMS.     1. ESRD on IHD MWF: currently on CRRT started 2/15, has NTDC in place    2. Abdominal wall cellulitis now w/ septic shocK: ID onboard   3. Cardiac arrest: s/p ROSC, multiple episodes    4. Elevated CK   5. Hyperkalemia   6. Metabolic acidosis 2/2 renal failure and lactic acidosis   7. Hypoalbuminemia    8. Transaminitis    9. Acute hypoxic respiratory failure: s/p intubation    10. Septic shock : on 3 pressors   11. A.fib w/ RVR: cardiology managing        Plan:   - continue CVVHD    - has very poor prognosis       ICU md impression   Bedside nurse has been having conversations throughout the day with the patient's family at the bedside.  She stated that the patient's brother, who is the POA does wish to transition to comfort measures.  [facility] CC was placed in the computer and orders were written for when the family is ready         Cellulitis - Care Day 5 (2/15/2021) by Cora Fuentes RN       Review Status Review Entered   Completed 2/17/2021 12:08      Criteria Review      Care Day: 5 Care Date: 2/15/2021 Level of Care: ICU    Guideline Day 2    Level Of Care    ( ) Floor    2/17/2021 12:08 PM EST by Merlin Pee in ICU on vent  prn- iv dextrose 12.5 gm iv x1    Clinical Status    ( ) * Dehydration absent    ( ) * Mental status at baseline    2/17/2021 12:08 PM EST by Umu Laboy      sedated    ( ) * Hemodynamic stability    2/17/2021 12:08 PM EST by Itzel Laboy      temp 91.2- rectal, pulse 118, tele a fib, resp 26, bp 164/66, map 62, bp 81/32- art ine, map 45, pulse ox 90 % on vent with fio2 60 %    (X) * Afebrile or fever improved    Activity    ( ) Activity as tolerated    2/17/2021 12:08 PM EST by Umu Laboy      sedated    Routes    (X) * Oral hydration, medications    2/17/2021 12:08 PM EST by Itzel Laboy      iv ns 500 cc iv bolus x1   iv cordarone 150 mg iv x1   iv lr 500 cc iv x12  iv lr 1000 cc iv x1   iv humulin r 10 units iv x1  iv na bicarb 100 meq iv x2  iv cordarone GTT at 16.7 nl/ hr  iv epinephrine GTT Coronary artery disease of native artery of native heart with stable angina pectoris ([facility])   · ESRD (end stage renal disease) ([facility] [address]   · Essential hypertension   · Hx of CABG   · Paroxysmal atrial fibrillation ([facility])   · CHF (congestive heart failure), NYHA class I, acute on chronic, combined ([facility])   · Uremia   · Goals of care, counseling/discussion   · End-stage renal disease on hemodialysis ([facility])   · MDD (major depressive disorder), recurrent severe, without psychosis ([facility])   · A-fib ([facility])   · Renal failure   · Hyperkalemia   · Dyspnea on exertion   · Abrasion of back   · Atrial fibrillation with rapid ventricular response ([facility])   · Cellulitis   · Elevated troponin   · Rhabdomyolysis   · Hyperlipidemia   · Major depression   · Abdominal wall cellulitis   · Calciphylaxis   · Open breast wound, unspecified laterality, initial encounter   · Wound, open, abdominal wall, anterior, initial encounter           PLAN:   · Continue IV vancomycin and Zosyn   · Vent and vasopressor support   · Check EEG   · Grave prognosis      Renal impression   Assessment:   50 y. o. year old female with history s/f ESRD on IHD MWF, A.fib, T2DM, HTN, CAD s/p CABG who presented for AMS.     1. ESRD on IHD MWF   2. AMS: w/u pending   3. Abdominal wall cellulitis: on vanc and zosyn, ID onboard    4. Elevated CK   5. Hyperkalemia   6. Metabolic acidosis 2/2 renal failure and lactic acidosis   7. Hypoalbuminemia    8. Transaminitis    9.  Acute hypoxic respiratory failure: s/p intubation    10.        Plan:   - will start pt on CVVHD w/ higher rates to account for acidosis, net negative 50 ml/hr for now, will adjust as tolerated        Neuro impression   Encephalopathy related to underlying infective process.  Patient has significant skin infections and that explains her elevated CRP is.  Patient became hypotensive and is likely septic with otherwise nonfocal examination her recent MRI did not show any strokes.  We will keep to keep observation and see how she does once on the floor were noted.  She has significant cellulitis all over her skin.  Clinical examination is difficult to ascertain due to sedation at this time.

## 2021-02-20 LAB
BLOOD CULTURE, ROUTINE: NORMAL
CULTURE, BLOOD 2: NORMAL

## 2021-04-27 NOTE — ED NOTES
Attempted to place a ortiz in the pt. Urethral meatus clearly visible, ortiz inserted no urine returned  Resistance met with attempt to inflate balloon   Ortiz removed and purewik placed.       Ramses Keita RN  02/11/21 7260 ETT pulled back to the 21 at the lip.   Gil Pack RRT

## 2022-02-10 NOTE — ED TRIAGE NOTES
Pt states she fell on Monday while walking. Pt states since then her right hip and right knee has been hurting. Pt also has bruise to right forearm and states her left knee is tender. Pt states she has been ambulatory at home with a walker. Pt goes to hemodialysis M,W,F, has not missed any appts. Pt alert and oriented x 4. Skin pink, warm, dry. Respirations even and unlabored. No distress noted at this time. Metronidazole Counseling:  I discussed with the patient the risks of metronidazole including but not limited to seizures, nausea/vomiting, a metallic taste in the mouth, nausea/vomiting and severe allergy.

## 2023-05-30 NOTE — PROGRESS NOTES
Bedside nurse has been having conversations throughout the day with the patient's family at the bedside. She stated that the patient's brother, who is the POA does wish to transition to comfort measures. DNR CC was placed in the computer and orders were written for when the family is ready. REAPPLY BRA AFTER SHOWERING STARTING 48 HRS

## 2025-03-06 NOTE — PROGRESS NOTES
Neurology Follow up    SUBJECTIVE: Patient is intubated with some awakening with sound. Patient became hypotensive with respiratory distress on the medical floor. Patient is infected all over her skin abdomen and elsewhere.   Is on mild sedation    Current Facility-Administered Medications   Medication Dose Route Frequency Provider Last Rate Last Admin    chlorhexidine (PERIDEX) 0.12 % solution 15 mL  15 mL Mouth/Throat BID [facility] Sedar, DO   15 mL at 02/15/21 1212    propofol injection  5-50 mcg/kg/min Intravenous Continuous Gibson D Sedar, DO 6.1 mL/hr at 02/15/21 0656 10 mcg/kg/min at 02/15/21 0656    cisatracurium besylate (NIMBEX) 200 mg in sodium chloride 0.9 % 100 mL infusion  0.5-10 mcg/kg/min (Ideal) Intravenous Continuous [facility] Sedar, DO        EPINEPHrine (EPINEPHrine HCL) 5 mg in dextrose 5 % 250 mL infusion  1-30 mcg/min Intravenous Continuous [facility] Sedar, DO 6 mL/hr at 02/15/21 0803 2 mcg/min at 02/15/21 0803    sodium chloride 0.9 % infusion             phenylephrine (KATELYNN-SYNEPHRINE) 50 mg in dextrose 5 % 250 mL infusion   mcg/min Intravenous Continuous Brigid Sabot, DO 9 mL/hr at 02/15/21 1107 30 mcg/min at 02/15/21 1107    amiodarone (CORDARONE) 450 mg in dextrose 5 % 250 mL infusion  1 mg/min Intravenous Continuous Brigid Sabot, DO        amiodarone (CORDARONE) 450 mg in dextrose 5 % 250 mL infusion  0.5 mg/min Intravenous Continuous Amita TSAI Korey, DO 16.7 mL/hr at 02/15/21 1108 0.5 mg/min at 02/15/21 1108    vancomycin (VANCOCIN) 1,250 mg in dextrose 5 % 250 mL IVPB  1,250 mg Intravenous Once Heidi Parnell MD        0.9 % sodium chloride bolus  1,000 mL Intravenous Once PRN Grace Gage MD        prismasol BGK 4/2.5 dialysis solution   Dialysis Continuous Gogo Billy MD        sodium phosphate 6 mmol in dextrose 5 % 250 mL IVPB  6 mmol Intravenous PRN Gogo Billy MD        Or    sodium phosphate 12 mmol in dextrose 5 % 250 mL IVPB  12 mmol Intravenous Subjective   Chief Complaint   Patient presents with    Generalized Body Aches     Follow up         Donald Bennett is a 33 y.o. female.    History of Present Illness  The patient presents for evaluation of inflammation.    She reports a recurrence of neck inflammation, which had previously subsided. She experiences intermittent pain, with some days being more severe than others. She is uncertain about potential triggers for her symptoms. She has attempted to alleviate her discomfort by changing her sleeping position and using different pillows. She also reports pain in her hip, arm, and hand, but does not experience any swelling. She has been taking diclofenac twice daily, which she finds effective, but she has exhausted her supply. She occasionally uses methocarbamol but is unsure of its efficacy. She has been taking over-the-counter Zyrtec for allergies, but she is unsure if it is helping.    MEDICATIONS  Current: diclofenac, methocarbamol, Zyrtec    I have reviewed the following portions of the patient's history and confirmed they are accurate: allergies, current medications, past family history, past medical history, past social history, past surgical history, and problem list    Review of Systems  Pertinent items are noted in HPI.     Current Outpatient Medications on File Prior to Visit   Medication Sig    cetirizine (zyrTEC) 10 MG tablet Take 1 tablet by mouth Every Night.    clobetasol (TEMOVATE) 0.05 % external solution Apply  topically to the appropriate area as directed 2 (Two) Times a Day.    methocarbamol (ROBAXIN) 500 MG tablet Take 1 tablet by mouth 2 (Two) Times a Day As Needed for Muscle Spasms (muscule pain).    triamcinolone (KENALOG) 0.1 % cream Apply 1 Application topically to the appropriate area as directed 2 (Two) Times a Day As Needed for Rash.     No current facility-administered medications on file prior to visit.       Objective   Vitals:    02/27/25 1316   BP: 110/64   BP Location:  "Left arm   Patient Position: Sitting   Cuff Size: Adult   Pulse: 67   Temp: 97.8 °F (36.6 °C)   SpO2: 100%   Weight: 74.8 kg (165 lb)   Height: 170.2 cm (67.01\")     Body mass index is 25.84 kg/m².    Physical Exam  Vitals reviewed.   Constitutional:       Appearance: Normal appearance. She is well-developed.   HENT:      Head: Normocephalic and atraumatic.      Nose: Nose normal.   Eyes:      General: Lids are normal.      Conjunctiva/sclera: Conjunctivae normal.      Pupils: Pupils are equal, round, and reactive to light.   Neck:      Thyroid: No thyromegaly.      Trachea: Trachea normal.   Cardiovascular:      Rate and Rhythm: Normal rate and regular rhythm.      Heart sounds: Normal heart sounds.   Pulmonary:      Effort: Pulmonary effort is normal. No respiratory distress.      Breath sounds: Normal breath sounds.   Chest:       Musculoskeletal:      Right wrist: Tenderness present.      Right hand: Tenderness present.      Cervical back: Tenderness present.      Left hip: Tenderness present.   Skin:     General: Skin is warm and dry.   Neurological:      Mental Status: She is alert and oriented to person, place, and time.      GCS: GCS eye subscore is 4. GCS verbal subscore is 5. GCS motor subscore is 6.   Psychiatric:         Attention and Perception: Attention normal.         Mood and Affect: Mood normal.         Speech: Speech normal.         Behavior: Behavior normal. Behavior is cooperative.         Thought Content: Thought content normal.         Results  Laboratory Studies  Inflammatory markers are elevated. Sed rate was up, CRP was really elevated. Blood count looked good.    Imaging  X-ray of hip, chest, and left ribs looked normal.    Lab Results   Component Value Date    WBC 4.83 01/20/2025    HGB 14.0 01/20/2025    HCT 41.3 01/20/2025    MCV 96.7 01/20/2025     (L) 01/20/2025      Lab Results   Component Value Date    GLUCOSE 89 01/20/2025    BUN 13 01/20/2025    CREATININE 0.91 01/20/2025 " PRN Merline Bone MD        Or    sodium phosphate 18 mmol in dextrose 5 % 500 mL IVPB  18 mmol Intravenous PRN Merline Bone MD        Or    sodium phosphate 24 mmol in dextrose 5 % 500 mL IVPB  24 mmol Intravenous PRN Gogo Billy MD        metoprolol (LOPRESSOR) injection 5 mg  5 mg Intravenous Q6H PRN Ihsan Peña MD        lidocaine 2 % injection 5 mL  5 mL Intradermal Once Ihsan Peña MD        sodium chloride flush 0.9 % injection 10 mL  10 mL Intravenous 2 times per day Ihsan Peña MD   10 mL at 02/15/21 1215    sodium chloride flush 0.9 % injection 10 mL  10 mL Intravenous PRN Ihsan Peña MD        0.9 % sodium chloride infusion  250 mL Intravenous Once Ihsan Peña MD        norepinephrine (LEVOPHED) 16 mg in dextrose 5% 250 mL infusion  2-100 mcg/min Intravenous Continuous Ihsan Peña MD 5.6 mL/hr at 02/15/21 0622 6 mcg/min at 02/15/21 0622    vasopressin 20 Units in dextrose 5 % 100 mL infusion  0.04 Units/min Intravenous Continuous Versie Cole Sedar, DO 12 mL/hr at 02/15/21 0803 0.04 Units/min at 02/15/21 0803    sodium bicarbonate 150 mEq in dextrose 5 % 1,000 mL infusion   Intravenous Continuous Versie Cole Sedar, DO 50 mL/hr at 02/15/21 0458 New Bag at 02/15/21 0458    insulin lispro (HUMALOG) injection vial 0-12 Units  0-12 Units Subcutaneous Q6H Luda Kim RN, NP   10 Units at 02/15/21 0319    glucose (GLUTOSE) 40 % oral gel 15 g  15 g Oral PRN Luda Kim RN, NP        dextrose 50 % IV solution  12.5 g Intravenous PRN Luda Kim RN, NP   12.5 g at 02/15/21 0100    glucagon (rDNA) injection 1 mg  1 mg Intramuscular PRN Luda Kim RN, NP        dextrose 5 % solution  100 mL/hr Intravenous PRN Luad Kim RN, NP        LORazepam (ATIVAN) injection 1 mg  1 mg Intravenous Q6H PRN Ihsan Peña MD        haloperidol lactate (HALDOL) injection 2 mg  2 mg Intramuscular Q6H PRN Ihsan Peña MD   2 mg at 02/13/21     01/20/2025    K 4.3 01/20/2025     01/20/2025    CALCIUM 9.4 01/20/2025    PROTEINTOT 7.2 01/20/2025    ALBUMIN 3.9 01/20/2025    ALT 15 01/20/2025    AST 17 01/20/2025    ALKPHOS 50 01/20/2025    BILITOT 0.4 01/20/2025    GLOB 3.3 01/20/2025    AGRATIO 1.2 01/20/2025    BCR 14.3 01/20/2025    ANIONGAP 12.0 01/20/2025    EGFR 86.7 01/20/2025      Lab Results   Component Value Date    HGBA1C 5.20 06/13/2024      Lab Results   Component Value Date    CHOL 167 02/16/2024    TRIG 35 02/16/2024    HDL 67 (H) 02/16/2024    LDL 92 02/16/2024      Lab Results   Component Value Date    TSH 1.410 02/16/2024          Assessment & Plan   Problem List Items Addressed This Visit    None  Visit Diagnoses       Pain of left side of body    -  Primary    Relevant Medications    diclofenac (VOLTAREN) 75 MG EC tablet    Rib pain on left side        Relevant Medications    diclofenac (VOLTAREN) 75 MG EC tablet    Right hand tendonitis        Relevant Medications    diclofenac (VOLTAREN) 75 MG EC tablet    Generalized joint pain        Relevant Orders    Ambulatory Referral to Rheumatology (Completed)    Body aches        Relevant Orders    Ambulatory Referral to Rheumatology (Completed)    Elevated C-reactive protein (CRP)        Relevant Orders    Ambulatory Referral to Rheumatology (Completed)    Elevated sed rate        Relevant Orders    Ambulatory Referral to Rheumatology (Completed)    Right hand pain        Relevant Medications    diclofenac (VOLTAREN) 75 MG EC tablet    Chest wall pain        Relevant Medications    diclofenac (VOLTAREN) 75 MG EC tablet    Left hip pain        Relevant Medications    diclofenac (VOLTAREN) 75 MG EC tablet               Current Outpatient Medications:     diclofenac (VOLTAREN) 75 MG EC tablet, Take 1 tablet by mouth 2 (Two) Times a Day As Needed (right hand pain). Take with food., Disp: 60 tablet, Rfl: 1    cetirizine (zyrTEC) 10 MG tablet, Take 1 tablet by mouth Every Night.,  1615    sodium chloride flush 0.9 % injection 10 mL  10 mL Intravenous 2 times per day Luda Kim RN, NP   10 mL at 02/15/21 1215    sodium chloride flush 0.9 % injection 10 mL  10 mL Intravenous PRN Luda Kim RN, KATELYN        promethazine (PHENERGAN) tablet 12.5 mg  12.5 mg Oral Q6H PRN Luda Kim RN, KATELYN        Or    ondansetron (ZOFRAN) injection 4 mg  4 mg Intravenous Q6H PRN Luda Kim RN, NP        polyethylene glycol (GLYCOLAX) packet 17 g  17 g Oral Daily PRN Luda Kim RN, NP        acetaminophen (TYLENOL) tablet 650 mg  650 mg Oral Q6H PRN Luda Kim RN, NP   650 mg at 02/13/21 0434    Or    acetaminophen (TYLENOL) suppository 650 mg  650 mg Rectal Q6H PRN Luda Kim RN, NP        piperacillin-tazobactam (ZOSYN) 2,250 mg in dextrose 5 % 50 mL IVPB (mini-bag)  2,250 mg Intravenous Q8H Luda Kim RN,  mL/hr at 02/15/21 1214 2,250 mg at 02/15/21 1214    vancomycin (VANCOCIN) intermittent dosing (placeholder)   Other RX Placeholder Luda Kim RN, NP        aspirin chewable tablet 81 mg  81 mg Oral Daily Luda Kim RN, NP   81 mg at 02/15/21 1212    [Held by provider] atorvastatin (LIPITOR) tablet 40 mg  40 mg Oral Nightly Luda Kim RN NP   40 mg at 02/12/21 2159    b complex-C-folic acid (NEPHROCAPS) capsule 1 mg  1 capsule Oral Daily Luda Kim RN, NP        [Held by provider] cloNIDine (CATAPRES) tablet 0.1 mg  0.1 mg Oral Daily Luda Kim RN, NP        Ferric Citrate TABS 210 mg  210 mg Oral TID WC Luda Kim RN, NP   Stopped at 02/14/21 1700    [Held by provider] hydrALAZINE (APRESOLINE) tablet 25 mg  25 mg Oral BID Luda Kim, RN, NP   25 mg at 02/12/21 2201    metoprolol tartrate (LOPRESSOR) tablet 50 mg  50 mg Oral BID Luda Kim RN, NP   50 mg at 02/12/21 2200    topiramate (TOPAMAX) tablet 25 mg  25 mg Oral BID Luda Kim, RN, NP   25 mg at 02/15/21 1213    albumin human 25 % IV solution 25 g  25 g Intravenous Daily PRN Kaley Harmon MD           PHYSICAL EXAM:    BP (!) 164/66   Pulse 146   Temp 97 °F (36.1 °C)   Resp 28   Ht 5' 3\" (1.6 m)   Wt 231 lb 0.7 oz (104.8 kg)   SpO2 100%   BMI 40.93 kg/m²    General Appearance:      Skin:  normal  CVS - Normal sounds, No murmurs , No carotid Bruits  RS -CTA  Abdomen Soft, BS present  Review of Systems   Mental Status Exam:             Level of Alertness:   Patient is very sedate. Funduscopic Exam:     Cranial Nerves            Cranial nerve III           Pupils:  equal, round, reactive to light      Cranial nerves III, IV, VI           Extraocular Movements: intact/patient tries to open her eyes with name       Motor: Unable to perform any particular motor exam but she does have triple response of the lower extremity.   Drift:  absent  Motor exam is symmetrical 5 out of 5 all extremities bilaterally  Tone:  normal  Abnormal Movements:  absent            Sensory: Unable to perform        Pinprick             Right Upper Extremity:  normal             Left Upper Extremity:  normal             Right Lower Extremity:  normal             Left Lower Extremity:  normal           Vibration                         Touch            Proprioception                 Coordination: Able to perform          Finger/Nose   Right:  normal              Left:  normal          Heel-Knee-Shin                Right:  normal              Left:  normal          Rapid Alternating Movements              Right:  normal              Left:  normal          Gait:                       Casual:  normal                         Romberg:  normal            Reflexes:             Deep Tendon Reflexes:             Reflexes are 2 +             Plantar response:                Right:  downgoing               Left:  downgoing    Vascular:  Cardiac Exam:  normal Disp: 30 tablet, Rfl: 5    clobetasol (TEMOVATE) 0.05 % external solution, Apply  topically to the appropriate area as directed 2 (Two) Times a Day., Disp: 50 mL, Rfl: 1    methocarbamol (ROBAXIN) 500 MG tablet, Take 1 tablet by mouth 2 (Two) Times a Day As Needed for Muscle Spasms (muscule pain)., Disp: 60 tablet, Rfl: 2    triamcinolone (KENALOG) 0.1 % cream, Apply 1 Application topically to the appropriate area as directed 2 (Two) Times a Day As Needed for Rash., Disp: 453 g, Rfl: 1    Assessment & Plan  1. Inflammation, cervical pain, joint pain.  Her inflammatory markers are elevated, but she tested negative for rheumatoid arthritis and lupus. The x-ray results of her hip, chest, and left ribs were unremarkable. Her blood count is within normal limits. A referral to rheumatology will be initiated due to the elevated inflammatory markers. A prescription refill for diclofenac 75 mg, to be taken twice daily, will be provided. She is advised to continue her follow-ups with orthopedics. If she does not receive an appointment with rheumatology within a few weeks, she should inform us so that we can assist further.         Plan of care reviewed with the patient at the conclusion of today's visit.  Education was provided regarding diagnosis, management, and any prescribed or recommended OTC medications.  Patient verbalized understanding of and agreement with management plan.     Return if symptoms worsen or fail to improve.      Transcribed from ambient dictation for JEANNE Robles by JEANNE Robles.  03/05/25   20:45 EST    Patient or patient representative verbalized consent for the use of Ambient Listening during the visit with  JEANNE Robles for chart documentation. 3/5/2025  20:46 EST     Ct Head Wo Contrast    Result Date: 2/12/2021  CT Brain Contrast medium:  Not utilized. History: Altered mental status, headache, neck pain, possible fall Comparison:  CT brain, July 26, 2020 Findings: Extra-axial spaces:  Normal. Intracranial hemorrhage:  None. Ventricular system:  Normal for age. Basal Cisterns:  Normal. Cerebral Parenchyma:  Normal. Midline Shift:  None. Cerebellum:  Normal. Paranasal sinuses and mastoid air cells:  Normal. Visualized Orbits:  Normal.     Impression: No acute findings. . All CT scans at this facility use dose modulation, iterative reconstruction, and/or weight based dosing when appropriate to reduce radiation dose to as low as reasonably achievable. Ct Cervical Spine Wo Contrast    Result Date: 2/12/2021  CT cervical spine without intravenous contrast medium. HISTORY:  Altered mental status, headache, neck pain. fall TECHNICAL FACTORS: CT cervical spine obtained and formatted as 2.5 mm contiguous axial images from skull base to the level of. Sagittal and coronal reconstructions were obtained during postprocessing. No contrast medium was utilized. COMPARISON: None FINDINGS: Cervical vertebral bodies are normal in height and alignment Atlantooccipital articulation maintained. Atlantoaxial interval preserved. Neural foramina intact. Mild disc space narrowing C5-C6. No fractures, dislocations, bone lesions. Limited imaging lung apices without anomaly. Carotid arteries and soft tissues are without anomaly. No fracture. All CT scans at this facility use dose modulation, iterative reconstruction, and/or weight based dosing when appropriate to reduce radiation dose to as low as reasonably achievable. Xr Chest Portable    Result Date: 2/12/2021  EXAMINATION: CHEST PORTABLE VIEW  CLINICAL HISTORY: Fatigue COMPARISONS: February 5, 2021  FINDINGS: Single  views of the chest is submitted. There are multiple median sternotomy wires.  There is an endovascular stent overlying the left upper chest. The cardiac silhouette is enlarged Pulmonary vascular unremarkable. Right sided trachea. No focal infiltrates. No Pneumothoraces. NO ACUTE ACTIVE CARDIOPULMONARY PROCESS    Us Dup Lower Extremities Bilateral Venous    Result Date: 2/12/2021  US DUP LOWER EXTREMITIES BILATERAL VENOUS CLINICAL HISTORY: Confusion, agitation COMPARISONS: FINDINGS: Duplex color ultrasound as well as  both gray scale and spectral Doppler ultrasound of the deep venous system of both the left land right lower extremity from the inguinal ligaments to the popliteal fossa was performed. There are no findings of deep venous thrombus in the visualized vessels of the left or right  lower extremity. NO FINDINGS OF  DEEP VENOUS THROMBUS IN THE VISUALIZED VESSELS OF THE LEFT OR RIGHT  LOWER EXTREMITY. Recent Labs     02/13/21  0820 02/14/21  0505 02/14/21  0505 02/14/21  1630 02/14/21  1630 02/15/21  0022 02/15/21  0600 02/15/21  0611   WBC 15.3* 17.8*  --  19.0*  --   --  12.4*  --    HGB 8.0* 8.9*   < > 8.4*   < > 8.7* 7.9* 8.5*    146  --  126*  --   --   --   --     < > = values in this interval not displayed. Recent Labs     02/15/21  0136 02/15/21  0600 02/15/21  0607 02/15/21  0611    138 142  --    K 5.4* 3.8 4.7  --    CL 91* 94* 90*  --    CO2 9* 22 16*  --    BUN 66* 40* 71*  --    CREATININE 6.94* 3.71* 7.14* 5.3*   GLUCOSE 360* 254* 318*  --      Recent Labs     02/14/21  0505 02/14/21  1630 02/15/21  0600   BILITOT 0.9* 1.0* 1.5*  1.1*   ALKPHOS 89 105 105  100   AST 75* 155* 162*  142*   ALT 69* 75* 80*  73*     Lab Results   Component Value Date    PROTIME 26.6 02/15/2021    INR 2.5 02/15/2021     No results found for: LITHIUM, DILFRTOT, VALPROATE    ASSESSMENT AND PLAN  Encephalopathy related to underlying infective process.   Patient has significant skin infections and that explains her elevated CRP is.  Patient became hypotensive and is likely septic with otherwise nonfocal examination her recent MRI did not show any strokes. We will keep to keep observation and see how she does once on the floor were noted. She has significant cellulitis all over her skin. Clinical examination is difficult to ascertain due to sedation at this time. Critical  care time of 35 minutes    Marvin Mehta MD, Sarah Clark, American Board of Psychiatry & Neurology  Board Certified in Vascular Neurology  Board Certified in Neuromuscular Medicine  Certified in . [place]